# Patient Record
Sex: MALE | Race: WHITE | NOT HISPANIC OR LATINO | URBAN - METROPOLITAN AREA
[De-identification: names, ages, dates, MRNs, and addresses within clinical notes are randomized per-mention and may not be internally consistent; named-entity substitution may affect disease eponyms.]

---

## 2018-02-20 ENCOUNTER — OUTPATIENT (OUTPATIENT)
Dept: OUTPATIENT SERVICES | Facility: HOSPITAL | Age: 83
LOS: 1 days | End: 2018-02-20
Payer: MEDICARE

## 2018-02-20 DIAGNOSIS — I62.00 NONTRAUMATIC SUBDURAL HEMORRHAGE, UNSPECIFIED: ICD-10-CM

## 2018-02-20 PROCEDURE — 70450 CT HEAD/BRAIN W/O DYE: CPT

## 2018-02-20 PROCEDURE — 70450 CT HEAD/BRAIN W/O DYE: CPT | Mod: 26

## 2018-02-22 ENCOUNTER — OTHER (OUTPATIENT)
Age: 83
End: 2018-02-22

## 2019-10-24 ENCOUNTER — APPOINTMENT (OUTPATIENT)
Dept: NEUROLOGY | Facility: CLINIC | Age: 84
End: 2019-10-24

## 2022-11-15 ENCOUNTER — APPOINTMENT (OUTPATIENT)
Dept: UROLOGY | Facility: CLINIC | Age: 87
End: 2022-11-15

## 2022-11-15 VITALS — DIASTOLIC BLOOD PRESSURE: 77 MMHG | TEMPERATURE: 98.6 F | SYSTOLIC BLOOD PRESSURE: 117 MMHG | HEART RATE: 60 BPM

## 2022-11-15 PROCEDURE — 99203 OFFICE O/P NEW LOW 30 MIN: CPT | Mod: 25

## 2022-11-15 PROCEDURE — 51798 US URINE CAPACITY MEASURE: CPT

## 2022-11-15 RX ORDER — SOLIFENACIN SUCCINATE 5 MG/1
5 TABLET ORAL DAILY
Qty: 30 | Refills: 11 | Status: ACTIVE | COMMUNITY
Start: 2022-11-15 | End: 1900-01-01

## 2022-11-15 RX ORDER — TAMSULOSIN HYDROCHLORIDE 0.4 MG/1
0.4 CAPSULE ORAL
Qty: 30 | Refills: 3 | Status: ACTIVE | COMMUNITY
Start: 2022-11-15 | End: 1900-01-01

## 2022-11-15 NOTE — HISTORY OF PRESENT ILLNESS
[FreeTextEntry1] : 89 male. Here with his daughter ([employer] Employee) and his wife.\par \par cc: frequent urination, elida at night (5-6x at night); bedwetting\par HPI: Since August 2022, the frequency and nocturia became more noticeable. During the day, pt is experiencing frequency. At night, main complaint is nocturia 5-6x/night and enuresis. Reports being on Flomax in the past but was stopped. Denies: hematuria, chills, fever. Hx of Dementia which has been worsening. Family concerned with attempting to render patient more dry with less wetting accidents, difficult to manage at home given worsening dementia. Does poor job with fluid intake, does endorse signifiicant constipation. \par \par Allergies: NKDA\par Anticoagulants: Plavix\par Medical: Dementia, bradycardia, heart failure, kidney stones, constipation\par Surgical: PPM, cholecystectomy, possibly kidney stone surgery\par Social: lives at home with wife, walks with cane\par Family: no known family cancers\par \par PVR: 44 mL\par \par  [Urinary Incontinence] : no urinary incontinence [Urinary Retention] : no urinary retention [Urinary Urgency] : urinary urgency [Urinary Frequency] : urinary frequency

## 2022-11-15 NOTE — REVIEW OF SYSTEMS
[History of kidney stones] : history of kidney stones [Wake up at night to urinate  How many times?  ___] : wakes up to urinate [unfilled] times during the night [Strong urge to urinate] : strong urge to urinate [Negative] : Heme/Lymph [see HPI] : see HPI

## 2022-11-15 NOTE — PHYSICAL EXAM
[General Appearance - Well Developed] : well developed [General Appearance - Well Nourished] : well nourished [Heart Rate And Rhythm] : Heart rate and rhythm were normal [] : no respiratory distress [Respiration, Rhythm And Depth] : normal respiratory rhythm and effort [Bowel Sounds] : normal bowel sounds [Abdomen Soft] : soft [Normal Station and Gait] : the gait and station were normal for the patient's age [Skin Color & Pigmentation] : normal skin color and pigmentation [No Focal Deficits] : no focal deficits [Skin Turgor] : supple [Oriented To Time, Place, And Person] : oriented to person, place, and time [Not Anxious] : not anxious

## 2022-12-19 ENCOUNTER — EMERGENCY (EMERGENCY)
Facility: HOSPITAL | Age: 87
LOS: 1 days | Discharge: HOME | End: 2022-12-19
Attending: EMERGENCY MEDICINE
Payer: MEDICARE

## 2022-12-19 ENCOUNTER — TRANSCRIPTION ENCOUNTER (OUTPATIENT)
Age: 87
End: 2022-12-19

## 2022-12-19 ENCOUNTER — EMERGENCY (EMERGENCY)
Facility: HOSPITAL | Age: 87
LOS: 1 days | Discharge: ANOTHER HOSPITAL | End: 2022-12-19
Attending: EMERGENCY MEDICINE
Payer: MEDICARE

## 2022-12-19 VITALS
HEART RATE: 81 BPM | WEIGHT: 164.91 LBS | SYSTOLIC BLOOD PRESSURE: 143 MMHG | OXYGEN SATURATION: 97 % | HEIGHT: 68 IN | TEMPERATURE: 98 F | DIASTOLIC BLOOD PRESSURE: 90 MMHG | RESPIRATION RATE: 18 BRPM

## 2022-12-19 VITALS
SYSTOLIC BLOOD PRESSURE: 148 MMHG | OXYGEN SATURATION: 97 % | HEART RATE: 60 BPM | RESPIRATION RATE: 18 BRPM | DIASTOLIC BLOOD PRESSURE: 84 MMHG | TEMPERATURE: 98 F

## 2022-12-19 VITALS
TEMPERATURE: 98 F | HEART RATE: 69 BPM | RESPIRATION RATE: 17 BRPM | DIASTOLIC BLOOD PRESSURE: 76 MMHG | SYSTOLIC BLOOD PRESSURE: 138 MMHG | OXYGEN SATURATION: 97 %

## 2022-12-19 LAB
ANION GAP SERPL CALC-SCNC: 8 MMOL/L — SIGNIFICANT CHANGE UP (ref 5–17)
APTT BLD: 35.6 SEC — HIGH (ref 27.5–35.5)
BASOPHILS # BLD AUTO: 0.1 K/UL — SIGNIFICANT CHANGE UP (ref 0–0.2)
BASOPHILS NFR BLD AUTO: 0.7 % — SIGNIFICANT CHANGE UP (ref 0–2)
BUN SERPL-MCNC: 23 MG/DL — HIGH (ref 7–18)
CALCIUM SERPL-MCNC: 9.2 MG/DL — SIGNIFICANT CHANGE UP (ref 8.4–10.5)
CHLORIDE SERPL-SCNC: 107 MMOL/L — SIGNIFICANT CHANGE UP (ref 96–108)
CO2 SERPL-SCNC: 26 MMOL/L — SIGNIFICANT CHANGE UP (ref 22–31)
CREAT SERPL-MCNC: 1.21 MG/DL — SIGNIFICANT CHANGE UP (ref 0.5–1.3)
EGFR: 56 ML/MIN/1.73M2 — LOW
EOSINOPHIL # BLD AUTO: 0 K/UL — SIGNIFICANT CHANGE UP (ref 0–0.5)
EOSINOPHIL NFR BLD AUTO: 0 % — SIGNIFICANT CHANGE UP (ref 0–6)
GLUCOSE SERPL-MCNC: 124 MG/DL — HIGH (ref 70–99)
HCT VFR BLD CALC: 43.8 % — SIGNIFICANT CHANGE UP (ref 39–50)
HGB BLD-MCNC: 14 G/DL — SIGNIFICANT CHANGE UP (ref 13–17)
IMM GRANULOCYTES NFR BLD AUTO: 0.8 % — SIGNIFICANT CHANGE UP (ref 0–0.9)
INR BLD: 1.19 RATIO — HIGH (ref 0.88–1.16)
LYMPHOCYTES # BLD AUTO: 1.17 K/UL — SIGNIFICANT CHANGE UP (ref 1–3.3)
LYMPHOCYTES # BLD AUTO: 8.1 % — LOW (ref 13–44)
MCHC RBC-ENTMCNC: 28.3 PG — SIGNIFICANT CHANGE UP (ref 27–34)
MCHC RBC-ENTMCNC: 32 GM/DL — SIGNIFICANT CHANGE UP (ref 32–36)
MCV RBC AUTO: 88.7 FL — SIGNIFICANT CHANGE UP (ref 80–100)
MONOCYTES # BLD AUTO: 1 K/UL — HIGH (ref 0–0.9)
MONOCYTES NFR BLD AUTO: 7 % — SIGNIFICANT CHANGE UP (ref 2–14)
NEUTROPHILS # BLD AUTO: 11.97 K/UL — HIGH (ref 1.8–7.4)
NEUTROPHILS NFR BLD AUTO: 83.4 % — HIGH (ref 43–77)
NRBC # BLD: 0 /100 WBCS — SIGNIFICANT CHANGE UP (ref 0–0)
PLATELET # BLD AUTO: 196 K/UL — SIGNIFICANT CHANGE UP (ref 150–400)
POTASSIUM SERPL-MCNC: 5.7 MMOL/L — HIGH (ref 3.5–5.3)
POTASSIUM SERPL-SCNC: 5.7 MMOL/L — HIGH (ref 3.5–5.3)
PROTHROM AB SERPL-ACNC: 14.2 SEC — HIGH (ref 10.5–13.4)
RBC # BLD: 4.94 M/UL — SIGNIFICANT CHANGE UP (ref 4.2–5.8)
RBC # FLD: 15.7 % — HIGH (ref 10.3–14.5)
SARS-COV-2 RNA SPEC QL NAA+PROBE: SIGNIFICANT CHANGE UP
SODIUM SERPL-SCNC: 141 MMOL/L — SIGNIFICANT CHANGE UP (ref 135–145)
WBC # BLD: 14.36 K/UL — HIGH (ref 3.8–10.5)
WBC # FLD AUTO: 14.36 K/UL — HIGH (ref 3.8–10.5)

## 2022-12-19 PROCEDURE — 99283 EMERGENCY DEPT VISIT LOW MDM: CPT | Mod: 25

## 2022-12-19 PROCEDURE — 12001 RPR S/N/AX/GEN/TRNK 2.5CM/<: CPT

## 2022-12-19 PROCEDURE — 99285 EMERGENCY DEPT VISIT HI MDM: CPT

## 2022-12-19 PROCEDURE — 12002 RPR S/N/AX/GEN/TRNK2.6-7.5CM: CPT

## 2022-12-19 PROCEDURE — 90715 TDAP VACCINE 7 YRS/> IM: CPT

## 2022-12-19 PROCEDURE — 90471 IMMUNIZATION ADMIN: CPT

## 2022-12-19 PROCEDURE — 73502 X-RAY EXAM HIP UNI 2-3 VIEWS: CPT | Mod: 26,RT

## 2022-12-19 RX ORDER — TETANUS TOXOID, REDUCED DIPHTHERIA TOXOID AND ACELLULAR PERTUSSIS VACCINE, ADSORBED 5; 2.5; 8; 8; 2.5 [IU]/.5ML; [IU]/.5ML; UG/.5ML; UG/.5ML; UG/.5ML
0.5 SUSPENSION INTRAMUSCULAR ONCE
Refills: 0 | Status: COMPLETED | OUTPATIENT
Start: 2022-12-19 | End: 2022-12-19

## 2022-12-19 RX ORDER — MORPHINE SULFATE 50 MG/1
4 CAPSULE, EXTENDED RELEASE ORAL ONCE
Refills: 0 | Status: DISCONTINUED | OUTPATIENT
Start: 2022-12-19 | End: 2022-12-19

## 2022-12-19 RX ADMIN — MORPHINE SULFATE 4 MILLIGRAM(S): 50 CAPSULE, EXTENDED RELEASE ORAL at 23:19

## 2022-12-19 RX ADMIN — TETANUS TOXOID, REDUCED DIPHTHERIA TOXOID AND ACELLULAR PERTUSSIS VACCINE, ADSORBED 0.5 MILLILITER(S): 5; 2.5; 8; 8; 2.5 SUSPENSION INTRAMUSCULAR at 20:14

## 2022-12-19 NOTE — ED PROVIDER NOTE - PATIENT PORTAL LINK FT
You can access the FollowMyHealth Patient Portal offered by [facility] by registering at the following website: [email or web address]. By joining [employer]’s FollowMyHealth portal, you will also be able to view your health information using other applications (apps) compatible with our system.

## 2022-12-19 NOTE — ED PROVIDER NOTE - MUSCULOSKELETAL, MLM
No hip or knee tenderness. Spine appears normal, range of motion is not limited, no muscle or joint tenderness

## 2022-12-19 NOTE — ED PROVIDER NOTE - PHYSICAL EXAMINATION
General: Well appearing, no acute distress, appears stated age  HEENT: normocephalic, atraumatic   Respiratory: normal work of breathing  MSK: tenderness of right pelvis, unable to bend knee, shortened and externally, normal pulses,   Skin: warm, dry  Neuro: A&Ox3, cranial nerves II-XII intact, 5/5 strength in all extremities, no sensory deficits, normal gait   Psych: appropriate affect

## 2022-12-19 NOTE — ED PROVIDER NOTE - LOCATION
PA Initiation    Medication: cycloSPORINE (RESTASIS) 0.05 % ophtha -   Insurance Company: [employer] [place] - Phone [phone] Fax [phone]  Pharmacy Filling the Rx: [id number] - [place] - [address]  Filling Pharmacy Phone: [phone]  Filling Pharmacy Fax: [phone]  Start Date: 3/14/2019               hip
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
## 2022-12-19 NOTE — ED ADULT NURSE NOTE - OBJECTIVE STATEMENT
As per pt, c/o LLE laceration s/p trip and fall down [place] mall. No other obvious traumas noted. Pt denies all other symptoms.

## 2022-12-19 NOTE — ED PROVIDER NOTE - OBJECTIVE STATEMENT
89 year old male with PMHx of alzheimer's is accompanied by his wife is complaining of left leg wound S/P fall today. As per pt's wife, he was on the escalator when he fell (not witnessed by wife). Due to the bleeding, patient called ambulance to be evaluated. No loss of consciousness and no head trauma. Patient is not on any blood thinners. 89 year old male with PMHx of alzheimer's is accompanied by his wife is complaining of left leg wound S/P fall today. As per pt's wife, he was on the escalator when he fell and started bleeding through his jeans . Due to the bleeding, patient called ambulance to be evaluated.   Pt did not lose consciousness and no head trauma. Patient is not on any blood thinners.

## 2022-12-19 NOTE — ED ADULT NURSE NOTE - NSIMPLEMENTINTERV_GEN_ALL_ED
Implemented All Fall Risk Interventions:  [place] to call system. Call bell, personal items and telephone within reach. Instruct patient to call for assistance. Room bathroom lighting operational. Non-slip footwear when patient is off stretcher. Physically safe environment: no spills, clutter or unnecessary equipment. Stretcher in lowest position, wheels locked, appropriate side rails in place. Provide visual cue, wrist band, yellow gown, etc. Monitor gait and stability. Monitor for mental status changes and reorient to person, place, and time. Review medications for side effects contributing to fall risk. Reinforce activity limits and safety measures with patient and family.

## 2022-12-19 NOTE — ED ADULT TRIAGE NOTE - CHIEF COMPLAINT QUOTE
S/P RRT brought in by Wheelchair by [place]. Supervisor [employer].  Found seated on the ground, at corner street of hospital entrance , fell as per wife while walking downhill, with head hitting floor, denies LOC  c/o pain to right hip.

## 2022-12-19 NOTE — ED PROVIDER NOTE - CLINICAL SUMMARY MEDICAL DECISION MAKING FREE TEXT BOX
Will provide Dermabond, give tetanus, and discharge. Will provide Dermabond, give tetanus, and discharge.  Patients wife states he is at his baseline, able to walk by him self, asked again 3 times by me and offered a wheelchair by the nurse, however wife refused, saying he can walk  just fine.

## 2022-12-20 ENCOUNTER — INPATIENT (INPATIENT)
Facility: HOSPITAL | Age: 87
LOS: 9 days | Discharge: NURSING, REHAB OR LONG-TERM CARE | DRG: 522 | End: 2022-12-30
Attending: ORTHOPAEDIC SURGERY | Admitting: ORTHOPAEDIC SURGERY
Payer: MEDICARE

## 2022-12-20 VITALS
TEMPERATURE: 98 F | HEART RATE: 73 BPM | WEIGHT: 179.9 LBS | SYSTOLIC BLOOD PRESSURE: 120 MMHG | OXYGEN SATURATION: 96 % | HEIGHT: 70 IN | DIASTOLIC BLOOD PRESSURE: 81 MMHG | RESPIRATION RATE: 18 BRPM

## 2022-12-20 VITALS
SYSTOLIC BLOOD PRESSURE: 120 MMHG | RESPIRATION RATE: 18 BRPM | DIASTOLIC BLOOD PRESSURE: 81 MMHG | OXYGEN SATURATION: 95 % | TEMPERATURE: 98 F | HEART RATE: 88 BPM

## 2022-12-20 DIAGNOSIS — N40.0 BENIGN PROSTATIC HYPERPLASIA WITHOUT LOWER URINARY TRACT SYMPTOMS: ICD-10-CM

## 2022-12-20 DIAGNOSIS — S72.009A FRACTURE OF UNSPECIFIED PART OF NECK OF UNSPECIFIED FEMUR, INITIAL ENCOUNTER FOR CLOSED FRACTURE: ICD-10-CM

## 2022-12-20 DIAGNOSIS — I48.91 UNSPECIFIED ATRIAL FIBRILLATION: ICD-10-CM

## 2022-12-20 DIAGNOSIS — R52 PAIN, UNSPECIFIED: ICD-10-CM

## 2022-12-20 DIAGNOSIS — S72.001A FRACTURE OF UNSPECIFIED PART OF NECK OF RIGHT FEMUR, INITIAL ENCOUNTER FOR CLOSED FRACTURE: ICD-10-CM

## 2022-12-20 DIAGNOSIS — G30.9 ALZHEIMER'S DISEASE, UNSPECIFIED: ICD-10-CM

## 2022-12-20 LAB
ALBUMIN SERPL ELPH-MCNC: 3.9 G/DL — SIGNIFICANT CHANGE UP (ref 3.3–5)
ALP SERPL-CCNC: 75 U/L — SIGNIFICANT CHANGE UP (ref 40–120)
ALT FLD-CCNC: 8 U/L — LOW (ref 10–45)
ANION GAP SERPL CALC-SCNC: 13 MMOL/L — SIGNIFICANT CHANGE UP (ref 5–17)
ANION GAP SERPL CALC-SCNC: 14 MMOL/L — SIGNIFICANT CHANGE UP (ref 5–17)
APTT BLD: 30.4 SEC — SIGNIFICANT CHANGE UP (ref 27.5–35.5)
AST SERPL-CCNC: 18 U/L — SIGNIFICANT CHANGE UP (ref 10–40)
BASOPHILS # BLD AUTO: 0.05 K/UL — SIGNIFICANT CHANGE UP (ref 0–0.2)
BASOPHILS NFR BLD AUTO: 0.4 % — SIGNIFICANT CHANGE UP (ref 0–2)
BILIRUB SERPL-MCNC: 1.4 MG/DL — HIGH (ref 0.2–1.2)
BLD GP AB SCN SERPL QL: SIGNIFICANT CHANGE UP
BUN SERPL-MCNC: 23 MG/DL — SIGNIFICANT CHANGE UP (ref 7–23)
BUN SERPL-MCNC: 23 MG/DL — SIGNIFICANT CHANGE UP (ref 7–23)
CALCIUM SERPL-MCNC: 8.9 MG/DL — SIGNIFICANT CHANGE UP (ref 8.4–10.5)
CALCIUM SERPL-MCNC: 9.2 MG/DL — SIGNIFICANT CHANGE UP (ref 8.4–10.5)
CHLORIDE SERPL-SCNC: 104 MMOL/L — SIGNIFICANT CHANGE UP (ref 96–108)
CHLORIDE SERPL-SCNC: 104 MMOL/L — SIGNIFICANT CHANGE UP (ref 96–108)
CO2 SERPL-SCNC: 23 MMOL/L — SIGNIFICANT CHANGE UP (ref 22–31)
CO2 SERPL-SCNC: 23 MMOL/L — SIGNIFICANT CHANGE UP (ref 22–31)
CREAT SERPL-MCNC: 1.07 MG/DL — SIGNIFICANT CHANGE UP (ref 0.5–1.3)
CREAT SERPL-MCNC: 1.08 MG/DL — SIGNIFICANT CHANGE UP (ref 0.5–1.3)
EGFR: 64 ML/MIN/1.73M2 — SIGNIFICANT CHANGE UP
EGFR: 65 ML/MIN/1.73M2 — SIGNIFICANT CHANGE UP
EOSINOPHIL # BLD AUTO: 0 K/UL — SIGNIFICANT CHANGE UP (ref 0–0.5)
EOSINOPHIL NFR BLD AUTO: 0 % — SIGNIFICANT CHANGE UP (ref 0–6)
FLUAV AG NPH QL: SIGNIFICANT CHANGE UP
FLUBV AG NPH QL: SIGNIFICANT CHANGE UP
GLUCOSE SERPL-MCNC: 135 MG/DL — HIGH (ref 70–99)
GLUCOSE SERPL-MCNC: 140 MG/DL — HIGH (ref 70–99)
HCT VFR BLD CALC: 42.2 % — SIGNIFICANT CHANGE UP (ref 39–50)
HCT VFR BLD CALC: 44.8 % — SIGNIFICANT CHANGE UP (ref 39–50)
HGB BLD-MCNC: 13.6 G/DL — SIGNIFICANT CHANGE UP (ref 13–17)
HGB BLD-MCNC: 14.5 G/DL — SIGNIFICANT CHANGE UP (ref 13–17)
IMM GRANULOCYTES NFR BLD AUTO: 0.8 % — SIGNIFICANT CHANGE UP (ref 0–0.9)
INR BLD: 1.23 RATIO — HIGH (ref 0.88–1.16)
LYMPHOCYTES # BLD AUTO: 0.66 K/UL — LOW (ref 1–3.3)
LYMPHOCYTES # BLD AUTO: 5.3 % — LOW (ref 13–44)
MCHC RBC-ENTMCNC: 28.8 PG — SIGNIFICANT CHANGE UP (ref 27–34)
MCHC RBC-ENTMCNC: 28.9 PG — SIGNIFICANT CHANGE UP (ref 27–34)
MCHC RBC-ENTMCNC: 32.2 GM/DL — SIGNIFICANT CHANGE UP (ref 32–36)
MCHC RBC-ENTMCNC: 32.4 GM/DL — SIGNIFICANT CHANGE UP (ref 32–36)
MCV RBC AUTO: 89.4 FL — SIGNIFICANT CHANGE UP (ref 80–100)
MCV RBC AUTO: 89.4 FL — SIGNIFICANT CHANGE UP (ref 80–100)
MONOCYTES # BLD AUTO: 0.71 K/UL — SIGNIFICANT CHANGE UP (ref 0–0.9)
MONOCYTES NFR BLD AUTO: 5.6 % — SIGNIFICANT CHANGE UP (ref 2–14)
NEUTROPHILS # BLD AUTO: 11.05 K/UL — HIGH (ref 1.8–7.4)
NEUTROPHILS NFR BLD AUTO: 87.9 % — HIGH (ref 43–77)
NRBC # BLD: 0 /100 WBCS — SIGNIFICANT CHANGE UP (ref 0–0)
NRBC # BLD: 0 /100 WBCS — SIGNIFICANT CHANGE UP (ref 0–0)
PLATELET # BLD AUTO: 196 K/UL — SIGNIFICANT CHANGE UP (ref 150–400)
PLATELET # BLD AUTO: 214 K/UL — SIGNIFICANT CHANGE UP (ref 150–400)
POTASSIUM SERPL-MCNC: 4.5 MMOL/L — SIGNIFICANT CHANGE UP (ref 3.5–5.3)
POTASSIUM SERPL-MCNC: 4.9 MMOL/L — SIGNIFICANT CHANGE UP (ref 3.5–5.3)
POTASSIUM SERPL-SCNC: 4.5 MMOL/L — SIGNIFICANT CHANGE UP (ref 3.5–5.3)
POTASSIUM SERPL-SCNC: 4.9 MMOL/L — SIGNIFICANT CHANGE UP (ref 3.5–5.3)
PROT SERPL-MCNC: 6.5 G/DL — SIGNIFICANT CHANGE UP (ref 6–8.3)
PROTHROM AB SERPL-ACNC: 14.3 SEC — HIGH (ref 10.5–13.4)
RBC # BLD: 4.72 M/UL — SIGNIFICANT CHANGE UP (ref 4.2–5.8)
RBC # BLD: 5.01 M/UL — SIGNIFICANT CHANGE UP (ref 4.2–5.8)
RBC # FLD: 15.8 % — HIGH (ref 10.3–14.5)
RBC # FLD: 15.8 % — HIGH (ref 10.3–14.5)
RSV RNA NPH QL NAA+NON-PROBE: SIGNIFICANT CHANGE UP
SARS-COV-2 RNA SPEC QL NAA+PROBE: SIGNIFICANT CHANGE UP
SODIUM SERPL-SCNC: 140 MMOL/L — SIGNIFICANT CHANGE UP (ref 135–145)
SODIUM SERPL-SCNC: 141 MMOL/L — SIGNIFICANT CHANGE UP (ref 135–145)
WBC # BLD: 12.57 K/UL — HIGH (ref 3.8–10.5)
WBC # BLD: 15.65 K/UL — HIGH (ref 3.8–10.5)
WBC # FLD AUTO: 12.57 K/UL — HIGH (ref 3.8–10.5)
WBC # FLD AUTO: 15.65 K/UL — HIGH (ref 3.8–10.5)

## 2022-12-20 PROCEDURE — 86900 BLOOD TYPING SEROLOGIC ABO: CPT

## 2022-12-20 PROCEDURE — 80048 BASIC METABOLIC PNL TOTAL CA: CPT

## 2022-12-20 PROCEDURE — 73502 X-RAY EXAM HIP UNI 2-3 VIEWS: CPT

## 2022-12-20 PROCEDURE — 72125 CT NECK SPINE W/O DYE: CPT | Mod: MA

## 2022-12-20 PROCEDURE — 72125 CT NECK SPINE W/O DYE: CPT | Mod: 26,MA

## 2022-12-20 PROCEDURE — 99285 EMERGENCY DEPT VISIT HI MDM: CPT

## 2022-12-20 PROCEDURE — 36415 COLL VENOUS BLD VENIPUNCTURE: CPT

## 2022-12-20 PROCEDURE — 85025 COMPLETE CBC W/AUTO DIFF WBC: CPT

## 2022-12-20 PROCEDURE — 72170 X-RAY EXAM OF PELVIS: CPT | Mod: 26

## 2022-12-20 PROCEDURE — 70450 CT HEAD/BRAIN W/O DYE: CPT | Mod: MA

## 2022-12-20 PROCEDURE — 27236 TREAT THIGH FRACTURE: CPT | Mod: RT

## 2022-12-20 PROCEDURE — 86901 BLOOD TYPING SEROLOGIC RH(D): CPT

## 2022-12-20 PROCEDURE — 85730 THROMBOPLASTIN TIME PARTIAL: CPT

## 2022-12-20 PROCEDURE — 99284 EMERGENCY DEPT VISIT MOD MDM: CPT | Mod: 25

## 2022-12-20 PROCEDURE — 87635 SARS-COV-2 COVID-19 AMP PRB: CPT

## 2022-12-20 PROCEDURE — 93010 ELECTROCARDIOGRAM REPORT: CPT

## 2022-12-20 PROCEDURE — 93280 PM DEVICE PROGR EVAL DUAL: CPT | Mod: 26

## 2022-12-20 PROCEDURE — 72192 CT PELVIS W/O DYE: CPT | Mod: 26,MA

## 2022-12-20 PROCEDURE — 71045 X-RAY EXAM CHEST 1 VIEW: CPT | Mod: 26

## 2022-12-20 PROCEDURE — 85610 PROTHROMBIN TIME: CPT

## 2022-12-20 PROCEDURE — 96374 THER/PROPH/DIAG INJ IV PUSH: CPT

## 2022-12-20 PROCEDURE — 76377 3D RENDER W/INTRP POSTPROCES: CPT | Mod: 26

## 2022-12-20 PROCEDURE — 86850 RBC ANTIBODY SCREEN: CPT

## 2022-12-20 PROCEDURE — 70450 CT HEAD/BRAIN W/O DYE: CPT | Mod: 26,MA

## 2022-12-20 DEVICE — STEM HIP NECK ANG V40 SZ 7 37X114MM 127 DEG: Type: IMPLANTABLE DEVICE | Site: RIGHT | Status: FUNCTIONAL

## 2022-12-20 DEVICE — HEAD FEMORAL: Type: IMPLANTABLE DEVICE | Site: RIGHT | Status: FUNCTIONAL

## 2022-12-20 DEVICE — CEMENT SIMPLEX P 40GM: Type: IMPLANTABLE DEVICE | Site: RIGHT | Status: FUNCTIONAL

## 2022-12-20 DEVICE — IMPLANTABLE DEVICE: Type: IMPLANTABLE DEVICE | Site: RIGHT | Status: FUNCTIONAL

## 2022-12-20 RX ORDER — DONEPEZIL HYDROCHLORIDE 10 MG/1
5 TABLET, FILM COATED ORAL AT BEDTIME
Refills: 0 | Status: DISCONTINUED | OUTPATIENT
Start: 2022-12-20 | End: 2022-12-20

## 2022-12-20 RX ORDER — METOPROLOL TARTRATE 50 MG
25 TABLET ORAL DAILY
Refills: 0 | Status: DISCONTINUED | OUTPATIENT
Start: 2022-12-20 | End: 2022-12-20

## 2022-12-20 RX ORDER — FENTANYL CITRATE 50 UG/ML
25 INJECTION INTRAVENOUS
Refills: 0 | Status: DISCONTINUED | OUTPATIENT
Start: 2022-12-20 | End: 2022-12-20

## 2022-12-20 RX ORDER — SODIUM CHLORIDE 9 MG/ML
1000 INJECTION, SOLUTION INTRAVENOUS
Refills: 0 | Status: DISCONTINUED | OUTPATIENT
Start: 2022-12-20 | End: 2022-12-20

## 2022-12-20 RX ORDER — ACETAMINOPHEN 500 MG
1000 TABLET ORAL ONCE
Refills: 0 | Status: DISCONTINUED | OUTPATIENT
Start: 2022-12-20 | End: 2022-12-20

## 2022-12-20 RX ORDER — FENTANYL CITRATE 50 UG/ML
50 INJECTION INTRAVENOUS
Refills: 0 | Status: DISCONTINUED | OUTPATIENT
Start: 2022-12-20 | End: 2022-12-20

## 2022-12-20 RX ORDER — DONEPEZIL HYDROCHLORIDE 10 MG/1
5 TABLET, FILM COATED ORAL AT BEDTIME
Refills: 0 | Status: DISCONTINUED | OUTPATIENT
Start: 2022-12-20 | End: 2022-12-30

## 2022-12-20 RX ORDER — METOPROLOL TARTRATE 50 MG
25 TABLET ORAL DAILY
Refills: 0 | Status: DISCONTINUED | OUTPATIENT
Start: 2022-12-20 | End: 2022-12-30

## 2022-12-20 RX ORDER — ONDANSETRON 8 MG/1
8 TABLET, FILM COATED ORAL ONCE
Refills: 0 | Status: DISCONTINUED | OUTPATIENT
Start: 2022-12-20 | End: 2022-12-20

## 2022-12-20 RX ORDER — ACETAMINOPHEN 500 MG
975 TABLET ORAL EVERY 8 HOURS
Refills: 0 | Status: DISCONTINUED | OUTPATIENT
Start: 2022-12-21 | End: 2022-12-30

## 2022-12-20 RX ORDER — PANTOPRAZOLE SODIUM 20 MG/1
40 TABLET, DELAYED RELEASE ORAL
Refills: 0 | Status: DISCONTINUED | OUTPATIENT
Start: 2022-12-20 | End: 2022-12-30

## 2022-12-20 RX ORDER — PANTOPRAZOLE SODIUM 20 MG/1
40 TABLET, DELAYED RELEASE ORAL
Refills: 0 | Status: DISCONTINUED | OUTPATIENT
Start: 2022-12-20 | End: 2022-12-20

## 2022-12-20 RX ORDER — OXYBUTYNIN CHLORIDE 5 MG
5 TABLET ORAL
Refills: 0 | Status: DISCONTINUED | OUTPATIENT
Start: 2022-12-20 | End: 2022-12-20

## 2022-12-20 RX ORDER — OXYCODONE HYDROCHLORIDE 5 MG/1
2.5 TABLET ORAL EVERY 4 HOURS
Refills: 0 | Status: DISCONTINUED | OUTPATIENT
Start: 2022-12-20 | End: 2022-12-23

## 2022-12-20 RX ORDER — CEFAZOLIN SODIUM 1 G
2000 VIAL (EA) INJECTION EVERY 8 HOURS
Refills: 0 | Status: COMPLETED | OUTPATIENT
Start: 2022-12-20 | End: 2022-12-22

## 2022-12-20 RX ORDER — SODIUM CHLORIDE 9 MG/ML
500 INJECTION INTRAMUSCULAR; INTRAVENOUS; SUBCUTANEOUS ONCE
Refills: 0 | Status: DISCONTINUED | OUTPATIENT
Start: 2022-12-20 | End: 2022-12-20

## 2022-12-20 RX ORDER — SODIUM CHLORIDE 9 MG/ML
1000 INJECTION INTRAMUSCULAR; INTRAVENOUS; SUBCUTANEOUS
Refills: 0 | Status: DISCONTINUED | OUTPATIENT
Start: 2022-12-20 | End: 2022-12-21

## 2022-12-20 RX ORDER — OXYCODONE HYDROCHLORIDE 5 MG/1
5 TABLET ORAL EVERY 4 HOURS
Refills: 0 | Status: DISCONTINUED | OUTPATIENT
Start: 2022-12-20 | End: 2022-12-23

## 2022-12-20 RX ORDER — SACUBITRIL AND VALSARTAN 24; 26 MG/1; MG/1
1 TABLET, FILM COATED ORAL
Refills: 0 | Status: DISCONTINUED | OUTPATIENT
Start: 2022-12-20 | End: 2022-12-30

## 2022-12-20 RX ORDER — TRAMADOL HYDROCHLORIDE 50 MG/1
50 TABLET ORAL EVERY 6 HOURS
Refills: 0 | Status: DISCONTINUED | OUTPATIENT
Start: 2022-12-20 | End: 2022-12-23

## 2022-12-20 RX ORDER — SACUBITRIL AND VALSARTAN 24; 26 MG/1; MG/1
1 TABLET, FILM COATED ORAL
Refills: 0 | Status: DISCONTINUED | OUTPATIENT
Start: 2022-12-20 | End: 2022-12-20

## 2022-12-20 RX ORDER — ENOXAPARIN SODIUM 100 MG/ML
40 INJECTION SUBCUTANEOUS EVERY 24 HOURS
Refills: 0 | Status: DISCONTINUED | OUTPATIENT
Start: 2022-12-21 | End: 2022-12-21

## 2022-12-20 RX ORDER — OXYBUTYNIN CHLORIDE 5 MG
5 TABLET ORAL
Refills: 0 | Status: DISCONTINUED | OUTPATIENT
Start: 2022-12-20 | End: 2022-12-30

## 2022-12-20 RX ADMIN — SACUBITRIL AND VALSARTAN 1 TABLET(S): 24; 26 TABLET, FILM COATED ORAL at 22:58

## 2022-12-20 RX ADMIN — SODIUM CHLORIDE 75 MILLILITER(S): 9 INJECTION, SOLUTION INTRAVENOUS at 10:50

## 2022-12-20 RX ADMIN — SODIUM CHLORIDE 50 MILLILITER(S): 9 INJECTION INTRAMUSCULAR; INTRAVENOUS; SUBCUTANEOUS at 18:29

## 2022-12-20 RX ADMIN — Medication 100 MILLIGRAM(S): at 22:57

## 2022-12-20 RX ADMIN — DONEPEZIL HYDROCHLORIDE 5 MILLIGRAM(S): 10 TABLET, FILM COATED ORAL at 22:58

## 2022-12-20 RX ADMIN — Medication 5 MILLIGRAM(S): at 22:58

## 2022-12-20 NOTE — H&P ADULT - HISTORY OF PRESENT ILLNESS
89y Male who presents to the ED today with c/o right hip pain and inability to bear weight s/p mechanical trip at home yesterday. Pt is confused at baseline and poor historian, hx of Alzheimers dementia. Per the patients family, the patient ambulates w/o assistance at baseline. Pt denies any other injuries. Pt denies head trauma or LOC. Pt denies any numbness, tingling or paresthesias.       Past Medical & Surgical History:  Fracture of unspecified part of neck of unspecified femur, initial encounter for closed fracture    MEWS Score    Alzheimer disease    Hip fracture    Hip fracture, right    Alzheimer disease    Afib    Pain    BPH (benign prostatic hyperplasia)    HIP FX    Alzheimer disease    [email or web address]        Allergies:  No Known Allergies      Vital Signs:  T(C): 36.9 (12-20-22 @ 07:02), Max: 36.9 (12-20-22 @ 07:02)  HR: 67 (12-20-22 @ 07:02) (67 - 75)  BP: 131/82 (12-20-22 @ 07:02) (120/81 - 150/72)  RR: 18 (12-20-22 @ 07:02) (18 - 18)  SpO2: 94% (12-20-22 @ 07:02) (94% - 96%)    Labs:  CBC ( 12-20-22 @ 04:42 )                   14.5  12.57 )-----------( 214                44.8      Chem ( 12-20-22 @ 04:42 )  141  |  104  |  23  ----------------------------<  135  4.5   |  23  |  1.08      PT/INR ( 12-20-22 @ 04:42 )   PT: 14.3;   INR: 1.23      PTT ( 12-20-22 @ 04:42 )  PTT:30.4    Imaging:  X-rays of the pelvis, right hip and femur demonstrate an femoral neck fracture.    PE right hip:  RLE is short and ER. +mild swelling, no ecchymosis, no erythema, skin intact, normothermic. +TTP over groin and greater troch. LROM 2' pain. Moving ankle and all toes, +EHL/FHL/TA/GS. SILT throughout. DP and PT pulses 2+.    Secondary Survey:  Right knee, right ankle, LLE, and B/L UEs with no swelling, no ecchymoses, no abrasions, no lacerations or any other signs of injury with full painless baseline ROM and no bony TTP. Able to SLR LLE. No pain with axial loading or log roll LLE. Sensation intact distally, moving all digits. Distal pulses intact.     Spine and ribs with no bony TTP.     Assessment:  89y Male with a right femoral neck fracture     Plan:  Pt and family advised that surgical fixation of right hip fracture with Intramedullary Nail is necessary.  Surgical procedure was discussed in detail with patient and family including all R/B/As; Pts family expressed understanding and consent for surgery was obtained.  Consult Medical service for optimization and medical clearance.  f/u medical clearance.  f/u labs/imaging.  Complete bedrest.  Pain control.  Ice application.  DVT prophylaxis with SCDs for now.  NPO and hold chemical anticoagulation with plan for possible OR today   IVF while NPO.  Case discussed with Dr. Ceron who agrees with plan.

## 2022-12-20 NOTE — CONSULT NOTE ADULT - PROBLEM SELECTOR RECOMMENDATION 3
Patient with a hx of afib  Echo done in march showed a EF of 43%  continue metoprolol for rate control  will adjust meds as needed  hold clopidogrel for surgery and restart post op  continue entresto

## 2022-12-20 NOTE — ED ADULT NURSE NOTE - NSIMPLEMENTINTERV_GEN_ALL_ED
Implemented All Fall with Harm Risk Interventions:  [place] to call system. Call bell, personal items and telephone within reach. Instruct patient to call for assistance. Room bathroom lighting operational. Non-slip footwear when patient is off stretcher. Physically safe environment: no spills, clutter or unnecessary equipment. Stretcher in lowest position, wheels locked, appropriate side rails in place. Provide visual cue, wrist band, yellow gown, etc. Monitor gait and stability. Monitor for mental status changes and reorient to person, place, and time. Review medications for side effects contributing to fall risk. Reinforce activity limits and safety measures with patient and family. Provide visual clues: red socks.

## 2022-12-20 NOTE — PRE-ANESTHESIA EVALUATION ADULT - NSANTHTOBACCOSD_GEN_ALL_CORE
[facility] [place] at [address]  [address], [address]  W: [phone]  F: [phone]    Reason for Visit:   Josie Santiago is a 80 y.o. male who is seen for follow up of Anemia    History of Present Illness:   Patient is a 80 y.o. male with multiple medical problems including coronary artery disease, aortic stenosis, hypertension, chronic kidney disease who was seen for progressive anemia. He was noted to have a hemoglobin of 7.8 g/dL with an MCV of 88 and no other CBC abnormalities on 1/9/19. Hemoglobin has been declining gradually over the last year. Prior evaluation had revealed a normal B12 level at around 400, no clear evidence of hemolysis. I have a ferritin level from last year that was normal at 124. His last measured creatinine was elevated at 1.3. He was last seen in February 2019 when additional work-up was ordered and was asked to follow-up in 3 weeks. He comes with his son today and states that he was unable to follow-up due to scheduling issues. Kevin Koch He is to deny any changes in his health. He has no bleeding. No dark stools. No fevers, chills, sweats. No blood in urine. He has no fevers, chills, sweats or new pain. He has chronic LE edema and stasis dermatitis. He thinks skin often bleeds and peels at that site. Uses a walker but lives alone and independently. Does not smoke or drink ETOH     Past Medical History:   Diagnosis Date    Cataracts, bilateral     Chronic pain     Diabetes ([address] 2/2/2011    Hypertension       Past Surgical History:   Procedure Laterality Date    HX HERNIA REPAIR      HX ORTHOPAEDIC      bilat hip replacement    HX PROSTATECTOMY        Social History     Tobacco Use    Smoking status: Never Smoker    Smokeless tobacco: Never Used   Substance Use Topics    Alcohol use: No      History reviewed. No pertinent family history.   Current Outpatient Medications   Medication Sig    multivitamin (ONE A DAY) tablet Take 1 Tab by mouth daily.  simvastatin (ZOCOR) 20 mg tablet Take 1 Tab by mouth nightly.  aspirin 81 mg chewable tablet Take 1 Tab by mouth daily.  phosphorus (K PHOS NEUTRAL) 250 mg tablet Take 1 Tab by mouth three (3) times daily (with meals).  timolol (TIMOPTIC) 0.5 % ophthalmic solution Administer 1 Drop to left eye two (2) times a day.  lisinopril (PRINIVIL, ZESTRIL) 20 mg tablet Take 20 mg by mouth daily.  amLODIPine (NORVASC) 10 mg tablet Take 10 mg by mouth daily.  furosemide (LASIX) 40 mg tablet Take 40 mg by mouth daily.  potassium chloride SA (MICRO-K) 10 mEq capsule Take 10 mEq by mouth daily. No current facility-administered medications for this visit. No Known Allergies     Review of Systems: A complete review of systems was obtained, negative except as described above. Physical Exam:     Visit Vitals  /69 (BP 1 Location: Left arm, BP Patient Position: Sitting)   Pulse 69   Temp 97.5 °F (36.4 °C) (Oral)   Resp 16   Ht 5' 7\" (1.702 m)   Wt 174 lb 8 oz (79.2 kg)   SpO2 96%   BMI 27.33 kg/m²     ECOG PS: 2  General: No distress, uses a walker  Eyes: PERRLA, anicteric sclerae  HENT: Atraumatic, OP clear  Neck: Supple  MS: Uses a walker, he has bilateral ;LE edema with scaly skin that is broken down on the L ankle  Skin: No rashes, except on LE  Psych: Alert, oriented, appropriate affect, normal judgment/insight    Results:     Lab Results   Component Value Date/Time    WBC 7.8 02/07/2019 12:35 PM    HGB 7.2 (L) 02/07/2019 12:35 PM    HCT 23.9 (L) 02/07/2019 12:35 PM    PLATELET 372 (H) 21/15/7056 12:35 PM    MCV 92 02/07/2019 12:35 PM    ABS.  NEUTROPHILS 5.3 02/07/2019 12:35 PM     Lab Results   Component Value Date/Time    Sodium 148 (H) 02/07/2019 12:35 PM    Potassium 4.2 02/07/2019 12:35 PM    Chloride 109 (H) 02/07/2019 12:35 PM    CO2 23 02/07/2019 12:35 PM    Glucose 84 02/07/2019 12:35 PM    BUN 24 02/07/2019 12:35 PM    Creatinine 1.60 (H) 02/07/2019 12:35 PM    GFR est AA 44 (L) 02/07/2019 12:35 PM    GFR est non-AA 38 (L) 02/07/2019 12:35 PM    Calcium 9.2 02/07/2019 12:35 PM    Glucose (POC) 94 12/21/2015 11:19 AM     Lab Results   Component Value Date/Time    Bilirubin, total 0.4 02/07/2019 12:35 PM    ALT (SGPT) 7 02/07/2019 12:35 PM    AST (SGOT) 11 02/07/2019 12:35 PM    Alk. phosphatase 66 02/07/2019 12:35 PM    Protein, total 7.5 02/07/2019 12:35 PM    Albumin 3.9 02/07/2019 12:35 PM    Globulin 3.1 12/20/2015 04:36 AM     Lab Results   Component Value Date/Time    Iron % saturation 7 (LL) 02/07/2019 12:35 PM    TIBC 285 02/07/2019 12:35 PM    Ferritin 58 02/07/2019 12:35 PM    Haptoglobin 218 (H) 02/07/2019 12:35 PM     07/20/2009 09:33 AM    M-Jb Not Observed 02/07/2019 12:35 PM    DAVID, Direct None Detected 07/20/2009 09:33 AM    Hep C Virus Ab 0.1 02/07/2019 12:35 PM    HIV SCREEN 4TH GENERATION [facility] Non Reactive 02/07/2019 12:35 PM     Lab Results   Component Value Date/Time    INR 1.2 (H) 04/22/2009 10:36 AM    aPTT 23.0 (L) 04/22/2009 10:36 AM     Component      Latest Ref Rng & Units 2/7/2019          12:35 PM   METHYLMALONIC ACID, SERUM      0 - 378 nmol/L 389 (H)     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 Outside records Records reviewed and summarized above. Pathology report(s) reviewed above. Radiology report(s) reviewed above. Assessment:   1) Normocytic anemia- progressive    All work-up from February 2019 reviewed. He was lost to follow-up and hence I do not know what his current hemoglobin is. Work-up is consistent with profound iron deficiency and B12 deficiency. His last iron saturation was low at 7%, his methylmalonic acid was elevated. Very concerned that he may be having occult GI bleeding. I will go ahead and replace iron as well as B12 and refer him to GI  There is also an element of anemia of chronic kidney disease.   However erythropoietin may not be able to help increase his hemoglobin until his ferritin is close to 200.    2) CKD    3) CAD  On aspirin    4) psychosocial  He has limited social support. Lives alone. Comes with a friend today. Social work following      Plan:     · Injectafer x2  · Start B12 1000 mcg IM weekly x4 and monthly thereafter  · Referred to GI for consideration of colonoscopy and perhaps an endoscopy. Return to clinic in 4 weeks with repeat labs    I appreciate the opportunity to participate in Mr. Merlyn schilling.     Signed By: Esa Calle MD No

## 2022-12-20 NOTE — CONSULT NOTE ADULT - PROBLEM SELECTOR RECOMMENDATION 9
Patient scheduled for an Open reduction and internal fixation of the right hip  No contraindication to scheduled procedure  Patient is NPO  DVT and GI prophylaxis

## 2022-12-20 NOTE — PROCEDURE NOTE - ADDITIONAL PROCEDURE DETAILS
Indication for interrogation: OR today  Presenting rhythm: AF 80s  Measured data WNL, normal pacemaker function, Pt is NOT pacemaker dependent  Stored data revealed episodes of AF persisting since at least 1/2019

## 2022-12-20 NOTE — ED PROVIDER NOTE - CLINICAL SUMMARY MEDICAL DECISION MAKING FREE TEXT BOX
89-year-old male past medical history of Alzheimer's presenting to the ED from a transfer from [facility] after falling and being diagnosed with a right hip fracture.    Initial imaging from [facility] concerns for femoral neck fx  Will consult ortho. Sending pre-op labs

## 2022-12-20 NOTE — ED ADULT NURSE REASSESSMENT NOTE - NS ED NURSE REASSESS COMMENT FT1
Pt pulling at IV line and undressing self in hallway. Pt placed in diaper and IV wrapped with gauze. Pt now sleeping in stretcher in position of comfort.

## 2022-12-20 NOTE — ED PROVIDER NOTE - PHYSICAL EXAMINATION
Physical Exam:    Gen: NAD, AOx3, non-toxic appearing, able to ambulate without assistance  Head: NCAT  HEENT: EOMI, PEERLA, normal conjunctiva, tongue midline, oral mucosa moist  Lung: CTAB, no respiratory distress, no wheezes/rhonchi/rales B/L, speaking in full sentences  CV: RRR, no murmurs, rubs or gallops  Abd: soft, NT, ND, no guarding, no rigidity, no rebound tenderness, no CVA tenderness   MSK: right hip ttp, sensation intact, RLE shortened and externally rotated   Neuro: No focal sensory or motor deficits  Skin: Warm, well perfused, no rash, no leg swelling  Psych: normal affect, calm
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
## 2022-12-20 NOTE — ED PROVIDER NOTE - ATTENDING CONTRIBUTION TO CARE
Afebrile. Awake and Alert. Ox0. Lungs CTA. Heart RRR. Abdomen soft NTND. CN II-XII grossly intact. +TTP right hip NV intact distally.    Ortho c/s
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
## 2022-12-20 NOTE — ED ADULT NURSE NOTE - OBJECTIVE STATEMENT
89 y.o. M A&Ox1 (oriented to self), PMHx of HLD, presenting to ED via EMS as transfer from [place] s/p fall. EMS states that pt was DCed from , declined wheelchair to vehicle, and while leaving tripped outside. States that pt was scanned at  and was found to have a R hip FX. Pt unable to answer any questions, but appears to be in pain to R leg. Upon assessment, pt alert, grossly neurologically intact, and well appearing. Pupils PERRLA and no drainage noted. Airway patent, chest rise symmetrical with no advantageous L/S, and pt is eupneic. Skin is warm, dry, and normal for race. No cyanosis noted to lips or fingernails. Mucous membranes moist. Negative clubbed fingernails. Radial pulses equal and +2 bilaterally. Abd soft, nontender, nondistended. ROM intact and strength +5 in LLE, LUE, RUE. Due to pain, ROM limited in RLE. No edema noted to bilateral legs or arms. Pt resting in stretcher in position of comfort. Stretcher locked and lowered and pt within eyesight of nurses station.

## 2022-12-20 NOTE — PROCEDURE NOTE - INTERROGATION NOTE: UNDERLYING RHYTHM
OFFICE VISIT NOTE            Assessment/Plan for  Yuliya Caro is a 48 y.o. female.  No Patient Care Coordination Note on file.       1.  Acute sinusitis.  Annual issue in a healthy woman.  Really without signs of underlying environmental allergies  2.  Vertigo       Plan:  Z-Carlos        Diagnoses and all orders for this visit:    Acute sinusitis, recurrence not specified, unspecified location    Other orders  -     azithromycin (ZITHROMAX Z-CARLOS) 250 MG tablet  Dispense: 6 tablet; Refill: 0        Yohannes Lares MD  Internal medicine  [facility]  [phone]  [email or web address]    This is an electronically verified report by Yohannes Lares M.D.  (Note created with Dragon voice recognition and unintended spelling errors and word substitutions may occur)             Subjective:   Chief Complaint:  Sinusitis (History of. Eye pressure. Day 5); Ear Fullness (Bilateral); and Cough (Mild)    Patient with 5 days of facial pain tooth pain sore throat.  She gets this annually typically in the spring.  Response to Z-Carlos.  Conservative measures have not worked.  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  She has no history of obstructive sinus disease or recurrent chronic sinus type infection or allergies    Medications:  Current Outpatient Medications on File Prior to Visit   Medication Sig     OMEGA-3/DHA/EPA/FISH OIL (FISH OIL-OMEGA-3 FATTY ACIDS) 300-1,000 mg capsule Take 2 g by mouth daily.     timolol maleate (TIMOPTIC) 0.5 % ophthalmic solution INSTILL 1 DROP BY OPTHALMIC ROUTE EVERY MORNING IN BOTH EYES     No current facility-administered medications on file prior to visit.      Allergies:  Allergies   Allergen Reactions     Sulfa (Sulfonamide Antibiotics) Rash and Hives       Review of Systems:     Extensive 10-point review of systems was performed. Please see the HPI for  "problem specific pertinent review of systems.     Patient does note she is well otherwise.  Healthy lifestyle    Otherwise, the following systems are noncontributory including constitutional, eyes, ears, nose and throat, cardiovascular, respiratory, gastrointestinal, genitourinary, musculoskeletal,neurological, skin and/or breast, endocrine, hematologic/lymph, allergic/immunologic and psychiatric.      Objective:    /70 (Patient Site: Left Arm, Patient Position: Sitting, Cuff Size: Adult Regular)   Pulse 76   Temp 98  F (36.7  C)   Ht 5' 5.5\" (1.664 m)   Wt 118 lb 0.6 oz (53.5 kg)   SpO2 98%   Breastfeeding? No   BMI 19.34 kg/m    Weight:   Wt Readings from Last 3 Encounters:   02/06/19 118 lb 0.6 oz (53.5 kg)   05/23/18 112 lb (50.8 kg)   02/19/18 107 lb (48.5 kg)     [unfilled]  118 lb 0.6 oz (53.5 kg)    In general, no acute distress.  Febrile  Color is good  ENT slightly congested nasal passages.  No polyps  Throat normal excellent dentition  TMs normal  Neck no adenopathy  Lungs clear  No edema          Review of clinical lab tests  Lab Results   Component Value Date    WBC 6.8 05/23/2018    HGB 13.9 05/23/2018    HCT 41.5 05/23/2018     05/23/2018    ALT 39 11/16/2009    AST 21 11/16/2009     05/23/2018    K 3.9 05/23/2018     05/23/2018    CREATININE 0.71 05/23/2018    BUN 12 05/23/2018    CO2 24 05/23/2018    TSH 1.20 10/18/2010       Glucose   Date/Time Value Ref Range Status   05/23/2018 02:23 PM 79 70 - 125 mg/dL Final   10/18/2010 12:15 PM 69 (L) 70 - 110 mg/dL Final     No results found for this or any previous visit (from the past 24 hour(s)).    No results found.     " AF 80s

## 2022-12-20 NOTE — CONSULT NOTE ADULT - PROBLEM SELECTOR RECOMMENDATION 2
continue aricept  reorient Patient as needed  haldol or seroquel for agitation  Psych eval as needed

## 2022-12-20 NOTE — ED PROVIDER NOTE - NS ED ROS FT
CONSTITUTIONAL: No fevers, no chills  EYES: no visual changes, no eye pain  EARS: no ear drainage, no ear pain, no change in hearing  NOSE: no nasal congestion  MOUTH/THROAT: no sore throat  CV: No chest pain, no palpitations  RESP: No SOB, no cough  GI: No n/v/d, no abd pain  : no dysuria, no hematuria, no flank pain  MSK: hip pain   SKIN: no rashes  NEURO: no headache, no focal weakness, no decreased sensation/parasthesias

## 2022-12-20 NOTE — CONSULT NOTE ADULT - ASSESSMENT
89 yo male presents after a fall at home with a right hip fracture. He is scheduled for an Open reduction and internal fixation of the right hip

## 2022-12-20 NOTE — ED ADULT TRIAGE NOTE - CCCP TRG CHIEF CMPLNT
fall Star Wedge Flap Text: The defect edges were debeveled with a #15 scalpel blade.  Given the location of the defect, shape of the defect and the proximity to free margins a star wedge flap was deemed most appropriate.  Using a sterile surgical marker, an appropriate rotation flap was drawn incorporating the defect and placing the expected incisions within the relaxed skin tension lines where possible. The area thus outlined was incised deep to adipose tissue with a #15 scalpel blade.  The skin margins were undermined to an appropriate distance in all directions utilizing iris scissors.

## 2022-12-20 NOTE — ED PROVIDER NOTE - PROGRESS NOTE DETAILS
Hugh Lima DO (PGY1)  Spoke with ortho resident regarding case. Requested reimaging of xray of hip, pelvis and femur RIGHT side. Will order. Hugh Lima DO (PGY1)  Screening pre op EKG showing afib and LBBB. Called wife for collateral who states patient has a hx of pacemaker 12 years ago however no hx of afib.

## 2022-12-21 LAB
24R-OH-CALCIDIOL SERPL-MCNC: 33.2 NG/ML — SIGNIFICANT CHANGE UP (ref 30–80)
ANION GAP SERPL CALC-SCNC: 13 MMOL/L — SIGNIFICANT CHANGE UP (ref 5–17)
BUN SERPL-MCNC: 28 MG/DL — HIGH (ref 7–23)
CALCIUM SERPL-MCNC: 8.7 MG/DL — SIGNIFICANT CHANGE UP (ref 8.4–10.5)
CHLORIDE SERPL-SCNC: 103 MMOL/L — SIGNIFICANT CHANGE UP (ref 96–108)
CO2 SERPL-SCNC: 24 MMOL/L — SIGNIFICANT CHANGE UP (ref 22–31)
CREAT SERPL-MCNC: 1.2 MG/DL — SIGNIFICANT CHANGE UP (ref 0.5–1.3)
EGFR: 57 ML/MIN/1.73M2 — LOW
GLUCOSE SERPL-MCNC: 197 MG/DL — HIGH (ref 70–99)
HCT VFR BLD CALC: 38.7 % — LOW (ref 39–50)
HGB BLD-MCNC: 12.4 G/DL — LOW (ref 13–17)
MCHC RBC-ENTMCNC: 28.6 PG — SIGNIFICANT CHANGE UP (ref 27–34)
MCHC RBC-ENTMCNC: 32 GM/DL — SIGNIFICANT CHANGE UP (ref 32–36)
MCV RBC AUTO: 89.4 FL — SIGNIFICANT CHANGE UP (ref 80–100)
NRBC # BLD: 0 /100 WBCS — SIGNIFICANT CHANGE UP (ref 0–0)
PLATELET # BLD AUTO: 219 K/UL — SIGNIFICANT CHANGE UP (ref 150–400)
POTASSIUM SERPL-MCNC: 4.7 MMOL/L — SIGNIFICANT CHANGE UP (ref 3.5–5.3)
POTASSIUM SERPL-SCNC: 4.7 MMOL/L — SIGNIFICANT CHANGE UP (ref 3.5–5.3)
RBC # BLD: 4.33 M/UL — SIGNIFICANT CHANGE UP (ref 4.2–5.8)
RBC # FLD: 15.8 % — HIGH (ref 10.3–14.5)
SODIUM SERPL-SCNC: 140 MMOL/L — SIGNIFICANT CHANGE UP (ref 135–145)
WBC # BLD: 15.55 K/UL — HIGH (ref 3.8–10.5)
WBC # FLD AUTO: 15.55 K/UL — HIGH (ref 3.8–10.5)

## 2022-12-21 RX ORDER — SODIUM CHLORIDE 9 MG/ML
250 INJECTION INTRAMUSCULAR; INTRAVENOUS; SUBCUTANEOUS ONCE
Refills: 0 | Status: COMPLETED | OUTPATIENT
Start: 2022-12-21 | End: 2022-12-21

## 2022-12-21 RX ORDER — SODIUM CHLORIDE 9 MG/ML
1000 INJECTION INTRAMUSCULAR; INTRAVENOUS; SUBCUTANEOUS
Refills: 0 | Status: DISCONTINUED | OUTPATIENT
Start: 2022-12-21 | End: 2022-12-23

## 2022-12-21 RX ORDER — HALOPERIDOL DECANOATE 100 MG/ML
2 INJECTION INTRAMUSCULAR ONCE
Refills: 0 | Status: COMPLETED | OUTPATIENT
Start: 2022-12-21 | End: 2022-12-21

## 2022-12-21 RX ORDER — ASPIRIN/CALCIUM CARB/MAGNESIUM 324 MG
325 TABLET ORAL EVERY 12 HOURS
Refills: 0 | Status: DISCONTINUED | OUTPATIENT
Start: 2022-12-21 | End: 2022-12-21

## 2022-12-21 RX ORDER — SODIUM CHLORIDE 9 MG/ML
500 INJECTION INTRAMUSCULAR; INTRAVENOUS; SUBCUTANEOUS ONCE
Refills: 0 | Status: COMPLETED | OUTPATIENT
Start: 2022-12-21 | End: 2022-12-21

## 2022-12-21 RX ORDER — QUETIAPINE FUMARATE 200 MG/1
25 TABLET, FILM COATED ORAL AT BEDTIME
Refills: 0 | Status: DISCONTINUED | OUTPATIENT
Start: 2022-12-21 | End: 2022-12-24

## 2022-12-21 RX ORDER — SODIUM CHLORIDE 9 MG/ML
1000 INJECTION INTRAMUSCULAR; INTRAVENOUS; SUBCUTANEOUS
Refills: 0 | Status: DISCONTINUED | OUTPATIENT
Start: 2022-12-21 | End: 2022-12-21

## 2022-12-21 RX ORDER — CLOPIDOGREL BISULFATE 75 MG/1
75 TABLET, FILM COATED ORAL DAILY
Refills: 0 | Status: DISCONTINUED | OUTPATIENT
Start: 2022-12-22 | End: 2022-12-30

## 2022-12-21 RX ORDER — QUETIAPINE FUMARATE 200 MG/1
12.5 TABLET, FILM COATED ORAL ONCE
Refills: 0 | Status: COMPLETED | OUTPATIENT
Start: 2022-12-21 | End: 2022-12-21

## 2022-12-21 RX ORDER — TAMSULOSIN HYDROCHLORIDE 0.4 MG/1
0.4 CAPSULE ORAL AT BEDTIME
Refills: 0 | Status: DISCONTINUED | OUTPATIENT
Start: 2022-12-21 | End: 2022-12-21

## 2022-12-21 RX ORDER — ASPIRIN/CALCIUM CARB/MAGNESIUM 324 MG
325 TABLET ORAL DAILY
Refills: 0 | Status: DISCONTINUED | OUTPATIENT
Start: 2022-12-21 | End: 2022-12-30

## 2022-12-21 RX ADMIN — Medication 100 MILLIGRAM(S): at 21:57

## 2022-12-21 RX ADMIN — QUETIAPINE FUMARATE 25 MILLIGRAM(S): 200 TABLET, FILM COATED ORAL at 21:58

## 2022-12-21 RX ADMIN — Medication 975 MILLIGRAM(S): at 00:46

## 2022-12-21 RX ADMIN — Medication 975 MILLIGRAM(S): at 21:58

## 2022-12-21 RX ADMIN — ENOXAPARIN SODIUM 40 MILLIGRAM(S): 100 INJECTION SUBCUTANEOUS at 06:36

## 2022-12-21 RX ADMIN — Medication 100 MILLIGRAM(S): at 06:36

## 2022-12-21 RX ADMIN — PANTOPRAZOLE SODIUM 40 MILLIGRAM(S): 20 TABLET, DELAYED RELEASE ORAL at 06:37

## 2022-12-21 RX ADMIN — DONEPEZIL HYDROCHLORIDE 5 MILLIGRAM(S): 10 TABLET, FILM COATED ORAL at 21:58

## 2022-12-21 RX ADMIN — Medication 975 MILLIGRAM(S): at 22:40

## 2022-12-21 RX ADMIN — Medication 100 MILLIGRAM(S): at 14:14

## 2022-12-21 RX ADMIN — Medication 5 MILLIGRAM(S): at 06:36

## 2022-12-21 RX ADMIN — SODIUM CHLORIDE 100 MILLILITER(S): 9 INJECTION INTRAMUSCULAR; INTRAVENOUS; SUBCUTANEOUS at 11:43

## 2022-12-21 RX ADMIN — Medication 5 MILLIGRAM(S): at 18:05

## 2022-12-21 RX ADMIN — HALOPERIDOL DECANOATE 2 MILLIGRAM(S): 100 INJECTION INTRAMUSCULAR at 10:24

## 2022-12-21 RX ADMIN — Medication 975 MILLIGRAM(S): at 15:10

## 2022-12-21 RX ADMIN — Medication 975 MILLIGRAM(S): at 14:14

## 2022-12-21 RX ADMIN — Medication 975 MILLIGRAM(S): at 06:36

## 2022-12-21 RX ADMIN — SODIUM CHLORIDE 500 MILLILITER(S): 9 INJECTION INTRAMUSCULAR; INTRAVENOUS; SUBCUTANEOUS at 10:40

## 2022-12-21 RX ADMIN — QUETIAPINE FUMARATE 12.5 MILLIGRAM(S): 200 TABLET, FILM COATED ORAL at 11:37

## 2022-12-21 RX ADMIN — SODIUM CHLORIDE 500 MILLILITER(S): 9 INJECTION INTRAMUSCULAR; INTRAVENOUS; SUBCUTANEOUS at 01:13

## 2022-12-21 RX ADMIN — SODIUM CHLORIDE 125 MILLILITER(S): 9 INJECTION INTRAMUSCULAR; INTRAVENOUS; SUBCUTANEOUS at 08:13

## 2022-12-21 RX ADMIN — Medication 975 MILLIGRAM(S): at 07:15

## 2022-12-21 NOTE — PHYSICAL THERAPY INITIAL EVALUATION ADULT - PERTINENT HX OF CURRENT PROBLEM, REHAB EVAL
89y/o M who presents to the ED today with c/o right hip pain and inability to bear weight s/p mechanical trip at home yesterday. Pt is confused at baseline and poor historian, hx of Alzheimers dementia. Per the patients family, the patient ambulates w/o assistance at baseline. Pt denies any other injuries. Pt denies head trauma or LOC. Pt denies any numbness, tingling or paresthesias. Xray R femur 12/20-Acute mildly displaced fracture of the right femoral neck.

## 2022-12-21 NOTE — OCCUPATIONAL THERAPY INITIAL EVALUATION ADULT - PERTINENT HX OF CURRENT PROBLEM, REHAB EVAL
89y Male who presents to the ED today with c/o right hip pain and inability to bear weight s/p mechanical trip at home yesterday. Pt is confused at baseline and poor historian, hx of Alzheimers dementia. Per the patients family, the patient ambulates w/o assistance at baseline. Pt denies any other injuries. Pt denies head trauma or LOC. Pt denies any numbness, tingling or paresthesias.   xray R femur 12/20-Acute mildly displaced fracture of the right femoral neck.

## 2022-12-21 NOTE — OCCUPATIONAL THERAPY INITIAL EVALUATION ADULT - ADDITIONAL COMMENTS
As per chart: Pateint lives in private house with spouse. 1 step to enter and has first floor set up. Prior to admission, patient independent with ambulation and ADLs. Discharge pending hospital course and P/T evaluation. Anticipate  sub acute rehab. Patient's wife states that patient has aides service 2 times a week, 4 hours per day--wife was unsure of [facility] and agency.

## 2022-12-21 NOTE — PHYSICAL THERAPY INITIAL EVALUATION ADULT - ACTIVE RANGE OF MOTION EXAMINATION, REHAB EVAL
RLE limited by pain/bilateral lower extremity Active ROM was WNL (within normal limits)/Left LE Active ROM was WFL (within functional limits)

## 2022-12-22 ENCOUNTER — TRANSCRIPTION ENCOUNTER (OUTPATIENT)
Age: 87
End: 2022-12-22

## 2022-12-22 ENCOUNTER — APPOINTMENT (OUTPATIENT)
Dept: UROLOGY | Facility: CLINIC | Age: 87
End: 2022-12-22

## 2022-12-22 LAB
ANION GAP SERPL CALC-SCNC: 6 MMOL/L — SIGNIFICANT CHANGE UP (ref 5–17)
BUN SERPL-MCNC: 31 MG/DL — HIGH (ref 7–23)
CALCIUM SERPL-MCNC: 8.5 MG/DL — SIGNIFICANT CHANGE UP (ref 8.4–10.5)
CHLORIDE SERPL-SCNC: 108 MMOL/L — SIGNIFICANT CHANGE UP (ref 96–108)
CO2 SERPL-SCNC: 26 MMOL/L — SIGNIFICANT CHANGE UP (ref 22–31)
CREAT SERPL-MCNC: 1.35 MG/DL — HIGH (ref 0.5–1.3)
EGFR: 49 ML/MIN/1.73M2 — LOW
GLUCOSE SERPL-MCNC: 115 MG/DL — HIGH (ref 70–99)
HCT VFR BLD CALC: 31.3 % — LOW (ref 39–50)
HGB BLD-MCNC: 10 G/DL — LOW (ref 13–17)
MCHC RBC-ENTMCNC: 29 PG — SIGNIFICANT CHANGE UP (ref 27–34)
MCHC RBC-ENTMCNC: 31.9 GM/DL — LOW (ref 32–36)
MCV RBC AUTO: 90.7 FL — SIGNIFICANT CHANGE UP (ref 80–100)
NRBC # BLD: 0 /100 WBCS — SIGNIFICANT CHANGE UP (ref 0–0)
PLATELET # BLD AUTO: 175 K/UL — SIGNIFICANT CHANGE UP (ref 150–400)
POTASSIUM SERPL-MCNC: 4.2 MMOL/L — SIGNIFICANT CHANGE UP (ref 3.5–5.3)
POTASSIUM SERPL-SCNC: 4.2 MMOL/L — SIGNIFICANT CHANGE UP (ref 3.5–5.3)
RBC # BLD: 3.45 M/UL — LOW (ref 4.2–5.8)
RBC # FLD: 16 % — HIGH (ref 10.3–14.5)
SODIUM SERPL-SCNC: 140 MMOL/L — SIGNIFICANT CHANGE UP (ref 135–145)
WBC # BLD: 9.39 K/UL — SIGNIFICANT CHANGE UP (ref 3.8–10.5)
WBC # FLD AUTO: 9.39 K/UL — SIGNIFICANT CHANGE UP (ref 3.8–10.5)

## 2022-12-22 RX ADMIN — DONEPEZIL HYDROCHLORIDE 5 MILLIGRAM(S): 10 TABLET, FILM COATED ORAL at 21:12

## 2022-12-22 RX ADMIN — PANTOPRAZOLE SODIUM 40 MILLIGRAM(S): 20 TABLET, DELAYED RELEASE ORAL at 06:04

## 2022-12-22 RX ADMIN — Medication 325 MILLIGRAM(S): at 21:12

## 2022-12-22 RX ADMIN — QUETIAPINE FUMARATE 25 MILLIGRAM(S): 200 TABLET, FILM COATED ORAL at 21:12

## 2022-12-22 RX ADMIN — Medication 975 MILLIGRAM(S): at 15:12

## 2022-12-22 RX ADMIN — Medication 100 MILLIGRAM(S): at 06:03

## 2022-12-22 RX ADMIN — CLOPIDOGREL BISULFATE 75 MILLIGRAM(S): 75 TABLET, FILM COATED ORAL at 12:04

## 2022-12-22 RX ADMIN — Medication 975 MILLIGRAM(S): at 06:04

## 2022-12-22 RX ADMIN — Medication 100 MILLIGRAM(S): at 15:11

## 2022-12-22 RX ADMIN — SACUBITRIL AND VALSARTAN 1 TABLET(S): 24; 26 TABLET, FILM COATED ORAL at 21:12

## 2022-12-22 RX ADMIN — Medication 975 MILLIGRAM(S): at 21:42

## 2022-12-22 RX ADMIN — Medication 975 MILLIGRAM(S): at 06:40

## 2022-12-22 RX ADMIN — Medication 5 MILLIGRAM(S): at 06:04

## 2022-12-22 RX ADMIN — Medication 975 MILLIGRAM(S): at 21:12

## 2022-12-22 RX ADMIN — Medication 5 MILLIGRAM(S): at 18:38

## 2022-12-22 RX ADMIN — Medication 975 MILLIGRAM(S): at 15:52

## 2022-12-22 NOTE — DISCHARGE NOTE PROVIDER - NSDCMRMEDTOKEN_GEN_ALL_CORE_FT
clopidogrel 75 mg oral tablet: 1 tab(s) orally once a day  donepezil 5 mg oral tablet: 1 tab(s) orally once a day (at bedtime)  Entresto 49 mg-51 mg oral tablet: 1 tab(s) orally 2 times a day  metoprolol succinate 25 mg oral tablet, extended release: 1 tab(s) orally once a day  omeprazole 20 mg oral delayed release capsule: 1 cap(s) orally once a day  solifenacin 5 mg oral tablet: 1 tab(s) orally once a day   acetaminophen 325 mg oral tablet: 3 tab(s) orally every 8 hours, As Needed for Mild Pain  aspirin 325 mg oral tablet: 1 tab(s) orally once a day  clopidogrel 75 mg oral tablet: 1 tab(s) orally once a day  donepezil 5 mg oral tablet: 1 tab(s) orally once a day (at bedtime)  Entresto 49 mg-51 mg oral tablet: 1 tab(s) orally 2 times a day  melatonin 3 mg oral tablet: 1 tab(s) orally once a day (at bedtime)  metoprolol succinate 25 mg oral tablet, extended release: 1 tab(s) orally once a day  pantoprazole 40 mg oral delayed release tablet: 1 tab orally once a day (before breakfast)  polyethylene glycol 3350 oral powder for reconstitution: 17 gram(s) orally 2 times a day  QUEtiapine 25 mg oral tablet: 1 tab(s) orally every 6 hours, As needed, agitation  QUEtiapine 50 mg oral tablet: 1 tab(s) orally 3 times a day  senna leaf extract oral tablet: 2 tab(s) orally once a day (at bedtime)  solifenacin 5 mg oral tablet: 1 tab(s) orally once a day

## 2022-12-22 NOTE — DISCHARGE NOTE PROVIDER - HOSPITAL COURSE
History of Present Illness:  History of Present Illness:   89y Male who presents to the ED today with c/o right hip pain and inability to bear weight s/p mechanical trip at home yesterday. Pt is confused at baseline and poor historian, hx of Alzheimers dementia. Per the patients family, the patient ambulates w/o assistance at baseline. Pt denies any other injuries. Pt denies head trauma or LOC. Pt denies any numbness, tingling or paresthesias.       Past Medical & Surgical History:  Fracture of unspecified part of neck of unspecified femur, initial encounter for closed fracture    PAST MEDICAL/SURGICAL HISTORY:    PPM  Afib  BPH (benign prostatic hyperplasia)  HIP FX  Alzheimer disease    HOSPITAL COURSE:  89 y/o M underwent right hip hemiarthroplasty on 12/20/2022 with Dr. Ceron.  Patient tolerated procedure well.  Patient was evaluated postoperatively by physical and occupational therapists for weight bearing as tolerated ambulation and advised that patient would benefit from admission to rehab facility.  Patient advised to keep surgical incision/dressing clean and dry, and have dressing/sutures/surgical staples removed and steri strips applied post op day #14 (1/3/2023)if applicable.  Patient further advised to follow up with Dr. Ceron in his office 3-4 weeks post op.    History of Present Illness:    89y Male who presents to the ED today with c/o right hip pain and inability to bear weight s/p mechanical trip at home yesterday. Pt is confused at baseline and poor historian, hx of Alzheimers dementia. Per the patients family, the patient ambulates w/o assistance at baseline. Pt denies any other injuries. Pt denies head trauma or LOC. Pt denies any numbness, tingling or paresthesias.       Past Medical & Surgical History:  Fracture of unspecified part of neck of unspecified femur, initial encounter for closed fracture    PAST MEDICAL/SURGICAL HISTORY:  PPM  Afib  BPH (benign prostatic hyperplasia)  HIP FX  Alzheimer disease    HOSPITAL COURSE:  89 y/o M underwent right hip hemiarthroplasty on 12/20/2022 with Dr. Ceron.  Patient tolerated procedure well.  Patient was evaluated postoperatively by physical and occupational therapists for weight bearing as tolerated ambulation and advised that patient would benefit from admission to rehab facility.    Psychiatry consulted due to severe agitation with history of dementia. Seroquel increased to 50mg TID during this admission. Psych recommendations: ******    Patient advised to keep surgical incision/dressing clean and dry, and have dressing/sutures/surgical staples removed and steri strips applied post op day #14 (1/3/2023)if applicable.  Patient further advised to follow up with Dr. Ceron in his office 3-4 weeks post op.    History of Present Illness:    89y Male who presents to the ED today with c/o right hip pain and inability to bear weight s/p mechanical trip at home yesterday. Pt is confused at baseline and poor historian, hx of Alzheimers dementia. Per the patients family, the patient ambulates w/o assistance at baseline. Pt denies any other injuries. Pt denies head trauma or LOC. Pt denies any numbness, tingling or paresthesias.       Past Medical & Surgical History:  Fracture of unspecified part of neck of unspecified femur, initial encounter for closed fracture    PAST MEDICAL/SURGICAL HISTORY:  PPM  Afib  BPH (benign prostatic hyperplasia)  HIP FX  Alzheimer disease    HOSPITAL COURSE:  89 y/o M underwent right hip hemiarthroplasty on 12/20/2022 with Dr. Ceron.  Patient tolerated procedure well.  Patient was evaluated postoperatively by physical and occupational therapists for weight bearing as tolerated ambulation and advised that patient would benefit from admission to rehab facility.    Psychiatry consulted due to severe agitation with history of dementia. Seroquel increased to 50mg TID during this admission. Psych recommendations:  1. Seroquel 50mg PO TID, hold for sedation.  Monitor EKG for QTc<500  2. PRN: Seroquel 25mg PO q6h PRN agitation; Monitor for QTc<500.  Melatonin 3mg PO qHS PRN insomnia.  3. Minimize use of benzos, opioids, anticholinergics, or other deliriogenic agents when possible.  Maintain sleep wake cycle.  Provide frequent reorientation and redirection.  Family member at bedside if possible. Assess for need for glasses and hearing aid (if applicable).  4. Outpatient psychiatry followup at [facility]-Call [phone] to make appointment upon discharge from rehab.   ([facility] Crisis clinic- [phone])    Patient advised to keep surgical incision/dressing clean and dry, and have dressing/sutures/surgical staples removed and steri strips applied post op day #14 (1/3/2023)if applicable.  Patient further advised to follow up with Dr. Ceron in his office 3-4 weeks post op.    History of Present Illness:    89y Male who presents to the ED today with c/o right hip pain and inability to bear weight s/p mechanical trip at home yesterday. Pt is confused at baseline and poor historian, hx of Alzheimers dementia. Per the patients family, the patient ambulates w/o assistance at baseline. Pt denies any other injuries. Pt denies head trauma or LOC. Pt denies any numbness, tingling or paresthesias.       Past Medical & Surgical History:  Fracture of unspecified part of neck of unspecified femur, initial encounter for closed fracture    PAST MEDICAL/SURGICAL HISTORY:  PPM  Afib  BPH (benign prostatic hyperplasia)  HIP FX  Alzheimer disease    HOSPITAL COURSE:  89 y/o M underwent right hip hemiarthroplasty on 12/20/2022 with Dr. Ceron.  Patient tolerated procedure well.  Patient was evaluated postoperatively by physical and occupational therapists for weight bearing as tolerated ambulation and advised that patient would benefit from admission to rehab facility.    Psychiatry consulted due to severe agitation with history of dementia. Seroquel increased to 50mg TID during this admission.   Psych recommendations as follows:  1. Seroquel 50mg PO TID, hold for sedation.  Monitor EKG for QTc<500  2. PRN: Seroquel 25mg PO q6h PRN agitation; Monitor for QTc<500.  Melatonin 3mg PO qHS PRN insomnia.  3. Minimize use of benzos, opioids, anticholinergics, or other deliriogenic agents when possible.  Maintain sleep wake cycle.  Provide frequent reorientation and redirection.  Family member at bedside if possible. Assess for need for glasses and hearing aid (if applicable).  4. Outpatient psychiatry followup at [facility]-Call [phone] to make appointment upon discharge from rehab.   ([facility] Crisis clinic- [phone])    Patient advised to keep surgical incision/dressing clean and dry, and have dressing/sutures/surgical staples removed and steri strips applied post op day #14 (1/3/2023)if applicable.  Patient further advised to follow up with Dr. Ceron in his office 3-4 weeks post op.

## 2022-12-22 NOTE — DISCHARGE NOTE PROVIDER - CARE PROVIDER_API CALL
Monty Ceron (MD)  Orthopaedic Surgery  [address]  [place] [address]  Phone: [phone]  Fax: [phone]  Follow Up Time:

## 2022-12-22 NOTE — DISCHARGE NOTE PROVIDER - NSDCQMCOGNITION_NEU_ALL_CORE
Difficulty making decisions/Difficulty remembering Difficulty concentrating/Difficulty making decisions/Difficulty remembering

## 2022-12-22 NOTE — DISCHARGE NOTE PROVIDER - NSDCFUADDINST_GEN_ALL_CORE_FT
Continue weight bearing as tolerated ambulation with rolling walker and maintaining anterior total hip precautions. Keep surgical incision/dressing clean and dry, have dressing/sutures/staples removed and steri-strips applied post operative day#14 (1/3/2023)if applicable. follow up with Dr. Ceron in his office 3-4 weeks op.

## 2022-12-22 NOTE — DISCHARGE NOTE PROVIDER - NSDCFUADDAPPT_GEN_ALL_CORE_FT
Please call to make appointment to see psychiatrist for continued medication management  upon discharge from rehab:    [facility]  [address] [place] [address] [phone]

## 2022-12-23 LAB
ANION GAP SERPL CALC-SCNC: 9 MMOL/L — SIGNIFICANT CHANGE UP (ref 5–17)
BUN SERPL-MCNC: 28 MG/DL — HIGH (ref 7–23)
CALCIUM SERPL-MCNC: 8.7 MG/DL — SIGNIFICANT CHANGE UP (ref 8.4–10.5)
CHLORIDE SERPL-SCNC: 106 MMOL/L — SIGNIFICANT CHANGE UP (ref 96–108)
CO2 SERPL-SCNC: 25 MMOL/L — SIGNIFICANT CHANGE UP (ref 22–31)
CREAT SERPL-MCNC: 1.09 MG/DL — SIGNIFICANT CHANGE UP (ref 0.5–1.3)
EGFR: 64 ML/MIN/1.73M2 — SIGNIFICANT CHANGE UP
GLUCOSE SERPL-MCNC: 112 MG/DL — HIGH (ref 70–99)
HCT VFR BLD CALC: 29.6 % — LOW (ref 39–50)
HGB BLD-MCNC: 9.6 G/DL — LOW (ref 13–17)
MCHC RBC-ENTMCNC: 28.8 PG — SIGNIFICANT CHANGE UP (ref 27–34)
MCHC RBC-ENTMCNC: 32.4 GM/DL — SIGNIFICANT CHANGE UP (ref 32–36)
MCV RBC AUTO: 88.9 FL — SIGNIFICANT CHANGE UP (ref 80–100)
NRBC # BLD: 0 /100 WBCS — SIGNIFICANT CHANGE UP (ref 0–0)
PLATELET # BLD AUTO: 192 K/UL — SIGNIFICANT CHANGE UP (ref 150–400)
POTASSIUM SERPL-MCNC: 4 MMOL/L — SIGNIFICANT CHANGE UP (ref 3.5–5.3)
POTASSIUM SERPL-SCNC: 4 MMOL/L — SIGNIFICANT CHANGE UP (ref 3.5–5.3)
RBC # BLD: 3.33 M/UL — LOW (ref 4.2–5.8)
RBC # FLD: 16.1 % — HIGH (ref 10.3–14.5)
SARS-COV-2 RNA SPEC QL NAA+PROBE: SIGNIFICANT CHANGE UP
SODIUM SERPL-SCNC: 140 MMOL/L — SIGNIFICANT CHANGE UP (ref 135–145)
WBC # BLD: 7.35 K/UL — SIGNIFICANT CHANGE UP (ref 3.8–10.5)
WBC # FLD AUTO: 7.35 K/UL — SIGNIFICANT CHANGE UP (ref 3.8–10.5)

## 2022-12-23 RX ORDER — ACETAMINOPHEN 500 MG
1000 TABLET ORAL ONCE
Refills: 0 | Status: DISCONTINUED | OUTPATIENT
Start: 2022-12-23 | End: 2022-12-30

## 2022-12-23 RX ORDER — CELECOXIB 200 MG/1
100 CAPSULE ORAL DAILY
Refills: 0 | Status: COMPLETED | OUTPATIENT
Start: 2022-12-24 | End: 2022-12-26

## 2022-12-23 RX ORDER — QUETIAPINE FUMARATE 200 MG/1
12.5 TABLET, FILM COATED ORAL EVERY 24 HOURS
Refills: 0 | Status: DISCONTINUED | OUTPATIENT
Start: 2022-12-24 | End: 2022-12-24

## 2022-12-23 RX ORDER — ONDANSETRON 8 MG/1
4 TABLET, FILM COATED ORAL EVERY 6 HOURS
Refills: 0 | Status: DISCONTINUED | OUTPATIENT
Start: 2022-12-23 | End: 2022-12-30

## 2022-12-23 RX ORDER — OXYCODONE HYDROCHLORIDE 5 MG/1
2.5 TABLET ORAL EVERY 4 HOURS
Refills: 0 | Status: DISCONTINUED | OUTPATIENT
Start: 2022-12-23 | End: 2022-12-24

## 2022-12-23 RX ORDER — TRAMADOL HYDROCHLORIDE 50 MG/1
25 TABLET ORAL EVERY 6 HOURS
Refills: 0 | Status: DISCONTINUED | OUTPATIENT
Start: 2022-12-23 | End: 2022-12-30

## 2022-12-23 RX ADMIN — Medication 5 MILLIGRAM(S): at 05:21

## 2022-12-23 RX ADMIN — Medication 325 MILLIGRAM(S): at 21:43

## 2022-12-23 RX ADMIN — CLOPIDOGREL BISULFATE 75 MILLIGRAM(S): 75 TABLET, FILM COATED ORAL at 11:06

## 2022-12-23 RX ADMIN — Medication 5 MILLIGRAM(S): at 18:02

## 2022-12-23 RX ADMIN — Medication 975 MILLIGRAM(S): at 14:46

## 2022-12-23 RX ADMIN — DONEPEZIL HYDROCHLORIDE 5 MILLIGRAM(S): 10 TABLET, FILM COATED ORAL at 21:43

## 2022-12-23 RX ADMIN — Medication 975 MILLIGRAM(S): at 05:50

## 2022-12-23 RX ADMIN — SACUBITRIL AND VALSARTAN 1 TABLET(S): 24; 26 TABLET, FILM COATED ORAL at 11:06

## 2022-12-23 RX ADMIN — SACUBITRIL AND VALSARTAN 1 TABLET(S): 24; 26 TABLET, FILM COATED ORAL at 21:43

## 2022-12-23 RX ADMIN — QUETIAPINE FUMARATE 25 MILLIGRAM(S): 200 TABLET, FILM COATED ORAL at 21:43

## 2022-12-23 RX ADMIN — Medication 975 MILLIGRAM(S): at 21:43

## 2022-12-23 RX ADMIN — Medication 975 MILLIGRAM(S): at 05:20

## 2022-12-23 RX ADMIN — Medication 975 MILLIGRAM(S): at 22:13

## 2022-12-23 RX ADMIN — PANTOPRAZOLE SODIUM 40 MILLIGRAM(S): 20 TABLET, DELAYED RELEASE ORAL at 05:21

## 2022-12-23 RX ADMIN — Medication 975 MILLIGRAM(S): at 14:16

## 2022-12-24 RX ORDER — LANOLIN ALCOHOL/MO/W.PET/CERES
3 CREAM (GRAM) TOPICAL AT BEDTIME
Refills: 0 | Status: DISCONTINUED | OUTPATIENT
Start: 2022-12-24 | End: 2022-12-30

## 2022-12-24 RX ORDER — QUETIAPINE FUMARATE 200 MG/1
25 TABLET, FILM COATED ORAL
Refills: 0 | Status: DISCONTINUED | OUTPATIENT
Start: 2022-12-24 | End: 2022-12-26

## 2022-12-24 RX ORDER — HALOPERIDOL DECANOATE 100 MG/ML
2 INJECTION INTRAMUSCULAR EVERY 6 HOURS
Refills: 0 | Status: DISCONTINUED | OUTPATIENT
Start: 2022-12-24 | End: 2022-12-27

## 2022-12-24 RX ORDER — SODIUM CHLORIDE 9 MG/ML
1000 INJECTION INTRAMUSCULAR; INTRAVENOUS; SUBCUTANEOUS
Refills: 0 | Status: DISCONTINUED | OUTPATIENT
Start: 2022-12-24 | End: 2022-12-27

## 2022-12-24 RX ORDER — HALOPERIDOL DECANOATE 100 MG/ML
1 INJECTION INTRAMUSCULAR EVERY 6 HOURS
Refills: 0 | Status: DISCONTINUED | OUTPATIENT
Start: 2022-12-24 | End: 2022-12-27

## 2022-12-24 RX ADMIN — SODIUM CHLORIDE 100 MILLILITER(S): 9 INJECTION INTRAMUSCULAR; INTRAVENOUS; SUBCUTANEOUS at 21:38

## 2022-12-24 RX ADMIN — Medication 975 MILLIGRAM(S): at 13:01

## 2022-12-24 RX ADMIN — QUETIAPINE FUMARATE 25 MILLIGRAM(S): 200 TABLET, FILM COATED ORAL at 15:17

## 2022-12-24 RX ADMIN — PANTOPRAZOLE SODIUM 40 MILLIGRAM(S): 20 TABLET, DELAYED RELEASE ORAL at 05:45

## 2022-12-24 RX ADMIN — SACUBITRIL AND VALSARTAN 1 TABLET(S): 24; 26 TABLET, FILM COATED ORAL at 11:05

## 2022-12-24 RX ADMIN — Medication 325 MILLIGRAM(S): at 21:38

## 2022-12-24 RX ADMIN — Medication 975 MILLIGRAM(S): at 05:45

## 2022-12-24 RX ADMIN — CELECOXIB 100 MILLIGRAM(S): 200 CAPSULE ORAL at 11:35

## 2022-12-24 RX ADMIN — HALOPERIDOL DECANOATE 2 MILLIGRAM(S): 100 INJECTION INTRAMUSCULAR at 23:13

## 2022-12-24 RX ADMIN — Medication 975 MILLIGRAM(S): at 22:30

## 2022-12-24 RX ADMIN — SACUBITRIL AND VALSARTAN 1 TABLET(S): 24; 26 TABLET, FILM COATED ORAL at 21:37

## 2022-12-24 RX ADMIN — CELECOXIB 100 MILLIGRAM(S): 200 CAPSULE ORAL at 11:05

## 2022-12-24 RX ADMIN — QUETIAPINE FUMARATE 12.5 MILLIGRAM(S): 200 TABLET, FILM COATED ORAL at 08:05

## 2022-12-24 RX ADMIN — Medication 975 MILLIGRAM(S): at 13:31

## 2022-12-24 RX ADMIN — Medication 975 MILLIGRAM(S): at 06:15

## 2022-12-24 RX ADMIN — CLOPIDOGREL BISULFATE 75 MILLIGRAM(S): 75 TABLET, FILM COATED ORAL at 11:05

## 2022-12-24 RX ADMIN — Medication 975 MILLIGRAM(S): at 21:44

## 2022-12-24 RX ADMIN — SODIUM CHLORIDE 100 MILLILITER(S): 9 INJECTION INTRAMUSCULAR; INTRAVENOUS; SUBCUTANEOUS at 15:26

## 2022-12-24 RX ADMIN — Medication 25 MILLIGRAM(S): at 05:46

## 2022-12-24 RX ADMIN — Medication 3 MILLIGRAM(S): at 21:44

## 2022-12-24 RX ADMIN — Medication 5 MILLIGRAM(S): at 05:45

## 2022-12-24 RX ADMIN — DONEPEZIL HYDROCHLORIDE 5 MILLIGRAM(S): 10 TABLET, FILM COATED ORAL at 21:38

## 2022-12-24 RX ADMIN — TRAMADOL HYDROCHLORIDE 25 MILLIGRAM(S): 50 TABLET ORAL at 11:04

## 2022-12-24 RX ADMIN — Medication 5 MILLIGRAM(S): at 17:27

## 2022-12-24 RX ADMIN — SODIUM CHLORIDE 100 MILLILITER(S): 9 INJECTION INTRAMUSCULAR; INTRAVENOUS; SUBCUTANEOUS at 17:28

## 2022-12-24 RX ADMIN — QUETIAPINE FUMARATE 25 MILLIGRAM(S): 200 TABLET, FILM COATED ORAL at 21:37

## 2022-12-25 RX ORDER — SODIUM CHLORIDE 9 MG/ML
500 INJECTION INTRAMUSCULAR; INTRAVENOUS; SUBCUTANEOUS ONCE
Refills: 0 | Status: COMPLETED | OUTPATIENT
Start: 2022-12-25 | End: 2022-12-25

## 2022-12-25 RX ADMIN — Medication 325 MILLIGRAM(S): at 21:03

## 2022-12-25 RX ADMIN — QUETIAPINE FUMARATE 25 MILLIGRAM(S): 200 TABLET, FILM COATED ORAL at 21:03

## 2022-12-25 RX ADMIN — SACUBITRIL AND VALSARTAN 1 TABLET(S): 24; 26 TABLET, FILM COATED ORAL at 11:00

## 2022-12-25 RX ADMIN — DONEPEZIL HYDROCHLORIDE 5 MILLIGRAM(S): 10 TABLET, FILM COATED ORAL at 21:03

## 2022-12-25 RX ADMIN — Medication 5 MILLIGRAM(S): at 17:12

## 2022-12-25 RX ADMIN — QUETIAPINE FUMARATE 25 MILLIGRAM(S): 200 TABLET, FILM COATED ORAL at 13:04

## 2022-12-25 RX ADMIN — Medication 3 MILLIGRAM(S): at 21:03

## 2022-12-25 RX ADMIN — SODIUM CHLORIDE 500 MILLILITER(S): 9 INJECTION INTRAMUSCULAR; INTRAVENOUS; SUBCUTANEOUS at 13:59

## 2022-12-25 RX ADMIN — CLOPIDOGREL BISULFATE 75 MILLIGRAM(S): 75 TABLET, FILM COATED ORAL at 13:05

## 2022-12-25 RX ADMIN — Medication 975 MILLIGRAM(S): at 13:04

## 2022-12-25 RX ADMIN — Medication 975 MILLIGRAM(S): at 21:02

## 2022-12-25 RX ADMIN — Medication 975 MILLIGRAM(S): at 13:40

## 2022-12-25 RX ADMIN — SACUBITRIL AND VALSARTAN 1 TABLET(S): 24; 26 TABLET, FILM COATED ORAL at 21:03

## 2022-12-25 RX ADMIN — TRAMADOL HYDROCHLORIDE 25 MILLIGRAM(S): 50 TABLET ORAL at 11:59

## 2022-12-25 RX ADMIN — Medication 975 MILLIGRAM(S): at 22:00

## 2022-12-25 RX ADMIN — CELECOXIB 100 MILLIGRAM(S): 200 CAPSULE ORAL at 13:40

## 2022-12-25 RX ADMIN — CELECOXIB 100 MILLIGRAM(S): 200 CAPSULE ORAL at 13:05

## 2022-12-25 RX ADMIN — TRAMADOL HYDROCHLORIDE 25 MILLIGRAM(S): 50 TABLET ORAL at 11:00

## 2022-12-25 NOTE — PROVIDER CONTACT NOTE (OTHER) - BACKGROUND
s/p fall and right hip arthroscopy.
see H&P
see H & P
s/p fall and right hip arthroscopy.
s/p fall and right hip fx. R hip total arthroscopy 12/20

## 2022-12-25 NOTE — PROVIDER CONTACT NOTE (OTHER) - ASSESSMENT
AxOx1. Confused. Does not appear to be in distress.
Pt A&Ox1. BP low, 88/47 HR 77.
AxOx1. Does not appear to be in distress. BP trending down, HR 98 bpm
AxOx1. Confused. HR 80 bpm
pt was able to remove the aquacel dsg to the rt hip - staples intact. redirected pt and wrists in place.

## 2022-12-25 NOTE — PROVIDER CONTACT NOTE (OTHER) - ACTION/TREATMENT ORDERED:
Provider notified. No further interventions recommended. Will cont to monitor.
Provider notified. No interventions, recommends RN to monitor.
Provider notified. Ordered a bolus 250 ml/30 min. Will recheck BP.
notified ANISH Green. Admin 250cc NS bolus.
PA stated he will replace drsg with new aquacel

## 2022-12-25 NOTE — PROVIDER CONTACT NOTE (OTHER) - SITUATION
Hypotension, BP 91/57, s/p 250 mL bolus
pt removed aquacel dsg
Hypotension, BP 94/66
BP 88/47. HR 77.
Hypotension, BP 81/52

## 2022-12-25 NOTE — PROVIDER CONTACT NOTE (OTHER) - REASON
Hypotension, BP 94/66
Hypotension, BP 91/57, s/p 250 mL bolus
pt removed aquacel dsg
BP low
Hypotension, BP 81/52

## 2022-12-26 LAB — SARS-COV-2 RNA SPEC QL NAA+PROBE: SIGNIFICANT CHANGE UP

## 2022-12-26 RX ORDER — QUETIAPINE FUMARATE 200 MG/1
50 TABLET, FILM COATED ORAL THREE TIMES A DAY
Refills: 0 | Status: DISCONTINUED | OUTPATIENT
Start: 2022-12-26 | End: 2022-12-30

## 2022-12-26 RX ORDER — QUETIAPINE FUMARATE 200 MG/1
50 TABLET, FILM COATED ORAL THREE TIMES A DAY
Refills: 0 | Status: DISCONTINUED | OUTPATIENT
Start: 2022-12-26 | End: 2022-12-26

## 2022-12-26 RX ADMIN — Medication 975 MILLIGRAM(S): at 21:39

## 2022-12-26 RX ADMIN — Medication 975 MILLIGRAM(S): at 21:09

## 2022-12-26 RX ADMIN — SACUBITRIL AND VALSARTAN 1 TABLET(S): 24; 26 TABLET, FILM COATED ORAL at 10:14

## 2022-12-26 RX ADMIN — Medication 5 MILLIGRAM(S): at 06:07

## 2022-12-26 RX ADMIN — Medication 975 MILLIGRAM(S): at 13:54

## 2022-12-26 RX ADMIN — QUETIAPINE FUMARATE 50 MILLIGRAM(S): 200 TABLET, FILM COATED ORAL at 21:08

## 2022-12-26 RX ADMIN — QUETIAPINE FUMARATE 25 MILLIGRAM(S): 200 TABLET, FILM COATED ORAL at 11:46

## 2022-12-26 RX ADMIN — Medication 975 MILLIGRAM(S): at 07:06

## 2022-12-26 RX ADMIN — CLOPIDOGREL BISULFATE 75 MILLIGRAM(S): 75 TABLET, FILM COATED ORAL at 11:46

## 2022-12-26 RX ADMIN — PANTOPRAZOLE SODIUM 40 MILLIGRAM(S): 20 TABLET, DELAYED RELEASE ORAL at 06:07

## 2022-12-26 RX ADMIN — Medication 975 MILLIGRAM(S): at 06:06

## 2022-12-26 RX ADMIN — Medication 325 MILLIGRAM(S): at 21:09

## 2022-12-26 RX ADMIN — Medication 975 MILLIGRAM(S): at 14:10

## 2022-12-26 RX ADMIN — Medication 3 MILLIGRAM(S): at 21:10

## 2022-12-26 RX ADMIN — Medication 25 MILLIGRAM(S): at 06:06

## 2022-12-26 RX ADMIN — CELECOXIB 100 MILLIGRAM(S): 200 CAPSULE ORAL at 13:54

## 2022-12-26 RX ADMIN — CELECOXIB 100 MILLIGRAM(S): 200 CAPSULE ORAL at 14:15

## 2022-12-26 RX ADMIN — SACUBITRIL AND VALSARTAN 1 TABLET(S): 24; 26 TABLET, FILM COATED ORAL at 21:08

## 2022-12-26 RX ADMIN — QUETIAPINE FUMARATE 25 MILLIGRAM(S): 200 TABLET, FILM COATED ORAL at 09:14

## 2022-12-26 RX ADMIN — Medication 5 MILLIGRAM(S): at 17:29

## 2022-12-26 RX ADMIN — DONEPEZIL HYDROCHLORIDE 5 MILLIGRAM(S): 10 TABLET, FILM COATED ORAL at 21:08

## 2022-12-26 NOTE — DIETITIAN INITIAL EVALUATION ADULT - OTHER INFO
-- Weight Hx: per wife pt's UBW 166lbs without any known changes PTA. Dosing wt this admission 180lbs, stated -?accuracy of stated vs. UBW. No growth chart history available, will continue to monitor.   -- Pt with restraints 2/2 agitation, now ordered for Seroquel  -- IVF: NS @ 100mL/hr

## 2022-12-26 NOTE — DIETITIAN INITIAL EVALUATION ADULT - PERTINENT MEDS FT
MEDICATIONS  (STANDING):  acetaminophen     Tablet .. 975 milliGRAM(s) Oral every 8 hours  aspirin 325 milliGRAM(s) Oral daily  celecoxib 100 milliGRAM(s) Oral daily  clopidogrel Tablet 75 milliGRAM(s) Oral daily  donepezil 5 milliGRAM(s) Oral at bedtime  melatonin 3 milliGRAM(s) Oral at bedtime  metoprolol succinate ER 25 milliGRAM(s) Oral daily  oxybutynin 5 milliGRAM(s) Oral two times a day  pantoprazole    Tablet 40 milliGRAM(s) Oral before breakfast  QUEtiapine 50 milliGRAM(s) Oral three times a day  sacubitril 49 mG/valsartan 51 mG 1 Tablet(s) Oral two times a day  sodium chloride 0.9%. 1000 milliLiter(s) (100 mL/Hr) IV Continuous <Continuous>    MEDICATIONS  (PRN):  acetaminophen   IVPB .. 1000 milliGRAM(s) IV Intermittent once PRN Severe Pain (7 - 10)  haloperidol     Tablet 2 milliGRAM(s) Oral every 6 hours PRN agitation  haloperidol    Injectable 1 milliGRAM(s) IV Push every 6 hours PRN SEVERE  agitation only  ondansetron Injectable 4 milliGRAM(s) IV Push every 6 hours PRN Nausea and/or Vomiting  traMADol 25 milliGRAM(s) Oral every 6 hours PRN mild-moderate pain

## 2022-12-26 NOTE — CHART NOTE - NSCHARTNOTESELECT_GEN_ALL_CORE
Psych recommendations/Event Note
Agitation/Event Note
Aquacel Pulling/Event Note
Orthopedic POC/Event Note

## 2022-12-26 NOTE — DIETITIAN INITIAL EVALUATION ADULT - ENTER FROM (CAL/KG)
..  [address] [place] [address], [address] [place] [address]   Nutrition Assessment - Medical Nutrition Therapy   Outpatient Initial Evaluation         Patient Name: Bishnu Gayle : 1957   Treatment Diagnosis: Type 2 DM, obesity   Referral Source: Rohini Tapia NP Start of Care [facility]): 2021     Gender: female Age: 59 y.o. Ht: 65 in Wt: 216 lb  kg   BMI: 35.94 BMR   Male  BMR Female 1530     Past Medical History:  Schizophrenia, HTN, GERD, CVA 2 years ago, smoker, hyperlipidemia, depression, sleep apnea (on cpap)     Pertinent Medications:     Lantus 55 units, Glipizide 10 mg BID, Norvasc, Zocor, ASA   Biochemical Data:   Lab Results   Component Value Date/Time    Hemoglobin A1c 8.0 (H) 2021 03:07 PM    Hemoglobin A1c (POC) 7.8 2020 01:54 PM     Lab Results   Component Value Date/Time    Sodium 142 2021 03:07 PM    Potassium 4.5 2021 03:07 PM    Chloride 108 2021 03:07 PM    CO2 27 2021 03:07 PM    Anion gap 7 2021 03:07 PM    Glucose 75 2021 03:07 PM    BUN 15 2021 03:07 PM    Creatinine 0.98 2021 03:07 PM    BUN/Creatinine ratio 15 2021 03:07 PM    GFR est AA >60 2021 03:07 PM    GFR est non-AA 57 (L) 2021 03:07 PM    Calcium 9.4 2021 03:07 PM    Bilirubin, total 0.2 2021 03:07 PM    Alk.  phosphatase 93 2021 03:07 PM    Protein, total 7.3 2021 03:07 PM    Albumin 3.8 2021 03:07 PM    Globulin 3.5 2021 03:07 PM    A-G Ratio 1.1 2021 03:07 PM    ALT (SGPT) 27 2021 03:07 PM    AST (SGOT) 15 2021 03:07 PM     Lab Results   Component Value Date/Time    Cholesterol, total 135 2021 03:07 PM    HDL Cholesterol 50 2021 03:07 PM    LDL, calculated 64.8 2021 03:07 PM    VLDL, calculated 20.2 2021 03:07 PM    Triglyceride 101 2021 03:07 PM    CHOL/HDL Ratio 2.7 2021 03:07 PM     Lab Results   Component Value Date/Time    ALT (SGPT) 27 03/18/2021 03:07 PM    AST (SGOT) 15 03/18/2021 03:07 PM    Alk. phosphatase 93 03/18/2021 03:07 PM    Bilirubin, direct 0.1 09/16/2020 01:38 PM    Bilirubin, total 0.2 03/18/2021 03:07 PM     Lab Results   Component Value Date/Time    Creatinine 0.98 03/18/2021 03:07 PM     Lab Results   Component Value Date/Time    BUN 15 03/18/2021 03:07 PM     Lab Results   Component Value Date/Time    Microalbumin/Creat ratio (mg/g creat) 22 09/16/2020 01:38 PM    Microalbumin,urine random 5.63 (H) 09/16/2020 01:38 PM    Microalbumin urine (POC) 150 06/14/2017 12:20 PM        Assessment:   The patient is here for diabetes education. She says she has had diabetes for about 10 years but she has never received any education in the past. She has a strong family history for diabetes; one sister passed from diabetes complications a few years back. The patient lives alone; she has 4 grown children who live nearby. . She says she has tried to stop drinking sugary drinks to improve her blood sugars but she still eats a lot of fast food. She does not cook. She also does not exercise. She says she does not want to quit smoking. The patient says she checks her blood sugar \"sometimes\" at fasting. She says her blood sugars range from  depending upon what she eats the night before. Food & Nutrition: Dietary recall:   Breakfast: SKIPS (used to drink coffee but she stopped doing that)  Lunch (around noon): caesar salad from [employer] with grilled chicken on it and caesar dressing   Dinner: Fast food with friends \"because they are paying for it\" [employer], [facility], chinese food  If she eats at home she will make a hamburger or sloppy joes or fix a frozen pizza. She buys pre-made items at [employer] as well (broccoli cheddar soup) She says she eats very little vegetables. She says eating vegetables is like eating [place] food. \"  Snacks at night: chips, cookies  Sweets:  \"That'm my problem. \" She says she loves gingersnap cookies. Beverages: Gatorade Zero, some water (no sodas, no juice)       Estimate Needs   Calories: 1500  Protein: 94 Carbs: 169 Fat: 50   Kcal/day  g/day  g/day  g/day        percent: 25  45  30               Nutrition Diagnosis   Obesity related to excessive energy intake as evidenced by BMI of 35.94    Altered nutrition related lab values related to type 2 DM as evidenced by A1C 8.0    Nutrition knowledge deficit related to type 2 DM as evidenced by overconsumption of CHO containing foods, fast food, lack of exercise, inappropriate diet to prevent weight gain over time. Nutrition Intervention &  Education: Educated patient on diabetes, weight loss diet with plate method guidelines. Encouraged the patient to eat at least 3 times per day, spacing meals no more than 4-5 hours apart (2-3 hours in-between snacks). Discussed that 1/2 the plate should include non-starchy vegetables, 1/4 plate should be lean protein, and 1/4 of plate should be carbohydrate. Discussed foods high in carbs that will affect blood sugars. Encouraged the patient to try using measuring cups to familiarize with appropriate serving sizes. Measure out 1 cup of carbs at meals (potatoes, rice, pasta, corn, baked beans, mac n cheese, sweet potatoes etc) and don't exceed that much at a meal. Suggested the patient include protein source with all meals and snacks. Include more non-starchy vegetables and only 2 fruit servings per day (eat a variety). Don't drink calories: avoid fruit juice, regular sodas, sweet tea, Gatorade. Eliminate/decrease fast food consumption. Make better choices at fast food restaurants if going to eat it. We discussed the importance of timing of meals. Don't skip breakfast; pick something from snack list. Discussed importance of 150 minutes per week physical activity.      Handouts Provided: [x]  Carbohydrates  [x]  Protein  [x]  Non-starchy Vegetables  []  Food Label  [x]  Meal and Snack Ideas  []  Food Journals [x]  Diabetes  []  Cholesterol  [x]  Sodium  [x]  Gen Nutr Guidelines  []  SBGM Guidelines  [x]  Others: My Healthy Plate   Information Reviewed with: Patient   Readiness to Change Stage: [x]  Pre-contemplative    []  Contemplative  []  Preparation               []  Action                  []  Maintenance   Potential Barriers to Learning: []  Decline in memory    []  Language barrier   []  Other:  []  Emotional                  []  Limited mobility  [x]  Lack of motivation     [] Vision, hearing or cognitive impairment   Expected Compliance: Fair to poor     Nutritional Goal - To promote lifestyle changes to result in:    [x]  Weight loss  [x]  Improved diabetic control  []  Decreased cholesterol levels  [x]  Decreased blood pressure  []  Weight maintenance []  Preventing any interactions associated with food allergies  []  Adequate weight gain toward goal weight  []  Other:        Patient Goals:   1. Eat something for breakfast within an hour of waking everyday (pick something from the snack list; balance of protein and carbs)  2. Eat less fast food. If going to eat fast food, don't super size. Choose grilled chicken (instead of fried) and side salad (instead of fries). 3. Try to follow plate method for meals. Include lean protein and controlled amount of carbs (1 cup max). Add some non-starchy vegetables (fresh, frozen or KEITH) or include salad with meals. 4. Walking after meals (30 minutes per day at least 5 days per week). 5. 64 oz water daily  (only sugar free beverages)      Dietitian Signature: Joanna Marino RD,[facility] Date: 4/19/2021   Follow-up:  Will call for an appointment if needed Time: 11:40 AM 25

## 2022-12-26 NOTE — DIETITIAN INITIAL EVALUATION ADULT - ORAL INTAKE PTA/DIET HISTORY
Per pt's wife at bedside (pt A&Ox0), pt with good appetite and PO intake PTA. Follows regular diet but prefers soft foods (has dentures). Taking vitamin D, E, C, and fish oil PTA. Wife providing pt with Ensure supplement 1 x daily at home. NKFA or intolerances reported.

## 2022-12-26 NOTE — DIETITIAN INITIAL EVALUATION ADULT - ENERGY INTAKE
Pt with decreased intake, poor per wife. Appears pt requires 1:1 feeding assistance and encouragement. Wife feeding patient at visit, pt intermittently refusing. Pt ordered for Ensure Surgery, per wife has been receiving.

## 2022-12-26 NOTE — DIETITIAN INITIAL EVALUATION ADULT - REASON
Nutrition focused physical exam deferred at this time as pt being fed. Pt appears to have age-related sarcopenia.

## 2022-12-26 NOTE — CHART NOTE - NSCHARTNOTEFT_GEN_A_CORE
Note:    Pt has been intermittently agitated throughout his post-operative period.  Does respond well when family members are present  Placed on Seroquel and discussions w/ Medicine  Spoke w/ Psych on call  Agree with above. Suggested:  --increase dose and frequency of Seroquel  -- continue haldol PRN  for excessive agitation  --  Add melatonin QHS  --  IVF for dehydration    Call for inpatient consultation should Rx prove ineffective or patient becomes more agitated  Family updated      ***See Above  Noble OCONNELL  Orthopedics  B: [phone]  S: [phone]
Nurse contacted provider that pt pulled off his Aquacel dressing and threw it on the floor, despite him having bilateral wrist restraints. Bilateral wrist restrains were tightened and a new Aquacel dressing was placed. As per nurse Sweetie, pt has done this before during his hospital stay. Pt is due for Seroquel dose at bedtime. Will continue to monitor behavior.    Rober Ramirez PA-C  Orthopedic Surgery  Pager #[phone]
Post-Operative Check    Patient was seen and evaluated in RR resting without complaints. Pain is well controlled.  Denies CP/SOB, dyspnea, paresthesias, N/V.  Patient is confused at baseline with Hx of Alzheimer's dementia.     Vitals:  T(C): 36.1 (20 Dec 2022 18:15), Max: 36.9 (20 Dec 2022 07:02)  T(F): 97 (20 Dec 2022 18:15), Max: 98.4 (20 Dec 2022 07:02)  HR: 79 (20 Dec 2022 19:00) (66 - 117)  BP: 100/58 (20 Dec 2022 19:00) (95/54 - 152/69)  BP(mean): 70 (20 Dec 2022 18:30) (68 - 98)  RR: 16 (20 Dec 2022 19:00) (16 - 18)  SpO2: 97% (20 Dec 2022 19:00) (92% - 99%)    Parameters below as of 20 Dec 2022 18:15  Patient On (Oxygen Delivery Method): nasal cannula  O2 Flow (L/min): 2    Exam:  General: A&Ox1, NAD, resting comfortably in bed.  Laterality: RLE            Aquacel is clean, dry, and intact            Sensation intact to light touch          Calves soft, nontender.           1+ DP/PT pulses.          (+) DF/PF/EHL/FHL 5/5          capillary refill <2 secs. Warm, well-perfused.     Labs:                      13.6   15.65 )-----------( 196      ( 20 Dec 2022 17:29 )             42.2     12-20    140  |  104  |  23  ----------------------------<  140<H>  4.9   |  23  |  1.07    Ca    8.9      20 Dec 2022 17:29      A/P: 89y Male s/p Right hip bipolar hemiarthroplasty.   -  Pain management  -  IS encouraged  -  DVT ppx: Lovenox 40 mg QD, SCDs  -  GI ppx: Protonix 40 mg  -  PT/OT - WBAT, OOB  -  Continue with Post-op Antibiotics x 24hrs  -  Dispo planning: PACU to floor pending PT/OT eval      Sven Guerra, PA-C  Orthopedic Surgery  Pager #[phone]
Spoke with psych physician on call regarding continued agitation.   Recommendations for increase in seroquel to 50mg TID, continue with prn haldol as ordered.   They will see patient first thing in the morning.     Feli Rdz PA-C  Orthopedic Surgery  Team Pager: [phone]

## 2022-12-26 NOTE — DIETITIAN INITIAL EVALUATION ADULT - NS FNS DIET ORDER
Diet, Regular:   Supplement Feeding Modality:  Oral  Ensure Surgery Cans or Servings Per Day:  1       Frequency:  Two Times a day (12-24-22 @ 13:48)

## 2022-12-27 DIAGNOSIS — F03.90 UNSPECIFIED DEMENTIA WITHOUT BEHAVIORAL DISTURBANCE: ICD-10-CM

## 2022-12-27 LAB
ANION GAP SERPL CALC-SCNC: 8 MMOL/L — SIGNIFICANT CHANGE UP (ref 5–17)
BUN SERPL-MCNC: 23 MG/DL — SIGNIFICANT CHANGE UP (ref 7–23)
CALCIUM SERPL-MCNC: 8.4 MG/DL — SIGNIFICANT CHANGE UP (ref 8.4–10.5)
CHLORIDE SERPL-SCNC: 109 MMOL/L — HIGH (ref 96–108)
CO2 SERPL-SCNC: 26 MMOL/L — SIGNIFICANT CHANGE UP (ref 22–31)
CREAT SERPL-MCNC: 1.08 MG/DL — SIGNIFICANT CHANGE UP (ref 0.5–1.3)
EGFR: 64 ML/MIN/1.73M2 — SIGNIFICANT CHANGE UP
GLUCOSE SERPL-MCNC: 156 MG/DL — HIGH (ref 70–99)
HCT VFR BLD CALC: 31.3 % — LOW (ref 39–50)
HGB BLD-MCNC: 9.9 G/DL — LOW (ref 13–17)
MCHC RBC-ENTMCNC: 28.6 PG — SIGNIFICANT CHANGE UP (ref 27–34)
MCHC RBC-ENTMCNC: 31.6 GM/DL — LOW (ref 32–36)
MCV RBC AUTO: 90.5 FL — SIGNIFICANT CHANGE UP (ref 80–100)
NRBC # BLD: 0 /100 WBCS — SIGNIFICANT CHANGE UP (ref 0–0)
PLATELET # BLD AUTO: 311 K/UL — SIGNIFICANT CHANGE UP (ref 150–400)
POTASSIUM SERPL-MCNC: 3.9 MMOL/L — SIGNIFICANT CHANGE UP (ref 3.5–5.3)
POTASSIUM SERPL-SCNC: 3.9 MMOL/L — SIGNIFICANT CHANGE UP (ref 3.5–5.3)
RBC # BLD: 3.46 M/UL — LOW (ref 4.2–5.8)
RBC # FLD: 16.2 % — HIGH (ref 10.3–14.5)
SODIUM SERPL-SCNC: 143 MMOL/L — SIGNIFICANT CHANGE UP (ref 135–145)
WBC # BLD: 10.32 K/UL — SIGNIFICANT CHANGE UP (ref 3.8–10.5)
WBC # FLD AUTO: 10.32 K/UL — SIGNIFICANT CHANGE UP (ref 3.8–10.5)

## 2022-12-27 PROCEDURE — 99222 1ST HOSP IP/OBS MODERATE 55: CPT

## 2022-12-27 RX ORDER — SENNA PLUS 8.6 MG/1
2 TABLET ORAL AT BEDTIME
Refills: 0 | Status: DISCONTINUED | OUTPATIENT
Start: 2022-12-27 | End: 2022-12-30

## 2022-12-27 RX ORDER — SODIUM CHLORIDE 9 MG/ML
500 INJECTION INTRAMUSCULAR; INTRAVENOUS; SUBCUTANEOUS ONCE
Refills: 0 | Status: COMPLETED | OUTPATIENT
Start: 2022-12-27 | End: 2022-12-29

## 2022-12-27 RX ORDER — HALOPERIDOL DECANOATE 100 MG/ML
0.5 INJECTION INTRAMUSCULAR EVERY 6 HOURS
Refills: 0 | Status: DISCONTINUED | OUTPATIENT
Start: 2022-12-27 | End: 2022-12-30

## 2022-12-27 RX ORDER — QUETIAPINE FUMARATE 200 MG/1
25 TABLET, FILM COATED ORAL EVERY 6 HOURS
Refills: 0 | Status: DISCONTINUED | OUTPATIENT
Start: 2022-12-27 | End: 2022-12-30

## 2022-12-27 RX ORDER — POLYETHYLENE GLYCOL 3350 17 G/17G
17 POWDER, FOR SOLUTION ORAL
Refills: 0 | Status: DISCONTINUED | OUTPATIENT
Start: 2022-12-27 | End: 2022-12-30

## 2022-12-27 RX ADMIN — Medication 5 MILLIGRAM(S): at 05:44

## 2022-12-27 RX ADMIN — QUETIAPINE FUMARATE 25 MILLIGRAM(S): 200 TABLET, FILM COATED ORAL at 23:45

## 2022-12-27 RX ADMIN — POLYETHYLENE GLYCOL 3350 17 GRAM(S): 17 POWDER, FOR SOLUTION ORAL at 17:58

## 2022-12-27 RX ADMIN — Medication 325 MILLIGRAM(S): at 21:30

## 2022-12-27 RX ADMIN — Medication 25 MILLIGRAM(S): at 05:44

## 2022-12-27 RX ADMIN — PANTOPRAZOLE SODIUM 40 MILLIGRAM(S): 20 TABLET, DELAYED RELEASE ORAL at 05:44

## 2022-12-27 RX ADMIN — Medication 975 MILLIGRAM(S): at 05:43

## 2022-12-27 RX ADMIN — CLOPIDOGREL BISULFATE 75 MILLIGRAM(S): 75 TABLET, FILM COATED ORAL at 11:44

## 2022-12-27 RX ADMIN — SACUBITRIL AND VALSARTAN 1 TABLET(S): 24; 26 TABLET, FILM COATED ORAL at 21:30

## 2022-12-27 RX ADMIN — Medication 3 MILLIGRAM(S): at 21:30

## 2022-12-27 RX ADMIN — Medication 975 MILLIGRAM(S): at 15:30

## 2022-12-27 RX ADMIN — SENNA PLUS 2 TABLET(S): 8.6 TABLET ORAL at 21:30

## 2022-12-27 RX ADMIN — QUETIAPINE FUMARATE 50 MILLIGRAM(S): 200 TABLET, FILM COATED ORAL at 15:30

## 2022-12-27 RX ADMIN — SACUBITRIL AND VALSARTAN 1 TABLET(S): 24; 26 TABLET, FILM COATED ORAL at 11:44

## 2022-12-27 RX ADMIN — Medication 5 MILLIGRAM(S): at 17:55

## 2022-12-27 RX ADMIN — Medication 975 MILLIGRAM(S): at 21:30

## 2022-12-27 RX ADMIN — Medication 975 MILLIGRAM(S): at 06:13

## 2022-12-27 RX ADMIN — QUETIAPINE FUMARATE 50 MILLIGRAM(S): 200 TABLET, FILM COATED ORAL at 21:31

## 2022-12-27 RX ADMIN — DONEPEZIL HYDROCHLORIDE 5 MILLIGRAM(S): 10 TABLET, FILM COATED ORAL at 21:30

## 2022-12-27 RX ADMIN — QUETIAPINE FUMARATE 50 MILLIGRAM(S): 200 TABLET, FILM COATED ORAL at 05:44

## 2022-12-27 NOTE — BH CONSULTATION LIAISON ASSESSMENT NOTE - CURRENT MEDICATION
MEDICATIONS  (STANDING):  acetaminophen     Tablet .. 975 milliGRAM(s) Oral every 8 hours  aspirin 325 milliGRAM(s) Oral daily  clopidogrel Tablet 75 milliGRAM(s) Oral daily  donepezil 5 milliGRAM(s) Oral at bedtime  melatonin 3 milliGRAM(s) Oral at bedtime  metoprolol succinate ER 25 milliGRAM(s) Oral daily  oxybutynin 5 milliGRAM(s) Oral two times a day  pantoprazole    Tablet 40 milliGRAM(s) Oral before breakfast  polyethylene glycol 3350 17 Gram(s) Oral two times a day  QUEtiapine 50 milliGRAM(s) Oral three times a day  sacubitril 49 mG/valsartan 51 mG 1 Tablet(s) Oral two times a day  senna 2 Tablet(s) Oral at bedtime  sodium chloride 0.9% Bolus 500 milliLiter(s) IV Bolus once  sodium chloride 0.9%. 1000 milliLiter(s) (100 mL/Hr) IV Continuous <Continuous>    MEDICATIONS  (PRN):  acetaminophen   IVPB .. 1000 milliGRAM(s) IV Intermittent once PRN Severe Pain (7 - 10)  haloperidol     Tablet 2 milliGRAM(s) Oral every 6 hours PRN agitation  haloperidol    Injectable 1 milliGRAM(s) IV Push every 6 hours PRN SEVERE  agitation only  ondansetron Injectable 4 milliGRAM(s) IV Push every 6 hours PRN Nausea and/or Vomiting  traMADol 25 milliGRAM(s) Oral every 6 hours PRN mild-moderate pain

## 2022-12-27 NOTE — BH CONSULTATION LIAISON ASSESSMENT NOTE - SUMMARY
89M,  for 42yrs, domiciled with wife and part-time HHA 2x weekly from 12pm-4pm, retired manager of a parking garage, with PPHx dementia dx in 2019 on donepezil, no previous SA or psych admissions, no previous inpt psychiatric admissions, no active substance abuse, with PMH significant for BPH, Afib w/PPM, a/w RT femoral head fx, POD#7 s/p R Femoral Neck Fx Amaury arthroplasty, psychiatry consulted for management of agitation.  Pt assessed at bedside, oriented to self only, in restraints.  Pt mumbling softly, unable to provide more than his name, does not know where he is or why he is in the hospital, unable to provide his own age.  Per family, pt with acute worsening MS as compared to baseline c/w delirium superimposed on dementia.

## 2022-12-27 NOTE — BH CONSULTATION LIAISON ASSESSMENT NOTE - NSBHCHARTREVIEWVS_PSY_A_CORE FT
Vital Signs Last 24 Hrs  T(C): 36.3 (27 Dec 2022 09:26), Max: 37.1 (27 Dec 2022 04:50)  T(F): 97.4 (27 Dec 2022 09:26), Max: 98.8 (27 Dec 2022 04:50)  HR: 87 (27 Dec 2022 09:26) (74 - 92)  BP: 84/63 (27 Dec 2022 09:26) (84/63 - 134/83)  BP(mean): --  RR: 18 (27 Dec 2022 09:26) (18 - 18)  SpO2: 96% (27 Dec 2022 09:26) (93% - 96%)    Parameters below as of 27 Dec 2022 09:26  Patient On (Oxygen Delivery Method): room air

## 2022-12-27 NOTE — BH CONSULTATION LIAISON ASSESSMENT NOTE - HPI (INCLUDE ILLNESS QUALITY, SEVERITY, DURATION, TIMING, CONTEXT, MODIFYING FACTORS, ASSOCIATED SIGNS AND SYMPTOMS)
89M,  for 42yrs, domiciled with wife and part-time HHA 2x weekly from 12pm-4pm, retired manager of a parking garage, with PPHx dementia dx in 2019 on donepezil, no previous SA or psych admissions, no previous inpt psychiatric admissions, no active substance abuse, with PMH significant for BPH, Afib w/PPM, a/w RT femoral head fx, POD#7 s/p R Femoral Neck Fx Amaury arthroplasty, psychiatry consulted for management of agitation.    Pt assessed at bedside, oriented to self only, in restraints.  Pt mumbling softly, unable to provide more than his name, does not know where he is or why he is in the hospital, unable to provide his own age.      Collateral obtained from spouse, Lelia Hawley ([phone]): Per wife, pt is normally pleasant at baseline, stating "he has never been a violent person." Wife says he has only been agitated in the hospital. States pt oriented to self at baseline, does not typically become agitated, wife is his main caregiver.

## 2022-12-27 NOTE — BH CONSULTATION LIAISON ASSESSMENT NOTE - DESCRIPTION
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

## 2022-12-27 NOTE — BH CONSULTATION LIAISON ASSESSMENT NOTE - NSBHCHARTREVIEWINVESTIGATE_PSY_A_CORE FT
< from: 12 Lead ECG (12.20.22 @ 05:07) >      Ventricular Rate 78 BPM    QRS Duration 144 ms    Q-T Interval 408 ms    QTC Calculation(Bazett) 465 ms    R Axis 39 degrees    T Axis 260 degrees    Diagnosis Line ATRIAL FIBRILLATION  LEFT BUNDLE BRANCH BLOCK  ABNORMAL ECG  NO PREVIOUS ECGS AVAILABLE  Confirmed by NIC ESPINAL BANI ([id number]) on 12/24/2022 12:11:32 AM    < end of copied text >

## 2022-12-27 NOTE — BH CONSULTATION LIAISON ASSESSMENT NOTE - NSBHCONSULTFOLLOWAFTERCARE_PSY_A_CORE FT
OP psych f/u at [facility]- [phone]  [facility] clinic- [phone]

## 2022-12-27 NOTE — BH CONSULTATION LIAISON ASSESSMENT NOTE - NSBHCONSULTRECOMMENDOTHER_PSY_A_CORE FT
1. May use Seroquel 50mg PO TID if family agreeable for antipsychotic use, hold for sedation.  Monitor EKG for QTc<500    2. PRN: Seroquel 25mg PO q6h PRN agitation; Haldol 0.5mg IV q6h PRN severe agitation.  Monitor for QTc<500.  Melatonin 3mg PO qHS PRN insomnia.    3. Check: TSH, B12, folate, RPR; consider CT head    4. Minimize use of benzos, opioids, anticholinergics, or other deliriogenic agents when possible.  Maintain sleep wake cycle.  Provide frequent reorientation and redirection.  Family member at bedside if possible. Assess for need for glasses and hearing aid (if applicable).    5. Pt does not meet criteria for psychiatric hospitalization.  Recommend outpt psych f/u at [facility] after d/c- [phone].   [facility] will follow.

## 2022-12-27 NOTE — BH CONSULTATION LIAISON ASSESSMENT NOTE - NSBHCHARTREVIEWLAB_PSY_A_CORE FT
9.9    10.32 )-----------( 311      ( 27 Dec 2022 10:45 )             31.3     12-27    143  |  109<H>  |  23  ----------------------------<  156<H>  3.9   |  26  |  1.08    Ca    8.4      27 Dec 2022 10:45

## 2022-12-28 LAB
FOLATE SERPL-MCNC: 9.7 NG/ML — SIGNIFICANT CHANGE UP
T PALLIDUM AB TITR SER: NEGATIVE — SIGNIFICANT CHANGE UP
TSH SERPL-MCNC: 1.2 UIU/ML — SIGNIFICANT CHANGE UP (ref 0.27–4.2)
VIT B12 SERPL-MCNC: >2000 PG/ML — HIGH (ref 232–1245)

## 2022-12-28 PROCEDURE — 99232 SBSQ HOSP IP/OBS MODERATE 35: CPT

## 2022-12-28 RX ADMIN — QUETIAPINE FUMARATE 25 MILLIGRAM(S): 200 TABLET, FILM COATED ORAL at 12:27

## 2022-12-28 RX ADMIN — QUETIAPINE FUMARATE 50 MILLIGRAM(S): 200 TABLET, FILM COATED ORAL at 06:22

## 2022-12-28 RX ADMIN — POLYETHYLENE GLYCOL 3350 17 GRAM(S): 17 POWDER, FOR SOLUTION ORAL at 17:53

## 2022-12-28 RX ADMIN — Medication 975 MILLIGRAM(S): at 06:50

## 2022-12-28 RX ADMIN — SENNA PLUS 2 TABLET(S): 8.6 TABLET ORAL at 21:06

## 2022-12-28 RX ADMIN — PANTOPRAZOLE SODIUM 40 MILLIGRAM(S): 20 TABLET, DELAYED RELEASE ORAL at 06:20

## 2022-12-28 RX ADMIN — Medication 975 MILLIGRAM(S): at 21:36

## 2022-12-28 RX ADMIN — QUETIAPINE FUMARATE 50 MILLIGRAM(S): 200 TABLET, FILM COATED ORAL at 14:07

## 2022-12-28 RX ADMIN — DONEPEZIL HYDROCHLORIDE 5 MILLIGRAM(S): 10 TABLET, FILM COATED ORAL at 21:06

## 2022-12-28 RX ADMIN — QUETIAPINE FUMARATE 50 MILLIGRAM(S): 200 TABLET, FILM COATED ORAL at 21:05

## 2022-12-28 RX ADMIN — QUETIAPINE FUMARATE 25 MILLIGRAM(S): 200 TABLET, FILM COATED ORAL at 23:17

## 2022-12-28 RX ADMIN — Medication 5 MILLIGRAM(S): at 06:22

## 2022-12-28 RX ADMIN — Medication 3 MILLIGRAM(S): at 21:06

## 2022-12-28 RX ADMIN — POLYETHYLENE GLYCOL 3350 17 GRAM(S): 17 POWDER, FOR SOLUTION ORAL at 06:22

## 2022-12-28 RX ADMIN — Medication 975 MILLIGRAM(S): at 14:06

## 2022-12-28 RX ADMIN — Medication 975 MILLIGRAM(S): at 06:20

## 2022-12-28 RX ADMIN — Medication 5 MILLIGRAM(S): at 17:52

## 2022-12-28 RX ADMIN — Medication 325 MILLIGRAM(S): at 21:06

## 2022-12-28 RX ADMIN — SACUBITRIL AND VALSARTAN 1 TABLET(S): 24; 26 TABLET, FILM COATED ORAL at 21:05

## 2022-12-28 RX ADMIN — CLOPIDOGREL BISULFATE 75 MILLIGRAM(S): 75 TABLET, FILM COATED ORAL at 12:28

## 2022-12-28 RX ADMIN — Medication 975 MILLIGRAM(S): at 21:06

## 2022-12-28 RX ADMIN — SACUBITRIL AND VALSARTAN 1 TABLET(S): 24; 26 TABLET, FILM COATED ORAL at 10:27

## 2022-12-28 NOTE — BH CONSULTATION LIAISON PROGRESS NOTE - NSBHCONSULTRECOMMENDOTHER_PSY_A_CORE FT
1. Can c/w Seroquel 50mg PO TID if family agreeable for antipsychotic use, hold for sedation.  Monitor EKG for QTc<500    2. PRN: Seroquel 25mg PO q6h PRN agitation; Haldol 0.5mg IV q6h PRN severe agitation.  Monitor for QTc<500.  Melatonin 3mg PO qHS PRN insomnia.    3. Can consider CT head if MS not improving    4. Minimize use of benzos, opioids, anticholinergics, or other deliriogenic agents when possible.  Maintain sleep wake cycle.  Provide frequent reorientation and redirection.  Family member at bedside if possible. Assess for need for glasses and hearing aid (if applicable).    5. Pt does not meet criteria for psychiatric hospitalization.  Recommend outpt psych f/u at [facility] after d/c- [phone].   [facility] will follow.

## 2022-12-28 NOTE — BH CONSULTATION LIAISON PROGRESS NOTE - NSBHCONSULTFOLLOWAFTERCARE_PSY_A_CORE FT
OP psych f/u at [facility]- [phone]  [facility] clinic- [phone]

## 2022-12-28 NOTE — BH CONSULTATION LIAISON PROGRESS NOTE - NSBHCHARTREVIEWVS_PSY_A_CORE FT
Vital Signs Last 24 Hrs  T(C): 36.7 (28 Dec 2022 13:55), Max: 36.8 (28 Dec 2022 09:26)  T(F): 98 (28 Dec 2022 13:55), Max: 98.2 (28 Dec 2022 09:26)  HR: 96 (28 Dec 2022 13:55) (69 - 100)  BP: 92/56 (28 Dec 2022 13:55) (92/56 - 126/78)  BP(mean): --  RR: 18 (28 Dec 2022 13:55) (18 - 18)  SpO2: 93% (28 Dec 2022 13:55) (93% - 95%)    Parameters below as of 28 Dec 2022 13:55  Patient On (Oxygen Delivery Method): room air

## 2022-12-28 NOTE — BH CONSULTATION LIAISON PROGRESS NOTE - NSBHCHARTREVIEWINVESTIGATE_PSY_A_CORE FT
< from: 12 Lead ECG (12.20.22 @ 05:07) >      Ventricular Rate 78 BPM    QRS Duration 144 ms    Q-T Interval 408 ms    QTC Calculation(Bazett) 465 ms    R Axis 39 degrees    T Axis 260 degrees    Diagnosis Line ATRIAL FIBRILLATION  LEFT BUNDLE BRANCH BLOCK  ABNORMAL ECG  NO PREVIOUS ECGS AVAILABLE  Confirmed by NIC ESPINAL BANI ([id number]) on 12/24/2022 12:11:32 AM    < end of copied text >

## 2022-12-28 NOTE — BH CONSULTATION LIAISON PROGRESS NOTE - CURRENT MEDICATION
MEDICATIONS  (STANDING):  acetaminophen     Tablet .. 975 milliGRAM(s) Oral every 8 hours  aspirin 325 milliGRAM(s) Oral daily  clopidogrel Tablet 75 milliGRAM(s) Oral daily  donepezil 5 milliGRAM(s) Oral at bedtime  melatonin 3 milliGRAM(s) Oral at bedtime  metoprolol succinate ER 25 milliGRAM(s) Oral daily  oxybutynin 5 milliGRAM(s) Oral two times a day  pantoprazole    Tablet 40 milliGRAM(s) Oral before breakfast  polyethylene glycol 3350 17 Gram(s) Oral two times a day  QUEtiapine 50 milliGRAM(s) Oral three times a day  sacubitril 49 mG/valsartan 51 mG 1 Tablet(s) Oral two times a day  senna 2 Tablet(s) Oral at bedtime  sodium chloride 0.9% Bolus 500 milliLiter(s) IV Bolus once    MEDICATIONS  (PRN):  acetaminophen   IVPB .. 1000 milliGRAM(s) IV Intermittent once PRN Severe Pain (7 - 10)  haloperidol    Injectable 0.5 milliGRAM(s) IV Push every 6 hours PRN severe agitation  ondansetron Injectable 4 milliGRAM(s) IV Push every 6 hours PRN Nausea and/or Vomiting  QUEtiapine 25 milliGRAM(s) Oral every 6 hours PRN agitation  traMADol 25 milliGRAM(s) Oral every 6 hours PRN mild-moderate pain

## 2022-12-28 NOTE — BH CONSULTATION LIAISON PROGRESS NOTE - NSBHFUPINTERVALHXFT_PSY_A_CORE
Pt was assessed at bedside with nurse present. He is sitting in a chair with soft restraints present, getting his afternoon medications. Pt unable to verbalize name but recognized it as his own when asked, disoriented to place, time, and situation. Pt denies current SI/HI/AVH. Vague about his age.    Per nursing staff, pt is restless but not overtly agitated, mildly somnolent s/p Seroquel.

## 2022-12-29 RX ADMIN — Medication 975 MILLIGRAM(S): at 13:08

## 2022-12-29 RX ADMIN — Medication 975 MILLIGRAM(S): at 06:36

## 2022-12-29 RX ADMIN — Medication 3 MILLIGRAM(S): at 20:51

## 2022-12-29 RX ADMIN — SENNA PLUS 2 TABLET(S): 8.6 TABLET ORAL at 20:52

## 2022-12-29 RX ADMIN — Medication 975 MILLIGRAM(S): at 20:49

## 2022-12-29 RX ADMIN — QUETIAPINE FUMARATE 25 MILLIGRAM(S): 200 TABLET, FILM COATED ORAL at 18:06

## 2022-12-29 RX ADMIN — DONEPEZIL HYDROCHLORIDE 5 MILLIGRAM(S): 10 TABLET, FILM COATED ORAL at 20:51

## 2022-12-29 RX ADMIN — QUETIAPINE FUMARATE 25 MILLIGRAM(S): 200 TABLET, FILM COATED ORAL at 10:00

## 2022-12-29 RX ADMIN — POLYETHYLENE GLYCOL 3350 17 GRAM(S): 17 POWDER, FOR SOLUTION ORAL at 17:30

## 2022-12-29 RX ADMIN — Medication 5 MILLIGRAM(S): at 17:30

## 2022-12-29 RX ADMIN — Medication 5 MILLIGRAM(S): at 06:36

## 2022-12-29 RX ADMIN — Medication 325 MILLIGRAM(S): at 20:51

## 2022-12-29 RX ADMIN — QUETIAPINE FUMARATE 50 MILLIGRAM(S): 200 TABLET, FILM COATED ORAL at 13:08

## 2022-12-29 RX ADMIN — PANTOPRAZOLE SODIUM 40 MILLIGRAM(S): 20 TABLET, DELAYED RELEASE ORAL at 06:36

## 2022-12-29 RX ADMIN — QUETIAPINE FUMARATE 50 MILLIGRAM(S): 200 TABLET, FILM COATED ORAL at 06:37

## 2022-12-29 RX ADMIN — QUETIAPINE FUMARATE 50 MILLIGRAM(S): 200 TABLET, FILM COATED ORAL at 20:51

## 2022-12-29 RX ADMIN — Medication 975 MILLIGRAM(S): at 07:06

## 2022-12-29 RX ADMIN — SACUBITRIL AND VALSARTAN 1 TABLET(S): 24; 26 TABLET, FILM COATED ORAL at 09:01

## 2022-12-29 RX ADMIN — SODIUM CHLORIDE 500 MILLILITER(S): 9 INJECTION INTRAMUSCULAR; INTRAVENOUS; SUBCUTANEOUS at 20:51

## 2022-12-29 RX ADMIN — CLOPIDOGREL BISULFATE 75 MILLIGRAM(S): 75 TABLET, FILM COATED ORAL at 13:08

## 2022-12-29 RX ADMIN — Medication 25 MILLIGRAM(S): at 06:35

## 2022-12-29 RX ADMIN — Medication 975 MILLIGRAM(S): at 21:30

## 2022-12-29 RX ADMIN — POLYETHYLENE GLYCOL 3350 17 GRAM(S): 17 POWDER, FOR SOLUTION ORAL at 06:36

## 2022-12-30 ENCOUNTER — TRANSCRIPTION ENCOUNTER (OUTPATIENT)
Age: 87
End: 2022-12-30

## 2022-12-30 VITALS
RESPIRATION RATE: 18 BRPM | DIASTOLIC BLOOD PRESSURE: 69 MMHG | TEMPERATURE: 98 F | OXYGEN SATURATION: 96 % | SYSTOLIC BLOOD PRESSURE: 104 MMHG | HEART RATE: 79 BPM

## 2022-12-30 LAB — SARS-COV-2 RNA SPEC QL NAA+PROBE: SIGNIFICANT CHANGE UP

## 2022-12-30 PROCEDURE — 85025 COMPLETE CBC W/AUTO DIFF WBC: CPT

## 2022-12-30 PROCEDURE — U0005: CPT

## 2022-12-30 PROCEDURE — 71045 X-RAY EXAM CHEST 1 VIEW: CPT

## 2022-12-30 PROCEDURE — 76377 3D RENDER W/INTRP POSTPROCES: CPT

## 2022-12-30 PROCEDURE — 96374 THER/PROPH/DIAG INJ IV PUSH: CPT

## 2022-12-30 PROCEDURE — 86900 BLOOD TYPING SEROLOGIC ABO: CPT

## 2022-12-30 PROCEDURE — 84443 ASSAY THYROID STIM HORMONE: CPT

## 2022-12-30 PROCEDURE — 85730 THROMBOPLASTIN TIME PARTIAL: CPT

## 2022-12-30 PROCEDURE — 97530 THERAPEUTIC ACTIVITIES: CPT

## 2022-12-30 PROCEDURE — 99285 EMERGENCY DEPT VISIT HI MDM: CPT

## 2022-12-30 PROCEDURE — 97116 GAIT TRAINING THERAPY: CPT

## 2022-12-30 PROCEDURE — 73552 X-RAY EXAM OF FEMUR 2/>: CPT

## 2022-12-30 PROCEDURE — 80048 BASIC METABOLIC PNL TOTAL CA: CPT

## 2022-12-30 PROCEDURE — 82306 VITAMIN D 25 HYDROXY: CPT

## 2022-12-30 PROCEDURE — 80053 COMPREHEN METABOLIC PANEL: CPT

## 2022-12-30 PROCEDURE — 86780 TREPONEMA PALLIDUM: CPT

## 2022-12-30 PROCEDURE — C9399: CPT

## 2022-12-30 PROCEDURE — 72192 CT PELVIS W/O DYE: CPT | Mod: MA

## 2022-12-30 PROCEDURE — 84484 ASSAY OF TROPONIN QUANT: CPT

## 2022-12-30 PROCEDURE — 86850 RBC ANTIBODY SCREEN: CPT

## 2022-12-30 PROCEDURE — 97162 PT EVAL MOD COMPLEX 30 MIN: CPT

## 2022-12-30 PROCEDURE — 97110 THERAPEUTIC EXERCISES: CPT

## 2022-12-30 PROCEDURE — 82607 VITAMIN B-12: CPT

## 2022-12-30 PROCEDURE — 36415 COLL VENOUS BLD VENIPUNCTURE: CPT

## 2022-12-30 PROCEDURE — 97166 OT EVAL MOD COMPLEX 45 MIN: CPT

## 2022-12-30 PROCEDURE — C1776: CPT

## 2022-12-30 PROCEDURE — 85027 COMPLETE CBC AUTOMATED: CPT

## 2022-12-30 PROCEDURE — 86901 BLOOD TYPING SEROLOGIC RH(D): CPT

## 2022-12-30 PROCEDURE — 87637 SARSCOV2&INF A&B&RSV AMP PRB: CPT

## 2022-12-30 PROCEDURE — 85610 PROTHROMBIN TIME: CPT

## 2022-12-30 PROCEDURE — 72170 X-RAY EXAM OF PELVIS: CPT

## 2022-12-30 PROCEDURE — 73502 X-RAY EXAM HIP UNI 2-3 VIEWS: CPT

## 2022-12-30 PROCEDURE — U0003: CPT

## 2022-12-30 PROCEDURE — 82746 ASSAY OF FOLIC ACID SERUM: CPT

## 2022-12-30 RX ORDER — QUETIAPINE FUMARATE 200 MG/1
1 TABLET, FILM COATED ORAL
Qty: 0 | Refills: 0 | DISCHARGE
Start: 2022-12-30

## 2022-12-30 RX ORDER — ACETAMINOPHEN 500 MG
3 TABLET ORAL
Qty: 0 | Refills: 0 | DISCHARGE
Start: 2022-12-30

## 2022-12-30 RX ORDER — SENNA PLUS 8.6 MG/1
2 TABLET ORAL
Qty: 0 | Refills: 0 | DISCHARGE
Start: 2022-12-30

## 2022-12-30 RX ORDER — PANTOPRAZOLE SODIUM 20 MG/1
1 TABLET, DELAYED RELEASE ORAL
Qty: 0 | Refills: 0 | DISCHARGE
Start: 2022-12-30

## 2022-12-30 RX ORDER — ASPIRIN/CALCIUM CARB/MAGNESIUM 324 MG
1 TABLET ORAL
Qty: 0 | Refills: 0 | DISCHARGE
Start: 2022-12-30

## 2022-12-30 RX ORDER — POLYETHYLENE GLYCOL 3350 17 G/17G
17 POWDER, FOR SOLUTION ORAL
Qty: 0 | Refills: 0 | DISCHARGE
Start: 2022-12-30

## 2022-12-30 RX ORDER — LANOLIN ALCOHOL/MO/W.PET/CERES
1 CREAM (GRAM) TOPICAL
Qty: 0 | Refills: 0 | DISCHARGE
Start: 2022-12-30

## 2022-12-30 RX ADMIN — Medication 975 MILLIGRAM(S): at 13:57

## 2022-12-30 RX ADMIN — Medication 975 MILLIGRAM(S): at 07:44

## 2022-12-30 RX ADMIN — Medication 25 MILLIGRAM(S): at 06:50

## 2022-12-30 RX ADMIN — POLYETHYLENE GLYCOL 3350 17 GRAM(S): 17 POWDER, FOR SOLUTION ORAL at 06:50

## 2022-12-30 RX ADMIN — Medication 5 MILLIGRAM(S): at 06:50

## 2022-12-30 RX ADMIN — TRAMADOL HYDROCHLORIDE 25 MILLIGRAM(S): 50 TABLET ORAL at 10:28

## 2022-12-30 RX ADMIN — Medication 5 MILLIGRAM(S): at 17:27

## 2022-12-30 RX ADMIN — POLYETHYLENE GLYCOL 3350 17 GRAM(S): 17 POWDER, FOR SOLUTION ORAL at 17:28

## 2022-12-30 RX ADMIN — Medication 975 MILLIGRAM(S): at 06:49

## 2022-12-30 RX ADMIN — CLOPIDOGREL BISULFATE 75 MILLIGRAM(S): 75 TABLET, FILM COATED ORAL at 11:17

## 2022-12-30 RX ADMIN — QUETIAPINE FUMARATE 50 MILLIGRAM(S): 200 TABLET, FILM COATED ORAL at 13:57

## 2022-12-30 RX ADMIN — QUETIAPINE FUMARATE 50 MILLIGRAM(S): 200 TABLET, FILM COATED ORAL at 06:52

## 2022-12-30 RX ADMIN — PANTOPRAZOLE SODIUM 40 MILLIGRAM(S): 20 TABLET, DELAYED RELEASE ORAL at 06:50

## 2022-12-30 RX ADMIN — QUETIAPINE FUMARATE 25 MILLIGRAM(S): 200 TABLET, FILM COATED ORAL at 11:17

## 2022-12-30 RX ADMIN — SACUBITRIL AND VALSARTAN 1 TABLET(S): 24; 26 TABLET, FILM COATED ORAL at 11:17

## 2022-12-30 NOTE — DISCHARGE NOTE NURSING/CASE MANAGEMENT/SOCIAL WORK - NSDCFUADDAPPT_GEN_ALL_CORE_FT
Please call to make appointment to see psychiatrist for continued medication management  upon discharge from rehab:    [facility]  [address] [place] [address] [phone]

## 2022-12-30 NOTE — PROGRESS NOTE ADULT - PROVIDER SPECIALTY LIST ADULT
Orthopedics
Internal Medicine
Orthopedics
Internal Medicine

## 2022-12-30 NOTE — PROGRESS NOTE ADULT - PROBLEM SELECTOR PLAN 3
Patient with a hx of afib  Echo done in march showed a EF of 43%  continue metoprolol for rate control  will adjust meds as needed  hold clopidogrel for surgery and restart post op  continue Entresto.
Patient with a hx of afib  Echo done in march showed a EF of 43%  continue metoprolol for rate control  will adjust meds as needed  hold clopidogrel for surgery and restart post op  continue Entresto.
Patient with a hx of afib  Echo done in march showed a EF of 43%  continue metoprolol for rate control  will adjust meds as needed  off AC  on plavix  continue Entresto.
Patient with a hx of afib  Echo done in march showed a EF of 43%  continue metoprolol for rate control  will adjust meds as needed  hold clopidogrel for surgery and restart post op  continue Entresto.
Patient with a hx of afib  Echo done in march showed a EF of 43%  continue metoprolol for rate control  will adjust meds as needed  hold clopidogrel for surgery and restart post op  continue Entresto.
Patient with a hx of afib  Echo done in march showed a EF of 43%  continue metoprolol for rate control  will adjust meds as needed  off AC  on plavix  continue Entresto.
Patient with a hx of afib  Echo done in march showed a EF of 43%  continue metoprolol for rate control  will adjust meds as needed  off AC  on plavix  continue Entresto.
Patient with a hx of afib  Echo done in march showed a EF of 43%  continue metoprolol for rate control  will adjust meds as needed  hold clopidogrel for surgery and restart post op  continue Entresto.

## 2022-12-30 NOTE — PROGRESS NOTE ADULT - PROBLEM SELECTOR PLAN 1
physical therapy as tolerated  PO as tolerated  DVT and GI prophylaxis
Patient tolerated the procedure well  physical therapy as tolerated  PO as tolerated  DVT and GI prophylaxis
Patient tolerated the procedure well  physical therapy as tolerated  PO as tolerated  DVT and GI prophylaxis
physical therapy as tolerated  PO as tolerated  DVT and GI prophylaxis
Patient tolerated the procedure well  physical therapy as tolerated  PO as tolerated  DVT and GI prophylaxis
physical therapy as tolerated  PO as tolerated  DVT and GI prophylaxis
Patient tolerated the procedure well  physical therapy as tolerated  PO as tolerated  DVT and GI prophylaxis
physical therapy as tolerated  PO as tolerated  DVT and GI prophylaxis

## 2022-12-30 NOTE — PROGRESS NOTE ADULT - PROBLEM SELECTOR PLAN 5
continue to monitor for urinary retention.

## 2022-12-30 NOTE — PROGRESS NOTE ADULT - PROBLEM SELECTOR PROBLEM 1
Hip fracture, right

## 2022-12-30 NOTE — PROGRESS NOTE ADULT - TIME BILLING
Discussed treatment plan with wife and patient at bedside .  DC planing to rehab
Discussed treatment plan with staff and patient at bedside .
Discussed treatment plan with staff and patient at bedside.   Patient scheduled for DC to rehab
Discussed treatment plan with wife and patient at bedside .  DC planing to rehab
Discussed treatment plan with staff and patient at bedside .  DC planing to rehab
Discussed treatment plan with wife and patient at bedside .  DC planing to rehab

## 2022-12-30 NOTE — PROGRESS NOTE ADULT - ASSESSMENT
89 yo male presents after a fall at home with a right hip fracture. He is s/p an Open reduction and internal fixation of the right hip
89 y/o M s/p right hip hemiarthroplasty POD#5, no narcs, re-orientation by family when available  Lucy Delacruz PA-C  Orthopaedic Surgery  Team pager [phone]  [facility] [phone]  [phone]  
89 yo male presents after a fall at home with a right hip fracture. He is s/p an Open reduction and internal fixation of the right hip
89 yo male presents after a fall at home with a right hip fracture. He is scheduled for an Open reduction and internal fixation of the right hip
89 yo male presents after a fall at home with a right hip fracture. He is s/p an Open reduction and internal fixation of the right hip
A/p: 89y Male POD#6 s/p R Femoral Neck Fx Amaury arthroplasty.  VSS. NAD.    Patient on level 1 restrains to prevent self injury  PT/OT-WBAT RLE  IS  DVT PPx: Asprin, Plavix and SCDs  Pain Control  Continue Current Tx.  Dispo planning to Sub Acute Rehab    Ha Barry PA-C  Orthopedic Surgery Team  Team Pager: [phone]
A/p: 89y Male POD#7 s/p R Femoral Neck Fx Amaury arthroplasty.  VSS. NAD.    Patient on level 1 restrains to prevent self injury  Awaiting psych eval today. Seroquel dosing increased yesterday as per psych verbal recs, will continue to assess  PT/OT-WBAT RLE  IS  DVT PPx: Asprin, Plavix and SCDs  Pain Control  Continue Current Tx.  Bowel regimen adjusted.   Protonix for GI protection  Dispo planning to Sub Acute Rehab    Feli Rdz PA-C  Orthopedic Surgery Team  Team Pager: [phone]
89 y/o M s/p right hip hemiarthroplasty POD#4, physical therapy, no narcs  Lucy Delacruz PA-C  Orthopaedic Surgery  Team pager [phone]  [facility] [phone]  [phone]  
S/P Right hip hemiarthroplasty    Plan    Continue present care  D/C planning in progress       Gisela Claire PA-C   Beeper    [phone]  
89 yo male presents after a fall at home with a right hip fracture. He is s/p an Open reduction and internal fixation of the right hip
89 yo male presents after a fall at home with a right hip fracture. He is s/p an Open reduction and internal fixation of the right hip
89 yo male presents after a fall at home with a right hip fracture. He is s/p an Open reduction and internal fixation of the right hip
89 yo male presents after a fall at home with a right hip fracture. He is s/p an Open reduction and internal fixation of the right hip
89 yo male presents after a fall at home with a right hip fracture. He is s/p an Open reduction and internal fixation of the right hip
89 yo male presents after a fall at home with a right hip fracture. He is s/p an Open reduction and internal fixation of the right hip

## 2022-12-30 NOTE — DISCHARGE NOTE NURSING/CASE MANAGEMENT/SOCIAL WORK - PATIENT PORTAL LINK FT
You can access the FollowMyHealth Patient Portal offered by [facility] by registering at the following website: [email or web address]. By joining [facility]’s FollowMyHealth portal, you will also be able to view your health information using other applications (apps) compatible with our system.

## 2022-12-30 NOTE — PROGRESS NOTE ADULT - PROBLEM SELECTOR PLAN 4
Pain meds as needed  follow for oversedation   GI regime to prevent constipation.

## 2022-12-30 NOTE — PROGRESS NOTE ADULT - PROBLEM SELECTOR PLAN 2
continue aricept  reorient Patient as needed  Patient has been agitated  continue 12.5 Mg QD and 25 mg Qhs  Psych eval as needed.
continue aricept  reorient Patient as needed  Patient has been agitated at time  continue 12.5 Mg QD and 25 mg Qhs  DC restraints if possible
continue aricept  reorient Patient as needed  Patient has been agitated at time  continue 12.5 Mg QD and 25 mg Qhs  DC restraints if possible  psy f/u
continue aricept  reorient Patient as needed  Patient has been agitated at time  continue 12.5 Mg QD and 25 mg Qhs  DC restraints if possible  psy f/u
continue aricept  reorient Patient as needed  Patient has been agitated at time  continue 12.5 Mg QD and 25 mg Qhs  DC restraints if possible  would consider Psych eval with continued agitation
continue aricept  reorient Patient as needed  Patient has been agitated  would start 12.5 Mg QD and 25 mg Qhs  Psych eval as needed.
continue aricept  reorient Patient as needed  Patient has been agitated at time  continue 12.5 Mg QD and 25 mg Qhs  DC restraints if possible  psy f/u
continue aricept  reorient Patient as needed  Patient has been agitated at time  continue 12.5 Mg QD and 25 mg Qhs  DC restraints if possible  psy f/u
continue aricept  reorient Patient as needed  Patient has been agitated at time  continue 12.5 Mg QD and 25 mg Qhs
continue aricept  reorient Patient as needed  Patient has been agitated at time  continue 12.5 Mg QD and 25 mg Qhs  DC restraints if possible  would consider Psych eval with continued agitation

## 2022-12-30 NOTE — PROGRESS NOTE ADULT - PROBLEM SELECTOR PROBLEM 2
Alzheimer disease

## 2022-12-30 NOTE — DISCHARGE NOTE NURSING/CASE MANAGEMENT/SOCIAL WORK - NSDCPEFALRISK_GEN_ALL_CORE
For information on Fall & Injury Prevention, visit: [email or web address] OR  [email or web address] OR  https://www.cdc.gov/steadi/patient.html

## 2022-12-30 NOTE — PROGRESS NOTE ADULT - SUBJECTIVE AND OBJECTIVE BOX
89-year-old male past medical history of Alzheimer's and pacemaker 12 years ago presenting to the ED from a transfer from [facility] after falling and being diagnosed with a right hip fracture.  Patient was initially evaluated at [place] after falling on an escalator and sustaining a laceration which was repaired at the other hospital.  Patient was discharged and walked out of the ER without any difficulty however when he was walking down a hill outside of the hospital as per the patient's wife he fell because of the wind and she fell on top of him.  Patient hit his head but did not lose consciousness no nausea, lightheadedness, change in his mental status.  Patient is not on any blood thinners. Patient seen post op after an Open reduction and internal fixation of the right hip. Patient has been confused but less agitated.        MEDICATIONS  (STANDING):  acetaminophen     Tablet .. 975 milliGRAM(s) Oral every 8 hours  aspirin 325 milliGRAM(s) Oral daily  clopidogrel Tablet 75 milliGRAM(s) Oral daily  donepezil 5 milliGRAM(s) Oral at bedtime  melatonin 3 milliGRAM(s) Oral at bedtime  metoprolol succinate ER 25 milliGRAM(s) Oral daily  oxybutynin 5 milliGRAM(s) Oral two times a day  pantoprazole    Tablet 40 milliGRAM(s) Oral before breakfast  polyethylene glycol 3350 17 Gram(s) Oral two times a day  QUEtiapine 50 milliGRAM(s) Oral three times a day  sacubitril 49 mG/valsartan 51 mG 1 Tablet(s) Oral two times a day  senna 2 Tablet(s) Oral at bedtime    MEDICATIONS  (PRN):  acetaminophen   IVPB .. 1000 milliGRAM(s) IV Intermittent once PRN Severe Pain (7 - 10)  ondansetron Injectable 4 milliGRAM(s) IV Push every 6 hours PRN Nausea and/or Vomiting  QUEtiapine 25 milliGRAM(s) Oral every 6 hours PRN agitation  traMADol 25 milliGRAM(s) Oral every 6 hours PRN mild-moderate pain          VITALS:   T(C): 36.3 (12-30-22 @ 08:10), Max: 36.8 (12-30-22 @ 04:20)  HR: 67 (12-30-22 @ 13:25) (67 - 98)  BP: 103/63 (12-30-22 @ 13:25) (95/56 - 120/73)  RR: 18 (12-30-22 @ 13:25) (18 - 18)  SpO2: 93% (12-30-22 @ 13:25) (93% - 97%)  Wt(kg): --    PHYSICAL EXAM:  GENERAL: NAD, well-groomed, well-developed  HEAD:  Atraumatic, Normocephalic  EYES: EOMI, PERRLA, conjunctiva and sclera clear  ENMT: No tonsillar erythema, exudates, or enlargement; Moist mucous membranes, Good dentition, No lesions  NECK: Supple, No JVD, Normal thyroid  NERVOUS SYSTEM:  Alert & Oriented X1,   CHEST/LUNG: Clear to percussion bilaterally; No rales, rhonchi, wheezing, or rubs  HEART: Regular rate and rhythm; No murmurs, rubs, or gallops  ABDOMEN: Soft, Nontender, Nondistended; Bowel sounds present  EXTREMITIES:  2+ Peripheral Pulses, No clubbing, cyanosis, or edema  LYMPH: No lymphadenopathy noted  SKIN: No rashes or lesions    LABS:                      CAPILLARY BLOOD GLUCOSE          RADIOLOGY & ADDITIONAL TESTS:      
Orthopedic Surgery Progress Note    S: No acute events overnight, patient confused this AM, restraints in place due to pulling at tubes/lines/dressings.    O:  Vital Signs Last 24 Hrs  T(C): 36.7 (22 Dec 2022 05:55), Max: 36.7 (21 Dec 2022 12:17)  T(F): 98 (22 Dec 2022 05:55), Max: 98 (21 Dec 2022 12:17)  HR: 85 (22 Dec 2022 05:55) (77 - 104)  BP: 111/71 (22 Dec 2022 05:55) (88/47 - 111/71)  BP(mean): --  RR: 18 (22 Dec 2022 05:55) (18 - 18)  SpO2: 94% (22 Dec 2022 05:55) (87% - 94%)    Parameters below as of 22 Dec 2022 05:55  Patient On (Oxygen Delivery Method): nasal cannula  O2 Flow (L/min): 2      Gen: NAD, patient resting comfortably in bed  RLE:  Dressing removed and incision covered by ABD and ace, aquacel replaced  EHL/FHL/TA/GS intact  SILT DP/SP/Whyte/Sa  WWP distally    Labs:                        12.4   15.55 )-----------( 219      ( 21 Dec 2022 05:27 )             38.7                         13.6   15.65 )-----------( 196      ( 20 Dec 2022 17:29 )             42.2       12-21    140  |  103  |  28<H>  ----------------------------<  197<H>  4.7   |  24  |  1.20      A/P 89y year old male s/p right hip hemiarthroplasty for femoral neck fracture, now POD #2    Pain Control  DVT PPX with aspirin and plavix  PT/OOB  WBAT   Anterior dislocation precautions  Dispo Planning- pending PT recs, likely rehab
Orthopedic Surgery Progress Note    S: Patient seen and examined this morning. No acute events overnight. Pain well controlled. Tolerating diet. Denies N/V/D/F/C.     O:  Vital Signs Last 24 Hrs  T(C): 36.4 (23 Dec 2022 04:14), Max: 37.1 (22 Dec 2022 20:04)  T(F): 97.6 (23 Dec 2022 04:14), Max: 98.8 (22 Dec 2022 20:04)  HR: 87 (23 Dec 2022 04:14) (76 - 92)  BP: 99/62 (23 Dec 2022 04:14) (92/54 - 111/71)  BP(mean): --  RR: 18 (23 Dec 2022 04:14) (18 - 18)  SpO2: 93% (23 Dec 2022 04:14) (93% - 97%)    Parameters below as of 23 Dec 2022 04:14  Patient On (Oxygen Delivery Method): nasal cannula  O2 Flow (L/min): 2        Gen: NAD, patient resting comfortably in bed  RLE:  Dressing removed and incision covered by ABD and ace, aquacel replaced  EHL/FHL/TA/GS intact  SILT DP/SP/Whyte/Sa  WWP distally    Labs:                                            10.0   9.39  )-----------( 175      ( 22 Dec 2022 07:11 )             31.3     12-22    140  |  108  |  31<H>  ----------------------------<  115<H>  4.2   |  26  |  1.35<H>    Ca    8.5      22 Dec 2022 07:11        A/P 89y year old male s/p right hip hemiarthroplasty for femoral neck fracture, now POD #3    Pain Control  DVT PPX with aspirin and plavix  PT/OOB  WBAT   Anterior dislocation precautions  Dispo Planning- pending PT recs, likely rehab
covering for dr moffett    89-year-old male past medical history of Alzheimer's and pacemaker 12 years ago presenting to the ED from a transfer from [facility] after falling and being diagnosed with a right hip fracture.  Patient was initially evaluated at [place] after falling on an escalator and sustaining a laceration which was repaired at the other hospital.  Patient was discharged and walked out of the ER without any difficulty however when he was walking down a hill outside of the hospital as per the patient's wife he fell because of the wind and she fell on top of him.  Patient hit his head but did not lose consciousness no nausea, lightheadedness, change in his mental status.  Patient is not on any blood thinners. Patient seen post op after an Open reduction and internal fixation of the right hip. Patient has been confused but less agitated.         MEDICATIONS  (STANDING):  acetaminophen     Tablet .. 975 milliGRAM(s) Oral every 8 hours  aspirin 325 milliGRAM(s) Oral daily  clopidogrel Tablet 75 milliGRAM(s) Oral daily  donepezil 5 milliGRAM(s) Oral at bedtime  melatonin 3 milliGRAM(s) Oral at bedtime  metoprolol succinate ER 25 milliGRAM(s) Oral daily  oxybutynin 5 milliGRAM(s) Oral two times a day  pantoprazole    Tablet 40 milliGRAM(s) Oral before breakfast  polyethylene glycol 3350 17 Gram(s) Oral two times a day  QUEtiapine 50 milliGRAM(s) Oral three times a day  sacubitril 49 mG/valsartan 51 mG 1 Tablet(s) Oral two times a day  senna 2 Tablet(s) Oral at bedtime  sodium chloride 0.9% Bolus 500 milliLiter(s) IV Bolus once    MEDICATIONS  (PRN):  acetaminophen   IVPB .. 1000 milliGRAM(s) IV Intermittent once PRN Severe Pain (7 - 10)  haloperidol    Injectable 0.5 milliGRAM(s) IV Push every 6 hours PRN severe agitation  ondansetron Injectable 4 milliGRAM(s) IV Push every 6 hours PRN Nausea and/or Vomiting  QUEtiapine 25 milliGRAM(s) Oral every 6 hours PRN agitation  traMADol 25 milliGRAM(s) Oral every 6 hours PRN mild-moderate pain    Vital Signs Last 24 Hrs  T(C): 36.8 (28 Dec 2022 09:26), Max: 36.8 (28 Dec 2022 09:26)  T(F): 98.2 (28 Dec 2022 09:26), Max: 98.2 (28 Dec 2022 09:26)  HR: 69 (28 Dec 2022 09:26) (69 - 100)  BP: 102/63 (28 Dec 2022 09:26) (102/63 - 126/78)  BP(mean): --  RR: 18 (28 Dec 2022 09:26) (18 - 18)  SpO2: 93% (28 Dec 2022 09:26) (93% - 95%)    Parameters below as of 28 Dec 2022 09:26  Patient On (Oxygen Delivery Method): room air          PHYSICAL EXAM:  GENERAL: NAD, well-groomed, well-developed  HEAD:  Atraumatic, Normocephalic  EYES: EOMI, PERRLA, conjunctiva and sclera clear  ENMT: No tonsillar erythema, exudates, or enlargement; Moist mucous membranes, Good dentition, No lesions  NECK: Supple, No JVD, Normal thyroid  NERVOUS SYSTEM:  Alert & Oriented X1,   CHEST/LUNG: Clear to percussion bilaterally; No rales, rhonchi, wheezing, or rubs  HEART: Regular rate and rhythm; No murmurs, rubs, or gallops  ABDOMEN: Soft, Nontender, Nondistended; Bowel sounds present  EXTREMITIES:  2+ Peripheral Pulses, No clubbing, cyanosis, or edema  LYMPH: No lymphadenopathy noted  SKIN: No rashes or lesions    LABS:                              9.9    10.32 )-----------( 311      ( 27 Dec 2022 10:45 )             31.3   12-27    143  |  109<H>  |  23  ----------------------------<  156<H>  3.9   |  26  |  1.08    Ca    8.4      27 Dec 2022 10:45    
Orthopedic Surgery Progress Note    S: No acute events overnight, pain appears well controlled.  Patient is confused this AM.  Hypotensive to 81/52 overnight, improved with bolus.     O:  Vital Signs Last 24 Hrs  T(C): 36.8 (21 Dec 2022 04:12), Max: 36.9 (20 Dec 2022 21:57)  T(F): 98.2 (21 Dec 2022 04:12), Max: 98.5 (20 Dec 2022 21:57)  HR: 89 (21 Dec 2022 04:12) (66 - 117)  BP: 93/61 (21 Dec 2022 04:12) (81/52 - 152/69)  BP(mean): 70 (20 Dec 2022 21:15) (68 - 98)  RR: 18 (21 Dec 2022 04:12) (15 - 18)  SpO2: 92% (21 Dec 2022 04:12) (92% - 100%)    Parameters below as of 21 Dec 2022 04:12  Patient On (Oxygen Delivery Method): nasal cannula  O2 Flow (L/min): 2      Gen: NAD, patient resting comfortably in bed, A&O x1  RLE:  Dressing C/D/I  EHL/FHL/TA/GS intact  SILT DP/SP/Whyte/Sa  No calf tenderness  WWP distally    Labs:                        12.4   15.55 )-----------( 219      ( 21 Dec 2022 05:27 )             38.7                         13.6   15.65 )-----------( 196      ( 20 Dec 2022 17:29 )             42.2       12-21    140  |  103  |  28<H>  ----------------------------<  197<H>  4.7   |  24  |  1.20        PT/INR - ( 20 Dec 2022 04:42 )   PT: 14.3 sec;   INR: 1.23 ratio         PTT - ( 20 Dec 2022 04:42 )  PTT:30.4 sec    A/P 89y year old male s/p right hip hemiarthroplasty for femoral neck fracture, now POD #1.    Pain Control  DVT PPX with lovenox for now  PT/OOB  WBAT   Anterior dislocation precautions  Dispo Planning- pending PT recs
Patient is a 89y old  Male who presents with a chief complaint of R Femoral Neck Fx (27 Dec 2022 06:10)      POST OPERATIVE DAY #:  [ ]   Patient comfortable  No complaints    VS:  T(C): 36.5 (12-28-22 @ 05:30), Max: 36.7 (12-27-22 @ 16:08)  T(F): 97.7 (12-28-22 @ 05:30), Max: 98.1 (12-27-22 @ 16:08)  HR: 70 (12-28-22 @ 05:30) (70 - 100)  BP: 102/63 (12-28-22 @ 05:30) (84/63 - 126/78)  RR: 18 (12-28-22 @ 05:30) (18 - 18)  SpO2: 93% (12-28-22 @ 05:30) (93% - 96%)  Wt(kg): --      PHYSICAL EXAM:  NAD, Alert  EXT:   Rt Hip: Dressing C/D/I  No Calf Tenderness  (+) DF/PF; (+) Distal Pulses;  Sensation: No gross deficits noted  Pulses 2+   B/L, PAS     LABS:                        9.9<L>  10.32 )-----------( 311      ( 27 Dec 2022 10:45 )             31.3<L>    12-27    143  |  109<H>  |  23  ----------------------------<  156<H>  3.9   |  26  |  1.08                 
Patient is a 89y old  Male who presents with a chief complaint of R Femoral Neck Fx (27 Dec 2022 06:10)    No acute events overnight.  Pain control.  Has been OOB working w/ PT.  Denies any LE numbness/tingling. No psychiatric concerns overnight.     ICU Vital Signs Last 24 Hrs  T(C): 36.3 (29 Dec 2022 04:31), Max: 36.9 (28 Dec 2022 20:09)  T(F): 97.4 (29 Dec 2022 04:31), Max: 98.4 (28 Dec 2022 20:09)  HR: 76 (29 Dec 2022 04:31) (69 - 96)  BP: 112/63 (29 Dec 2022 04:31) (92/56 - 112/63)  BP(mean): --  ABP: --  ABP(mean): --  RR: 18 (29 Dec 2022 04:31) (18 - 18)  SpO2: 95% (29 Dec 2022 04:31) (93% - 95%)    O2 Parameters below as of 29 Dec 2022 04:31  Patient On (Oxygen Delivery Method): room air        PHYSICAL EXAM:  NAD, Alert  EXT:   Rt Hip: Dressing C/D/I  No Calf Tenderness  (+) DF/PF; (+) Distal Pulses;  Sensation: No gross deficits noted  Pulses 2+   B/L, PAS       Assessment: s/p Right hip hemiarthroplasty, progressing well.    Plan:  - Pain control PRN  - DVT PPx  - OOB  - WBAT  - PT/OT  - No restraints at this time      
Patient seen and examined this morning. No acute events overnight. Pain well controlled. Tolerating diet. Denies N/V/D/F/C. Trying to get out of bed this morning.    Vital Signs Last 24 Hrs  T(C): 36.8 (30 Dec 2022 04:20), Max: 36.8 (30 Dec 2022 04:20)  T(F): 98.3 (30 Dec 2022 04:20), Max: 98.3 (30 Dec 2022 04:20)  HR: 84 (30 Dec 2022 04:20) (76 - 98)  BP: 116/69 (30 Dec 2022 04:20) (95/56 - 121/66)  BP(mean): --  RR: 18 (30 Dec 2022 04:20) (18 - 18)  SpO2: 94% (30 Dec 2022 04:20) (94% - 97%)    Parameters below as of 30 Dec 2022 04:20  Patient On (Oxygen Delivery Method): room air            PHYSICAL EXAM:  NAD, Alert  EXT:   Rt Hip: Dressing C/D/I  No Calf Tenderness  (+) DF/PF; (+) Distal Pulses;  Sensation: No gross deficits noted  Pulses 2+   B/L, PAS       Assessment: s/p Right hip hemiarthroplasty, POD10, progressing well.    Plan:  - Pain control PRN  - DVT PPx  - OOB  - WBAT  - PT/OT  - No restraints at this time  - Rehab today      
Patient is a 89y old  Male who presents with a chief complaint of right hip fracture   Patient s/p right hip hemiarthroplasty POD#5  Patient appears comfortable, less agitated overnight    T(C): 36.4 (12-25-22 @ 04:47), Max: 37.1 (12-24-22 @ 16:15)  HR: 74 (12-25-22 @ 04:47) (73 - 96)  BP: 101/63 (12-25-22 @ 04:47) (101/63 - 132/54)  RR: 18 (12-25-22 @ 04:47) (16 - 18)  SpO2: 94% (12-25-22 @ 04:47) (94% - 98%)    PHYSICAL EXAM:  NAD, Alert  [Right ] Hip: Dressing C/D/I; sensation grossly intact to light touch; (+) DF/PF; (+) Distal Pulses; No Calf tenderness B/L, PAS       
Patient is a 89y old  Male who presents with a chief complaint of Right hip fracture  Patient s/p right hip hemiarthroplasty POD#4  Patient appears comfortable, agitated overnight    T(C): 36.4 (12-24-22 @ 04:23), Max: 37.2 (12-23-22 @ 23:55)  HR: 76 (12-24-22 @ 04:23) (76 - 109)  BP: 131/72 (12-24-22 @ 04:23) (93/59 - 131/72)  RR: 18 (12-24-22 @ 04:23) (16 - 18)  SpO2: 98% (12-24-22 @ 04:23) (94% - 98%)    PHYSICAL EXAM:  NAD, Alert  [right ] Hip: Dressing C/D/I; sensation grossly intact to light touch; (+) DF/PF; (+) Distal Pulses; No Calf tenderness B/L, PAS   LABS:                     9.6    7.35  )-----------( 192      ( 23 Dec 2022 06:07 )             29.6   12-23  140  |  106  |  28<H>  ----------------------------<  112<H>  4.0   |  25  |  1.09  Ca    8.7      23 Dec 2022 06:06      
Post op Day [6]    Patient resting without complaints.  No chest pain, SOB, N/V.    T(C): 36.7 (12-26-22 @ 05:01), Max: 36.9 (12-25-22 @ 08:40)  HR: 77 (12-26-22 @ 05:01) (63 - 95)  BP: 124/63 (12-26-22 @ 05:01) (91/54 - 124/63)  RR: 18 (12-26-22 @ 05:01) (18 - 18)  SpO2: 93% (12-26-22 @ 05:01) (92% - 100%)      Exam:  Alert and Orientedx1 to name, patient with dementia at baseline. No Acute Distress    Lower Extremities: R Femur  Incision open to air c/d/i, staples in place.  Calves Soft, Non-tender bilaterally  +PF/DF/EHL/FHL  SILT  +DP Pulse                 
Post op Day [7]    Patient resting without complaints. Denies pain at this time. Responsive to questions. In restraints.  No chest pain, SOB, N/V.    T(C): 36.7 (12-26-22 @ 05:01), Max: 36.9 (12-25-22 @ 08:40)  HR: 77 (12-26-22 @ 05:01) (63 - 95)  BP: 124/63 (12-26-22 @ 05:01) (91/54 - 124/63)  RR: 18 (12-26-22 @ 05:01) (18 - 18)  SpO2: 93% (12-26-22 @ 05:01) (92% - 100%)      Exam:  Alert and Oriented x1 to name, patient with dementia at baseline. No Acute Distress  Lower Extremities: R Femur  Incision open to air c/d/i, staples in place. (patient removes dressing daily - will reapply a new dressing today)  Calves Soft, Non-tender bilaterally  +PF/DF/EHL/FHL  SILT  +DP Pulse
89-year-old male past medical history of Alzheimer's and pacemaker 12 years ago presenting to the ED from a transfer from [facility] after falling and being diagnosed with a right hip fracture.  Patient was initially evaluated at [place] after falling on an escalator and sustaining a laceration which was repaired at the other hospital.  Patient was discharged and walked out of the ER without any difficulty however when he was walking down a hill outside of the hospital as per the patient's wife he fell because of the wind and she fell on top of him.  Patient hit his head but did not lose consciousness no nausea, lightheadedness, change in his mental status.  Patient is not on any blood thinners. Patient seen post op after an Open reduction and internal fixation of the right hip. Patient has been confused.       MEDICATIONS  (STANDING):  acetaminophen     Tablet .. 975 milliGRAM(s) Oral every 8 hours  aspirin 325 milliGRAM(s) Oral every 12 hours  ceFAZolin   IVPB 2000 milliGRAM(s) IV Intermittent every 8 hours  donepezil 5 milliGRAM(s) Oral at bedtime  metoprolol succinate ER 25 milliGRAM(s) Oral daily  oxybutynin 5 milliGRAM(s) Oral two times a day  pantoprazole    Tablet 40 milliGRAM(s) Oral before breakfast  QUEtiapine 25 milliGRAM(s) Oral at bedtime  sacubitril 49 mG/valsartan 51 mG 1 Tablet(s) Oral two times a day  sodium chloride 0.9%. 1000 milliLiter(s) (100 mL/Hr) IV Continuous <Continuous>    MEDICATIONS  (PRN):  oxyCODONE    IR 2.5 milliGRAM(s) Oral every 4 hours PRN Moderate Pain (4 - 6)  oxyCODONE    IR 5 milliGRAM(s) Oral every 4 hours PRN Severe Pain (7 - 10)  traMADol 50 milliGRAM(s) Oral every 6 hours PRN Mild Pain (1 - 3)          VITALS:   T(C): 36.7 (12-21-22 @ 12:17), Max: 36.9 (12-20-22 @ 21:57)  HR: 91 (12-21-22 @ 12:17) (77 - 117)  BP: 89/50 (12-21-22 @ 12:17) (81/52 - 152/69)  RR: 18 (12-21-22 @ 12:17) (15 - 18)  SpO2: 93% (12-21-22 @ 12:17) (92% - 100%)  Wt(kg): --    PHYSICAL EXAM:  GENERAL: NAD, well-groomed, well-developed  HEAD:  Atraumatic, Normocephalic  EYES: EOMI, PERRLA, conjunctiva and sclera clear  ENMT: No tonsillar erythema, exudates, or enlargement; Moist mucous membranes, Good dentition, No lesions  NECK: Supple, No JVD, Normal thyroid  NERVOUS SYSTEM:  Alert & Oriented X1,   CHEST/LUNG: Clear to percussion bilaterally; No rales, rhonchi, wheezing, or rubs  HEART: Regular rate and rhythm; No murmurs, rubs, or gallops  ABDOMEN: Soft, Nontender, Nondistended; Bowel sounds present  EXTREMITIES:  2+ Peripheral Pulses, No clubbing, cyanosis, or edema  LYMPH: No lymphadenopathy noted  SKIN: No rashes or lesions    LABS:        CBC Full  -  ( 21 Dec 2022 05:27 )  WBC Count : 15.55 K/uL  RBC Count : 4.33 M/uL  Hemoglobin : 12.4 g/dL  Hematocrit : 38.7 %  Platelet Count - Automated : 219 K/uL  Mean Cell Volume : 89.4 fl  Mean Cell Hemoglobin : 28.6 pg  Mean Cell Hemoglobin Concentration : 32.0 gm/dL  Auto Neutrophil # : x  Auto Lymphocyte # : x  Auto Monocyte # : x  Auto Eosinophil # : x  Auto Basophil # : x  Auto Neutrophil % : x  Auto Lymphocyte % : x  Auto Monocyte % : x  Auto Eosinophil % : x  Auto Basophil % : x    12-21    140  |  103  |  28<H>  ----------------------------<  197<H>  4.7   |  24  |  1.20    Ca    8.7      21 Dec 2022 05:27    TPro  6.5  /  Alb  3.9  /  TBili  1.4<H>  /  DBili  x   /  AST  18  /  ALT  8<L>  /  AlkPhos  75  12-20    LIVER FUNCTIONS - ( 20 Dec 2022 04:42 )  Alb: 3.9 g/dL / Pro: 6.5 g/dL / ALK PHOS: 75 U/L / ALT: 8 U/L / AST: 18 U/L / GGT: x           PT/INR - ( 20 Dec 2022 04:42 )   PT: 14.3 sec;   INR: 1.23 ratio         PTT - ( 20 Dec 2022 04:42 )  PTT:30.4 sec    CAPILLARY BLOOD GLUCOSE          RADIOLOGY & ADDITIONAL TESTS:      
covering for dr moffett    89-year-old male past medical history of Alzheimer's and pacemaker 12 years ago presenting to the ED from a transfer from [facility] after falling and being diagnosed with a right hip fracture.  Patient was initially evaluated at [place] after falling on an escalator and sustaining a laceration which was repaired at the other hospital.  Patient was discharged and walked out of the ER without any difficulty however when he was walking down a hill outside of the hospital as per the patient's wife he fell because of the wind and she fell on top of him.  Patient hit his head but did not lose consciousness no nausea, lightheadedness, change in his mental status.  Patient is not on any blood thinners. Patient seen post op after an Open reduction and internal fixation of the right hip. Patient has been confused but less agitated.         MEDICATIONS  (STANDING):  acetaminophen     Tablet .. 975 milliGRAM(s) Oral every 8 hours  aspirin 325 milliGRAM(s) Oral daily  clopidogrel Tablet 75 milliGRAM(s) Oral daily  donepezil 5 milliGRAM(s) Oral at bedtime  melatonin 3 milliGRAM(s) Oral at bedtime  metoprolol succinate ER 25 milliGRAM(s) Oral daily  oxybutynin 5 milliGRAM(s) Oral two times a day  pantoprazole    Tablet 40 milliGRAM(s) Oral before breakfast  polyethylene glycol 3350 17 Gram(s) Oral two times a day  QUEtiapine 50 milliGRAM(s) Oral three times a day  sacubitril 49 mG/valsartan 51 mG 1 Tablet(s) Oral two times a day  senna 2 Tablet(s) Oral at bedtime  sodium chloride 0.9% Bolus 500 milliLiter(s) IV Bolus once  sodium chloride 0.9%. 1000 milliLiter(s) (100 mL/Hr) IV Continuous <Continuous>    MEDICATIONS  (PRN):  acetaminophen   IVPB .. 1000 milliGRAM(s) IV Intermittent once PRN Severe Pain (7 - 10)  haloperidol     Tablet 2 milliGRAM(s) Oral every 6 hours PRN agitation  haloperidol    Injectable 1 milliGRAM(s) IV Push every 6 hours PRN SEVERE  agitation only  ondansetron Injectable 4 milliGRAM(s) IV Push every 6 hours PRN Nausea and/or Vomiting  traMADol 25 milliGRAM(s) Oral every 6 hours PRN mild-moderate pain      Vital Signs Last 24 Hrs  T(C): 36.3 (27 Dec 2022 09:26), Max: 37.1 (27 Dec 2022 04:50)  T(F): 97.4 (27 Dec 2022 09:26), Max: 98.8 (27 Dec 2022 04:50)  HR: 87 (27 Dec 2022 09:26) (74 - 88)  BP: 96/62 (27 Dec 2022 11:00) (84/63 - 134/83)  BP(mean): --  RR: 18 (27 Dec 2022 09:26) (18 - 18)  SpO2: 96% (27 Dec 2022 09:26) (93% - 96%)    Parameters below as of 27 Dec 2022 09:26  Patient On (Oxygen Delivery Method): room air          PHYSICAL EXAM:  GENERAL: NAD, well-groomed, well-developed  HEAD:  Atraumatic, Normocephalic  EYES: EOMI, PERRLA, conjunctiva and sclera clear  ENMT: No tonsillar erythema, exudates, or enlargement; Moist mucous membranes, Good dentition, No lesions  NECK: Supple, No JVD, Normal thyroid  NERVOUS SYSTEM:  Alert & Oriented X1,   CHEST/LUNG: Clear to percussion bilaterally; No rales, rhonchi, wheezing, or rubs  HEART: Regular rate and rhythm; No murmurs, rubs, or gallops  ABDOMEN: Soft, Nontender, Nondistended; Bowel sounds present  EXTREMITIES:  2+ Peripheral Pulses, No clubbing, cyanosis, or edema  LYMPH: No lymphadenopathy noted  SKIN: No rashes or lesions    LABS:                             9.9    10.32 )-----------( 311      ( 27 Dec 2022 10:45 )             31.3   12-27    143  |  109<H>  |  23  ----------------------------<  156<H>  3.9   |  26  |  1.08    Ca    8.4      27 Dec 2022 10:45    
89-year-old male past medical history of Alzheimer's and pacemaker 12 years ago presenting to the ED from a transfer from [facility] after falling and being diagnosed with a right hip fracture.  Patient was initially evaluated at [place] after falling on an escalator and sustaining a laceration which was repaired at the other hospital.  Patient was discharged and walked out of the ER without any difficulty however when he was walking down a hill outside of the hospital as per the patient's wife he fell because of the wind and she fell on top of him.  Patient hit his head but did not lose consciousness no nausea, lightheadedness, change in his mental status.  Patient is not on any blood thinners. Patient seen post op after an Open reduction and internal fixation of the right hip. Patient has been confused but less agitated. seen with his grand daughter at bedside eating breakfast.      MEDICATIONS  (STANDING):  acetaminophen     Tablet .. 975 milliGRAM(s) Oral every 8 hours  aspirin 325 milliGRAM(s) Oral daily  celecoxib 100 milliGRAM(s) Oral daily  clopidogrel Tablet 75 milliGRAM(s) Oral daily  donepezil 5 milliGRAM(s) Oral at bedtime  melatonin 3 milliGRAM(s) Oral at bedtime  metoprolol succinate ER 25 milliGRAM(s) Oral daily  oxybutynin 5 milliGRAM(s) Oral two times a day  pantoprazole    Tablet 40 milliGRAM(s) Oral before breakfast  QUEtiapine 25 milliGRAM(s) Oral <User Schedule>  sacubitril 49 mG/valsartan 51 mG 1 Tablet(s) Oral two times a day  sodium chloride 0.9% Bolus 500 milliLiter(s) IV Bolus once  sodium chloride 0.9%. 1000 milliLiter(s) (100 mL/Hr) IV Continuous <Continuous>    MEDICATIONS  (PRN):  acetaminophen   IVPB .. 1000 milliGRAM(s) IV Intermittent once PRN Severe Pain (7 - 10)  haloperidol     Tablet 2 milliGRAM(s) Oral every 6 hours PRN agitation  haloperidol    Injectable 1 milliGRAM(s) IV Push every 6 hours PRN SEVERE  agitation only  ondansetron Injectable 4 milliGRAM(s) IV Push every 6 hours PRN Nausea and/or Vomiting  traMADol 25 milliGRAM(s) Oral every 6 hours PRN mild-moderate pain          VITALS:   T(C): 36.7 (12-25-22 @ 13:20), Max: 37.1 (12-24-22 @ 16:15)  HR: 95 (12-25-22 @ 13:20) (74 - 96)  BP: 92/55 (12-25-22 @ 13:20) (92/55 - 132/54)  RR: 18 (12-25-22 @ 13:20) (16 - 18)  SpO2: 100% (12-25-22 @ 13:20) (94% - 100%)  Wt(kg): --      PHYSICAL EXAM:  GENERAL: NAD, well-groomed, well-developed  HEAD:  Atraumatic, Normocephalic  EYES: EOMI, PERRLA, conjunctiva and sclera clear  ENMT: No tonsillar erythema, exudates, or enlargement; Moist mucous membranes, Good dentition, No lesions  NECK: Supple, No JVD, Normal thyroid  NERVOUS SYSTEM:  Alert & Oriented X1,   CHEST/LUNG: Clear to percussion bilaterally; No rales, rhonchi, wheezing, or rubs  HEART: Regular rate and rhythm; No murmurs, rubs, or gallops  ABDOMEN: Soft, Nontender, Nondistended; Bowel sounds present  EXTREMITIES:  2+ Peripheral Pulses, No clubbing, cyanosis, or edema  LYMPH: No lymphadenopathy noted  SKIN: No rashes or lesions    LABS:                      CAPILLARY BLOOD GLUCOSE          RADIOLOGY & ADDITIONAL TESTS:      
covering for dr moffett    89-year-old male past medical history of Alzheimer's and pacemaker 12 years ago presenting to the ED from a transfer from [facility] after falling and being diagnosed with a right hip fracture.  Patient was initially evaluated at [place] after falling on an escalator and sustaining a laceration which was repaired at the other hospital.  Patient was discharged and walked out of the ER without any difficulty however when he was walking down a hill outside of the hospital as per the patient's wife he fell because of the wind and she fell on top of him.  Patient hit his head but did not lose consciousness no nausea, lightheadedness, change in his mental status.  Patient is not on any blood thinners. Patient seen post op after an Open reduction and internal fixation of the right hip. Patient has been confused but less agitated.         MEDICATIONS  (STANDING):  acetaminophen     Tablet .. 975 milliGRAM(s) Oral every 8 hours  aspirin 325 milliGRAM(s) Oral daily  celecoxib 100 milliGRAM(s) Oral daily  clopidogrel Tablet 75 milliGRAM(s) Oral daily  donepezil 5 milliGRAM(s) Oral at bedtime  melatonin 3 milliGRAM(s) Oral at bedtime  metoprolol succinate ER 25 milliGRAM(s) Oral daily  oxybutynin 5 milliGRAM(s) Oral two times a day  pantoprazole    Tablet 40 milliGRAM(s) Oral before breakfast  QUEtiapine 50 milliGRAM(s) Oral three times a day  sacubitril 49 mG/valsartan 51 mG 1 Tablet(s) Oral two times a day  sodium chloride 0.9%. 1000 milliLiter(s) (100 mL/Hr) IV Continuous <Continuous>    MEDICATIONS  (PRN):  acetaminophen   IVPB .. 1000 milliGRAM(s) IV Intermittent once PRN Severe Pain (7 - 10)  haloperidol     Tablet 2 milliGRAM(s) Oral every 6 hours PRN agitation  haloperidol    Injectable 1 milliGRAM(s) IV Push every 6 hours PRN SEVERE  agitation only  ondansetron Injectable 4 milliGRAM(s) IV Push every 6 hours PRN Nausea and/or Vomiting  traMADol 25 milliGRAM(s) Oral every 6 hours PRN mild-moderate pain       Vital Signs Last 24 Hrs  T(C): 36.4 (26 Dec 2022 13:06), Max: 36.8 (25 Dec 2022 20:00)  T(F): 97.6 (26 Dec 2022 13:06), Max: 98.2 (25 Dec 2022 20:00)  HR: 92 (26 Dec 2022 13:06) (63 - 92)  BP: 133/79 (26 Dec 2022 13:06) (91/54 - 133/79)  BP(mean): --  RR: 18 (26 Dec 2022 13:06) (18 - 18)  SpO2: 93% (26 Dec 2022 13:06) (92% - 93%)    Parameters below as of 26 Dec 2022 13:06  Patient On (Oxygen Delivery Method): room air          PHYSICAL EXAM:  GENERAL: NAD, well-groomed, well-developed  HEAD:  Atraumatic, Normocephalic  EYES: EOMI, PERRLA, conjunctiva and sclera clear  ENMT: No tonsillar erythema, exudates, or enlargement; Moist mucous membranes, Good dentition, No lesions  NECK: Supple, No JVD, Normal thyroid  NERVOUS SYSTEM:  Alert & Oriented X1,   CHEST/LUNG: Clear to percussion bilaterally; No rales, rhonchi, wheezing, or rubs  HEART: Regular rate and rhythm; No murmurs, rubs, or gallops  ABDOMEN: Soft, Nontender, Nondistended; Bowel sounds present  EXTREMITIES:  2+ Peripheral Pulses, No clubbing, cyanosis, or edema  LYMPH: No lymphadenopathy noted  SKIN: No rashes or lesions    LABS:       
89-year-old male past medical history of Alzheimer's and pacemaker 12 years ago presenting to the ED from a transfer from [facility] after falling and being diagnosed with a right hip fracture.  Patient was initially evaluated at [place] after falling on an escalator and sustaining a laceration which was repaired at the other hospital.  Patient was discharged and walked out of the ER without any difficulty however when he was walking down a hill outside of the hospital as per the patient's wife he fell because of the wind and she fell on top of him.  Patient hit his head but did not lose consciousness no nausea, lightheadedness, change in his mental status.  Patient is not on any blood thinners. Patient seen post op after an Open reduction and internal fixation of the right hip. Patient has been confused but less agitated. seen with his wife at bedside      MEDICATIONS  (STANDING):  acetaminophen     Tablet .. 975 milliGRAM(s) Oral every 8 hours  aspirin 325 milliGRAM(s) Oral daily  clopidogrel Tablet 75 milliGRAM(s) Oral daily  donepezil 5 milliGRAM(s) Oral at bedtime  metoprolol succinate ER 25 milliGRAM(s) Oral daily  oxybutynin 5 milliGRAM(s) Oral two times a day  pantoprazole    Tablet 40 milliGRAM(s) Oral before breakfast  QUEtiapine 25 milliGRAM(s) Oral at bedtime  sacubitril 49 mG/valsartan 51 mG 1 Tablet(s) Oral two times a day  sodium chloride 0.9%. 1000 milliLiter(s) (100 mL/Hr) IV Continuous <Continuous>    MEDICATIONS  (PRN):  oxyCODONE    IR 2.5 milliGRAM(s) Oral every 4 hours PRN Moderate Pain (4 - 6)  oxyCODONE    IR 5 milliGRAM(s) Oral every 4 hours PRN Severe Pain (7 - 10)  traMADol 50 milliGRAM(s) Oral every 6 hours PRN Mild Pain (1 - 3)          VITALS:   T(C): 36.4 (12-23-22 @ 13:35), Max: 37.1 (12-22-22 @ 20:04)  HR: 80 (12-23-22 @ 13:35) (77 - 95)  BP: 93/59 (12-23-22 @ 13:35) (93/59 - 105/64)  RR: 18 (12-23-22 @ 13:35) (18 - 18)  SpO2: 94% (12-23-22 @ 13:35) (92% - 96%)  Wt(kg): --    PHYSICAL EXAM:  GENERAL: NAD, well-groomed, well-developed  HEAD:  Atraumatic, Normocephalic  EYES: EOMI, PERRLA, conjunctiva and sclera clear  ENMT: No tonsillar erythema, exudates, or enlargement; Moist mucous membranes, Good dentition, No lesions  NECK: Supple, No JVD, Normal thyroid  NERVOUS SYSTEM:  Alert & Oriented X1,   CHEST/LUNG: Clear to percussion bilaterally; No rales, rhonchi, wheezing, or rubs  HEART: Regular rate and rhythm; No murmurs, rubs, or gallops  ABDOMEN: Soft, Nontender, Nondistended; Bowel sounds present  EXTREMITIES:  2+ Peripheral Pulses, No clubbing, cyanosis, or edema  LYMPH: No lymphadenopathy noted  SKIN: No rashes or lesions    LABS:        CBC Full  -  ( 23 Dec 2022 06:07 )  WBC Count : 7.35 K/uL  RBC Count : 3.33 M/uL  Hemoglobin : 9.6 g/dL  Hematocrit : 29.6 %  Platelet Count - Automated : 192 K/uL  Mean Cell Volume : 88.9 fl  Mean Cell Hemoglobin : 28.8 pg  Mean Cell Hemoglobin Concentration : 32.4 gm/dL  Auto Neutrophil # : x  Auto Lymphocyte # : x  Auto Monocyte # : x  Auto Eosinophil # : x  Auto Basophil # : x  Auto Neutrophil % : x  Auto Lymphocyte % : x  Auto Monocyte % : x  Auto Eosinophil % : x  Auto Basophil % : x    12-23    140  |  106  |  28<H>  ----------------------------<  112<H>  4.0   |  25  |  1.09    Ca    8.7      23 Dec 2022 06:06            CAPILLARY BLOOD GLUCOSE          RADIOLOGY & ADDITIONAL TESTS:      
89-year-old male past medical history of Alzheimer's and pacemaker 12 years ago presenting to the ED from a transfer from [facility] after falling and being diagnosed with a right hip fracture.  Patient was initially evaluated at [place] after falling on an escalator and sustaining a laceration which was repaired at the other hospital.  Patient was discharged and walked out of the ER without any difficulty however when he was walking down a hill outside of the hospital as per the patient's wife he fell because of the wind and she fell on top of him.  Patient hit his head but did not lose consciousness no nausea, lightheadedness, change in his mental status.  Patient is not on any blood thinners. Patient seen post op after an Open reduction and internal fixation of the right hip. Patient has been confused but less agitated.       MEDICATIONS  (STANDING):  acetaminophen     Tablet .. 975 milliGRAM(s) Oral every 8 hours  aspirin 325 milliGRAM(s) Oral daily  clopidogrel Tablet 75 milliGRAM(s) Oral daily  donepezil 5 milliGRAM(s) Oral at bedtime  metoprolol succinate ER 25 milliGRAM(s) Oral daily  oxybutynin 5 milliGRAM(s) Oral two times a day  pantoprazole    Tablet 40 milliGRAM(s) Oral before breakfast  QUEtiapine 25 milliGRAM(s) Oral at bedtime  sacubitril 49 mG/valsartan 51 mG 1 Tablet(s) Oral two times a day  sodium chloride 0.9%. 1000 milliLiter(s) (100 mL/Hr) IV Continuous <Continuous>    MEDICATIONS  (PRN):  oxyCODONE    IR 2.5 milliGRAM(s) Oral every 4 hours PRN Moderate Pain (4 - 6)  oxyCODONE    IR 5 milliGRAM(s) Oral every 4 hours PRN Severe Pain (7 - 10)  traMADol 50 milliGRAM(s) Oral every 6 hours PRN Mild Pain (1 - 3)          VITALS:   T(C): 36.6 (12-22-22 @ 16:01), Max: 36.8 (12-22-22 @ 09:10)  HR: 77 (12-22-22 @ 16:01) (76 - 104)  BP: 97/65 (12-22-22 @ 16:01) (92/54 - 111/71)  RR: 18 (12-22-22 @ 16:01) (18 - 18)  SpO2: 96% (12-22-22 @ 16:01) (87% - 97%)  Wt(kg): --    PHYSICAL EXAM:  GENERAL: NAD, well-groomed, well-developed  HEAD:  Atraumatic, Normocephalic  EYES: EOMI, PERRLA, conjunctiva and sclera clear  ENMT: No tonsillar erythema, exudates, or enlargement; Moist mucous membranes, Good dentition, No lesions  NECK: Supple, No JVD, Normal thyroid  NERVOUS SYSTEM:  Alert & Oriented X1,   CHEST/LUNG: Clear to percussion bilaterally; No rales, rhonchi, wheezing, or rubs  HEART: Regular rate and rhythm; No murmurs, rubs, or gallops  ABDOMEN: Soft, Nontender, Nondistended; Bowel sounds present  EXTREMITIES:  2+ Peripheral Pulses, No clubbing, cyanosis, or edema  LYMPH: No lymphadenopathy noted  SKIN: No rashes or lesions    LABS:        CBC Full  -  ( 22 Dec 2022 07:11 )  WBC Count : 9.39 K/uL  RBC Count : 3.45 M/uL  Hemoglobin : 10.0 g/dL  Hematocrit : 31.3 %  Platelet Count - Automated : 175 K/uL  Mean Cell Volume : 90.7 fl  Mean Cell Hemoglobin : 29.0 pg  Mean Cell Hemoglobin Concentration : 31.9 gm/dL  Auto Neutrophil # : x  Auto Lymphocyte # : x  Auto Monocyte # : x  Auto Eosinophil # : x  Auto Basophil # : x  Auto Neutrophil % : x  Auto Lymphocyte % : x  Auto Monocyte % : x  Auto Eosinophil % : x  Auto Basophil % : x    12-22    140  |  108  |  31<H>  ----------------------------<  115<H>  4.2   |  26  |  1.35<H>    Ca    8.5      22 Dec 2022 07:11            CAPILLARY BLOOD GLUCOSE          RADIOLOGY & ADDITIONAL TESTS:      
covering for dr moffett    89-year-old male past medical history of Alzheimer's and pacemaker 12 years ago presenting to the ED from a transfer from [facility] after falling and being diagnosed with a right hip fracture.  Patient was initially evaluated at [place] after falling on an escalator and sustaining a laceration which was repaired at the other hospital.  Patient was discharged and walked out of the ER without any difficulty however when he was walking down a hill outside of the hospital as per the patient's wife he fell because of the wind and she fell on top of him.  Patient hit his head but did not lose consciousness no nausea, lightheadedness, change in his mental status.  Patient is not on any blood thinners. Patient seen post op after an Open reduction and internal fixation of the right hip. Patient has been confused but less agitated.         MEDICATIONS  (STANDING):  acetaminophen     Tablet .. 975 milliGRAM(s) Oral every 8 hours  aspirin 325 milliGRAM(s) Oral daily  clopidogrel Tablet 75 milliGRAM(s) Oral daily  donepezil 5 milliGRAM(s) Oral at bedtime  melatonin 3 milliGRAM(s) Oral at bedtime  metoprolol succinate ER 25 milliGRAM(s) Oral daily  oxybutynin 5 milliGRAM(s) Oral two times a day  pantoprazole    Tablet 40 milliGRAM(s) Oral before breakfast  polyethylene glycol 3350 17 Gram(s) Oral two times a day  QUEtiapine 50 milliGRAM(s) Oral three times a day  sacubitril 49 mG/valsartan 51 mG 1 Tablet(s) Oral two times a day  senna 2 Tablet(s) Oral at bedtime  sodium chloride 0.9% Bolus 500 milliLiter(s) IV Bolus once    MEDICATIONS  (PRN):  acetaminophen   IVPB .. 1000 milliGRAM(s) IV Intermittent once PRN Severe Pain (7 - 10)  haloperidol    Injectable 0.5 milliGRAM(s) IV Push every 6 hours PRN severe agitation  ondansetron Injectable 4 milliGRAM(s) IV Push every 6 hours PRN Nausea and/or Vomiting  QUEtiapine 25 milliGRAM(s) Oral every 6 hours PRN agitation  traMADol 25 milliGRAM(s) Oral every 6 hours PRN mild-moderate pain      Vital Signs Last 24 Hrs  T(C): 36.4 (29 Dec 2022 08:54), Max: 36.9 (28 Dec 2022 20:09)  T(F): 97.6 (29 Dec 2022 08:54), Max: 98.4 (28 Dec 2022 20:09)  HR: 81 (29 Dec 2022 08:54) (76 - 96)  BP: 111/73 (29 Dec 2022 08:54) (92/56 - 112/63)  BP(mean): --  RR: 18 (29 Dec 2022 08:54) (18 - 18)  SpO2: 95% (29 Dec 2022 08:54) (93% - 95%)    Parameters below as of 29 Dec 2022 08:54  Patient On (Oxygen Delivery Method): room air           PHYSICAL EXAM:  GENERAL: NAD, well-groomed, well-developed  HEAD:  Atraumatic, Normocephalic  EYES: EOMI, PERRLA, conjunctiva and sclera clear  ENMT: No tonsillar erythema, exudates, or enlargement; Moist mucous membranes, Good dentition, No lesions  NECK: Supple, No JVD, Normal thyroid  NERVOUS SYSTEM:  Alert & Oriented X1,   CHEST/LUNG: Clear to percussion bilaterally; No rales, rhonchi, wheezing, or rubs  HEART: Regular rate and rhythm; No murmurs, rubs, or gallops  ABDOMEN: Soft, Nontender, Nondistended; Bowel sounds present  EXTREMITIES:  2+ Peripheral Pulses, No clubbing, cyanosis, or edema  LYMPH: No lymphadenopathy noted  SKIN: No rashes or lesions    LABS:                      
89-year-old male past medical history of Alzheimer's and pacemaker 12 years ago presenting to the ED from a transfer from [facility] after falling and being diagnosed with a right hip fracture.  Patient was initially evaluated at [place] after falling on an escalator and sustaining a laceration which was repaired at the other hospital.  Patient was discharged and walked out of the ER without any difficulty however when he was walking down a hill outside of the hospital as per the patient's wife he fell because of the wind and she fell on top of him.  Patient hit his head but did not lose consciousness no nausea, lightheadedness, change in his mental status.  Patient is not on any blood thinners. Patient seen post op after an Open reduction and internal fixation of the right hip. Patient has been confused but less agitated. seen with his wife at bedside      MEDICATIONS  (STANDING):  acetaminophen     Tablet .. 975 milliGRAM(s) Oral every 8 hours  aspirin 325 milliGRAM(s) Oral daily  celecoxib 100 milliGRAM(s) Oral daily  clopidogrel Tablet 75 milliGRAM(s) Oral daily  donepezil 5 milliGRAM(s) Oral at bedtime  metoprolol succinate ER 25 milliGRAM(s) Oral daily  oxybutynin 5 milliGRAM(s) Oral two times a day  pantoprazole    Tablet 40 milliGRAM(s) Oral before breakfast  QUEtiapine 25 milliGRAM(s) Oral at bedtime  QUEtiapine 12.5 milliGRAM(s) Oral every 24 hours  sacubitril 49 mG/valsartan 51 mG 1 Tablet(s) Oral two times a day    MEDICATIONS  (PRN):  acetaminophen   IVPB .. 1000 milliGRAM(s) IV Intermittent once PRN Severe Pain (7 - 10)  haloperidol     Tablet 2 milliGRAM(s) Oral every 6 hours PRN agitation  ondansetron Injectable 4 milliGRAM(s) IV Push every 6 hours PRN Nausea and/or Vomiting  oxyCODONE    IR 2.5 milliGRAM(s) Oral every 4 hours PRN Severe Pain (7 - 10)  traMADol 25 milliGRAM(s) Oral every 6 hours PRN mild-moderate pain          VITALS:   T(C): 36.6 (12-24-22 @ 08:26), Max: 37.2 (12-23-22 @ 23:55)  HR: 73 (12-24-22 @ 08:26) (73 - 109)  BP: 103/65 (12-24-22 @ 08:26) (93/59 - 131/72)  RR: 18 (12-24-22 @ 08:26) (16 - 18)  SpO2: 98% (12-24-22 @ 08:26) (94% - 98%)  Wt(kg): --        LABS:        CBC Full  -  ( 23 Dec 2022 06:07 )  WBC Count : 7.35 K/uL  RBC Count : 3.33 M/uL  Hemoglobin : 9.6 g/dL  Hematocrit : 29.6 %  Platelet Count - Automated : 192 K/uL  Mean Cell Volume : 88.9 fl  Mean Cell Hemoglobin : 28.8 pg  Mean Cell Hemoglobin Concentration : 32.4 gm/dL  Auto Neutrophil # : x  Auto Lymphocyte # : x  Auto Monocyte # : x  Auto Eosinophil # : x  Auto Basophil # : x  Auto Neutrophil % : x  Auto Lymphocyte % : x  Auto Monocyte % : x  Auto Eosinophil % : x  Auto Basophil % : x    12-23    140  |  106  |  28<H>  ----------------------------<  112<H>  4.0   |  25  |  1.09    Ca    8.7      23 Dec 2022 06:06            CAPILLARY BLOOD GLUCOSE          RADIOLOGY & ADDITIONAL TESTS:

## 2023-01-04 ENCOUNTER — TRANSCRIPTION ENCOUNTER (OUTPATIENT)
Age: 88
End: 2023-01-04

## 2023-01-04 ENCOUNTER — INPATIENT (INPATIENT)
Facility: HOSPITAL | Age: 88
LOS: 15 days | Discharge: NURSING, REHAB OR LONG-TERM CARE | DRG: 522 | End: 2023-01-20
Attending: ORTHOPAEDIC SURGERY | Admitting: ORTHOPAEDIC SURGERY
Payer: MEDICARE

## 2023-01-04 VITALS
HEART RATE: 97 BPM | TEMPERATURE: 98 F | HEIGHT: 68 IN | WEIGHT: 149.91 LBS | RESPIRATION RATE: 16 BRPM | OXYGEN SATURATION: 100 % | DIASTOLIC BLOOD PRESSURE: 70 MMHG | SYSTOLIC BLOOD PRESSURE: 108 MMHG

## 2023-01-04 DIAGNOSIS — S72.009A FRACTURE OF UNSPECIFIED PART OF NECK OF UNSPECIFIED FEMUR, INITIAL ENCOUNTER FOR CLOSED FRACTURE: ICD-10-CM

## 2023-01-04 PROBLEM — G30.9 ALZHEIMER'S DISEASE, UNSPECIFIED: Chronic | Status: ACTIVE | Noted: 2022-12-19

## 2023-01-04 LAB
ALBUMIN SERPL ELPH-MCNC: 3.4 G/DL — SIGNIFICANT CHANGE UP (ref 3.3–5)
ALP SERPL-CCNC: 92 U/L — SIGNIFICANT CHANGE UP (ref 40–120)
ALT FLD-CCNC: 13 U/L — SIGNIFICANT CHANGE UP (ref 10–45)
ANION GAP SERPL CALC-SCNC: 11 MMOL/L — SIGNIFICANT CHANGE UP (ref 5–17)
APTT BLD: 25.4 SEC — LOW (ref 27.5–35.5)
AST SERPL-CCNC: 22 U/L — SIGNIFICANT CHANGE UP (ref 10–40)
BASOPHILS # BLD AUTO: 0.11 K/UL — SIGNIFICANT CHANGE UP (ref 0–0.2)
BASOPHILS NFR BLD AUTO: 0.9 % — SIGNIFICANT CHANGE UP (ref 0–2)
BILIRUB SERPL-MCNC: 1 MG/DL — SIGNIFICANT CHANGE UP (ref 0.2–1.2)
BLD GP AB SCN SERPL QL: NEGATIVE — SIGNIFICANT CHANGE UP
BUN SERPL-MCNC: 57 MG/DL — HIGH (ref 7–23)
CALCIUM SERPL-MCNC: 9.1 MG/DL — SIGNIFICANT CHANGE UP (ref 8.4–10.5)
CHLORIDE SERPL-SCNC: 106 MMOL/L — SIGNIFICANT CHANGE UP (ref 96–108)
CO2 SERPL-SCNC: 25 MMOL/L — SIGNIFICANT CHANGE UP (ref 22–31)
CREAT SERPL-MCNC: 1.39 MG/DL — HIGH (ref 0.5–1.3)
EGFR: 48 ML/MIN/1.73M2 — LOW
EOSINOPHIL # BLD AUTO: 0 K/UL — SIGNIFICANT CHANGE UP (ref 0–0.5)
EOSINOPHIL NFR BLD AUTO: 0 % — SIGNIFICANT CHANGE UP (ref 0–6)
GLUCOSE SERPL-MCNC: 109 MG/DL — HIGH (ref 70–99)
HCT VFR BLD CALC: 33.3 % — LOW (ref 39–50)
HGB BLD-MCNC: 10.5 G/DL — LOW (ref 13–17)
IMM GRANULOCYTES NFR BLD AUTO: 3.9 % — HIGH (ref 0–0.9)
INR BLD: 1.29 RATIO — HIGH (ref 0.88–1.16)
LYMPHOCYTES # BLD AUTO: 0.79 K/UL — LOW (ref 1–3.3)
LYMPHOCYTES # BLD AUTO: 6.2 % — LOW (ref 13–44)
MCHC RBC-ENTMCNC: 29.8 PG — SIGNIFICANT CHANGE UP (ref 27–34)
MCHC RBC-ENTMCNC: 31.5 GM/DL — LOW (ref 32–36)
MCV RBC AUTO: 94.6 FL — SIGNIFICANT CHANGE UP (ref 80–100)
MONOCYTES # BLD AUTO: 0.97 K/UL — HIGH (ref 0–0.9)
MONOCYTES NFR BLD AUTO: 7.6 % — SIGNIFICANT CHANGE UP (ref 2–14)
NEUTROPHILS # BLD AUTO: 10.31 K/UL — HIGH (ref 1.8–7.4)
NEUTROPHILS NFR BLD AUTO: 81.4 % — HIGH (ref 43–77)
NRBC # BLD: 0 /100 WBCS — SIGNIFICANT CHANGE UP (ref 0–0)
PLATELET # BLD AUTO: 436 K/UL — HIGH (ref 150–400)
POTASSIUM SERPL-MCNC: 4.6 MMOL/L — SIGNIFICANT CHANGE UP (ref 3.5–5.3)
POTASSIUM SERPL-SCNC: 4.6 MMOL/L — SIGNIFICANT CHANGE UP (ref 3.5–5.3)
PROT SERPL-MCNC: 6.4 G/DL — SIGNIFICANT CHANGE UP (ref 6–8.3)
PROTHROM AB SERPL-ACNC: 14.9 SEC — HIGH (ref 10.5–13.4)
RBC # BLD: 3.52 M/UL — LOW (ref 4.2–5.8)
RBC # FLD: 18.6 % — HIGH (ref 10.3–14.5)
RH IG SCN BLD-IMP: POSITIVE — SIGNIFICANT CHANGE UP
SARS-COV-2 RNA SPEC QL NAA+PROBE: SIGNIFICANT CHANGE UP
SODIUM SERPL-SCNC: 142 MMOL/L — SIGNIFICANT CHANGE UP (ref 135–145)
WBC # BLD: 12.68 K/UL — HIGH (ref 3.8–10.5)
WBC # FLD AUTO: 12.68 K/UL — HIGH (ref 3.8–10.5)

## 2023-01-04 PROCEDURE — 99285 EMERGENCY DEPT VISIT HI MDM: CPT

## 2023-01-04 PROCEDURE — 71045 X-RAY EXAM CHEST 1 VIEW: CPT | Mod: 26

## 2023-01-04 PROCEDURE — 73552 X-RAY EXAM OF FEMUR 2/>: CPT | Mod: 26,LT

## 2023-01-04 PROCEDURE — 72125 CT NECK SPINE W/O DYE: CPT | Mod: 26,MA

## 2023-01-04 PROCEDURE — 76376 3D RENDER W/INTRP POSTPROCES: CPT | Mod: 26

## 2023-01-04 PROCEDURE — 72192 CT PELVIS W/O DYE: CPT | Mod: 26,MA

## 2023-01-04 PROCEDURE — 73522 X-RAY EXAM HIPS BI 3-4 VIEWS: CPT | Mod: 26

## 2023-01-04 PROCEDURE — 70450 CT HEAD/BRAIN W/O DYE: CPT | Mod: 26,MA

## 2023-01-04 RX ORDER — SENNA PLUS 8.6 MG/1
2 TABLET ORAL AT BEDTIME
Refills: 0 | Status: DISCONTINUED | OUTPATIENT
Start: 2023-01-04 | End: 2023-01-05

## 2023-01-04 RX ORDER — SODIUM CHLORIDE 9 MG/ML
1000 INJECTION INTRAMUSCULAR; INTRAVENOUS; SUBCUTANEOUS
Refills: 0 | Status: DISCONTINUED | OUTPATIENT
Start: 2023-01-04 | End: 2023-01-05

## 2023-01-04 RX ORDER — OXYCODONE HYDROCHLORIDE 5 MG/1
2.5 TABLET ORAL EVERY 4 HOURS
Refills: 0 | Status: DISCONTINUED | OUTPATIENT
Start: 2023-01-04 | End: 2023-01-05

## 2023-01-04 RX ORDER — ALLOPURINOL 300 MG
300 TABLET ORAL DAILY
Refills: 0 | Status: DISCONTINUED | OUTPATIENT
Start: 2023-01-04 | End: 2023-01-05

## 2023-01-04 RX ORDER — AMIODARONE HYDROCHLORIDE 400 MG/1
200 TABLET ORAL DAILY
Refills: 0 | Status: DISCONTINUED | OUTPATIENT
Start: 2023-01-04 | End: 2023-01-05

## 2023-01-04 RX ORDER — ENOXAPARIN SODIUM 100 MG/ML
40 INJECTION SUBCUTANEOUS ONCE
Refills: 0 | Status: COMPLETED | OUTPATIENT
Start: 2023-01-04 | End: 2023-01-04

## 2023-01-04 RX ORDER — SODIUM CHLORIDE 9 MG/ML
1000 INJECTION INTRAMUSCULAR; INTRAVENOUS; SUBCUTANEOUS ONCE
Refills: 0 | Status: COMPLETED | OUTPATIENT
Start: 2023-01-04 | End: 2023-01-04

## 2023-01-04 RX ORDER — LANOLIN ALCOHOL/MO/W.PET/CERES
3 CREAM (GRAM) TOPICAL AT BEDTIME
Refills: 0 | Status: DISCONTINUED | OUTPATIENT
Start: 2023-01-04 | End: 2023-01-05

## 2023-01-04 RX ORDER — PANTOPRAZOLE SODIUM 20 MG/1
40 TABLET, DELAYED RELEASE ORAL
Refills: 0 | Status: DISCONTINUED | OUTPATIENT
Start: 2023-01-04 | End: 2023-01-05

## 2023-01-04 RX ORDER — OXYBUTYNIN CHLORIDE 5 MG
5 TABLET ORAL
Refills: 0 | Status: DISCONTINUED | OUTPATIENT
Start: 2023-01-04 | End: 2023-01-05

## 2023-01-04 RX ORDER — MEMANTINE HYDROCHLORIDE 10 MG/1
5 TABLET ORAL DAILY
Refills: 0 | Status: DISCONTINUED | OUTPATIENT
Start: 2023-01-04 | End: 2023-01-05

## 2023-01-04 RX ORDER — ACETAMINOPHEN 500 MG
1000 TABLET ORAL ONCE
Refills: 0 | Status: COMPLETED | OUTPATIENT
Start: 2023-01-04 | End: 2023-01-04

## 2023-01-04 RX ORDER — ACETAMINOPHEN 500 MG
1000 TABLET ORAL ONCE
Refills: 0 | Status: DISCONTINUED | OUTPATIENT
Start: 2023-01-04 | End: 2023-01-05

## 2023-01-04 RX ORDER — TAMSULOSIN HYDROCHLORIDE 0.4 MG/1
0.4 CAPSULE ORAL AT BEDTIME
Refills: 0 | Status: DISCONTINUED | OUTPATIENT
Start: 2023-01-04 | End: 2023-01-05

## 2023-01-04 RX ORDER — OXYCODONE HYDROCHLORIDE 5 MG/1
5 TABLET ORAL EVERY 4 HOURS
Refills: 0 | Status: DISCONTINUED | OUTPATIENT
Start: 2023-01-04 | End: 2023-01-05

## 2023-01-04 RX ORDER — TRAZODONE HCL 50 MG
50 TABLET ORAL AT BEDTIME
Refills: 0 | Status: DISCONTINUED | OUTPATIENT
Start: 2023-01-04 | End: 2023-01-05

## 2023-01-04 RX ORDER — ACETAMINOPHEN 500 MG
975 TABLET ORAL EVERY 8 HOURS
Refills: 0 | Status: DISCONTINUED | OUTPATIENT
Start: 2023-01-04 | End: 2023-01-05

## 2023-01-04 RX ORDER — HALOPERIDOL DECANOATE 100 MG/ML
2 INJECTION INTRAMUSCULAR ONCE
Refills: 0 | Status: COMPLETED | OUTPATIENT
Start: 2023-01-04 | End: 2023-01-04

## 2023-01-04 RX ORDER — MAGNESIUM HYDROXIDE 400 MG/1
30 TABLET, CHEWABLE ORAL DAILY
Refills: 0 | Status: DISCONTINUED | OUTPATIENT
Start: 2023-01-04 | End: 2023-01-05

## 2023-01-04 RX ADMIN — Medication 400 MILLIGRAM(S): at 18:07

## 2023-01-04 RX ADMIN — HALOPERIDOL DECANOATE 2 MILLIGRAM(S): 100 INJECTION INTRAMUSCULAR at 19:45

## 2023-01-04 RX ADMIN — ENOXAPARIN SODIUM 40 MILLIGRAM(S): 100 INJECTION SUBCUTANEOUS at 23:13

## 2023-01-04 RX ADMIN — SODIUM CHLORIDE 100 MILLILITER(S): 9 INJECTION INTRAMUSCULAR; INTRAVENOUS; SUBCUTANEOUS at 23:07

## 2023-01-04 RX ADMIN — SODIUM CHLORIDE 1000 MILLILITER(S): 9 INJECTION INTRAMUSCULAR; INTRAVENOUS; SUBCUTANEOUS at 18:07

## 2023-01-04 NOTE — ED PROVIDER NOTE - CLINICAL SUMMARY MEDICAL DECISION MAKING FREE TEXT BOX
Attending note.  Patient with recurrent falls and worsening confusion with new left hip fracture.  Patient had previous right hip fracture was repaired approximately 2 weeks ago.  Labs, analgesia, CT of head, x-rays of pelvis, bilateral hips, bilateral femurs, bilateral knees.  Urinalysis.  Orthopedic consultation.

## 2023-01-04 NOTE — H&P ADULT - HISTORY OF PRESENT ILLNESS
89y Male community ambulatory presents c/o L hip pain and inability to ambulate sp mechanical fall at rehab. Per the family, patient fell 5 days ago and the facility did not get xrays until today. Denies HS/LOC. Denies numbness/tingling. Denies fever/chills. Denies pain/injury elsewhere.     Discussed with wife at bedside.     PMH of dementia, HTN, A fib sp PPM, subdural hematoma sp craniotomy, BPH, gout, sp right hip hemiarthroplasty on 12/20/2022 with Dr. Ceron p/w L hip pain.     Has been c/o L hip pain and NH only pursued xrays today which revealed a L hip femoral neck fx. No imaging of revised R hip was performed to ensure no issues on the side.     Wife very upset at how pt was tx and given his hx was not secured better in bed. Did not give pt any pain meds. Since R hip replacement pt has not been able to  ambulate and is wheelchair/bedbound. Pt at baseline mental status, AOx1.      MEDICATIONS  (STANDING):  acetaminophen     Tablet .. 975 milliGRAM(s) Oral every 8 hours  enoxaparin Injectable 40 milliGRAM(s) SubCutaneous once  senna 2 Tablet(s) Oral at bedtime  sodium chloride 0.9%. 1000 milliLiter(s) IV Continuous <Continuous>    Allergies    No Known Allergies    Intolerances                            10.5   12.68 )-----------( 436      ( 04 Jan 2023 17:46 )             33.3     04 Jan 2023 17:46    142    |  106    |  57     ----------------------------<  109    4.6     |  25     |  1.39     Ca    9.1        04 Jan 2023 17:46    TPro  6.4    /  Alb  3.4    /  TBili  1.0    /  DBili  x      /  AST  22     /  ALT  13     /  AlkPhos  92     04 Jan 2023 17:46    PT/INR - ( 04 Jan 2023 17:46 )   PT: 14.9 sec;   INR: 1.29 ratio         PTT - ( 04 Jan 2023 17:46 )  PTT:25.4 sec  Vital Signs Last 24 Hrs  T(C): 36.9 (01-04-23 @ 17:15), Max: 36.9 (01-04-23 @ 17:15)  T(F): 98.4 (01-04-23 @ 17:15), Max: 98.4 (01-04-23 @ 17:15)  HR: 88 (01-04-23 @ 17:15) (88 - 97)  BP: 119/72 (01-04-23 @ 17:15) (108/70 - 119/72)  BP(mean): --  RR: 20 (01-04-23 @ 17:15) (16 - 20)  SpO2: 99% (01-04-23 @ 17:15) (99% - 100%)    Imaging: XR demonstrates L hip fracture    Physical Exam  General: NAD, Awake  Neurologic: No gross deficits, moving all 4s.  Head: NCAT without abrasions, lacerations, or ecchymosis to head, face, or scalp  Eyes: PERRL  Neck/C-Spine: FAROM. No bony TTP.  T/L Spine: No bony TTP, no palpable step-off.    HIPS and PELVIS: Unable to SLR L Hip.

## 2023-01-04 NOTE — ED PROVIDER NOTE - NS ED ATTENDING STATEMENT MOD
This was a shared visit with the DEJON. I reviewed and verified the documentation and independently performed the documented:

## 2023-01-04 NOTE — ED ADULT NURSE NOTE - OBJECTIVE STATEMENT
89F, AAO1 baseline dementia, BIBEMS from NH. Wife at bedside reports fall from bed at rehab 5 days ago, pt unable to ambulate since fall. Wife sts xray L hip performed this AM showing fx. Pt with recent fall in december + r hip fracture. Staples to R hip incision intact, no redness, no drainage, not warm to touch. +PP b/l LE, moving b/l upper extremities. NRB in place from EMS, pt noted to be sating % RA.

## 2023-01-04 NOTE — H&P ADULT - NSICDXNOFAMILYHX_GEN_ALL_CORE
Patient is 66-year-old male sent here for further evaluation regarding gross hematuria.  Patient had a CT you performed.  This demonstrated mild benign bilateral renal cysts.  He was noted to have some mild right-sided hydronephrosis down to the level of the UVJ.  On the left side he had significant hydronephrosis in the mid section of the ureter with the more distal end of the ureter on the left difficult to visualize.  He also has significant bladder distention.  I do believe there is a little bit of filling defect in the bladder on the right sidewall as well.    Patient did note that he saw little bit of blood a couple of times during of voiding.  He has not had prior urological surgery.  He denies kidney stones or UTIs.      I recommended cystoscopy is dictated below.  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  Has a couple of small polyps on the dome as well.  His left ureter is open effluxing clear urine there does not appear to be true obstruction.  We may shoot a retrograde or repeat that CT scan in the future just to follow that left sided hydro.  He did empty his bladder well today in the office so even though he has a somewhat distended appearing bladder on his CT scan, he does empty well.  I do not think anything needs to be done in terms of his outlet.    He agrees and understands.  I recommended TURBT with mitomycin.  Will get that done in the next week or 2 as an outpatient.  I answered all his questions.  He had a 35 minutes visit today greater than half that time spent discussion      Patient was prepped draped in supine position.  Flexible scope was introduced passed under direct vision to the bladder.  Penile prostatic urethra were normal.  He had moderate hyperplasia of the prostate but a somewhat open bladder neck.  Bladder itself was visualized throughout.  Sidewalls floor dome and trigone were all inspected.  He had significant  papillary disease on the right sidewall and floor of the bladder.  There were few small polyps on the dome.  Left right ureters could be seen in were open effluxing clear urine.  I retroflexed the scope.  The bladder neck is clear.  I removed the scope.  He was given postprocedure prophylaxis.     <-- Click to add NO pertinent Family History

## 2023-01-04 NOTE — ED PROVIDER NOTE - DIFFERENTIAL DIAGNOSIS
Differential Diagnosis Not limited to left hip fracture, pelvis fracture, femur fracture, recurrent fracture of right hip, intracranial hemorrhage, dehydration

## 2023-01-04 NOTE — ED PROVIDER NOTE - NSICDXPASTMEDICALHX_GEN_ALL_CORE_FT
PAST MEDICAL HISTORY:  AF (atrial fibrillation)     Alzheimer disease     Arthritis     Benign prostatic hypertrophy     Gout     Hypertension     Pacemaker     Subdural hematoma

## 2023-01-04 NOTE — ED PROVIDER NOTE - PROGRESS NOTE DETAILS
Ortho paged. Sole Anderson PA-C Pt acutely agitated. Pulled out IV and trying to crawl his way out of the stretcher. Cursing and trying to pull at staff.   Will order haldol for agitation  EKG documented  Dw Dr. Neyda Anderson PA-C

## 2023-01-04 NOTE — H&P ADULT - NSHPPHYSICALEXAM_GEN_ALL_CORE
LLE:   Shortened, externally rotated  no erythema, ecchymosis, edema  TTP over the left hip and proximal thigh  NTTP over the bony prominences of the knee/ankle/foot  painless passive/active ROM of the ankle/foot  Unable to range hip or knee 2/2 hip pain  L2-S1 SILT  motor grossly intact throughout hip flexors/quads/hams/TA/EHL/FHL/GSC  + DP/PT pulses  compartments soft and compressible, calves nontender      SECONDARY EXAM:  SILT throughout, motor grossly intact throughout, no other orthopedic injuries at this time, compartments soft and compressible. Moving b/l UE w/o issue. Brusing on the LUE brachium medially    SPINE: Skin intact, no bony tenderness or step-offs appreciated throughout cervical/thoracic/lumbar/sacral spine

## 2023-01-04 NOTE — ED PROVIDER NOTE - NSICDXPASTSURGICALHX_GEN_ALL_CORE_FT
PAST SURGICAL HISTORY:  No significant past surgical history     S/P craniotomy     S/P laparoscopic cholecystectomy     S/P umbilical hernia repair, follow-up exam

## 2023-01-04 NOTE — ED ADULT NURSE REASSESSMENT NOTE - NS ED NURSE REASSESS COMMENT FT1
handoff report received from CHIVO Salazar. upon receiving report pt appears confused and agitated, is noted to be yelling, removing his gown, removed his peripheral IV line, and attempting to get out of bed. ANISH Black and RN at bedside to assist patient. EKG completed by RN per PA orders, EKG handed to PA. Vital signs stable. Haldol administered IM per orders. Pt changed and cleaned, fresh new gown provided. New peripheral IV placed. handoff report received from CHIVO Salazar. upon receiving report pt appears confused and agitated, is noted to be yelling, removing his gown, removed his peripheral IV line, and attempting to get out of bed. ANISH Black and RN at bedside to assist patient. EKG completed by RN per PA orders, EKG handed to PA. Vital signs stable. Haldol administered IM per orders. Pt changed and cleaned, fresh new gown provided. New peripheral IV placed. NAD noted at this time pt now resting calm in stretcher.

## 2023-01-04 NOTE — ED PROVIDER NOTE - OBJECTIVE STATEMENT
89 yo M with a PMH of dementia,  HTN, A fib sp PPM, subdural hematoma sp craniotomy, BPH, gout, sp right hip hemiarthroplasty on 12/20/2022 with Dr. Ceron p/w L hip pain. Hx obtained from wife who states pt fell out of his bed 5 days ago. Has been c/o L hip pain and NH only pursued xrays today which revealed a L hip fx. No imaging of revised R hip was performed to ensure no issues on the side. Wife very upset at how pt was tx and given his hx was not secured better in bed. Did not give pt any pain meds. Since R hip replacement pt has not been able to ambulate and is wheelchair/bedbound. Pt at baseline mental status, AOx1. In her opinion pt seems weaker since he was admitted. 89 yo M with a PMH of dementia,  HTN, A fib sp PPM, subdural hematoma sp craniotomy, BPH, gout, sp right hip hemiarthroplasty on 12/20/2022 with Dr. Ceron p/w L hip pain. Hx obtained from wife who states pt fell out of his bed 5 days ago. Has been c/o L hip pain and NH only pursued xrays today which revealed a L hip fx. No imaging of revised R hip was performed to ensure no issues on the side. Wife very upset at how pt was tx and given his hx was not secured better in bed. Did not give pt any pain meds. Since R hip replacement pt has not been able to ambulate and is wheelchair/bedbound. Pt at baseline mental status, AOx1. In her opinion pt seems weaker since he was admitted.      Attending note.  Patient was seen in room #34 to the left.  Patient was brought in by EMS from [facility] after multiple falls over the last week.  He was previously mated to [facility] approximately 2 weeks ago for a fall which resulted in a right hip fracture which was repaired by Dr. Monty Ceron.  She is not able to provide a reliable history.  According to the patient his daughter he has a history of dementia which is gotten much worse since his original fracture.  He is not eating and drinking as he should.  She reports 3 falls over the last week.  X-rays were performed yesterday and she received a phone call today that he fractured his left hip.  Patient denies pain or other injury on review of systems. 89 yo M with a PMH of dementia, HTN, A fib sp PPM, subdural hematoma sp craniotomy, BPH, gout, sp right hip hemiarthroplasty on 12/20/2022 with Dr. Ceron p/w L hip pain. Hx obtained from wife who states pt fell out of his bed 5 days ago. Has been c/o L hip pain and NH only pursued xrays today which revealed a L hip fx. No imaging of revised R hip was performed to ensure no issues on the side. Wife very upset at how pt was tx and given his hx was not secured better in bed. Did not give pt any pain meds. Since R hip replacement pt has not been able to ambulate and is wheelchair/bedbound. Pt at baseline mental status, AOx1. In her opinion pt seems weaker since he was admitted.      Attending note.  Patient was seen in room #34 to the left.  Patient was brought in by EMS from [facility] after multiple falls over the last week.  He was previously mated to [facility] approximately 2 weeks ago for a fall which resulted in a right hip fracture which was repaired by Dr. Monty Ceron.  She is not able to provide a reliable history.  According to the patient his daughter he has a history of dementia which is gotten much worse since his original fracture.  He is not eating and drinking as he should.  She reports 3 falls over the last week.  X-rays were performed yesterday and she received a phone call today that he fractured his left hip.  Patient denies pain or other injury on review of systems.

## 2023-01-04 NOTE — H&P ADULT - ASSESSMENT
A/P: 89y Male with L hip fracture    Plan for L hip hemiarthroplasty vs CRPP w/ Dr. Ceron 1/5   Hold VTE ppx after midnight  IVF while NPO  PPM interrogated 12/20, please see chart --> MRN [id number]  Pain control  NWB LLE, bedrest  FU labs/imaging  Per Dr. Ceron leave jesse in   Please document Medical risk stratification v optimization for OR  Will discuss with attending

## 2023-01-04 NOTE — ED ADULT NURSE REASSESSMENT NOTE - NS ED NURSE REASSESS COMMENT FT1
Patient not able to tolerate po medication at this time d/t agitation and confusion. ANISH Anderson informs RN that she contacted Surgery MD who is taking care of patient and made them aware of pt need for IV pain medication to be ordered. Per ANISH Black, admitting Surg MD states he will place orders for IV medication. Handoff report given to ED Holding RN and made aware of orders pending. Assumed presence of pain noted d/t patient injuries and patient's current agitation levels. Pt not able to tolerate po medication at this time d/t agitation and confusion. ANISH Anderson informs RN that she contacted Surgery MD who is taking care of patient and made them aware of pt need for IV pain medication to be ordered. Per ANISH Black, admitting Surg MD states he will place orders for IV medication. Handoff report given to ED Holding RN and made aware of orders pending. Assumed presence of pain noted d/t patient injuries and patient's current agitation levels. Per PA and RN judgment, pt not able to tolerate po medication at this time d/t agitation and confusion. ANISH Anderson informs RN that she contacted Surgery MD who is taking care of patient and made them aware of pt need for IV pain medication to be ordered. Per ANISH Black, admitting Surg MD states he will place orders for IV medication. pending new orders. Handoff report given to ED Holding RN and made aware of orders pending.

## 2023-01-05 DIAGNOSIS — R52 PAIN, UNSPECIFIED: ICD-10-CM

## 2023-01-05 DIAGNOSIS — S72.002A FRACTURE OF UNSPECIFIED PART OF NECK OF LEFT FEMUR, INITIAL ENCOUNTER FOR CLOSED FRACTURE: ICD-10-CM

## 2023-01-05 DIAGNOSIS — I10 ESSENTIAL (PRIMARY) HYPERTENSION: ICD-10-CM

## 2023-01-05 DIAGNOSIS — N40.0 BENIGN PROSTATIC HYPERPLASIA WITHOUT LOWER URINARY TRACT SYMPTOMS: ICD-10-CM

## 2023-01-05 DIAGNOSIS — F03.90 UNSPECIFIED DEMENTIA WITHOUT BEHAVIORAL DISTURBANCE: ICD-10-CM

## 2023-01-05 DIAGNOSIS — I48.20 CHRONIC ATRIAL FIBRILLATION, UNSPECIFIED: ICD-10-CM

## 2023-01-05 LAB
24R-OH-CALCIDIOL SERPL-MCNC: 35.6 NG/ML — SIGNIFICANT CHANGE UP (ref 30–80)
ANION GAP SERPL CALC-SCNC: 11 MMOL/L — SIGNIFICANT CHANGE UP (ref 5–17)
ANION GAP SERPL CALC-SCNC: 14 MMOL/L — SIGNIFICANT CHANGE UP (ref 5–17)
APTT BLD: 29.4 SEC — SIGNIFICANT CHANGE UP (ref 27.5–35.5)
BUN SERPL-MCNC: 55 MG/DL — HIGH (ref 7–23)
BUN SERPL-MCNC: 60 MG/DL — HIGH (ref 7–23)
CALCIUM SERPL-MCNC: 8.3 MG/DL — LOW (ref 8.4–10.5)
CALCIUM SERPL-MCNC: 9.2 MG/DL — SIGNIFICANT CHANGE UP (ref 8.4–10.5)
CHLORIDE SERPL-SCNC: 107 MMOL/L — SIGNIFICANT CHANGE UP (ref 96–108)
CHLORIDE SERPL-SCNC: 110 MMOL/L — HIGH (ref 96–108)
CO2 SERPL-SCNC: 21 MMOL/L — LOW (ref 22–31)
CO2 SERPL-SCNC: 21 MMOL/L — LOW (ref 22–31)
CREAT SERPL-MCNC: 1.2 MG/DL — SIGNIFICANT CHANGE UP (ref 0.5–1.3)
CREAT SERPL-MCNC: 1.25 MG/DL — SIGNIFICANT CHANGE UP (ref 0.5–1.3)
EGFR: 54 ML/MIN/1.73M2 — LOW
EGFR: 57 ML/MIN/1.73M2 — LOW
GLUCOSE SERPL-MCNC: 136 MG/DL — HIGH (ref 70–99)
GLUCOSE SERPL-MCNC: 146 MG/DL — HIGH (ref 70–99)
HCT VFR BLD CALC: 30.8 % — LOW (ref 39–50)
HCT VFR BLD CALC: 31.4 % — LOW (ref 39–50)
HGB BLD-MCNC: 9.5 G/DL — LOW (ref 13–17)
HGB BLD-MCNC: 9.9 G/DL — LOW (ref 13–17)
INR BLD: 1.31 RATIO — HIGH (ref 0.88–1.16)
MCHC RBC-ENTMCNC: 29.4 PG — SIGNIFICANT CHANGE UP (ref 27–34)
MCHC RBC-ENTMCNC: 29.4 PG — SIGNIFICANT CHANGE UP (ref 27–34)
MCHC RBC-ENTMCNC: 30.8 GM/DL — LOW (ref 32–36)
MCHC RBC-ENTMCNC: 31.5 GM/DL — LOW (ref 32–36)
MCV RBC AUTO: 93.2 FL — SIGNIFICANT CHANGE UP (ref 80–100)
MCV RBC AUTO: 95.4 FL — SIGNIFICANT CHANGE UP (ref 80–100)
NRBC # BLD: 0 /100 WBCS — SIGNIFICANT CHANGE UP (ref 0–0)
NRBC # BLD: 0 /100 WBCS — SIGNIFICANT CHANGE UP (ref 0–0)
PLATELET # BLD AUTO: 423 K/UL — HIGH (ref 150–400)
PLATELET # BLD AUTO: 429 K/UL — HIGH (ref 150–400)
POTASSIUM SERPL-MCNC: 4.4 MMOL/L — SIGNIFICANT CHANGE UP (ref 3.5–5.3)
POTASSIUM SERPL-MCNC: 5.2 MMOL/L — SIGNIFICANT CHANGE UP (ref 3.5–5.3)
POTASSIUM SERPL-SCNC: 4.4 MMOL/L — SIGNIFICANT CHANGE UP (ref 3.5–5.3)
POTASSIUM SERPL-SCNC: 5.2 MMOL/L — SIGNIFICANT CHANGE UP (ref 3.5–5.3)
PROTHROM AB SERPL-ACNC: 15.1 SEC — HIGH (ref 10.5–13.4)
RBC # BLD: 3.23 M/UL — LOW (ref 4.2–5.8)
RBC # BLD: 3.37 M/UL — LOW (ref 4.2–5.8)
RBC # FLD: 18.3 % — HIGH (ref 10.3–14.5)
RBC # FLD: 18.5 % — HIGH (ref 10.3–14.5)
SODIUM SERPL-SCNC: 142 MMOL/L — SIGNIFICANT CHANGE UP (ref 135–145)
SODIUM SERPL-SCNC: 142 MMOL/L — SIGNIFICANT CHANGE UP (ref 135–145)
WBC # BLD: 11.64 K/UL — HIGH (ref 3.8–10.5)
WBC # BLD: 19.58 K/UL — HIGH (ref 3.8–10.5)
WBC # FLD AUTO: 11.64 K/UL — HIGH (ref 3.8–10.5)
WBC # FLD AUTO: 19.58 K/UL — HIGH (ref 3.8–10.5)

## 2023-01-05 PROCEDURE — 93970 EXTREMITY STUDY: CPT | Mod: 26

## 2023-01-05 PROCEDURE — 72170 X-RAY EXAM OF PELVIS: CPT | Mod: 26

## 2023-01-05 PROCEDURE — 27236 TREAT THIGH FRACTURE: CPT | Mod: LT

## 2023-01-05 DEVICE — STEM HIP NECK ANG V40 SZ 7 37X114MM 127 DEG: Type: IMPLANTABLE DEVICE | Site: LEFT | Status: FUNCTIONAL

## 2023-01-05 DEVICE — IMPLANTABLE DEVICE: Type: IMPLANTABLE DEVICE | Site: LEFT | Status: FUNCTIONAL

## 2023-01-05 DEVICE — HEAD FEMORAL: Type: IMPLANTABLE DEVICE | Site: LEFT | Status: FUNCTIONAL

## 2023-01-05 RX ORDER — CELECOXIB 200 MG/1
100 CAPSULE ORAL DAILY
Refills: 0 | Status: COMPLETED | OUTPATIENT
Start: 2023-01-06 | End: 2023-01-08

## 2023-01-05 RX ORDER — OXYBUTYNIN CHLORIDE 5 MG
5 TABLET ORAL
Refills: 0 | Status: DISCONTINUED | OUTPATIENT
Start: 2023-01-05 | End: 2023-01-16

## 2023-01-05 RX ORDER — ONDANSETRON 8 MG/1
4 TABLET, FILM COATED ORAL ONCE
Refills: 0 | Status: DISCONTINUED | OUTPATIENT
Start: 2023-01-05 | End: 2023-01-05

## 2023-01-05 RX ORDER — SENNA PLUS 8.6 MG/1
2 TABLET ORAL AT BEDTIME
Refills: 0 | Status: DISCONTINUED | OUTPATIENT
Start: 2023-01-05 | End: 2023-01-20

## 2023-01-05 RX ORDER — ACETAMINOPHEN 500 MG
1000 TABLET ORAL ONCE
Refills: 0 | Status: COMPLETED | OUTPATIENT
Start: 2023-01-05 | End: 2023-01-05

## 2023-01-05 RX ORDER — ENOXAPARIN SODIUM 100 MG/ML
60 INJECTION SUBCUTANEOUS EVERY 12 HOURS
Refills: 0 | Status: ACTIVE | OUTPATIENT
Start: 2023-01-06 | End: 2023-12-05

## 2023-01-05 RX ORDER — PANTOPRAZOLE SODIUM 20 MG/1
40 TABLET, DELAYED RELEASE ORAL
Refills: 0 | Status: DISCONTINUED | OUTPATIENT
Start: 2023-01-05 | End: 2023-01-20

## 2023-01-05 RX ORDER — AMIODARONE HYDROCHLORIDE 400 MG/1
200 TABLET ORAL DAILY
Refills: 0 | Status: DISCONTINUED | OUTPATIENT
Start: 2023-01-05 | End: 2023-01-20

## 2023-01-05 RX ORDER — OXYCODONE HYDROCHLORIDE 5 MG/1
5 TABLET ORAL ONCE
Refills: 0 | Status: DISCONTINUED | OUTPATIENT
Start: 2023-01-05 | End: 2023-01-05

## 2023-01-05 RX ORDER — SODIUM CHLORIDE 9 MG/ML
500 INJECTION INTRAMUSCULAR; INTRAVENOUS; SUBCUTANEOUS ONCE
Refills: 0 | Status: COMPLETED | OUTPATIENT
Start: 2023-01-05 | End: 2023-01-05

## 2023-01-05 RX ORDER — ACETAMINOPHEN 500 MG
975 TABLET ORAL EVERY 8 HOURS
Refills: 0 | Status: DISCONTINUED | OUTPATIENT
Start: 2023-01-06 | End: 2023-01-20

## 2023-01-05 RX ORDER — ACETAMINOPHEN 500 MG
1000 TABLET ORAL ONCE
Refills: 0 | Status: COMPLETED | OUTPATIENT
Start: 2023-01-05 | End: 2023-01-10

## 2023-01-05 RX ORDER — ACETAMINOPHEN 500 MG
1000 TABLET ORAL ONCE
Refills: 0 | Status: COMPLETED | OUTPATIENT
Start: 2023-01-06 | End: 2023-01-06

## 2023-01-05 RX ORDER — POLYETHYLENE GLYCOL 3350 17 G/17G
17 POWDER, FOR SOLUTION ORAL DAILY
Refills: 0 | Status: DISCONTINUED | OUTPATIENT
Start: 2023-01-05 | End: 2023-01-20

## 2023-01-05 RX ORDER — ALLOPURINOL 300 MG
300 TABLET ORAL DAILY
Refills: 0 | Status: DISCONTINUED | OUTPATIENT
Start: 2023-01-05 | End: 2023-01-20

## 2023-01-05 RX ORDER — TRAMADOL HYDROCHLORIDE 50 MG/1
50 TABLET ORAL EVERY 6 HOURS
Refills: 0 | Status: DISCONTINUED | OUTPATIENT
Start: 2023-01-05 | End: 2023-01-07

## 2023-01-05 RX ORDER — CEFAZOLIN SODIUM 1 G
2000 VIAL (EA) INJECTION EVERY 8 HOURS
Refills: 0 | Status: COMPLETED | OUTPATIENT
Start: 2023-01-05 | End: 2023-01-06

## 2023-01-05 RX ORDER — TAMSULOSIN HYDROCHLORIDE 0.4 MG/1
0.4 CAPSULE ORAL AT BEDTIME
Refills: 0 | Status: DISCONTINUED | OUTPATIENT
Start: 2023-01-05 | End: 2023-01-20

## 2023-01-05 RX ORDER — ONDANSETRON 8 MG/1
4 TABLET, FILM COATED ORAL EVERY 6 HOURS
Refills: 0 | Status: DISCONTINUED | OUTPATIENT
Start: 2023-01-05 | End: 2023-01-20

## 2023-01-05 RX ORDER — LANOLIN ALCOHOL/MO/W.PET/CERES
3 CREAM (GRAM) TOPICAL AT BEDTIME
Refills: 0 | Status: DISCONTINUED | OUTPATIENT
Start: 2023-01-05 | End: 2023-01-05

## 2023-01-05 RX ORDER — LANOLIN ALCOHOL/MO/W.PET/CERES
3 CREAM (GRAM) TOPICAL AT BEDTIME
Refills: 0 | Status: DISCONTINUED | OUTPATIENT
Start: 2023-01-05 | End: 2023-01-20

## 2023-01-05 RX ORDER — MAGNESIUM HYDROXIDE 400 MG/1
30 TABLET, CHEWABLE ORAL DAILY
Refills: 0 | Status: DISCONTINUED | OUTPATIENT
Start: 2023-01-05 | End: 2023-01-20

## 2023-01-05 RX ORDER — TRAZODONE HCL 50 MG
50 TABLET ORAL AT BEDTIME
Refills: 0 | Status: DISCONTINUED | OUTPATIENT
Start: 2023-01-05 | End: 2023-01-20

## 2023-01-05 RX ORDER — SODIUM CHLORIDE 9 MG/ML
1000 INJECTION INTRAMUSCULAR; INTRAVENOUS; SUBCUTANEOUS
Refills: 0 | Status: DISCONTINUED | OUTPATIENT
Start: 2023-01-05 | End: 2023-01-06

## 2023-01-05 RX ORDER — TRAMADOL HYDROCHLORIDE 50 MG/1
25 TABLET ORAL EVERY 6 HOURS
Refills: 0 | Status: DISCONTINUED | OUTPATIENT
Start: 2023-01-05 | End: 2023-01-07

## 2023-01-05 RX ORDER — MEMANTINE HYDROCHLORIDE 10 MG/1
5 TABLET ORAL DAILY
Refills: 0 | Status: DISCONTINUED | OUTPATIENT
Start: 2023-01-05 | End: 2023-01-20

## 2023-01-05 RX ORDER — FENTANYL CITRATE 50 UG/ML
25 INJECTION INTRAVENOUS
Refills: 0 | Status: DISCONTINUED | OUTPATIENT
Start: 2023-01-05 | End: 2023-01-05

## 2023-01-05 RX ORDER — OMEPRAZOLE 10 MG/1
1 CAPSULE, DELAYED RELEASE ORAL
Qty: 0 | Refills: 0 | DISCHARGE

## 2023-01-05 RX ORDER — ACETAMINOPHEN 500 MG
975 TABLET ORAL EVERY 6 HOURS
Refills: 0 | Status: DISCONTINUED | OUTPATIENT
Start: 2023-01-05 | End: 2023-01-05

## 2023-01-05 RX ADMIN — SODIUM CHLORIDE 500 MILLILITER(S): 9 INJECTION INTRAMUSCULAR; INTRAVENOUS; SUBCUTANEOUS at 19:29

## 2023-01-05 RX ADMIN — Medication 5 MILLIGRAM(S): at 05:43

## 2023-01-05 RX ADMIN — AMIODARONE HYDROCHLORIDE 200 MILLIGRAM(S): 400 TABLET ORAL at 05:43

## 2023-01-05 RX ADMIN — SODIUM CHLORIDE 1000 MILLILITER(S): 9 INJECTION INTRAMUSCULAR; INTRAVENOUS; SUBCUTANEOUS at 20:29

## 2023-01-05 RX ADMIN — PANTOPRAZOLE SODIUM 40 MILLIGRAM(S): 20 TABLET, DELAYED RELEASE ORAL at 05:43

## 2023-01-05 RX ADMIN — SODIUM CHLORIDE 100 MILLILITER(S): 9 INJECTION INTRAMUSCULAR; INTRAVENOUS; SUBCUTANEOUS at 17:34

## 2023-01-05 RX ADMIN — SODIUM CHLORIDE 100 MILLILITER(S): 9 INJECTION INTRAMUSCULAR; INTRAVENOUS; SUBCUTANEOUS at 23:29

## 2023-01-05 RX ADMIN — SODIUM CHLORIDE 100 MILLILITER(S): 9 INJECTION INTRAMUSCULAR; INTRAVENOUS; SUBCUTANEOUS at 09:32

## 2023-01-05 RX ADMIN — Medication 400 MILLIGRAM(S): at 23:28

## 2023-01-05 RX ADMIN — Medication 975 MILLIGRAM(S): at 05:43

## 2023-01-05 NOTE — CONSULT NOTE ADULT - PROBLEM SELECTOR RECOMMENDATION 9
Patient is scheduled for an Open reduction and internal fixation of the left hip  No contraindication to scheduled procedure  Patient is NPO  DVT and GI prophylaxis as per ortho

## 2023-01-05 NOTE — CONSULT NOTE ADULT - PROBLEM SELECTOR RECOMMENDATION 3
will continue to monitor BP  Not currently on antihypertensive meds  will continue to monitor BP and add meds as needed  Low sodium diet

## 2023-01-05 NOTE — PROGRESS NOTE ADULT - SUBJECTIVE AND OBJECTIVE BOX
Patient seen and evaluated this AM.  Pain controlled.  Has been NPO since midnight.     ICU Vital Signs Last 24 Hrs  T(C): 36.4 (05 Jan 2023 04:53), Max: 37 (04 Jan 2023 21:49)  T(F): 97.5 (05 Jan 2023 04:53), Max: 98.6 (04 Jan 2023 21:49)  HR: 76 (05 Jan 2023 04:53) (69 - 97)  BP: 134/88 (05 Jan 2023 04:53) (108/70 - 134/88)  BP(mean): 91 (04 Jan 2023 19:40) (91 - 91)  ABP: --  ABP(mean): --  RR: 18 (05 Jan 2023 04:53) (16 - 20)  SpO2: 97% (05 Jan 2023 04:53) (96% - 100%)    O2 Parameters below as of 05 Jan 2023 04:53  Patient On (Oxygen Delivery Method): room air        Exam:    Gen: NAD, resting comfortably  LLE:  Shortened, externally rotated  No erythema, ecchymosis, edema  TTP over the left hip and proximal thigh  NTTP over the bony prominences of the knee/ankle/foot  Painless passive/active ROM of the ankle/foot  Unable to range hip or knee 2/2 hip pain    RLE:   Prior alina staples in place  Prominent gluteal bruising   +TTP  Hip ROM limited by pain      A/P: 89y Male with L hip fracture    Plan for OR today   IVF while NPO  PPM interrogated 12/20, please see chart --> MRN [id number]  Pain control  NWB LLE, bedrest  FU labs  Per Dr. Ceron leave RLE jesse in   Please document Medical risk stratification v optimization for OR

## 2023-01-05 NOTE — CONSULT NOTE ADULT - PROBLEM SELECTOR RECOMMENDATION 2
continue to monitor rate  continue amiodarone for rate control  Patient is not currently on AC with frequent falls   will continue to follow rat and add meds as needed

## 2023-01-05 NOTE — PRE-ANESTHESIA EVALUATION ADULT - NSANTHPMHFT_GEN_ALL_CORE
89 yo male with a hx of dementia, Afib (coumadin held), HTN, and BPH, Presents after a fall at a rehab facility. 89 yo male with a hx of dementia, Afib (coumadin held), HTN, and BPH, Presents after a fall at a rehab facility. Not able to ambulate

## 2023-01-05 NOTE — PROGRESS NOTE ADULT - SUBJECTIVE AND OBJECTIVE BOX
ORTHO ATTENDING POST OP    s/p L  hip hemiarthroplasty  WBAT  L LE  Anterior hip precautions  Lovenox starting tomorrow for DVT tx  venodynes  ancef x 24h  OOB to chair in AM  rehab consult  CBC in RR and AM   f/u medicine

## 2023-01-05 NOTE — PRE-ANESTHESIA EVALUATION ADULT - NSRADCARDRESULTSFT_GEN_ALL_CORE
PROCEDURE: Transthoracic echocardiogram with 2-D, M-Mode  and complete spectral and color flow Doppler.  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 Mild mitral regurgitation.  Aortic Valve/Aorta: Calcified trileaflet aortic valve with  normal opening. No aortic valve regurgitation seen.  Normal aortic root.  Left Atrium: Severely dilated left atrium.  LA volume index  = 75 cc/m2.  Left Ventricle: Mild segmental left ventricular systolic  dysfunction. The septum is paradoxical. The distal septum  is hypokinetic. Normal left ventricular internal dimensions  and wall thickness.  Right Heart: Severe right atrial enlargement. Normal right  ventricular size and function. Mild-moderate tricuspid  regurgitation. Normal pulmonic valve.  Pericardium/Pleura: Normal pericardium with no pericardial  effusion.  Hemodynamic: Estimated right atrial pressure is 10 mm Hg.  Estimated right ventricular systolic pressure equals 41 mm  Hg, assuming right atrial pressure equals 10 mm Hg,  consistent with mild pulmonary hypertension.  ------------------------------------------------------------------------  Conclusions:  1. Mitral annular calcification, otherwise normal mitral  valve. Mild mitral regurgitation.  2. Calcified trileaflet aortic valve with normal opening.  No aortic valve regurgitation seen.  3. Severely dilated left atrium.  LA volume index = 75  cc/m2.  4. Normal left ventricularinternal dimensions and wall  thickness.  5. Mild segmental left ventricular systolic dysfunction.  The septum is paradoxical. The distal septum is  hypokinetic.  6. Severe right atrial enlargement.  7. Normal right ventricular size and function.  8.Estimated right ventricular systolic pressure equals 41  mm Hg, assuming right atrial pressure equals 10 mm Hg,  consistent with mild pulmonary hypertension.  9. Mild-moderate tricuspid regurgitation.  ------------------------------------------------------------------------  Confirmed on  11/15/2013 - 14:44:12 by Michael Huddleston M.D.

## 2023-01-05 NOTE — CHART NOTE - NSCHARTNOTEFT_GEN_A_CORE
Post-Operative Check    Patient was seen and evaluated in RR. Patient very lethargic and difficult to arouse post anesthesia.    Vitals:  T(C): 36.2 (05 Jan 2023 17:15), Max: 37 (04 Jan 2023 21:49)  T(F): 97.2 (05 Jan 2023 17:15), Max: 98.6 (04 Jan 2023 21:49)  HR: 86 (05 Jan 2023 17:30) (69 - 100)  BP: 99/54 (05 Jan 2023 17:30) (89/54 - 134/88)  BP(mean): 73 (05 Jan 2023 17:30) (61 - 91)  RR: 14 (05 Jan 2023 17:30) (12 - 20)  SpO2: 93% (05 Jan 2023 17:30) (92% - 100%)    Parameters below as of 05 Jan 2023 17:15  Patient On (Oxygen Delivery Method): nasal cannula  O2 Flow (L/min): 2    Exam:  General: A&Ox3, NAD, resting comfortably in bed.  Laterality:       LLE:         Aquacel dressing is clean, dry, and intact.                   Calves soft, nontender.          2+ DP pulse         Unable to assess motor/sensation         Good capillary refill. Warm, well-perfused.     Labs:                      9.5    19.58 )-----------( 423      ( 05 Jan 2023 17:27 )             30.8     01-05    142  |  107  |  60<H>  ----------------------------<  136<H>  4.4   |  21<L>  |  1.25    Ca    9.2      05 Jan 2023 01:50      A/P: 89y Male s/p Left Femur IM Nailing  -  Pain management  -  IS encouraged  -  DVT ppx: Lovenox 60mg BID, SCDs  -  GI ppx: Protonix 40mg  -  PT/OT - WBAT LLE, OOB  -  Continue with Post-op Antibiotics x 24hrs  -  f/u AM labs  -  f/u motor/sensation  -  Appreciate medicine recs  -  Discharge planning: PACU to floor, pending PT/OT      Sven Guerra PA-C  Orthopedic Surgery  Pager #[phone]

## 2023-01-06 ENCOUNTER — TRANSCRIPTION ENCOUNTER (OUTPATIENT)
Age: 88
End: 2023-01-06

## 2023-01-06 DIAGNOSIS — I82.409 ACUTE EMBOLISM AND THROMBOSIS OF UNSPECIFIED DEEP VEINS OF UNSPECIFIED LOWER EXTREMITY: ICD-10-CM

## 2023-01-06 LAB
ALBUMIN SERPL ELPH-MCNC: 3 G/DL — LOW (ref 3.3–5)
ANION GAP SERPL CALC-SCNC: 11 MMOL/L — SIGNIFICANT CHANGE UP (ref 5–17)
ANION GAP SERPL CALC-SCNC: 8 MMOL/L — SIGNIFICANT CHANGE UP (ref 5–17)
BASOPHILS # BLD AUTO: 0.04 K/UL — SIGNIFICANT CHANGE UP (ref 0–0.2)
BASOPHILS NFR BLD AUTO: 0.3 % — SIGNIFICANT CHANGE UP (ref 0–2)
BUN SERPL-MCNC: 58 MG/DL — HIGH (ref 7–23)
BUN SERPL-MCNC: 60 MG/DL — HIGH (ref 7–23)
CALCIUM SERPL-MCNC: 8.4 MG/DL — SIGNIFICANT CHANGE UP (ref 8.4–10.5)
CALCIUM SERPL-MCNC: 8.6 MG/DL — SIGNIFICANT CHANGE UP (ref 8.4–10.5)
CHLORIDE SERPL-SCNC: 110 MMOL/L — HIGH (ref 96–108)
CHLORIDE SERPL-SCNC: 111 MMOL/L — HIGH (ref 96–108)
CO2 SERPL-SCNC: 22 MMOL/L — SIGNIFICANT CHANGE UP (ref 22–31)
CO2 SERPL-SCNC: 23 MMOL/L — SIGNIFICANT CHANGE UP (ref 22–31)
CREAT SERPL-MCNC: 1.37 MG/DL — HIGH (ref 0.5–1.3)
CREAT SERPL-MCNC: 1.51 MG/DL — HIGH (ref 0.5–1.3)
EGFR: 43 ML/MIN/1.73M2 — LOW
EGFR: 48 ML/MIN/1.73M2 — LOW
EOSINOPHIL # BLD AUTO: 0 K/UL — SIGNIFICANT CHANGE UP (ref 0–0.5)
EOSINOPHIL NFR BLD AUTO: 0 % — SIGNIFICANT CHANGE UP (ref 0–6)
GLUCOSE SERPL-MCNC: 147 MG/DL — HIGH (ref 70–99)
GLUCOSE SERPL-MCNC: 165 MG/DL — HIGH (ref 70–99)
HCT VFR BLD CALC: 27.9 % — LOW (ref 39–50)
HCT VFR BLD CALC: 29.4 % — LOW (ref 39–50)
HGB BLD-MCNC: 8.5 G/DL — LOW (ref 13–17)
HGB BLD-MCNC: 9 G/DL — LOW (ref 13–17)
IMM GRANULOCYTES NFR BLD AUTO: 3.2 % — HIGH (ref 0–0.9)
LYMPHOCYTES # BLD AUTO: 0.52 K/UL — LOW (ref 1–3.3)
LYMPHOCYTES # BLD AUTO: 4.5 % — LOW (ref 13–44)
MCHC RBC-ENTMCNC: 29.3 PG — SIGNIFICANT CHANGE UP (ref 27–34)
MCHC RBC-ENTMCNC: 29.6 PG — SIGNIFICANT CHANGE UP (ref 27–34)
MCHC RBC-ENTMCNC: 30.5 GM/DL — LOW (ref 32–36)
MCHC RBC-ENTMCNC: 30.6 GM/DL — LOW (ref 32–36)
MCV RBC AUTO: 95.8 FL — SIGNIFICANT CHANGE UP (ref 80–100)
MCV RBC AUTO: 97.2 FL — SIGNIFICANT CHANGE UP (ref 80–100)
MONOCYTES # BLD AUTO: 0.85 K/UL — SIGNIFICANT CHANGE UP (ref 0–0.9)
MONOCYTES NFR BLD AUTO: 7.3 % — SIGNIFICANT CHANGE UP (ref 2–14)
NEUTROPHILS # BLD AUTO: 9.88 K/UL — HIGH (ref 1.8–7.4)
NEUTROPHILS NFR BLD AUTO: 84.7 % — HIGH (ref 43–77)
NRBC # BLD: 0 /100 WBCS — SIGNIFICANT CHANGE UP (ref 0–0)
NRBC # BLD: 0 /100 WBCS — SIGNIFICANT CHANGE UP (ref 0–0)
PLATELET # BLD AUTO: 405 K/UL — HIGH (ref 150–400)
PLATELET # BLD AUTO: 437 K/UL — HIGH (ref 150–400)
POTASSIUM SERPL-MCNC: 5.2 MMOL/L — SIGNIFICANT CHANGE UP (ref 3.5–5.3)
POTASSIUM SERPL-MCNC: 5.5 MMOL/L — HIGH (ref 3.5–5.3)
POTASSIUM SERPL-SCNC: 5.2 MMOL/L — SIGNIFICANT CHANGE UP (ref 3.5–5.3)
POTASSIUM SERPL-SCNC: 5.5 MMOL/L — HIGH (ref 3.5–5.3)
RBC # BLD: 2.87 M/UL — LOW (ref 4.2–5.8)
RBC # BLD: 3.07 M/UL — LOW (ref 4.2–5.8)
RBC # FLD: 18.3 % — HIGH (ref 10.3–14.5)
RBC # FLD: 18.5 % — HIGH (ref 10.3–14.5)
SODIUM SERPL-SCNC: 141 MMOL/L — SIGNIFICANT CHANGE UP (ref 135–145)
SODIUM SERPL-SCNC: 144 MMOL/L — SIGNIFICANT CHANGE UP (ref 135–145)
WBC # BLD: 11.66 K/UL — HIGH (ref 3.8–10.5)
WBC # BLD: 14.45 K/UL — HIGH (ref 3.8–10.5)
WBC # FLD AUTO: 11.66 K/UL — HIGH (ref 3.8–10.5)
WBC # FLD AUTO: 14.45 K/UL — HIGH (ref 3.8–10.5)

## 2023-01-06 RX ORDER — SODIUM CHLORIDE 9 MG/ML
500 INJECTION INTRAMUSCULAR; INTRAVENOUS; SUBCUTANEOUS ONCE
Refills: 0 | Status: COMPLETED | OUTPATIENT
Start: 2023-01-06 | End: 2023-01-06

## 2023-01-06 RX ADMIN — TAMSULOSIN HYDROCHLORIDE 0.4 MILLIGRAM(S): 0.4 CAPSULE ORAL at 23:17

## 2023-01-06 RX ADMIN — SODIUM CHLORIDE 500 MILLILITER(S): 9 INJECTION INTRAMUSCULAR; INTRAVENOUS; SUBCUTANEOUS at 11:10

## 2023-01-06 RX ADMIN — TRAMADOL HYDROCHLORIDE 50 MILLIGRAM(S): 50 TABLET ORAL at 15:15

## 2023-01-06 RX ADMIN — Medication 975 MILLIGRAM(S): at 23:16

## 2023-01-06 RX ADMIN — SENNA PLUS 2 TABLET(S): 8.6 TABLET ORAL at 23:17

## 2023-01-06 RX ADMIN — Medication 100 MILLIGRAM(S): at 07:06

## 2023-01-06 RX ADMIN — Medication 5 MILLIGRAM(S): at 18:16

## 2023-01-06 RX ADMIN — CELECOXIB 100 MILLIGRAM(S): 200 CAPSULE ORAL at 12:57

## 2023-01-06 RX ADMIN — TRAMADOL HYDROCHLORIDE 50 MILLIGRAM(S): 50 TABLET ORAL at 16:00

## 2023-01-06 RX ADMIN — Medication 975 MILLIGRAM(S): at 14:21

## 2023-01-06 RX ADMIN — MEMANTINE HYDROCHLORIDE 5 MILLIGRAM(S): 10 TABLET ORAL at 12:09

## 2023-01-06 RX ADMIN — Medication 975 MILLIGRAM(S): at 23:52

## 2023-01-06 RX ADMIN — Medication 3 MILLIGRAM(S): at 23:17

## 2023-01-06 RX ADMIN — Medication 975 MILLIGRAM(S): at 13:51

## 2023-01-06 RX ADMIN — Medication 300 MILLIGRAM(S): at 12:08

## 2023-01-06 RX ADMIN — Medication 1000 MILLIGRAM(S): at 00:00

## 2023-01-06 RX ADMIN — Medication 1000 MILLIGRAM(S): at 06:10

## 2023-01-06 RX ADMIN — ENOXAPARIN SODIUM 60 MILLIGRAM(S): 100 INJECTION SUBCUTANEOUS at 05:41

## 2023-01-06 RX ADMIN — CELECOXIB 100 MILLIGRAM(S): 200 CAPSULE ORAL at 12:09

## 2023-01-06 RX ADMIN — Medication 400 MILLIGRAM(S): at 05:40

## 2023-01-06 RX ADMIN — Medication 50 MILLIGRAM(S): at 01:00

## 2023-01-06 RX ADMIN — ENOXAPARIN SODIUM 60 MILLIGRAM(S): 100 INJECTION SUBCUTANEOUS at 18:16

## 2023-01-06 RX ADMIN — PANTOPRAZOLE SODIUM 40 MILLIGRAM(S): 20 TABLET, DELAYED RELEASE ORAL at 05:40

## 2023-01-06 RX ADMIN — Medication 100 MILLIGRAM(S): at 00:18

## 2023-01-06 RX ADMIN — POLYETHYLENE GLYCOL 3350 17 GRAM(S): 17 POWDER, FOR SOLUTION ORAL at 12:08

## 2023-01-06 RX ADMIN — Medication 5 MILLIGRAM(S): at 05:41

## 2023-01-06 RX ADMIN — Medication 50 MILLIGRAM(S): at 23:17

## 2023-01-06 RX ADMIN — SODIUM CHLORIDE 500 MILLILITER(S): 9 INJECTION INTRAMUSCULAR; INTRAVENOUS; SUBCUTANEOUS at 11:09

## 2023-01-06 NOTE — DISCHARGE NOTE PROVIDER - NSDCFUADDINST_GEN_ALL_CORE_FT
Continue weight bearing as tolerated ambulation with rolling walker. Keep surgical incision/dressing clean and dry, have dressing/sutures/staples removed and steri-strips applied post operative day #14(1/19/2023)if applicable. Follow up with Dr. Ceron in his office 3-4 weeks post op. Continue weight bearing as tolerated ambulation with rolling walker. Keep surgical incision/dressing clean and dry, have dressing/sutures/staples removed and steri-strips applied post operative day #14(1/19/2023)if applicable. Follow up with Dr. Ceron in his office 3-4 weeks post op.    Please follow up with Vascular Surgeron Dr. Juarez within 1 month (call office for appointment).  Please follow up with Hematology at the [facility] on discharge from hospital.  Please call [phone] to schedule an appointment.  Address: [address] [place] [address].    You must continue Eliquis until cleared by Vascular Surgeon or Hematologist.     Diet Consistency recommendations: Puree diet with thin liquids.  Patient should have comprehensive dental evaluation on discharge.  Please make appointment with your primary Dentist. Continue weight bearing as tolerated ambulation with rolling walker. Keep left surgical incision/dressing clean and dry, have dressing/sutures/staples removed and steri-strips applied post operative day #21 (1/26/2023) if applicable. Follow up with Dr. Ceron in his office 3-4 weeks post op.    Please follow up with Vascular Surgeon Dr. Juarez within 1 month (call office for appointment).  Please follow up with Hematology at the [facility] on discharge from hospital.  Please call [phone] to schedule an appointment.  Address: [address] [place] [address].    You must continue Eliquis until cleared by Vascular Surgeon or Hematologist.     Diet Consistency recommendations: Puree diet with thin liquids.  Patient should have comprehensive dental evaluation on discharge.  Please make appointment with your primary Dentist. Continue weight bearing as tolerated ambulation with rolling walker. Keep left surgical incision/dressing clean and dry and have dressing/sutures/staples removed and steri-strips applied post operative day #21 (1/26/2023) at rehab. Follow up with Dr. Ceron in his office 3-4 weeks post op.    Please follow up with Vascular Surgeon Dr. Juarez within 1 month (call office for appointment).  Please follow up with Hematology at the [facility] on discharge from Hospital.  Please call [phone] to schedule an appointment.  Address: [address] [place] [address].    You must continue Eliquis until cleared by Vascular Surgeon or Hematologist.     Diet Consistency recommendations: Puree diet with thin liquids.  Patient should have comprehensive dental evaluation on discharge.  Please make appointment with your primary Dentist.    Rehab to perform TOV once patient more ambulatory (Jamil placed 1/17/23 after failed TOV).

## 2023-01-06 NOTE — PROGRESS NOTE ADULT - SUBJECTIVE AND OBJECTIVE BOX
Orthopedic Surgery Progress Note    S: No acute events overnight, pain is well controlled.  No chest pain, shortness of breath, light-headedness.    O:  Vital Signs Last 24 Hrs  T(C): 36.8 (06 Jan 2023 05:00), Max: 36.8 (05 Jan 2023 18:00)  T(F): 98.3 (06 Jan 2023 05:00), Max: 98.3 (06 Jan 2023 05:00)  HR: 81 (06 Jan 2023 05:00) (78 - 100)  BP: 92/56 (06 Jan 2023 05:00) (84/55 - 125/82)  BP(mean): 73 (05 Jan 2023 17:30) (61 - 91)  RR: 18 (06 Jan 2023 05:00) (12 - 18)  SpO2: 99% (06 Jan 2023 05:00) (92% - 100%)    Parameters below as of 06 Jan 2023 05:00  Patient On (Oxygen Delivery Method): room air        Gen: NAD, patient resting comfortably in bed  LLE:  Dressing C/D/I  EHL/FHL/TA/GS intact  SILT DP/SP/Whyte/Sa  no calf tenderness  WWP distally    Labs:                        9.5    19.58 )-----------( 423      ( 05 Jan 2023 17:27 )             30.8                         9.9    11.64 )-----------( 429      ( 05 Jan 2023 01:50 )             31.4       01-05    142  |  110<H>  |  55<H>  ----------------------------<  146<H>  5.2   |  21<L>  |  1.20        PT/INR - ( 05 Jan 2023 01:50 )   PT: 15.1 sec;   INR: 1.31 ratio         PTT - ( 05 Jan 2023 01:50 )  PTT:29.4 sec      A/P 89y year old male s/p L hip hemiarthroplasty for femoral neck fracture, now POD#1    Pain Control  Therapeutic lovenox for mutliple B/L DVTs  PT/OOB  WBAT   Anterior dislocation precautions LLE  Dispo Planning- will need rehab placement

## 2023-01-06 NOTE — CHART NOTE - NSCHARTNOTEFT_GEN_A_CORE
RN called patient bedside for hypotensive episode.  Manual BP 74/52, patient asymptomatic.  Patient received 500cc NS Bolus x2 and running on 100cc NS fluid x 24hrs.  Repeat CBC/BMP ordered for 1800.  Will continue to monitor patient and BP and follow up on evening labs.  Medicine team made aware and in agreement.    Ha Barry PA-C  Orthopedic Surgery Team  Team Pager #[phone]

## 2023-01-06 NOTE — PHYSICAL THERAPY INITIAL EVALUATION ADULT - TRANSFER TRAINING, PT EVAL
GOAL: Patient will perform sit to stand transfers minAx1 at rolling walker with proper hand placement and appropriate safety awareness in 2 weeks.

## 2023-01-06 NOTE — PHYSICAL THERAPY INITIAL EVALUATION ADULT - ADDITIONAL COMMENTS
As per 12/21 PT eval document: Pateint lives in private house with spouse. 1 step to enter and has first floor set up. Prior to admission, patient independent with ambulation and ADLs. Discharge pending hospital course and P/T evaluation. Anticipate  sub acute rehab. Patient's wife states that patient has aides service 2 times a week, 4 hours per day--wife was unsure of [facility] and agency. Pt recently admitted to [facility] and returned s/p fall and L hip alina performed this admission.

## 2023-01-06 NOTE — DISCHARGE NOTE PROVIDER - NSDCMRMEDTOKEN_GEN_ALL_CORE_FT
acetaminophen 325 mg oral tablet: 3 tab(s) orally every 8 hours, As Needed for Mild Pain  allopurinol 300 mg oral tablet: 1 tab(s) orally once a day  amiodarone 200 mg oral tablet: 1 tab(s) orally once a day  aspirin 325 mg oral tablet: 1 tab(s) orally once a day  clopidogrel 75 mg oral tablet: 1 tab(s) orally once a day  clopidogrel 75 mg oral tablet: 1 tab(s) orally once a day  donepezil 5 mg oral tablet: 1 tab(s) orally once a day (at bedtime)  Entresto 49 mg-51 mg oral tablet: 1 tab(s) orally 2 times a day  melatonin 3 mg oral tablet: 1 tab(s) orally once a day (at bedtime)  metoprolol succinate 25 mg oral tablet, extended release: 1 tab(s) orally once a day  Namenda 5 mg oral tablet: 1 tab(s) orally once a day  pantoprazole 40 mg oral delayed release tablet: 1 tab(s) orally once a day  pantoprazole 40 mg oral delayed release tablet: 1 tab orally once a day (before breakfast)  polyethylene glycol 3350 oral powder for reconstitution: 17 gram(s) orally 2 times a day  QUEtiapine 25 mg oral tablet: 1 tab(s) orally every 6 hours, As needed, agitation  QUEtiapine 50 mg oral tablet: 1 tab(s) orally 3 times a day  senna leaf extract oral tablet: 2 tab(s) orally once a day (at bedtime)  solifenacin 5 mg oral tablet: 1 tab(s) orally once a day  tamsulosin 0.4 mg oral capsule: 2 cap(s) orally once a day  Toviaz 4 mg oral tablet, extended release: 1 tab(s) orally once a day  traZODone 50 mg oral tablet: 1 tab(s) orally once a day (at bedtime)   apixaban 5 mg oral tablet: 1 tab(s) orally every 12 hours  magnesium hydroxide 8% oral suspension: 30 milliliter(s) orally once a day, As needed, Constipation  polyethylene glycol 3350 oral powder for reconstitution: 17 gram(s) orally once a day   acetaminophen 325 mg oral tablet: 3 tabs orally every 8 hours, As Needed for Mild Pain MDD:9  allopurinol 300 mg oral tablet: 1 tab orally once a day MDD:1  amiodarone 200 mg oral tablet: 1 tab(s) orally once a day MDD:1  apixaban 5 mg oral tablet: 1 tab orally every 12 hours X 3 months for bilateral lower extremity blood clots. FU with PCP to continue anticoagulation past 3 month period to treat blood clots in legs. MDD:2  magnesium hydroxide 8% oral suspension: 30 milliliterorally once a day, As needed for Constipation MDD:1  melatonin 3 mg oral tablet: 1 tab(s) orally once a day (at bedtime) MDD:1  Namenda 5 mg oral tablet: 1 tab orally once a day MDD:1  pantoprazole 40 mg oral delayed release tablet: 1 tab orally once a day (before breakfast) while taking Eliquis MDD:1  polyethylene glycol 3350 oral powder for reconstitution: 17 grams orally once a day MDD:1  QUEtiapine 50 mg oral tablet: 1 tab(s) orally once a day (at bedtime) MDD:1  senna leaf extract oral tablet: 2 tabs orally once a day (at bedtime)    MDD:2  SEROquel 25 mg oral tablet: 0.5 tab(s) orally once a day at 8am MDD:1  tamsulosin 0.4 mg oral capsule: 1 cap(s) orally once a day (at bedtime) MDD:1  traZODone 50 mg oral tablet: 1 tab(s) orally once a day (at bedtime) MDD:1   acetaminophen 325 mg oral tablet: 3 tabs orally every 8 hours, As Needed for Mild Pain MDD:9  allopurinol 300 mg oral tablet: 1 tab orally once a day MDD:1  amiodarone 200 mg oral tablet: 1 tab(s) orally once a day MDD:1  apixaban 5 mg oral tablet: 1 tab orally every 12 hours X 3 months for bilateral lower extremity blood clots. FU with Dr. Juarez to continue anticoagulation past 3 month period to treat blood clots in legs. MDD:2  magnesium hydroxide 8% oral suspension: 30 milliliterorally once a day, As needed for Constipation MDD:1  melatonin 3 mg oral tablet: 1 tab(s) orally once a day (at bedtime) MDD:1  Namenda 5 mg oral tablet: 1 tab orally once a day MDD:1  pantoprazole 40 mg oral delayed release tablet: 1 tab orally once a day (before breakfast) while taking Eliquis MDD:1  polyethylene glycol 3350 oral powder for reconstitution: 17 grams orally once a day MDD:1  QUEtiapine 50 mg oral tablet: 1 tab(s) orally once a day (at bedtime) MDD:1  senna leaf extract oral tablet: 2 tabs orally once a day (at bedtime)    MDD:2  SEROquel 25 mg oral tablet: 1 tab orally once a day PRN agitation  MDD:1   tamsulosin 0.4 mg oral capsule: 1 cap(s) orally once a day (at bedtime) MDD:1  traZODone 50 mg oral tablet: 1 tab(s) orally once a day (at bedtime) MDD:1   acetaminophen 325 mg oral tablet: 3 tabs orally every 8 hours As Needed for Mild Pain  allopurinol 300 mg oral tablet: 1 tab(s) orally once a day  amiodarone 200 mg oral tablet: 1 tab(s) orally once a day  apixaban 5 mg oral tablet: 1 tab(s) orally every 12 hours X 3 months for B/L LE DVTS. LEN Juarez to continue this medication past 3 month period which is needed for treatment of blood clots  magnesium hydroxide 8% oral suspension: 30 milliliter(s) orally once a day, As needed, Constipation  melatonin 3 mg oral tablet: 1 tab(s) orally once a day (at bedtime), As Needed for Insomnia  memantine 5 mg oral tablet: 1 tab(s) orally once a day  pantoprazole 40 mg oral delayed release tablet: 1 tab(s) orally once a day before breakfast (Do not stop while taking Eliquis)  polyethylene glycol 3350 oral powder for reconstitution: 17 grams orally once a day MDD:1  QUEtiapine 25 mg oral tablet: 1 tab(s) orally once a day, As needed for Agitation  QUEtiapine 50 mg oral tablet: 1 tab(s) orally once a day (at bedtime)  senna leaf extract oral tablet: 2 tab(s) orally once a day (at bedtime)  tamsulosin 0.4 mg oral capsule: 1 cap(s) orally once a day (at bedtime)  traZODone 50 mg oral tablet: 1 tab(s) orally once a day (at bedtime)   acetaminophen 325 mg oral tablet: 3 tabs orally every 8 hours As Needed for Mild Pain  allopurinol 300 mg oral tablet: 1 tab(s) orally once a day  amiodarone 200 mg oral tablet: 1 tab(s) orally once a day  apixaban 5 mg oral tablet: 1 tab(s) orally every 12 hours X 3 months for B/L LE DVTS. LEN Juarez to continue this medication past 3 month period which is needed for treatment of blood clots  magnesium hydroxide 8% oral suspension: 30 milliliter(s) orally once a day, As needed, Constipation  melatonin 3 mg oral tablet: 1 tab(s) orally once a day (at bedtime), As Needed for Insomnia  memantine 5 mg oral tablet: 1 tab(s) orally once a day  pantoprazole 40 mg oral delayed release tablet: 1 tab(s) orally once a day before breakfast (Do not stop while taking Eliquis)  polyethylene glycol 3350 oral powder for reconstitution: 17 gram(s) orally once a day  QUEtiapine 25 mg oral tablet: 1 tab(s) orally once a day, As needed for Agitation  QUEtiapine 50 mg oral tablet: 1 tab(s) orally once a day (at bedtime)  senna leaf extract oral tablet: 2 tab(s) orally once a day (at bedtime)  tamsulosin 0.4 mg oral capsule: 1 cap(s) orally once a day (at bedtime)  traZODone 50 mg oral tablet: 1 tab(s) orally once a day (at bedtime)

## 2023-01-06 NOTE — PROGRESS NOTE ADULT - ASSESSMENT
89 yo male with a hx of dementia, Afib, HTN, and BPH, Presents after a fall at a rehab facility. Patient was found to have a right hip fracture and is s/p an Open reduction and internal fixation of the left hip

## 2023-01-06 NOTE — OCCUPATIONAL THERAPY INITIAL EVALUATION ADULT - PERTINENT HX OF CURRENT PROBLEM, REHAB EVAL
89y Male community ambulatory presents c/o L hip pain and inability to ambulate sp mechanical fall at rehab. Per the family, patient fell 5 days ago and the facility did not get xrays until today.  PMH of dementia, HTN, A fib sp PPM, subdural hematoma sp craniotomy, BPH, gout, sp right hip hemiarthroplasty on 12/20/2022 with Dr. Ceron p/w L hip pain.   Has been c/o L hip pain and NH only pursued xrays today which revealed a L hip femoral neck fx. No imaging of revised R hip was performed to ensure no issues on the side.   Wife very upset at how pt was tx and given his hx was not secured better in bed. Did not give pt any pain meds. Since R hip replacement pt has not been able to  ambulate and is wheelchair/bedbound. Pt at baseline mental status, AOx1.  PROCEDURES:  Hemiarthroplasty, hip 05-Jan-2023 16:32:11  Tara Huddleston.

## 2023-01-06 NOTE — DISCHARGE NOTE PROVIDER - CARE PROVIDERS DIRECT ADDRESSES
[email or web address]

## 2023-01-06 NOTE — OCCUPATIONAL THERAPY INITIAL EVALUATION ADULT - NSOTDISCHREC_GEN_A_CORE
return to; if home will require assist with ALL adls and iadls, home OT, and supervision at all times to ensure safety. mechanical life device +transport WC/Sub-acute Rehab

## 2023-01-06 NOTE — DISCHARGE NOTE PROVIDER - CARE PROVIDER_API CALL
Monty Ceron (MD)  Orthopaedic Surgery  [address]  [place] [address]  Phone: [phone]  Fax: [phone]  Follow Up Time:    Monty Ceron)  Orthopaedic Surgery  [address], [address]  [place] [address]  Phone: [phone]  Fax: [phone]  Follow Up Time:     Lazaro Juarez)  Vascular Surgery  [address], [address]  [place] [address]  Phone: [phone]  Fax: [phone]  Follow Up Time:    Monty Ceron)  Orthopaedic Surgery  [address], [address]  [place] [address]  Phone: [phone]  Fax: [phone]  Follow Up Time: 2 weeks    Lazaro Juarez)  Vascular Surgery  [address], [address]  [place] [address]  Phone: [phone]  Fax: [phone]  Follow Up Time: 1 month

## 2023-01-06 NOTE — PHYSICAL THERAPY INITIAL EVALUATION ADULT - IMPAIRED TRANSFERS: SIT/STAND, REHAB EVAL
impaired balance/cognition/decreased flexibility/impaired motor control/pain/impaired postural control/decreased strength

## 2023-01-06 NOTE — DISCHARGE NOTE PROVIDER - HOSPITAL COURSE
Reason for Admission: L Hip Fx  History of Present Illness:   89y Male community ambulatory presents c/o L hip pain and inability to ambulate sp mechanical fall at rehab. Per the family, patient fell 5 days ago and the facility did not get x-rays until today. Denies HS/LOC. Denies numbness/tingling. Denies fever/chills. Denies pain/injury elsewhere.   Wife very upset at how pt was tx and given his hx was not secured better in bed. Did not give pt any pain meds. Since R hip replacement pt has not been able to  ambulate and is wheelchair/bedbound. Pt at baseline mental status, AOx1.     Past Medical, Past Surgical History:  PAST MEDICAL HISTORY:  AF (atrial fibrillation)   Alzheimer disease   Arthritis   Benign prostatic hypertrophy   Gout   Hypertension   Pacemaker   Subdural hematoma.     PAST SURGICAL HISTORY:  S/P craniotomy   S/P laparoscopic cholecystectomy   S/P umbilical hernia repair.     HOSPITAL COURSE:  89 y/o M underwent left hip hemiarthroplasty on 1/5/2023 with Dr. Ceron.  Patient tolerated procedure well. Patient underwent B/L L/E dopplers perioperatively and was found to have right proximal femoral, peroneal and Soleal DVTs, and left Common/deep femoral, Soleal DVTs. Patient was started on therapeutic dose Lovenox.  Patient was evaluated postoperatively by physical and occupational therapists for weight bearing as tolerated ambulation and advised that patient would benefit from admission to rehab facility.  Patient advised to keep surgical incision/dressing clean and dry, and have dressing/sutures/surgical staples removed and steri strips applied post op day #14(1/19/2023)if applicable.  Patient further advised to follow up with Dr. Ceron in his office 3-4 weeks post op.     Reason for Admission: L Hip Fx  History of Present Illness:   89y Male community ambulatory presents c/o L hip pain and inability to ambulate sp mechanical fall at rehab. Per the family, patient fell 5 days ago and the facility did not get x-rays until today. Denies HS/LOC. Denies numbness/tingling. Denies fever/chills. Denies pain/injury elsewhere.   Wife very upset at how pt was tx and given his hx was not secured better in bed. Did not give pt any pain meds. Since R hip replacement pt has not been able to  ambulate and is wheelchair/bedbound. Pt at baseline mental status, AOx1.     Past Medical, Past Surgical History:  PAST MEDICAL HISTORY:  AF (atrial fibrillation)   Alzheimer disease   Arthritis   Benign prostatic hypertrophy   Gout   Hypertension   Pacemaker   Subdural hematoma.     PAST SURGICAL HISTORY:  S/P craniotomy   S/P laparoscopic cholecystectomy   S/P umbilical hernia repair.     HOSPITAL COURSE:  89 y/o M underwent left hip hemiarthroplasty on 1/5/2023 with Dr. Ceron.  Patient tolerated procedure well. Patient underwent B/L L/E dopplers perioperatively and was found to have right proximal femoral, peroneal and Soleal DVTs, and left Common/deep femoral, Soleal DVTs. Patient was started on therapeutic dose Lovenox.  Patient was evaluated postoperatively by physical and occupational therapists for weight bearing as tolerated ambulation and advised that patient would benefit from admission to rehab facility.    1/7: Patient started on Seroquel 12.5mg PO Daily in morning and Seroquel 50mg PO at bedtime for agitation.  1/8: Left upper extremity doppler ordered to r/o DVT. Doppler performed and showed XXXXX      Patient advised to keep surgical incision/dressing clean and dry, and have dressing/sutures/surgical staples removed and steri strips applied post op day #14(1/19/2023) if applicable.  Patient further advised to follow up with Dr. Ceron in his office 3-4 weeks post op.     Reason for Admission: L Hip Fx  History of Present Illness:   89y Male community ambulatory presents c/o L hip pain and inability to ambulate sp mechanical fall at rehab. Per the family, patient fell 5 days ago and the facility did not get x-rays until today. Denies HS/LOC. Denies numbness/tingling. Denies fever/chills. Denies pain/injury elsewhere.   Wife very upset at how pt was tx and given his hx was not secured better in bed. Did not give pt any pain meds. Since R hip replacement pt has not been able to  ambulate and is wheelchair/bedbound. Pt at baseline mental status, AOx1.    PAST MEDICAL HISTORY:  AF (atrial fibrillation)   Alzheimer disease   Arthritis   Benign prostatic hypertrophy   Gout   Hypertension   Pacemaker   Subdural hematoma.     PAST SURGICAL HISTORY:  S/P craniotomy   S/P laparoscopic cholecystectomy   S/P umbilical hernia repair.     HOSPITAL COURSE:  89 y/o M underwent left hip hemiarthroplasty on 1/5/2023 with Dr. Ceron.  Patient tolerated procedure well. Patient underwent B/L L/E dopplers perioperatively and was found to have right proximal femoral, peroneal and Soleal DVTs, and left Common/deep femoral, Soleal DVTs. Patient was started on therapeutic dose Lovenox.  Patient was evaluated postoperatively by physical and occupational therapists for weight bearing as tolerated ambulation and advised that patient would benefit from admission to rehab facility.      1/7: Patient started on Seroquel 12.5mg PO Daily in morning and Seroquel 50mg PO at bedtime for agitation.    1/8: Left upper extremity doppler ordered to r/o DVT. 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 Vascular Surgery was consulted and recommended continued therapeutic anticoagulation and to follow up with Hematology/Oncology. Heme/Onc recommended to transition to [facility] when deemed appropriate.    1/10: Morning Seroquel was discontinued.    Dental was consulted due to dentures and possible malnutrition, was not seen but recommended to follow up outpatient.  Patient had choking/coughing episode, speech and swallow was consulted and recommended puree diet with thin liquids.     1/12: Patient was placed on Eliquis 5mg twice daily.    Patient advised to keep surgical incision/dressing clean and dry, and have dressing/sutures/surgical staples removed and steri strips applied post op day #14(1/19/2023) if applicable.  Patient further advised to follow up with Dr. Ceron in his office 3-4 weeks post op.     Reason for Admission: L Hip Fx  History of Present Illness:   89y Male community ambulatory presents c/o L hip pain and inability to ambulate sp mechanical fall at rehab. Per the family, patient fell 5 days ago and the facility did not get x-rays until today. Denies HS/LOC. Denies numbness/tingling. Denies fever/chills. Denies pain/injury elsewhere.   Wife very upset at how pt was tx and given his hx was not secured better in bed. Did not give pt any pain meds. Since R hip replacement pt has not been able to  ambulate and is wheelchair/bedbound. Pt at baseline mental status, AOx1.    PAST MEDICAL HISTORY:  AF (atrial fibrillation)   Alzheimer disease   Arthritis   Benign prostatic hypertrophy   Gout   Hypertension   Pacemaker   Subdural hematoma.     PAST SURGICAL HISTORY:  S/P craniotomy   S/P laparoscopic cholecystectomy   S/P umbilical hernia repair.     HOSPITAL COURSE:  89 y/o M underwent left hip hemiarthroplasty on 1/5/2023 with Dr. Ceron.  Patient tolerated procedure well. Patient underwent B/L L/E dopplers perioperatively and was found to have right proximal femoral, peroneal and Soleal DVTs, and left Common/deep femoral, Soleal DVTs. Patient was started on therapeutic dose Lovenox.  Patient was evaluated postoperatively by physical and occupational therapists for weight bearing as tolerated ambulation and advised that patient would benefit from admission to rehab facility.      1/7: Patient started on Seroquel 12.5mg PO Daily in morning and Seroquel 50mg PO at bedtime for agitation.    1/8: Left upper extremity doppler ordered to r/o DVT. 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 Vascular Surgery was consulted and recommended continued therapeutic anticoagulation and to follow up with Hematology/Oncology. Heme/Onc recommended to transition to [facility] when deemed appropriate.    1/10: Morning Seroquel was discontinued.    Dental was consulted due to dentures and possible malnutrition, was not seen but recommended to follow up outpatient.  Patient had choking/coughing episode, speech and swallow was consulted and recommended puree diet with thin liquids.     1/12: Patient was transitioned from Lovenox to Eliquis 5mg twice daily for DVT prophylaxis.    Patient advised to keep surgical incision/dressing clean and dry, and have dressing/sutures/surgical staples removed and steri strips applied post op day #14(1/19/2023) if applicable.  Patient further advised to follow up with Dr. Ceron in his office 3-4 weeks post op.     Reason for Admission: L Hip Fx  History of Present Illness:   89y Male community ambulatory presents c/o L hip pain and inability to ambulate sp mechanical fall at rehab. Per the family, patient fell 5 days ago and the facility did not get x-rays until today. Denies HS/LOC. Denies numbness/tingling. Denies fever/chills. Denies pain/injury elsewhere.   Wife very upset at how pt was tx and given his hx was not secured better in bed. Did not give pt any pain meds. Since R hip replacement pt has not been able to  ambulate and is wheelchair/bedbound. Pt at baseline mental status, AOx1.    PAST MEDICAL HISTORY:  AF (atrial fibrillation)   Alzheimer disease   Arthritis   Benign prostatic hypertrophy   Gout   Hypertension   Pacemaker   Subdural hematoma.     PAST SURGICAL HISTORY:  S/P craniotomy   S/P laparoscopic cholecystectomy   S/P umbilical hernia repair.     HOSPITAL COURSE:  89 y/o M underwent left hip hemiarthroplasty on 1/5/2023 with Dr. Ceron.  Patient tolerated procedure well. Patient underwent B/L L/E dopplers perioperatively and was found to have right proximal femoral, peroneal and Soleal DVTs, and left Common/deep femoral, Soleal DVTs. Patient was started on therapeutic dose Lovenox.  Patient was evaluated postoperatively by physical and occupational therapists for weight bearing as tolerated ambulation and advised that patient would benefit from admission to rehab facility.      1/7: Patient started on Seroquel 12.5mg PO Daily in morning and Seroquel 50mg PO at bedtime for agitation.    1/8: Left upper extremity doppler ordered to r/o DVT. 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 Vascular Surgery was consulted and recommended continued therapeutic anticoagulation and to follow up with Hematology/Oncology. Heme/Onc recommended to transition to [facility] when deemed appropriate.    1/10: Morning Seroquel was discontinued.    Dental was consulted due to dentures and possible malnutrition, was not seen but recommended to follow up outpatient.  Patient had choking/coughing episode, speech and swallow was consulted and recommended puree diet with thin liquids.     1/12: Patient was transitioned from Lovenox to Eliquis 5mg twice daily for DVT prophylaxis.    1/15:  Patient had indwelling urethral catheter placed due to urinary retention, catheter was removed on 1/17 but due to failed trial of void, catheter was reinserted.     1/16: Behavioral Health was consulted due to patient's continued agitation and recommended Seroquel as needed daily, Seroquel/Melatonin/Trazodone at bedtime.     Patient advised to keep surgical incision/dressing clean and dry, and have dressing/sutures/surgical staples removed and steri strips applied post op day #14(1/19/2023) if applicable.  Patient further advised to follow up with Dr. Ceron in his office 3-4 weeks post op.     Reason for Admission: L Hip Fx  History of Present Illness:   89y Male community ambulatory presents c/o L hip pain and inability to ambulate sp mechanical fall at rehab. Per the family, patient fell 5 days ago and the facility did not get x-rays until today. Denies HS/LOC. Denies numbness/tingling. Denies fever/chills. Denies pain/injury elsewhere.   Wife very upset at how pt was tx and given his hx was not secured better in bed. Did not give pt any pain meds. Since R hip replacement pt has not been able to  ambulate and is wheelchair/bedbound. Pt at baseline mental status, AOx1.    PAST MEDICAL HISTORY:  AF (atrial fibrillation)   Alzheimer disease   Arthritis   Benign prostatic hypertrophy   Gout   Hypertension   Pacemaker   Subdural hematoma.     PAST SURGICAL HISTORY:  S/P craniotomy   S/P laparoscopic cholecystectomy   S/P umbilical hernia repair.     HOSPITAL COURSE:  89 y/o M underwent left hip hemiarthroplasty on 1/5/2023 with Dr. Ceron.  Patient tolerated procedure well. Patient underwent B/L L/E dopplers perioperatively and was found to have right proximal femoral, peroneal and Soleal DVTs, and left Common/deep femoral, Soleal DVTs. Patient was started on therapeutic dose Lovenox.  Patient was evaluated postoperatively by physical and occupational therapists for weight bearing as tolerated ambulation and advised that patient would benefit from admission to rehab facility.      1/7: Patient started on Seroquel 12.5mg PO Daily in morning and Seroquel 50mg PO at bedtime for agitation.    1/8: Left upper extremity doppler ordered to r/o DVT. 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 Vascular Surgery was consulted and recommended continued therapeutic anticoagulation and to follow up with Hematology/Oncology. Heme/Onc recommended to transition to [facility] when deemed appropriate.    1/10: Morning Seroquel was discontinued.    Dental was consulted due to dentures and possible malnutrition, was not seen but recommended to follow up outpatient.  Patient had choking/coughing episode, speech and swallow was consulted and recommended puree diet with thin liquids.     1/12: Patient was transitioned from Lovenox to Eliquis 5mg twice daily for DVT prophylaxis.    1/15:  Patient had indwelling urethral catheter placed due to urinary retention, catheter was removed on 1/17 but due to failed trial of void, catheter was reinserted.     1/16: Behavioral Health was consulted due to patient's continued agitation and recommended Seroquel as needed daily, Seroquel/Melatonin/Trazodone at bedtime.     1/17: Patient was due to void and failed his trial, for which an indwelling urethral catheter was placed. Behavioral Health recommended to continue Seroquel 50mg qhs and Trazodone 50mg po qhs.     1/18: Discharge planning was discussed and was intended to be for [facility]. [facility] was unable to come and evaluate the patient secondary to the nurse from [facility] being unable to arrive for the evaluation. The visit was then rescheduled for the morning of January 19th.    1/19: [facility] came to evaluate the patient and it was determined that they cannot accept the patient as he is "too heavy of a 2 person assist." Patient's daughter Dea was informed of the results of the evaluation. Daughter Dea mentioned that she wanted a referral to be sent to [facility], [facility] and [facility], all of which declined the patient. However, [facility]. A global referral was sent out by case management and a discharge notice was recommended to be given to the family as the patient has been considered to be medically cleared.     Patient advised to keep surgical incision/dressing clean and dry, and have dressing/sutures/surgical staples removed and steri strips applied post op day #14(1/19/2023) if applicable.  Patient further advised to follow up with Dr. Ceron in his office 3-4 weeks post op.     Reason for Admission: L Hip Fx  History of Present Illness:   89y Male community ambulatory presents c/o L hip pain and inability to ambulate sp mechanical fall at rehab. Per the family, patient fell 5 days ago and the facility did not get x-rays until today. Denies HS/LOC. Denies numbness/tingling. Denies fever/chills. Denies pain/injury elsewhere.   Wife very upset at how pt was tx and given his hx was not secured better in bed. Did not give pt any pain meds. Since R hip replacement pt has not been able to  ambulate and is wheelchair/bedbound. Pt at baseline mental status, AOx1.    PAST MEDICAL HISTORY:  AF (atrial fibrillation)   Alzheimer disease   Arthritis   Benign prostatic hypertrophy   Gout   Hypertension   Pacemaker   Subdural hematoma.     PAST SURGICAL HISTORY:  S/P craniotomy   S/P laparoscopic cholecystectomy   S/P umbilical hernia repair.     HOSPITAL COURSE:  89 y/o M underwent left hip hemiarthroplasty on 1/5/2023 with Dr. Ceron.  Patient tolerated procedure well. Patient underwent B/L L/E dopplers perioperatively and was found to have right proximal femoral, peroneal and Soleal DVTs, and left Common/deep femoral, Soleal DVTs. Patient was started on therapeutic dose Lovenox.  Patient was evaluated postoperatively by physical and occupational therapists for weight bearing as tolerated ambulation and advised that patient would benefit from admission to rehab facility.      1/7: Patient started on Seroquel 12.5mg PO Daily in morning and Seroquel 50mg PO at bedtime for agitation.    1/8: Left upper extremity doppler ordered to r/o DVT. 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 Vascular Surgery was consulted and recommended continued therapeutic anticoagulation and to follow up with Hematology/Oncology. Heme/Onc recommended to transition to [facility] when deemed appropriate.    1/10: Morning Seroquel was discontinued.    Dental was consulted due to dentures and possible malnutrition, was not seen but recommended to follow up outpatient.  Patient had choking/coughing episode, speech and swallow was consulted and recommended puree diet with thin liquids.     1/12: Patient was transitioned from Lovenox to Eliquis 5mg twice daily for DVT prophylaxis.    1/15:  Patient had indwelling urethral catheter placed due to urinary retention, catheter was removed on 1/17 but due to failed trial of void, catheter was reinserted.     1/16: Behavioral Health was consulted due to patient's continued agitation and recommended Seroquel as needed daily, Seroquel/Melatonin/Trazodone at bedtime.     1/17: Patient was due to void and failed his trial, for which an indwelling urethral catheter was placed. Behavioral Health recommended to continue Seroquel 50mg qhs and Trazodone 50mg po qhs.     1/18: Discharge planning was discussed and was intended to be for [facility]. [facility] was unable to come and evaluate the patient secondary to the nurse from [facility] being unable to arrive for the evaluation. The visit was then rescheduled for the morning of January 19th.    1/19: [facility] came to evaluate the patient and it was determined that they cannot accept the patient as he is "too heavy of a 2 person assist." Patient's daughter Dea was informed of the results of the evaluation. Daughter Dea mentioned that she wanted a referral to be sent to [facility], [facility] and [facility], all of which declined the patient. However, [facility]. A global referral was sent out by case management and a discharge notice was recommended to be given to the family as the patient has been considered to be medically cleared.     Patient advised to keep surgical incision/dressing clean and dry, and have dressing/sutures/surgical staples removed and steri strips applied post op day #21 (1/19/2023) if applicable.  Patient further advised to follow up with Dr. Ceron in his office 3-4 weeks post op.     Reason for Admission: L Hip Fx  History of Present Illness:   89y Male community ambulatory presents c/o L hip pain and inability to ambulate sp mechanical fall at rehab. Per the family, patient fell 5 days ago and the facility did not get x-rays until today. Denies HS/LOC. Denies numbness/tingling. Denies fever/chills. Denies pain/injury elsewhere.   Wife very upset at how pt was tx and given his hx was not secured better in bed. Did not give pt any pain meds. Since R hip replacement pt has not been able to  ambulate and is wheelchair/bedbound. Pt at baseline mental status, AOx1.    PAST MEDICAL HISTORY:  AF (atrial fibrillation)   Alzheimer disease   Arthritis   Benign prostatic hypertrophy   Gout   Hypertension   Pacemaker   Subdural hematoma.     PAST SURGICAL HISTORY:  S/P craniotomy   S/P laparoscopic cholecystectomy   S/P umbilical hernia repair.     HOSPITAL COURSE:  89 y/o M underwent left hip hemiarthroplasty on 1/5/2023 with Dr. Ceron.  Patient tolerated procedure well. Patient underwent B/L L/E dopplers perioperatively and was found to have right proximal femoral, peroneal and Soleal DVTs, and left Common/deep femoral, Soleal DVTs. Patient was started on therapeutic dose Lovenox.  Patient was evaluated postoperatively by physical and occupational therapists for weight bearing as tolerated ambulation and advised that patient would benefit from admission to rehab facility.      1/7: Patient started on Seroquel 12.5mg PO Daily in morning and Seroquel 50mg PO at bedtime for agitation.    1/8: Left upper extremity doppler ordered to r/o DVT. 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 Vascular Surgery was consulted and recommended continued therapeutic anticoagulation and to follow up with Hematology/Oncology. Heme/Onc recommended to transition to [facility] when deemed appropriate.    1/10:    Dental was consulted due to dentures and possible malnutrition, was not seen but recommended to follow up outpatient.  Patient had choking/coughing episode, speech and swallow was consulted and recommended puree diet with thin liquids.     1/12: Patient was transitioned from Lovenox to Eliquis 5mg twice daily for treatment of DVTs/DVT prophylaxis.    1/15:  Patient had indwelling urethral catheter placed due to urinary retention, catheter was removed on 1/17 but due to failed trial of void, catheter was reinserted.     1/16: Behavioral Health was consulted due to patient's continued agitation and recommended Seroquel as needed daily, Seroquel/Melatonin/Trazodone at bedtime.     1/17: Patient was due to void and failed his trial, for which an indwelling urethral catheter was placed. Behavioral Health recommended to continue Seroquel 50mg qhs (hold for sedation) and Trazodone 50mg po qhs, melatonin 3mg PO QHS prn insomnia and Seroquel 25mg PO daily PRN agitation.     1/18: Discharge planning was discussed and was intended to be for [facility]. [facility] was unable to come and evaluate the patient secondary to the nurse from [facility] being unable to arrive for the evaluation. The visit was then rescheduled for the morning of January 19th.    1/19: [facility] came to [facility] to evaluate patient and declined acceptance and family made aware. Subsequently, case management applied to subacute facilities daughter requested which also declined acceptance.   [facility] and case management sending global referral.     Patient advised to keep surgical incision/dressing clean and dry, and have dressing/sutures/surgical staples removed and steri strips applied post op day #21 (1/19/2023) if applicable.  Patient further advised to follow up with Dr. Ceron in his office 3-4 weeks post op.    Patient must continue Eliquis 5mg PO twice a day for at least 3 months and MUST follow up with Vascular Surgeon for continued management of Eliquis medication for bilateral leg/left subclavian blood clots.     Reason for Admission: L Hip Fx  History of Present Illness:   89y Male community ambulatory presents c/o L hip pain and inability to ambulate sp mechanical fall at rehab. Per the family, patient fell 5 days ago and the facility did not get x-rays until today. Denies HS/LOC. Denies numbness/tingling. Denies fever/chills. Denies pain/injury elsewhere.   Wife very upset at how pt was tx and given his hx was not secured better in bed. Did not give pt any pain meds. Since R hip replacement pt has not been able to  ambulate and is wheelchair/bedbound. Pt at baseline mental status, AOx1.    PAST MEDICAL HISTORY:  AF (atrial fibrillation)   Alzheimer disease   Arthritis   Benign prostatic hypertrophy   Gout   Hypertension   Pacemaker   Subdural hematoma.     PAST SURGICAL HISTORY:  S/P craniotomy   S/P laparoscopic cholecystectomy   S/P umbilical hernia repair.     HOSPITAL COURSE:  89 y/o M underwent left hip hemiarthroplasty on 1/5/2023 with Dr. Ceron.  Patient tolerated procedure well. Patient underwent B/L L/E dopplers perioperatively and was found to have right proximal femoral, peroneal and Soleal DVTs, and left Common/deep femoral, Soleal DVTs. Patient was started on therapeutic dose Lovenox.  Patient was evaluated postoperatively by physical and occupational therapists for weight bearing as tolerated ambulation and advised that patient would benefit from admission to rehab facility.      1/7: Patient started on Seroquel 12.5mg PO Daily in morning and Seroquel 50mg PO at bedtime for agitation.    1/8: Left upper extremity doppler ordered to r/o DVT. 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 Vascular Surgery was consulted and recommended continued therapeutic anticoagulation and to follow up with Hematology/Oncology. Heme/Onc recommended to transition to DOAC when deemed appropriate.    1/10:    Dental was consulted due to dentures and possible malnutrition, was not seen but recommended to follow up outpatient.  Patient had choking/coughing episode, speech and swallow was consulted and recommended puree diet with thin liquids.     1/12: Patient was transitioned from Lovenox to Eliquis 5mg twice daily for treatment of DVTs/DVT prophylaxis.    1/15:  Patient had indwelling urethral catheter placed due to urinary retention, catheter was removed on 1/17 but due to failed trial of void, catheter was reinserted.     1/16: Behavioral Health was consulted due to patient's continued agitation and recommended Seroquel as needed daily, Seroquel/Melatonin/Trazodone at bedtime.     1/17: Patient was due to void and failed his trial, for which an indwelling urethral catheter was placed. Behavioral Health recommended to continue Seroquel 50mg qhs (hold for sedation) and Trazodone 50mg po qhs, melatonin 3mg PO QHS prn insomnia and Seroquel 25mg PO daily PRN agitation.     1/18: Discharge planning was discussed and was intended to be for [facility]. [facility] was unable to come and evaluate the patient secondary to the nurse from [facility] being unable to arrive for the evaluation. The visit was then rescheduled for the morning of January 19th.    1/19: [facility] patient and declined acceptance and family made aware. Subsequently, case management applied to subacute facilities daughter requested which also declined acceptance.   Family notified of acceptance to [facility] and case management sending global referral.     Patient advised to keep surgical incision/dressing clean and dry, and have dressing/sutures/surgical staples removed and steri strips applied post op day #21 (1/19/2023) if applicable.  Patient further advised to follow up with Dr. Ceron in his office 3-4 weeks post op.    Patient must continue Eliquis 5mg PO twice a day for at least 3 months and MUST follow up with Vascular Surgeon for continued management of Eliquis medication for bilateral leg/left subclavian blood clots.    Rehab to perform TOV once patient more ambulatory (Jamil placed 1/17/23)

## 2023-01-06 NOTE — DISCHARGE NOTE PROVIDER - PROVIDER TOKENS
PROVIDER:[TOKEN:[[id number]]] PROVIDER:[TOKEN:[[id number]]],PROVIDER:[TOKEN:[[id number]]] PROVIDER:[TOKEN:[[id number]],FOLLOWUP:[2 weeks]],PROVIDER:[TOKEN:[[id number]],FOLLOWUP:[1 month]]

## 2023-01-06 NOTE — PHYSICAL THERAPY INITIAL EVALUATION ADULT - ACTIVE RANGE OF MOTION EXAMINATION, REHAB EVAL
except LLE 2/2 pain and weakness/bilateral upper extremity Active ROM was WFL (within functional limits)/bilateral  lower extremity Active ROM was WFL (within functional limits)

## 2023-01-06 NOTE — PROGRESS NOTE ADULT - SUBJECTIVE AND OBJECTIVE BOX
Patient is a 89 yo male who was recently DCed from [facility] after surgery for a left hip fracture. Th Patient was brought in by EMS from [facility] after multiple falls over the last week.  He was previously admitted to [facility] approximately 2 weeks ago for a fall which resulted in a right hip fracture which was repaired by Dr. Monty Ceron.  He is not able to provide a reliable history.  According to the patient his daughter he has a history of dementia which is gotten much worse since his original fracture.  He is not eating and drinking as he should.  She reports 3 falls over the last week.  X-rays were performed yesterday and she received a phone call today that he fractured his left hip.  Patient denies pain or other injury on review of systems. Patient is s/p an Open reduction and internal fixation of the left hi. Patient tolerated the procedure well. Post op Patient found to have b/l DVTs on dopplers.     MEDICATIONS  (STANDING):  acetaminophen     Tablet .. 975 milliGRAM(s) Oral every 8 hours  allopurinol 300 milliGRAM(s) Oral daily  aMIOdarone    Tablet 200 milliGRAM(s) Oral daily  celecoxib 100 milliGRAM(s) Oral daily  enoxaparin Injectable 60 milliGRAM(s) SubCutaneous every 12 hours  melatonin 3 milliGRAM(s) Oral at bedtime  memantine 5 milliGRAM(s) Oral daily  oxybutynin 5 milliGRAM(s) Oral two times a day  pantoprazole    Tablet 40 milliGRAM(s) Oral before breakfast  polyethylene glycol 3350 17 Gram(s) Oral daily  senna 2 Tablet(s) Oral at bedtime  sodium chloride 0.9%. 1000 milliLiter(s) (100 mL/Hr) IV Continuous <Continuous>  tamsulosin 0.4 milliGRAM(s) Oral at bedtime  traZODone 50 milliGRAM(s) Oral at bedtime    MEDICATIONS  (PRN):  acetaminophen   IVPB .. 1000 milliGRAM(s) IV Intermittent once PRN Severe Pain (7 - 10)  magnesium hydroxide Suspension 30 milliLiter(s) Oral daily PRN Constipation  ondansetron Injectable 4 milliGRAM(s) IV Push every 6 hours PRN Nausea and/or Vomiting  traMADol 25 milliGRAM(s) Oral every 6 hours PRN Moderate Pain (4 - 6)  traMADol 50 milliGRAM(s) Oral every 6 hours PRN Severe Pain (7 - 10)          VITALS:   T(C): 36.4 (01-06-23 @ 08:26), Max: 36.8 (01-05-23 @ 18:00)  HR: 77 (01-06-23 @ 11:45) (77 - 87)  BP: 88/50 (01-06-23 @ 11:45) (74/52 - 121/81)  RR: 18 (01-06-23 @ 08:26) (12 - 18)  SpO2: 99% (01-06-23 @ 10:33) (92% - 100%)  Wt(kg): --      PHYSICAL EXAM:  GENERAL: NAD, well-groomed, well-developed  HEAD:  Atraumatic, Normocephalic  EYES: EOMI, PERRLA, conjunctiva and sclera clear  ENMT: No tonsillar erythema, exudates, or enlargement; Moist mucous membranes, Good dentition, No lesions  NECK: Supple, No JVD, Normal thyroid  NERVOUS SYSTEM:  Alert & Oriented X3, Good concentration; Motor Strength 5/5 B/L upper and lower extremities; DTRs 2+ intact and symmetric  CHEST/LUNG: Clear to percussion bilaterally; No rales, rhonchi, wheezing, or rubs  HEART: Regular rate and rhythm; No murmurs, rubs, or gallops  ABDOMEN: Soft, Nontender, Nondistended; Bowel sounds present  EXTREMITIES:  2+ Peripheral Pulses, No clubbing, cyanosis, or edema  LYMPH: No lymphadenopathy noted  SKIN: No rashes or lesions    LABS:    CARDIAC MARKERS ( 04 Jan 2023 17:46 )  x     / x     / 28 U/L / x     / x          CBC Full  -  ( 06 Jan 2023 07:05 )  WBC Count : 11.66 K/uL  RBC Count : 3.07 M/uL  Hemoglobin : 9.0 g/dL  Hematocrit : 29.4 %  Platelet Count - Automated : 437 K/uL  Mean Cell Volume : 95.8 fl  Mean Cell Hemoglobin : 29.3 pg  Mean Cell Hemoglobin Concentration : 30.6 gm/dL  Auto Neutrophil # : 9.88 K/uL  Auto Lymphocyte # : 0.52 K/uL  Auto Monocyte # : 0.85 K/uL  Auto Eosinophil # : 0.00 K/uL  Auto Basophil # : 0.04 K/uL  Auto Neutrophil % : 84.7 %  Auto Lymphocyte % : 4.5 %  Auto Monocyte % : 7.3 %  Auto Eosinophil % : 0.0 %  Auto Basophil % : 0.3 %    01-06    144  |  110<H>  |  58<H>  ----------------------------<  165<H>  5.5<H>   |  23  |  1.37<H>    Ca    8.4      06 Jan 2023 07:05    TPro  x   /  Alb  3.0<L>  /  TBili  x   /  DBili  x   /  AST  x   /  ALT  x   /  AlkPhos  x   01-06    LIVER FUNCTIONS - ( 06 Jan 2023 07:05 )  Alb: 3.0 g/dL / Pro: x     / ALK PHOS: x     / ALT: x     / AST: x     / GGT: x           PT/INR - ( 05 Jan 2023 01:50 )   PT: 15.1 sec;   INR: 1.31 ratio         PTT - ( 05 Jan 2023 01:50 )  PTT:29.4 sec    CAPILLARY BLOOD GLUCOSE          RADIOLOGY & ADDITIONAL TESTS:

## 2023-01-06 NOTE — OCCUPATIONAL THERAPY INITIAL EVALUATION ADULT - ADDITIONAL COMMENTS
As per chart: Patient lives in private house with spouse. 1 step to enter and  has first floor set up. Prior to admission, patient independent with ambulation  and ADLs. Patient has [facility] 2 days/week for 4 hours/day  *Pt was d/c from [facility] on 12/30 to Sub-acute rehab, returned 2* fall at rehab

## 2023-01-06 NOTE — PHYSICAL THERAPY INITIAL EVALUATION ADULT - PERTINENT HX OF CURRENT PROBLEM, REHAB EVAL
89y Male community ambulatory presents c/o L hip pain and inability to ambulate sp mechanical fall at rehab. Per the family, patient fell 5 days ago and the facility did not get xrays until today. Denies HS/LOC. Denies numbness/tingling. Denies fever/chills. Denies pain/injury elsewhere. PMH of dementia, HTN, A fib sp PPM, subdural hematoma sp craniotomy, BPH, gout, sp right hip hemiarthroplasty on 12/20/2022 with Dr. Ceron p/w L hip pain. Hosp Course: 1/4 XR L femur/Tony Hips w Pelv: Overriding and mildly displaced left femoral neck fracture; 1/4 CXR neg rib fx or PTX; 1/4 CT Head/C-spine Overriding and mildly displaced left femoral neck fracture; 1/4 CT Pelvis Overriding left femoral neck fracture with 1.3 cm of anterior displacement and angulation of the more distal fracture fragment. Interval right hip arthroplasty; 1/5 Tony VA duplex: +Acute, above the knee DVT is seen in the right femoral and left common   femoral and deep femoral veins. Below the knee DVT is noted in the right peroneal and the bilateral soleal veins; 1/5 XR Pelvis: No fracture or dislocation. Status post bilateral hip arthroplasties. No evidence of periprosthetic fracture or loosening. Subcutaneous edema at the left hip which is postoperative in nature with adjacent postsurgical staples

## 2023-01-06 NOTE — DISCHARGE NOTE PROVIDER - NSDCFUADDAPPT_GEN_ALL_CORE_FT
APPTS ARE READY TO BE MADE: [X] YES    Best Family or Patient Contact (if needed):    Additional Information about above appointments (if needed):    1: 1 month f/u with Dr. Juarez for Eliquis management   2:  2 week f/u with Dr. Ceron for wound checks  3:     Other comments or requests:    APPTS ARE READY TO BE MADE: [X] YES    Best Family or Patient Contact (if needed):    Additional Information about above appointments (if needed):    1: 1 month f/u with Dr. Juarez for Eliquis management   2:  2 week f/u with Dr. Ceron for wound checks  3:     Other comments or requests:  Patient is being discharged to rehab. Caregiver will arrange follow up.

## 2023-01-06 NOTE — CHART NOTE - NSCHARTNOTEFT_GEN_A_CORE
Spoke with patients daughter bedside who is concerned her father is not eating well 2/2 his top teeth dentures not fitting well.  Patients daughter requesting dental consult.  Explained to patient that this consult will most likely happen during the week when Dental team in house.  Will forward to remainder primary team to follow up with dental consult on 1/9/21.  Patients daughter to bring in dentures over the weekend.  Nutrition consult ordered in the meantime and diet changed to small and bite size.      Ha Barry PA-C  Orthopedic Surgery Team  Team Pager #[phone]

## 2023-01-06 NOTE — PHYSICAL THERAPY INITIAL EVALUATION ADULT - LEVEL OF INDEPENDENCE: STAND/SIT, REHAB EVAL
Spoke with Priya Steel yesterday afternoon and [facility] should be able to take pt. Left vm this am to confirm. 1130 spoke with Maty Garcia they can take pt anytime. Ps to Dr Lyndon Carnes. 1300 Pt Discharged to [facility],Set up with [facility] for 1800. Pt's son 100 [facility] updated and R N notified. [address] [facility] with d/c plan. maximum assist (25% patients effort)

## 2023-01-06 NOTE — PATIENT PROFILE ADULT - FALL HARM RISK - HARM RISK INTERVENTIONS
Assistance with ambulation/Assistance OOB with selected safe patient handling equipment/Communicate Risk of Fall with Harm to all staff/Discuss with provider need for PT consult/Monitor gait and stability/Provide patient with walking aids - walker, cane, crutches/Reinforce activity limits and safety measures with patient and family/Sit up slowly, dangle for a short time, stand at bedside before walking/Tailored Fall Risk Interventions/Use of alarms - bed, chair and/or voice tab/Visual Cue: Yellow wristband and red socks/Bed in lowest position, wheels locked, appropriate side rails in place/Call bell, personal items and telephone in reach/Instruct patient to call for assistance before getting out of bed or chair/Non-slip footwear when patient is out of bed/Tracy to call system/Physically safe environment - no spills, clutter or unnecessary equipment/Purposeful Proactive Rounding/Room/bathroom lighting operational, light cord in reach

## 2023-01-06 NOTE — DISCHARGE NOTE PROVIDER - NSDCFUSCHEDAPPT_GEN_ALL_CORE_FT
Monty Ceron  [facility]  ORTHOSURG [address] [facility]  Scheduled Appointment: 01/17/2023     Monty Ceron  [facility]  ORTHOSURG [address] [facility]  Scheduled Appointment: 01/30/2023

## 2023-01-06 NOTE — PHYSICAL THERAPY INITIAL EVALUATION ADULT - MANUAL MUSCLE TESTING RESULTS, REHAB EVAL
Strength is at least 3/5 through extremities except LLE 2/2 pain and weakness/grossly assessed due to

## 2023-01-07 LAB
ANION GAP SERPL CALC-SCNC: 11 MMOL/L — SIGNIFICANT CHANGE UP (ref 5–17)
BUN SERPL-MCNC: 60 MG/DL — HIGH (ref 7–23)
CALCIUM SERPL-MCNC: 8.6 MG/DL — SIGNIFICANT CHANGE UP (ref 8.4–10.5)
CHLORIDE SERPL-SCNC: 109 MMOL/L — HIGH (ref 96–108)
CO2 SERPL-SCNC: 20 MMOL/L — LOW (ref 22–31)
CREAT SERPL-MCNC: 1.45 MG/DL — HIGH (ref 0.5–1.3)
EGFR: 45 ML/MIN/1.73M2 — LOW
GLUCOSE SERPL-MCNC: 139 MG/DL — HIGH (ref 70–99)
HCT VFR BLD CALC: 31.6 % — LOW (ref 39–50)
HGB BLD-MCNC: 9.9 G/DL — LOW (ref 13–17)
MCHC RBC-ENTMCNC: 30.1 PG — SIGNIFICANT CHANGE UP (ref 27–34)
MCHC RBC-ENTMCNC: 31.3 GM/DL — LOW (ref 32–36)
MCV RBC AUTO: 96 FL — SIGNIFICANT CHANGE UP (ref 80–100)
NRBC # BLD: 0 /100 WBCS — SIGNIFICANT CHANGE UP (ref 0–0)
PLATELET # BLD AUTO: 370 K/UL — SIGNIFICANT CHANGE UP (ref 150–400)
POTASSIUM SERPL-MCNC: 4.9 MMOL/L — SIGNIFICANT CHANGE UP (ref 3.5–5.3)
POTASSIUM SERPL-SCNC: 4.9 MMOL/L — SIGNIFICANT CHANGE UP (ref 3.5–5.3)
RBC # BLD: 3.29 M/UL — LOW (ref 4.2–5.8)
RBC # FLD: 17.7 % — HIGH (ref 10.3–14.5)
SODIUM SERPL-SCNC: 140 MMOL/L — SIGNIFICANT CHANGE UP (ref 135–145)
WBC # BLD: 10.47 K/UL — SIGNIFICANT CHANGE UP (ref 3.8–10.5)
WBC # FLD AUTO: 10.47 K/UL — SIGNIFICANT CHANGE UP (ref 3.8–10.5)

## 2023-01-07 RX ORDER — QUETIAPINE FUMARATE 200 MG/1
50 TABLET, FILM COATED ORAL AT BEDTIME
Refills: 0 | Status: DISCONTINUED | OUTPATIENT
Start: 2023-01-07 | End: 2023-01-20

## 2023-01-07 RX ORDER — QUETIAPINE FUMARATE 200 MG/1
12.5 TABLET, FILM COATED ORAL DAILY
Refills: 0 | Status: DISCONTINUED | OUTPATIENT
Start: 2023-01-08 | End: 2023-01-10

## 2023-01-07 RX ORDER — QUETIAPINE FUMARATE 200 MG/1
50 TABLET, FILM COATED ORAL THREE TIMES A DAY
Refills: 0 | Status: DISCONTINUED | OUTPATIENT
Start: 2023-01-07 | End: 2023-01-07

## 2023-01-07 RX ORDER — DIAZEPAM 5 MG
1 TABLET ORAL ONCE
Refills: 0 | Status: DISCONTINUED | OUTPATIENT
Start: 2023-01-07 | End: 2023-01-07

## 2023-01-07 RX ORDER — SODIUM CHLORIDE 9 MG/ML
1000 INJECTION INTRAMUSCULAR; INTRAVENOUS; SUBCUTANEOUS
Refills: 0 | Status: DISCONTINUED | OUTPATIENT
Start: 2023-01-07 | End: 2023-01-08

## 2023-01-07 RX ADMIN — Medication 50 MILLIGRAM(S): at 21:25

## 2023-01-07 RX ADMIN — QUETIAPINE FUMARATE 50 MILLIGRAM(S): 200 TABLET, FILM COATED ORAL at 21:25

## 2023-01-07 RX ADMIN — ENOXAPARIN SODIUM 60 MILLIGRAM(S): 100 INJECTION SUBCUTANEOUS at 17:52

## 2023-01-07 RX ADMIN — Medication 975 MILLIGRAM(S): at 06:57

## 2023-01-07 RX ADMIN — SENNA PLUS 2 TABLET(S): 8.6 TABLET ORAL at 21:24

## 2023-01-07 RX ADMIN — TAMSULOSIN HYDROCHLORIDE 0.4 MILLIGRAM(S): 0.4 CAPSULE ORAL at 21:25

## 2023-01-07 RX ADMIN — CELECOXIB 100 MILLIGRAM(S): 200 CAPSULE ORAL at 13:30

## 2023-01-07 RX ADMIN — Medication 5 MILLIGRAM(S): at 17:52

## 2023-01-07 RX ADMIN — Medication 300 MILLIGRAM(S): at 13:30

## 2023-01-07 RX ADMIN — Medication 975 MILLIGRAM(S): at 05:23

## 2023-01-07 RX ADMIN — TRAMADOL HYDROCHLORIDE 50 MILLIGRAM(S): 50 TABLET ORAL at 03:53

## 2023-01-07 RX ADMIN — ENOXAPARIN SODIUM 60 MILLIGRAM(S): 100 INJECTION SUBCUTANEOUS at 05:24

## 2023-01-07 RX ADMIN — Medication 975 MILLIGRAM(S): at 21:25

## 2023-01-07 RX ADMIN — Medication 5 MILLIGRAM(S): at 05:24

## 2023-01-07 RX ADMIN — Medication 3 MILLIGRAM(S): at 21:25

## 2023-01-07 RX ADMIN — Medication 1 MILLIGRAM(S): at 04:27

## 2023-01-07 RX ADMIN — TRAMADOL HYDROCHLORIDE 50 MILLIGRAM(S): 50 TABLET ORAL at 03:23

## 2023-01-07 RX ADMIN — QUETIAPINE FUMARATE 50 MILLIGRAM(S): 200 TABLET, FILM COATED ORAL at 06:49

## 2023-01-07 RX ADMIN — MEMANTINE HYDROCHLORIDE 5 MILLIGRAM(S): 10 TABLET ORAL at 13:30

## 2023-01-07 RX ADMIN — Medication 975 MILLIGRAM(S): at 13:30

## 2023-01-07 RX ADMIN — POLYETHYLENE GLYCOL 3350 17 GRAM(S): 17 POWDER, FOR SOLUTION ORAL at 13:29

## 2023-01-07 RX ADMIN — Medication 975 MILLIGRAM(S): at 21:55

## 2023-01-07 RX ADMIN — AMIODARONE HYDROCHLORIDE 200 MILLIGRAM(S): 400 TABLET ORAL at 05:24

## 2023-01-07 RX ADMIN — PANTOPRAZOLE SODIUM 40 MILLIGRAM(S): 20 TABLET, DELAYED RELEASE ORAL at 05:26

## 2023-01-07 NOTE — CHART NOTE - NSCHARTNOTEFT_GEN_A_CORE
Ortho PA Note    Called spouse to inform patient agitated, screaming on unit currently. Patient "biting off" IV line overnight as per RN and is currently in restraints as he  is attempting to get OOB.   Patient recommended Seroquel 50mg PO TID last admission by Psych Team. Spouse made aware patient requiring Seroquel 50mg PO TID  to be started now.  Spouse informed me she will be at hospital at noon today.      ANISH Baird  [id number]
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
## 2023-01-07 NOTE — PROGRESS NOTE ADULT - SUBJECTIVE AND OBJECTIVE BOX
Patient is a 89 yo male who was recently DCed from [facility] after surgery for a left hip fracture. Th Patient was brought in by EMS from [facility] after multiple falls over the last week.  He was previously admitted to [facility] approximately 2 weeks ago for a fall which resulted in a right hip fracture which was repaired by Dr. Monty Ceron.  He is not able to provide a reliable history.  According to the patient his daughter he has a history of dementia which is gotten much worse since his original fracture.  He is not eating and drinking as he should.  She reports 3 falls over the last week.  X-rays were performed yesterday and she received a phone call today that he fractured his left hip.  Patient denies pain or other injury on review of systems. Patient is s/p an Open reduction and internal fixation of the left hi. Patient tolerated the procedure well. Post op Patient found to have b/l DVTs on dopplers. Overnight the Pt became more agitated. He was started of Seroquel 50 TID by psych. Patient very lethargic this am.       MEDICATIONS  (STANDING):  acetaminophen     Tablet .. 975 milliGRAM(s) Oral every 8 hours  allopurinol 300 milliGRAM(s) Oral daily  aMIOdarone    Tablet 200 milliGRAM(s) Oral daily  celecoxib 100 milliGRAM(s) Oral daily  enoxaparin Injectable 60 milliGRAM(s) SubCutaneous every 12 hours  melatonin 3 milliGRAM(s) Oral at bedtime  memantine 5 milliGRAM(s) Oral daily  oxybutynin 5 milliGRAM(s) Oral two times a day  pantoprazole    Tablet 40 milliGRAM(s) Oral before breakfast  polyethylene glycol 3350 17 Gram(s) Oral daily  QUEtiapine 50 milliGRAM(s) Oral at bedtime  senna 2 Tablet(s) Oral at bedtime  sodium chloride 0.9%. 1000 milliLiter(s) (50 mL/Hr) IV Continuous <Continuous>  tamsulosin 0.4 milliGRAM(s) Oral at bedtime  traZODone 50 milliGRAM(s) Oral at bedtime    MEDICATIONS  (PRN):  acetaminophen   IVPB .. 1000 milliGRAM(s) IV Intermittent once PRN Severe Pain (7 - 10)  magnesium hydroxide Suspension 30 milliLiter(s) Oral daily PRN Constipation  ondansetron Injectable 4 milliGRAM(s) IV Push every 6 hours PRN Nausea and/or Vomiting          VITALS:   T(C): 36.7 (01-07-23 @ 14:00), Max: 36.8 (01-06-23 @ 23:40)  HR: 88 (01-07-23 @ 14:00) (71 - 88)  BP: 87/55 (01-07-23 @ 14:00) (80/50 - 101/63)  RR: 18 (01-07-23 @ 14:00) (18 - 18)  SpO2: 98% (01-07-23 @ 14:00) (95% - 100%)  Wt(kg): --    PHYSICAL EXAM:  GENERAL: NAD, well-groomed, well-developed  HEAD:  Atraumatic, Normocephalic  EYES: EOMI, PERRLA, conjunctiva and sclera clear  ENMT: No tonsillar erythema, exudates, or enlargement; Moist mucous membranes, Good dentition, No lesions  NECK: Supple, No JVD, Normal thyroid  NERVOUS SYSTEM:  Alert & Oriented X3, Good concentration; Motor Strength 5/5 B/L upper and lower extremities; DTRs 2+ intact and symmetric  CHEST/LUNG: Clear to percussion bilaterally; No rales, rhonchi, wheezing, or rubs  HEART: Regular rate and rhythm; No murmurs, rubs, or gallops  ABDOMEN: Soft, Nontender, Nondistended; Bowel sounds present  EXTREMITIES:  2+ Peripheral Pulses, No clubbing, cyanosis, or edema  LYMPH: No lymphadenopathy noted  SKIN: No rashes or lesions    LABS:        CBC Full  -  ( 07 Jan 2023 10:40 )  WBC Count : 10.47 K/uL  RBC Count : 3.29 M/uL  Hemoglobin : 9.9 g/dL  Hematocrit : 31.6 %  Platelet Count - Automated : 370 K/uL  Mean Cell Volume : 96.0 fl  Mean Cell Hemoglobin : 30.1 pg  Mean Cell Hemoglobin Concentration : 31.3 gm/dL  Auto Neutrophil # : x  Auto Lymphocyte # : x  Auto Monocyte # : x  Auto Eosinophil # : x  Auto Basophil # : x  Auto Neutrophil % : x  Auto Lymphocyte % : x  Auto Monocyte % : x  Auto Eosinophil % : x  Auto Basophil % : x    01-07    140  |  109<H>  |  60<H>  ----------------------------<  139<H>  4.9   |  20<L>  |  1.45<H>    Ca    8.6      07 Jan 2023 10:40    TPro  x   /  Alb  3.0<L>  /  TBili  x   /  DBili  x   /  AST  x   /  ALT  x   /  AlkPhos  x   01-06    LIVER FUNCTIONS - ( 06 Jan 2023 07:05 )  Alb: 3.0 g/dL / Pro: x     / ALK PHOS: x     / ALT: x     / AST: x     / GGT: x               CAPILLARY BLOOD GLUCOSE          RADIOLOGY & ADDITIONAL TESTS:

## 2023-01-07 NOTE — PROGRESS NOTE ADULT - SUBJECTIVE AND OBJECTIVE BOX
Patient agitated earlier this morning.   As per Dylan-overnight, patient "bit off" IV.   Required restraints as trying to climb OOB.      T(C): 36.4 (01-07-23 @ 07:10), Max: 36.8 (01-06-23 @ 23:40)  HR: 79 (01-07-23 @ 07:10) (71 - 82)  BP: 90/57 (01-07-23 @ 07:10) (74/52 - 101/63)  RR: 18 (01-07-23 @ 07:10) (18 - 18)  SpO2: 95% (01-07-23 @ 07:10) (95% - 100%)  Wt(kg): --    PHYSICAL EXAM:  Gen: Arousable, Confused, follows most commands  Left Hip: AquacelDressing w moderate old ss drainage; dull sensation grossly intact to light touch; (+) EHL/FHL/TA/GS,  (+) DF/PF; (+) Distal Pulses; No Calf tenderness B/L, PAS     LABS:                        8.5    14.45 )-----------( 405      ( 06 Jan 2023 17:57 )             27.9     01-06    141  |  111<H>  |  60<H>  ----------------------------<  147<H>  5.2   |  22  |  1.51<H>    Ca    8.6      06 Jan 2023 17:57    TPro  x   /  Alb  3.0<L>  /  TBili  x   /  DBili  x   /  AST  x   /  ALT  x   /  AlkPhos  x   01-06

## 2023-01-07 NOTE — PROGRESS NOTE ADULT - ASSESSMENT
A/P: 89 y/o M POD# 2 s/p L Hemiarthroplasty    DVT ppx- Lovenox 60mg SQ BID  LLE WBAT  Pain management w Tylenol  PT  OT  Seroquel 50mg PO TID (Spouse aware)  Discharge planning to [facility]?  D/W attending      ANISH Baird  Orthopedic Surgery  [phone]

## 2023-01-08 LAB
ANION GAP SERPL CALC-SCNC: 14 MMOL/L — SIGNIFICANT CHANGE UP (ref 5–17)
BUN SERPL-MCNC: 60 MG/DL — HIGH (ref 7–23)
CALCIUM SERPL-MCNC: 9.1 MG/DL — SIGNIFICANT CHANGE UP (ref 8.4–10.5)
CHLORIDE SERPL-SCNC: 120 MMOL/L — HIGH (ref 96–108)
CO2 SERPL-SCNC: 17 MMOL/L — LOW (ref 22–31)
CREAT SERPL-MCNC: 1.35 MG/DL — HIGH (ref 0.5–1.3)
EGFR: 49 ML/MIN/1.73M2 — LOW
GLUCOSE SERPL-MCNC: 138 MG/DL — HIGH (ref 70–99)
POTASSIUM SERPL-MCNC: 4.9 MMOL/L — SIGNIFICANT CHANGE UP (ref 3.5–5.3)
POTASSIUM SERPL-SCNC: 4.9 MMOL/L — SIGNIFICANT CHANGE UP (ref 3.5–5.3)
SODIUM SERPL-SCNC: 142 MMOL/L — SIGNIFICANT CHANGE UP (ref 135–145)
SODIUM SERPL-SCNC: 151 MMOL/L — HIGH (ref 135–145)

## 2023-01-08 PROCEDURE — 93971 EXTREMITY STUDY: CPT | Mod: 26,LT

## 2023-01-08 RX ADMIN — ENOXAPARIN SODIUM 60 MILLIGRAM(S): 100 INJECTION SUBCUTANEOUS at 17:40

## 2023-01-08 RX ADMIN — QUETIAPINE FUMARATE 12.5 MILLIGRAM(S): 200 TABLET, FILM COATED ORAL at 12:19

## 2023-01-08 RX ADMIN — SENNA PLUS 2 TABLET(S): 8.6 TABLET ORAL at 21:19

## 2023-01-08 RX ADMIN — Medication 300 MILLIGRAM(S): at 12:16

## 2023-01-08 RX ADMIN — Medication 3 MILLIGRAM(S): at 21:19

## 2023-01-08 RX ADMIN — Medication 975 MILLIGRAM(S): at 21:18

## 2023-01-08 RX ADMIN — Medication 975 MILLIGRAM(S): at 06:35

## 2023-01-08 RX ADMIN — Medication 975 MILLIGRAM(S): at 21:58

## 2023-01-08 RX ADMIN — MEMANTINE HYDROCHLORIDE 5 MILLIGRAM(S): 10 TABLET ORAL at 12:15

## 2023-01-08 RX ADMIN — CELECOXIB 100 MILLIGRAM(S): 200 CAPSULE ORAL at 12:14

## 2023-01-08 RX ADMIN — PANTOPRAZOLE SODIUM 40 MILLIGRAM(S): 20 TABLET, DELAYED RELEASE ORAL at 06:17

## 2023-01-08 RX ADMIN — POLYETHYLENE GLYCOL 3350 17 GRAM(S): 17 POWDER, FOR SOLUTION ORAL at 12:16

## 2023-01-08 RX ADMIN — ENOXAPARIN SODIUM 60 MILLIGRAM(S): 100 INJECTION SUBCUTANEOUS at 06:18

## 2023-01-08 RX ADMIN — Medication 50 MILLIGRAM(S): at 21:19

## 2023-01-08 RX ADMIN — QUETIAPINE FUMARATE 50 MILLIGRAM(S): 200 TABLET, FILM COATED ORAL at 21:19

## 2023-01-08 RX ADMIN — AMIODARONE HYDROCHLORIDE 200 MILLIGRAM(S): 400 TABLET ORAL at 06:17

## 2023-01-08 RX ADMIN — Medication 5 MILLIGRAM(S): at 06:17

## 2023-01-08 RX ADMIN — Medication 975 MILLIGRAM(S): at 06:17

## 2023-01-08 RX ADMIN — Medication 5 MILLIGRAM(S): at 17:40

## 2023-01-08 RX ADMIN — TAMSULOSIN HYDROCHLORIDE 0.4 MILLIGRAM(S): 0.4 CAPSULE ORAL at 21:19

## 2023-01-08 NOTE — CONSULT NOTE ADULT - SUBJECTIVE AND OBJECTIVE BOX
Vascular Surgery Consult  Consulting attending: Dr. Juarez    HPI:  Patient is a 89 year old male presenting for L hip fx s/p mechanical fall. He is POD #3 s/p L hop hemiarthroplasty. Was found to have edematous LUE today and underwent a LUE venous duplex. 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 Vascular surgery consulted for evaluation and recommendations.    Previously receiving DVT ppx. Patient currently on AC - T lovenox - which was initiated 2 days ago for b/l lower extremity DVTs.        PAST MEDICAL HISTORY:  AF (atrial fibrillation)    Hypertension    Arthritis    Benign prostatic hypertrophy    Gout    Subdural hematoma    Pacemaker    Alzheimer disease    Alzheimer disease        PAST SURGICAL HISTORY:  S/P craniotomy    S/P laparoscopic cholecystectomy    S/P umbilical hernia repair, follow-up exam    No significant past surgical history        MEDICATIONS:  acetaminophen     Tablet .. 975 milliGRAM(s) Oral every 8 hours  acetaminophen   IVPB .. 1000 milliGRAM(s) IV Intermittent once PRN  allopurinol 300 milliGRAM(s) Oral daily  aMIOdarone    Tablet 200 milliGRAM(s) Oral daily  enoxaparin Injectable 60 milliGRAM(s) SubCutaneous every 12 hours  magnesium hydroxide Suspension 30 milliLiter(s) Oral daily PRN  melatonin 3 milliGRAM(s) Oral at bedtime  memantine 5 milliGRAM(s) Oral daily  ondansetron Injectable 4 milliGRAM(s) IV Push every 6 hours PRN  oxybutynin 5 milliGRAM(s) Oral two times a day  pantoprazole    Tablet 40 milliGRAM(s) Oral before breakfast  polyethylene glycol 3350 17 Gram(s) Oral daily  QUEtiapine 50 milliGRAM(s) Oral at bedtime  QUEtiapine 12.5 milliGRAM(s) Oral daily  senna 2 Tablet(s) Oral at bedtime  tamsulosin 0.4 milliGRAM(s) Oral at bedtime  traZODone 50 milliGRAM(s) Oral at bedtime      ALLERGIES:  No Known Allergies      VITALS & I/Os:  Vital Signs Last 24 Hrs  T(C): 37.2 (08 Jan 2023 21:22), Max: 37.2 (08 Jan 2023 21:22)  T(F): 99 (08 Jan 2023 21:22), Max: 99 (08 Jan 2023 21:22)  HR: 75 (08 Jan 2023 21:22) (75 - 94)  BP: 100/54 (08 Jan 2023 21:22) (100/54 - 116/70)  BP(mean): --  RR: 18 (08 Jan 2023 21:22) (18 - 18)  SpO2: 95% (08 Jan 2023 21:22) (93% - 97%)    Parameters below as of 08 Jan 2023 21:22  Patient On (Oxygen Delivery Method): nasal cannula  O2 Flow (L/min): 2      I&O's Summary    07 Jan 2023 07:01  -  08 Jan 2023 07:00  --------------------------------------------------------  IN: 500 mL / OUT: 0 mL / NET: 500 mL    08 Jan 2023 07:01  -  08 Jan 2023 21:42  --------------------------------------------------------  IN: 460 mL / OUT: 0 mL / NET: 460 mL        PHYSICAL EXAM:  Gen: NAD  CV: Regular rate  Resp: Nasal cannula in place  Ext: LUE is edematous, motor and sensory intact, palpable radial pulse        LABS:                        9.9    10.47 )-----------( 370      ( 07 Jan 2023 10:40 )             31.6     01-08    142  |  x   |  x   ----------------------------<  x   x    |  x   |  x     Ca    9.1      08 Jan 2023 11:06      Lactate:                  IMAGING:  < from: [facility] Duplex Upper Ext Vein Scan, Left (01.08.23 @ 17:20) >    ACC: [id number] EXAM:  DUPLEX EXT VEINS UPPER LT                          PROCEDURE DATE:  01/08/2023          INTERPRETATION:  CLINICAL INFORMATION: Left arm swelling    COMPARISON: None available.    TECHNIQUE: Duplex sonography of the LEFT UPPER extremity veins with color   and spectral Doppler, with and without compression.    FINDINGS:    DVT is noted in the left subclavian vein. The left internal jugular,   axillary and brachial veins are patent and compressible where applicable.    The basilic and cephalic veins (superficial veins) are patent and   without thrombus.      IMPRESSION:  DVT is noted in the left subclavian vein.    The findings were discussed with Dr. Huddleston on 1/8/2023 5:23 PM.  Hospital policies for call back including read back policies were   followed. The verbal communication call back supplements this written   report.    --- End of Report ---            YURI HERNANDEZ MD; Attending Radiologist  This document has been electronically signed. Jan 8 2023  5:23PM    < end of copied text >  
 Patient is a 89 yo male who was recently DCed from [facility] after surgery for a left hip fracture. Th Patient was brought in by EMS from [facility] after multiple falls over the last week.  He was previously admitted to [facility] approximately 2 weeks ago for a fall which resulted in a right hip fracture which was repaired by Dr. Monty Ceron.  He is not able to provide a reliable history.  According to the patient his daughter he has a history of dementia which is gotten much worse since his original fracture.  He is not eating and drinking as he should.  She reports 3 falls over the last week.  X-rays were performed yesterday and she received a phone call today that he fractured his left hip.  Patient denies pain or other injury on review of systems. Patient is scheduled for an Open reduction and internal fixation of the left hip      PAST MEDICAL & SURGICAL HISTORY:  AF (atrial fibrillation)      Hypertension      Arthritis      Benign prostatic hypertrophy      Gout      Subdural hematoma      Pacemaker      Alzheimer disease      S/P craniotomy      S/P laparoscopic cholecystectomy      S/P umbilical hernia repair, follow-up exam      Open reduction and internal fixation right hip            MEDICATIONS  (STANDING):  acetaminophen     Tablet .. 975 milliGRAM(s) Oral every 8 hours  allopurinol 300 milliGRAM(s) Oral daily  aMIOdarone    Tablet 200 milliGRAM(s) Oral daily  memantine 5 milliGRAM(s) Oral daily  oxybutynin 5 milliGRAM(s) Oral two times a day  pantoprazole    Tablet 40 milliGRAM(s) Oral before breakfast  senna 2 Tablet(s) Oral at bedtime  sodium chloride 0.9%. 1000 milliLiter(s) (100 mL/Hr) IV Continuous <Continuous>  tamsulosin 0.4 milliGRAM(s) Oral at bedtime  traZODone 50 milliGRAM(s) Oral at bedtime    MEDICATIONS  (PRN):  acetaminophen   IVPB .. 1000 milliGRAM(s) IV Intermittent once PRN Severe Pain (7 - 10)  magnesium hydroxide Suspension 30 milliLiter(s) Oral daily PRN Constipation  melatonin 3 milliGRAM(s) Oral at bedtime PRN Insomnia  oxyCODONE    IR 2.5 milliGRAM(s) Oral every 4 hours PRN Moderate Pain (4 - 6)  oxyCODONE    IR 5 milliGRAM(s) Oral every 4 hours PRN Severe Pain (7 - 10)    Social Hx:  Tobacco: Neg  ETOH: Neg  Drugs:  Neg    Family Hx:  As per my conversation with the patients wife, Non contributory     ROS  CONSTITUTIONAL: No weakness, fevers or chills  EYES/ENT: No visual changes;  No vertigo or throat pain   NECK: No pain or stiffness  RESPIRATORY: No cough, wheezing, hemoptysis; No shortness of breath  CARDIOVASCULAR: No chest pain or palpitations  GASTROINTESTINAL: No abdominal or epigastric pain. No nausea, vomiting, or hematemesis; No diarrhea or constipation. No melena or hematochezia.  GENITOURINARY: No dysuria, frequency or hematuria  NEUROLOGICAL: No numbness or weakness  SKIN: No itching, burning, rashes, or lesions   MUSCULOSKELETAL: Left leg pain    INTERVAL HPI/OVERNIGHT EVENTS:  T(C): 36.4 (01-05-23 @ 08:16), Max: 37 (01-04-23 @ 21:49)  HR: 100 (01-05-23 @ 08:16) (69 - 100)  BP: 125/82 (01-05-23 @ 08:16) (108/70 - 134/88)  RR: 18 (01-05-23 @ 08:16) (16 - 20)  SpO2: 94% (01-05-23 @ 08:16) (94% - 100%)  Wt(kg): --  I&O's Summary    04 Jan 2023 07:01  -  05 Jan 2023 07:00  --------------------------------------------------------  IN: 700 mL / OUT: 200 mL / NET: 500 mL    05 Jan 2023 07:01  -  05 Jan 2023 09:55  --------------------------------------------------------  IN: 300 mL / OUT: 0 mL / NET: 300 mL        PHYSICAL EXAM:  GENERAL: NAD, well-groomed, well-developed  HEAD:  Atraumatic, Normocephalic  EYES: EOMI, PERRLA, conjunctiva and sclera clear  ENMT: No tonsillar erythema, exudates, or enlargement; Moist mucous membranes, Good dentition, No lesions  NECK: Supple, No JVD, Normal thyroid  NERVOUS SYSTEM:  Alert & Oriented X3, Good concentration; Motor Strength 5/5 B/L upper and lower extremities; DTRs 2+ intact and symmetric  CHEST/LUNG: Clear to percussion bilaterally; No rales, rhonchi, wheezing, or rubs  HEART: Regular rate and rhythm; No murmurs, rubs, or gallops  ABDOMEN: Soft, Nontender, Nondistended; Bowel sounds present  EXTREMITIES:  2+ Peripheral Pulses, No clubbing, cyanosis, or edema  LYMPH: No lymphadenopathy noted  SKIN: No rashes or lesions        LABS:                        9.9    11.64 )-----------( 429      ( 05 Jan 2023 01:50 )             31.4     01-05    142  |  107  |  60<H>  ----------------------------<  136<H>  4.4   |  21<L>  |  1.25    Ca    9.2      05 Jan 2023 01:50    TPro  6.4  /  Alb  3.4  /  TBili  1.0  /  DBili  x   /  AST  22  /  ALT  13  /  AlkPhos  92  01-04    PT/INR - ( 05 Jan 2023 01:50 )   PT: 15.1 sec;   INR: 1.31 ratio         PTT - ( 05 Jan 2023 01:50 )  PTT:29.4 sec    EKG AFib @ 78 BPM LBBB

## 2023-01-08 NOTE — DIETITIAN INITIAL EVALUATION ADULT - WEIGHT USED FOR CALCULATIONS
Agency/Facility Name: [facility]  Outcome: L/M for admissions requesting referral status    dosing weight

## 2023-01-08 NOTE — DIETITIAN INITIAL EVALUATION ADULT - NS FNS DIET ORDER
Diet, Minced and Moist:   Supplement Feeding Modality:  Oral  Ensure Enlive Cans or Servings Per Day:  1       Frequency:  Three Times a day (01-07-23 @ 14:11)

## 2023-01-08 NOTE — PROGRESS NOTE ADULT - ASSESSMENT
A/P: 89 y/o M POD# 3 s/p L Hemiarthroplasty    DVT ppx- Lovenox 60mg SQ BID  LLE WBAT  Check LUE Doppler to R/O DVT   Pain management w Tylenol  PT  OT  Seroquel 12.5mg Po Daily and Seroquel 50mg PO bedtime as per Medicine  Discharge planning to [facility]  D/W attending      ANISH Baird  Orthopedic Surgery  [id number]

## 2023-01-08 NOTE — DIETITIAN INITIAL EVALUATION ADULT - REASON INDICATOR FOR ASSESSMENT
Nutrition Consult for Assessment received and appreciated.   Information obtained from: family at bedside, medical record, communication with team.   Pt A&O x 1, Alzheimer's dementia Nutrition Consult for Assessment received and appreciated.   Information obtained from: wife at bedside, medical record, communication with team.   Pt A&O x 1, Alzheimer's dementia

## 2023-01-08 NOTE — DIETITIAN INITIAL EVALUATION ADULT - PERTINENT MEDS FT
MEDICATIONS  (STANDING):  acetaminophen     Tablet .. 975 milliGRAM(s) Oral every 8 hours  allopurinol 300 milliGRAM(s) Oral daily  aMIOdarone    Tablet 200 milliGRAM(s) Oral daily  enoxaparin Injectable 60 milliGRAM(s) SubCutaneous every 12 hours  melatonin 3 milliGRAM(s) Oral at bedtime  memantine 5 milliGRAM(s) Oral daily  oxybutynin 5 milliGRAM(s) Oral two times a day  pantoprazole    Tablet 40 milliGRAM(s) Oral before breakfast  polyethylene glycol 3350 17 Gram(s) Oral daily  QUEtiapine 50 milliGRAM(s) Oral at bedtime  QUEtiapine 12.5 milliGRAM(s) Oral daily  senna 2 Tablet(s) Oral at bedtime  sodium chloride 0.9%. 1000 milliLiter(s) (50 mL/Hr) IV Continuous <Continuous>  tamsulosin 0.4 milliGRAM(s) Oral at bedtime  traZODone 50 milliGRAM(s) Oral at bedtime    MEDICATIONS  (PRN):  acetaminophen   IVPB .. 1000 milliGRAM(s) IV Intermittent once PRN Severe Pain (7 - 10)  magnesium hydroxide Suspension 30 milliLiter(s) Oral daily PRN Constipation  ondansetron Injectable 4 milliGRAM(s) IV Push every 6 hours PRN Nausea and/or Vomiting

## 2023-01-08 NOTE — DIETITIAN INITIAL EVALUATION ADULT - NS FNS WEIGHT CHANGE REASON
Weight per HIE:  74.8kg (12/20/22) Weight per HIE:  74.8kg (12/20/22)  Wife endorses  pounds / 74.8kg  ? accuracy of current dosing wt 68kg.

## 2023-01-08 NOTE — DIETITIAN INITIAL EVALUATION ADULT - PERTINENT LABORATORY DATA
01-08    151<H>  |  120<H>  |  60<H>  ----------------------------<  138<H>  4.9   |  17<L>  |  1.35<H>    Ca    9.1      08 Jan 2023 11:06

## 2023-01-08 NOTE — PROGRESS NOTE ADULT - SUBJECTIVE AND OBJECTIVE BOX
Patient is a 89 yo male who was recently DCed from [facility] after surgery for a left hip fracture. Th Patient was brought in by EMS from [facility] after multiple falls over the last week.  He was previously admitted to [facility] approximately 2 weeks ago for a fall which resulted in a right hip fracture which was repaired by Dr. Monty Ceron.  He is not able to provide a reliable history.  According to the patient his daughter he has a history of dementia which is gotten much worse since his original fracture.  He is not eating and drinking as he should.  She reports 3 falls over the last week.  X-rays were performed yesterday and she received a phone call today that he fractured his left hip.  Patient denies pain or other injury on review of systems. Patient is s/p an Open reduction and internal fixation of the left hi. Patient tolerated the procedure well. Post op Patient found to have b/l DVTs on dopplers. Overnight the Pt became more agitated. He was started of Seroquel 50 TID by psych. Patient  more awake today continues to be confused.        MEDICATIONS  (STANDING):  acetaminophen     Tablet .. 975 milliGRAM(s) Oral every 8 hours  allopurinol 300 milliGRAM(s) Oral daily  aMIOdarone    Tablet 200 milliGRAM(s) Oral daily  enoxaparin Injectable 60 milliGRAM(s) SubCutaneous every 12 hours  melatonin 3 milliGRAM(s) Oral at bedtime  memantine 5 milliGRAM(s) Oral daily  oxybutynin 5 milliGRAM(s) Oral two times a day  pantoprazole    Tablet 40 milliGRAM(s) Oral before breakfast  polyethylene glycol 3350 17 Gram(s) Oral daily  QUEtiapine 50 milliGRAM(s) Oral at bedtime  QUEtiapine 12.5 milliGRAM(s) Oral daily  senna 2 Tablet(s) Oral at bedtime  sodium chloride 0.9%. 1000 milliLiter(s) (50 mL/Hr) IV Continuous <Continuous>  tamsulosin 0.4 milliGRAM(s) Oral at bedtime  traZODone 50 milliGRAM(s) Oral at bedtime    MEDICATIONS  (PRN):  acetaminophen   IVPB .. 1000 milliGRAM(s) IV Intermittent once PRN Severe Pain (7 - 10)  magnesium hydroxide Suspension 30 milliLiter(s) Oral daily PRN Constipation  ondansetron Injectable 4 milliGRAM(s) IV Push every 6 hours PRN Nausea and/or Vomiting          VITALS:   T(C): 36.4 (01-08-23 @ 12:00), Max: 37 (01-08-23 @ 04:26)  HR: 89 (01-08-23 @ 12:00) (74 - 120)  BP: 110/56 (01-08-23 @ 12:00) (87/55 - 112/59)  RR: 18 (01-08-23 @ 12:00) (18 - 18)  SpO2: 97% (01-08-23 @ 12:00) (93% - 98%)  Wt(kg): --    PHYSICAL EXAM:  GENERAL: NAD, well-groomed, well-developed  HEAD:  Atraumatic, Normocephalic  EYES: EOMI, PERRLA, conjunctiva and sclera clear  ENMT: No tonsillar erythema, exudates, or enlargement; Moist mucous membranes, Good dentition, No lesions  NECK: Supple, No JVD, Normal thyroid  NERVOUS SYSTEM:  Alert & Oriented X3, Good concentration; Motor Strength 5/5 B/L upper and lower extremities; DTRs 2+ intact and symmetric  CHEST/LUNG: Clear to percussion bilaterally; No rales, rhonchi, wheezing, or rubs  HEART: Regular rate and rhythm; No murmurs, rubs, or gallops  ABDOMEN: Soft, Nontender, Nondistended; Bowel sounds present  EXTREMITIES:  2+ Peripheral Pulses, No clubbing, cyanosis, or edema  LYMPH: No lymphadenopathy noted  SKIN: No rashes or lesions    LABS:        CBC Full  -  ( 07 Jan 2023 10:40 )  WBC Count : 10.47 K/uL  RBC Count : 3.29 M/uL  Hemoglobin : 9.9 g/dL  Hematocrit : 31.6 %  Platelet Count - Automated : 370 K/uL  Mean Cell Volume : 96.0 fl  Mean Cell Hemoglobin : 30.1 pg  Mean Cell Hemoglobin Concentration : 31.3 gm/dL  Auto Neutrophil # : x  Auto Lymphocyte # : x  Auto Monocyte # : x  Auto Eosinophil # : x  Auto Basophil # : x  Auto Neutrophil % : x  Auto Lymphocyte % : x  Auto Monocyte % : x  Auto Eosinophil % : x  Auto Basophil % : x    01-08    151<H>  |  120<H>  |  60<H>  ----------------------------<  138<H>  4.9   |  17<L>  |  1.35<H>    Ca    9.1      08 Jan 2023 11:06            CAPILLARY BLOOD GLUCOSE          RADIOLOGY & ADDITIONAL TESTS:

## 2023-01-08 NOTE — PROGRESS NOTE ADULT - SUBJECTIVE AND OBJECTIVE BOX
As per RN overnight-Juan-patient did not sleep and was restless and screaming majority   of the night. Patient received Seroquel 50mg PO at 21:25.      Restraints were renewed this morning.   T(C): 36.4 (01-08-23 @ 08:01), Max: 37 (01-08-23 @ 04:26)  HR: 83 (01-08-23 @ 08:01) (74 - 120)  BP: 112/59 (01-08-23 @ 08:01) (87/55 - 112/59)  RR: 18 (01-08-23 @ 08:01) (18 - 18)  SpO2: 94% (01-08-23 @ 08:01) (93% - 98%)      Exam:  Gen: Awake, restless, confused, follows most commands  Ext:   Lower Extremities:  Calves Soft, Non-tender bilaterally  +PF/DF/EHL/FHL  SILT  +DP Pulse    Xray:                           9.9    10.47 )-----------( 370      ( 07 Jan 2023 10:40 )             31.6    01-07    140  |  109<H>  |  60<H>  ----------------------------<  139<H>  4.9   |  20<L>  |  1.45<H>    Ca    8.6      07 Jan 2023 10:40     As per RN overnight-Juan-patient did not sleep and was restless and screaming majority   of the night. Patient received Seroquel 50mg PO at 21:25.      Restraints were renewed this morning.   T(C): 36.4 (01-08-23 @ 08:01), Max: 37 (01-08-23 @ 04:26)  HR: 83 (01-08-23 @ 08:01) (74 - 120)  BP: 112/59 (01-08-23 @ 08:01) (87/55 - 112/59)  RR: 18 (01-08-23 @ 08:01) (18 - 18)  SpO2: 94% (01-08-23 @ 08:01) (93% - 98%)      Exam:  Gen: Awake, restless, confused and disoriented, follows some commands  Ext:   LUE: Edematous, Nontender to palpation, radial pulse2+, grossly motor and neuro   intact  Lower Extremities:  Left Hip: Aquacel Dressing w moderate old ss drainage; dull sensation grossly intact to light touch; (+) EHL/FHL/TA/GS,  (+) DF/PF; (+) Distal Pulses; No Calf tenderness B/L, PAS       Labs:                      9.9    10.47 )-----------( 370      ( 07 Jan 2023 10:40 )             31.6    01-07    140  |  109<H>  |  60<H>  ----------------------------<  139<H>  4.9   |  20<L>  |  1.45<H>    Ca    8.6      07 Jan 2023 10:40

## 2023-01-08 NOTE — PROGRESS NOTE ADULT - ASSESSMENT
89 yo male with a hx of dementia, Afib, HTN, and BPH, Presents after a fall at a rehab facility. Patient was found to have a right hip fracture and is s/p an Open reduction and internal fixation of the left hip

## 2023-01-08 NOTE — DIETITIAN INITIAL EVALUATION ADULT - REASON FOR ADMISSION
Fracture of unspecified part of neck of unspecified femur, initial encounter for closed fracture    Per chart: "89 yo male with a hx of dementia, Afib, HTN, and BPH, Presents after a fall at a rehab facility. Patient was found to have a right hip fracture and is s/p an Open reduction and internal fixation of the left hip."

## 2023-01-08 NOTE — DIETITIAN INITIAL EVALUATION ADULT - ENERGY INTAKE
Adequate (%) Per nursing flowsheet, pt consumed 25% of breakfast & lunch and 75% dinner on 1/07.  [meal rounds pending] Per nursing flowsheet, pt consumed 25% of breakfast & lunch and 75% dinner on 1/07.  Wife reports pt ate 100% of chicken, carrots and pudding, and drank 2 Ensure shakes.

## 2023-01-08 NOTE — CONSULT NOTE ADULT - ASSESSMENT
Patient called today with regards to the following medication refill request:    Provider: Ania Wagner MD  Medication: PARoxetine (PAXIL) 20 MG tablet, trazodone (DESYREL) 150 MG tablet, venlafaxine XR (EFFEXOR XR) 75 MG 24 hr capsule    Pharmacy:   [facility] - [place], [place] - [address] AT [address] [place] [address]  [facility] [address]  Phone: [phone] Fax: [phone]        For additional information, or if you need to contact the patient, please call patient at the following number:    Mobile [phone]       Patient states that it is okay to leave a detailed message.    Patient was instructed to call pharmacy directly for future refills.      Advised Patient that refill processing may take 48-72 hours and that the pharmacy will contact them when their prescription is ready for pickup.   
[facility] called [facility] to verify the script sent electronically on 2/22/2021 for Paroxetine 20mg tablets, one tablet by mouth daily, quantity 90.  Trazodone 150mg tablets, one nightly, quantity 90 and Venlafaxine 75mg capsules, one daily, quantity 90 [facility] technician confirmed they did not receive medications that were escribed.     RN called in script for Paroxetine 20mg tablets, one tablet by mouth daily, quantity 90 and because of an issue with their computer system electronically sent in the others.    RN called patient to inquire if the patient was indeed out of medication and if patient wanted a 30 day script sent to local pharmacy while mail order was processing.  Patients  reports mail order is how they want them.  RN asked for return call from patient.     Routing to Dr. Quesada as MONSE   
89 yo male with a hx of dementia, Afib, HTN, and BPH, Presents after a fall at a rehab facility. Patient was found to have a right hip fracture and is scheduled for an Open reduction and internal fixation of the left hip
89 year old male with LUE subclavian vein DVT. Currently receiving T. Lovenox for AC for bilateral lower extremity DVTs.    Plan:  - Continue therapeutic AC  - F/u heme/onc eval and recs  - No acute vascular surgery interventions at this time    Discussed with vascular surgery fellow, Dr. Coker, on behalf of vascular surgery attending on call, Dr Juarez.      Vascular Surgery  [id number]

## 2023-01-08 NOTE — DIETITIAN INITIAL EVALUATION ADULT - OTHER INFO
Nutrition Status:  - POD 3 s/p left hemiarthroplasty  - Hypernatremia (Na 151): NS ordered @ 50ml/hr

## 2023-01-08 NOTE — DIETITIAN INITIAL EVALUATION ADULT - ORAL INTAKE PTA/DIET HISTORY
[family interview pending]  Allergies: NKFA Per wife, pt has a great appetite at home, typically eating soft/minced foods and drinking 2 Ensure daily with full feeding assistance.  Allergies: NKFA

## 2023-01-08 NOTE — DIETITIAN INITIAL EVALUATION ADULT - OTHER CALCULATIONS
fluid needs: defer to team in setting of hypernatremia Calculations based on UBW 74.8kg  Fluid needs: defer to team in setting of hypernatremia

## 2023-01-08 NOTE — CHART NOTE - NSCHARTNOTEFT_GEN_A_CORE
Heme onc consulted for bilateral DVTs and possibly treatment failure w/ left. subclavian vein DVT. 89 yo male with a hx of dementia, Afib, HTN, and BPH, Presents after a fall at a rehab facility s/p  L Hemiarthroplasty 1/5/22. Given pt recent right hip hemiarthroplasty 12/20/22 with likely immobility at subacute rehab 2/2 to dementia, and subsequent further hip procedure on 1/5/22, as well as lack of time between initiation of therapeutic AC on the 5th to discovery of LUE DVT on the 8th, patient does not likely warrant a hypercoagulable workup. Official consult in the AM.

## 2023-01-08 NOTE — DIETITIAN INITIAL EVALUATION ADULT - NSICDXPASTMEDICALHX_GEN_ALL_CORE_FT
PAST MEDICAL HISTORY:  AF (atrial fibrillation)     Alzheimer disease     Alzheimer disease     Arthritis     Benign prostatic hypertrophy     Gout     Hypertension     Pacemaker     Subdural hematoma

## 2023-01-09 LAB — SARS-COV-2 RNA SPEC QL NAA+PROBE: SIGNIFICANT CHANGE UP

## 2023-01-09 RX ADMIN — Medication 3 MILLIGRAM(S): at 21:20

## 2023-01-09 RX ADMIN — ENOXAPARIN SODIUM 60 MILLIGRAM(S): 100 INJECTION SUBCUTANEOUS at 05:48

## 2023-01-09 RX ADMIN — MEMANTINE HYDROCHLORIDE 5 MILLIGRAM(S): 10 TABLET ORAL at 11:37

## 2023-01-09 RX ADMIN — QUETIAPINE FUMARATE 50 MILLIGRAM(S): 200 TABLET, FILM COATED ORAL at 21:19

## 2023-01-09 RX ADMIN — PANTOPRAZOLE SODIUM 40 MILLIGRAM(S): 20 TABLET, DELAYED RELEASE ORAL at 05:47

## 2023-01-09 RX ADMIN — AMIODARONE HYDROCHLORIDE 200 MILLIGRAM(S): 400 TABLET ORAL at 05:47

## 2023-01-09 RX ADMIN — Medication 975 MILLIGRAM(S): at 05:48

## 2023-01-09 RX ADMIN — Medication 975 MILLIGRAM(S): at 06:00

## 2023-01-09 RX ADMIN — Medication 5 MILLIGRAM(S): at 17:15

## 2023-01-09 RX ADMIN — TAMSULOSIN HYDROCHLORIDE 0.4 MILLIGRAM(S): 0.4 CAPSULE ORAL at 21:19

## 2023-01-09 RX ADMIN — QUETIAPINE FUMARATE 12.5 MILLIGRAM(S): 200 TABLET, FILM COATED ORAL at 11:37

## 2023-01-09 RX ADMIN — Medication 50 MILLIGRAM(S): at 21:19

## 2023-01-09 RX ADMIN — Medication 975 MILLIGRAM(S): at 15:20

## 2023-01-09 RX ADMIN — Medication 975 MILLIGRAM(S): at 14:34

## 2023-01-09 RX ADMIN — SENNA PLUS 2 TABLET(S): 8.6 TABLET ORAL at 21:19

## 2023-01-09 RX ADMIN — Medication 975 MILLIGRAM(S): at 21:19

## 2023-01-09 RX ADMIN — ENOXAPARIN SODIUM 60 MILLIGRAM(S): 100 INJECTION SUBCUTANEOUS at 17:15

## 2023-01-09 RX ADMIN — Medication 300 MILLIGRAM(S): at 11:36

## 2023-01-09 RX ADMIN — Medication 5 MILLIGRAM(S): at 05:48

## 2023-01-09 NOTE — PROGRESS NOTE ADULT - SUBJECTIVE AND OBJECTIVE BOX
Vascular Surgery Progress Note    SUBJECTIVE  No acute events overnight. Pt seen and examined on mornings rounds.    OBJECTIVE  ___________________________________________________  VITAL SIGNS / I&O's   Vital Signs Last 24 Hrs  T(C): 36.4 (09 Jan 2023 13:52), Max: 37.2 (08 Jan 2023 21:22)  T(F): 97.5 (09 Jan 2023 13:52), Max: 99 (08 Jan 2023 21:22)  HR: 81 (09 Jan 2023 13:52) (75 - 103)  BP: 119/73 (09 Jan 2023 13:52) (100/54 - 119/73)  BP(mean): --  RR: 18 (09 Jan 2023 13:52) (18 - 18)  SpO2: 96% (09 Jan 2023 13:52) (95% - 96%)    Parameters below as of 09 Jan 2023 13:52  Patient On (Oxygen Delivery Method): nasal cannula  O2 Flow (L/min): 2        08 Jan 2023 07:01  -  09 Jan 2023 07:00  --------------------------------------------------------  IN:    Oral Fluid: 460 mL  Total IN: 460 mL    OUT:    Voided (mL): 200 mL  Total OUT: 200 mL    Total NET: 260 mL        ___________________________________________________  PHYSICAL EXAM    Gen: NAD  CV: Regular rate  Resp: Nasal cannula in place  Ext: LUE is edematous, motor and sensory intact, palpable radial pulse    ___________________________________________________  LABS    08 Jan 2023 17:08    142    |  x      |  x      ----------------------------<  x      x       |  x      |  x        Ca    9.1        08 Jan 2023 11:06        CAPILLARY BLOOD GLUCOSE    ___________________________________________________  MICRO  Recent Cultures:    ___________________________________________________  MEDICATIONS  (STANDING):  acetaminophen     Tablet .. 975 milliGRAM(s) Oral every 8 hours  allopurinol 300 milliGRAM(s) Oral daily  aMIOdarone    Tablet 200 milliGRAM(s) Oral daily  enoxaparin Injectable 60 milliGRAM(s) SubCutaneous every 12 hours  melatonin 3 milliGRAM(s) Oral at bedtime  memantine 5 milliGRAM(s) Oral daily  oxybutynin 5 milliGRAM(s) Oral two times a day  pantoprazole    Tablet 40 milliGRAM(s) Oral before breakfast  polyethylene glycol 3350 17 Gram(s) Oral daily  QUEtiapine 50 milliGRAM(s) Oral at bedtime  QUEtiapine 12.5 milliGRAM(s) Oral daily  senna 2 Tablet(s) Oral at bedtime  tamsulosin 0.4 milliGRAM(s) Oral at bedtime  traZODone 50 milliGRAM(s) Oral at bedtime    MEDICATIONS  (PRN):  acetaminophen   IVPB .. 1000 milliGRAM(s) IV Intermittent once PRN Severe Pain (7 - 10)  magnesium hydroxide Suspension 30 milliLiter(s) Oral daily PRN Constipation  ondansetron Injectable 4 milliGRAM(s) IV Push every 6 hours PRN Nausea and/or Vomiting

## 2023-01-09 NOTE — CHART NOTE - NSCHARTNOTEFT_GEN_A_CORE
HEMATOLOGY FELLOW NOTE     89M hx dementia, Afib, HTN, and BPH, presented after a fall at a rehab facility. Patient was found to have a right hip fracture and is s/p open reduction and internal fixation of the left hip. 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 He was previously not on any AC, so no concerns for treatment/DOAC failure.    Currently on therapeutic Lovenox, can be transitioned to DOAC if deemed appropriate as per primary team based on risk/benefit discussion.    Chiki Matias MD  Hematology/Oncology Fellow PGY-4  Pager: [facility] [phone] / CANDACE [phone]   After 5pm and on weekends please page on-call fellow

## 2023-01-09 NOTE — PROGRESS NOTE ADULT - SUBJECTIVE AND OBJECTIVE BOX
Patient is a 89 yo male who was recently DCed from [facility] after surgery for a left hip fracture. Th Patient was brought in by EMS from [facility] after multiple falls over the last week.  He was previously admitted to [facility] approximately 2 weeks ago for a fall which resulted in a right hip fracture which was repaired by Dr. Monty Ceron.  He is not able to provide a reliable history.  According to the patient his daughter he has a history of dementia which is gotten much worse since his original fracture.  He is not eating and drinking as he should.  She reports 3 falls over the last week.  X-rays were performed yesterday and she received a phone call today that he fractured his left hip.  Patient denies pain or other injury on review of systems. Patient is s/p an Open reduction and internal fixation of the left hi. Patient tolerated the procedure well. Post op Patient found to have b/l DVTs on dopplers. Overnight the Pt became more agitated. He was started of Seroquel 50 TID by psych. Patient  more awake today continues to be confused.        MEDICATIONS  (STANDING):  acetaminophen     Tablet .. 975 milliGRAM(s) Oral every 8 hours  allopurinol 300 milliGRAM(s) Oral daily  aMIOdarone    Tablet 200 milliGRAM(s) Oral daily  enoxaparin Injectable 60 milliGRAM(s) SubCutaneous every 12 hours  melatonin 3 milliGRAM(s) Oral at bedtime  memantine 5 milliGRAM(s) Oral daily  oxybutynin 5 milliGRAM(s) Oral two times a day  pantoprazole    Tablet 40 milliGRAM(s) Oral before breakfast  polyethylene glycol 3350 17 Gram(s) Oral daily  QUEtiapine 50 milliGRAM(s) Oral at bedtime  QUEtiapine 12.5 milliGRAM(s) Oral daily  senna 2 Tablet(s) Oral at bedtime  tamsulosin 0.4 milliGRAM(s) Oral at bedtime  traZODone 50 milliGRAM(s) Oral at bedtime    MEDICATIONS  (PRN):  acetaminophen   IVPB .. 1000 milliGRAM(s) IV Intermittent once PRN Severe Pain (7 - 10)  magnesium hydroxide Suspension 30 milliLiter(s) Oral daily PRN Constipation  ondansetron Injectable 4 milliGRAM(s) IV Push every 6 hours PRN Nausea and/or Vomiting          VITALS:   T(C): 36.8 (01-09-23 @ 08:30), Max: 37.2 (01-08-23 @ 21:22)  HR: 87 (01-09-23 @ 08:30) (75 - 103)  BP: 104/64 (01-09-23 @ 08:30) (100/54 - 116/70)  RR: 18 (01-09-23 @ 08:30) (18 - 18)  SpO2: 95% (01-09-23 @ 08:30) (95% - 95%)  Wt(kg): --      PHYSICAL EXAM:  GENERAL: NAD, well-groomed, well-developed  HEAD:  Atraumatic, Normocephalic  EYES: EOMI, PERRLA, conjunctiva and sclera clear  ENMT: No tonsillar erythema, exudates, or enlargement; Moist mucous membranes, Good dentition, No lesions  NECK: Supple, No JVD, Normal thyroid  NERVOUS SYSTEM:  Alert & Oriented X3, Good concentration; Motor Strength 5/5 B/L upper and lower extremities; DTRs 2+ intact and symmetric  CHEST/LUNG: Clear to percussion bilaterally; No rales, rhonchi, wheezing, or rubs  HEART: Regular rate and rhythm; No murmurs, rubs, or gallops  ABDOMEN: Soft, Nontender, Nondistended; Bowel sounds present  EXTREMITIES:  2+ Peripheral Pulses, No clubbing, cyanosis, or edema  LYMPH: No lymphadenopathy noted  SKIN: No rashes or lesions  LABS:          01-08    142  |  x   |  x   ----------------------------<  x   x    |  x   |  x     Ca    9.1      08 Jan 2023 11:06            CAPILLARY BLOOD GLUCOSE          RADIOLOGY & ADDITIONAL TESTS:

## 2023-01-09 NOTE — PROGRESS NOTE ADULT - ASSESSMENT
89 year old male with LUE subclavian vein DVT. Currently receiving T. Lovenox for AC for bilateral lower extremity DVTs.    Plan:  - Continue therapeutic AC  - F/u heme/onc eval and recs  - No acute vascular surgery interventions at this time  - Will follow    Vascular Surgery  [id number]

## 2023-01-09 NOTE — PROGRESS NOTE ADULT - SUBJECTIVE AND OBJECTIVE BOX
Patient seen and examined at bedside, resting comfortably.  No acute complaints at this time. Pain well controlled. Denies chest pain, shortness of breath, nausea or vomiting.             PE:  Vital Signs Last 24 Hrs  T(C): 37.2 (01-09-23 @ 04:10), Max: 37.2 (01-08-23 @ 21:22)  T(F): 99 (01-09-23 @ 04:10), Max: 99 (01-08-23 @ 21:22)  HR: 103 (01-09-23 @ 04:10) (75 - 103)  BP: 114/70 (01-09-23 @ 04:10) (100/54 - 116/70)  BP(mean): --  RR: 18 (01-09-23 @ 04:10) (18 - 18)  SpO2: 95% (01-09-23 @ 04:10) (94% - 97%)                              9.9    10.47 )-----------( 370      ( 07 Jan 2023 10:40 )             31.6     01-08    142  |  x   |  x   ----------------------------<  x   x    |  x   |  x     Ca    9.1      08 Jan 2023 11:06          Exam:  Gen: Awake, restless, confused and disoriented, follows some commands  Ext:   LUE: Edematous, Nontender to palpation, radial pulse2+, grossly motor and neuro   intact  Lower Extremities:  Left Hip: Aquacel Dressing w moderate old ss drainage; dull sensation grossly intact to light touch; (+) EHL/FHL/TA/GS,  (+) DF/PF; (+) Distal Pulses; No Calf tenderness B/L, PAS

## 2023-01-09 NOTE — PROGRESS NOTE ADULT - ASSESSMENT
89 yo male with a hx of dementia, Afib, HTN, and BPH, Presents after a fall at a rehab facility. Patient was found to have a right hip fracture and is s/p an Open reduction and internal fixation of the left hip

## 2023-01-09 NOTE — PROGRESS NOTE ADULT - ASSESSMENT
A/P: 89 y/o M POD#4 s/p L Hemiarthroplasty    DVT ppx- Lovenox 60mg SQ BID  WBAT b/l LEs   LUE DVT, VSx c/s rec appreciated  FU H/o C/s  Pain management w Tylenol  PT/OT  Seroquel 12.5mg Po Daily and Seroquel 50mg PO bedtime as per Medicine  Discharge planning to [facility]

## 2023-01-10 RX ADMIN — QUETIAPINE FUMARATE 12.5 MILLIGRAM(S): 200 TABLET, FILM COATED ORAL at 11:39

## 2023-01-10 RX ADMIN — ENOXAPARIN SODIUM 60 MILLIGRAM(S): 100 INJECTION SUBCUTANEOUS at 17:43

## 2023-01-10 RX ADMIN — Medication 975 MILLIGRAM(S): at 22:10

## 2023-01-10 RX ADMIN — Medication 300 MILLIGRAM(S): at 11:39

## 2023-01-10 RX ADMIN — Medication 1000 MILLIGRAM(S): at 12:45

## 2023-01-10 RX ADMIN — Medication 50 MILLIGRAM(S): at 22:11

## 2023-01-10 RX ADMIN — TAMSULOSIN HYDROCHLORIDE 0.4 MILLIGRAM(S): 0.4 CAPSULE ORAL at 22:10

## 2023-01-10 RX ADMIN — Medication 400 MILLIGRAM(S): at 12:23

## 2023-01-10 RX ADMIN — ENOXAPARIN SODIUM 60 MILLIGRAM(S): 100 INJECTION SUBCUTANEOUS at 06:35

## 2023-01-10 RX ADMIN — Medication 5 MILLIGRAM(S): at 17:43

## 2023-01-10 RX ADMIN — AMIODARONE HYDROCHLORIDE 200 MILLIGRAM(S): 400 TABLET ORAL at 06:35

## 2023-01-10 RX ADMIN — Medication 975 MILLIGRAM(S): at 23:10

## 2023-01-10 RX ADMIN — SENNA PLUS 2 TABLET(S): 8.6 TABLET ORAL at 22:10

## 2023-01-10 RX ADMIN — POLYETHYLENE GLYCOL 3350 17 GRAM(S): 17 POWDER, FOR SOLUTION ORAL at 11:39

## 2023-01-10 RX ADMIN — Medication 975 MILLIGRAM(S): at 06:35

## 2023-01-10 RX ADMIN — MEMANTINE HYDROCHLORIDE 5 MILLIGRAM(S): 10 TABLET ORAL at 11:39

## 2023-01-10 RX ADMIN — QUETIAPINE FUMARATE 50 MILLIGRAM(S): 200 TABLET, FILM COATED ORAL at 22:11

## 2023-01-10 RX ADMIN — Medication 3 MILLIGRAM(S): at 22:11

## 2023-01-10 RX ADMIN — PANTOPRAZOLE SODIUM 40 MILLIGRAM(S): 20 TABLET, DELAYED RELEASE ORAL at 06:35

## 2023-01-10 RX ADMIN — Medication 5 MILLIGRAM(S): at 06:35

## 2023-01-10 NOTE — DIETITIAN INITIAL EVALUATION ADULT - ADD RECOMMEND
Problem: Discharge Planning  Goal: Discharge to home or other facility with appropriate resources  1/9/2023 2035 by Kelsea Carballo RN  Outcome: Progressing  1/9/2023 1903 by Reggie Cope RN  Outcome: Progressing     Problem: Pain  Goal: Verbalizes/displays adequate comfort level or baseline comfort level  1/9/2023 2035 by Kelsea Carballo RN  Outcome: Progressing  1/9/2023 1903 by Reggie Cope RN  Outcome: Progressing     Problem: Skin/Tissue Integrity  Goal: Absence of new skin breakdown  Description: 1. Monitor for areas of redness and/or skin breakdown  2. Assess vascular access sites hourly  3. Every 4-6 hours minimum:  Change oxygen saturation probe site  4. Every 4-6 hours:  If on nasal continuous positive airway pressure, respiratory therapy assess nares and determine need for appliance change or resting period. 1/9/2023 2035 by Kelsea Carballo RN  Outcome: Progressing  1/9/2023 1903 by Reggie Cope RN  Outcome: Progressing     Problem: Nutrition Deficit:  Goal: Optimize nutritional status  1/9/2023 2035 by Kelsea Carballo RN  Outcome: Progressing  1/9/2023 1903 by Reggie Cope RN  Outcome: Progressing     Problem: Respiratory - Adult  Goal: Achieves optimal ventilation and oxygenation  1/9/2023 2035 by Kelsea Carballo RN  Outcome: Progressing  1/9/2023 1958 by Katja Ward RCP  Outcome: Progressing  1/9/2023 1903 by Reggie Cope RN  Outcome: Progressing  1/9/2023 1203 by Lakshmi Brito RCP  Outcome: Progressing     Problem: Confusion  Goal: Confusion, delirium, dementia, or psychosis is improved or at baseline  Description: INTERVENTIONS:  1. Assess for possible contributors to thought disturbance, including medications, impaired vision or hearing, underlying metabolic abnormalities, dehydration, psychiatric diagnoses, and notify attending LIP  2. Taylor high risk fall precautions, as indicated  3.  Provide frequent short contacts to provide reality reorientation, refocusing and direction  4. Decrease environmental stimuli, including noise as appropriate  5. Monitor and intervene to maintain adequate nutrition, hydration, elimination, sleep and activity  6. If unable to ensure safety without constant attention obtain sitter and review sitter guidelines with assigned personnel  7.  Initiate Psychosocial CNS and Spiritual Care consult, as indicated  1/9/2023 2035 by Cortney Huynh RN  Outcome: Progressing  1/9/2023 1903 by Vielka Irene RN  Outcome: Progressing     Problem: Safety - Adult  Goal: Free from fall injury  1/9/2023 2035 by Cortney Huynh RN  Outcome: Progressing  1/9/2023 1903 by Vielka Irene RN  Outcome: Progressing     Problem: ABCDS Injury Assessment  Goal: Absence of physical injury  1/9/2023 2035 by Cortney Huynh RN  Outcome: Progressing  1/9/2023 1903 by Vielka Irene RN  Outcome: Progressing -- Consider diet change to easy to chew as pt prefers soft foods. Consider SLP evaluation if concern for difficulty chewing/swallowing.  -- Add multivitamin if not otherwise contraindicated.  -- Provide 1:1 feeding assistance and encouragement at meals.  -- Consider bowel regimen as pt with no documented BM since admission.  -- Continue to monitor nutritional intake, labs, weights, BM, skin, clinical course.

## 2023-01-10 NOTE — CHART NOTE - NSCHARTNOTEFT_GEN_A_CORE
Patient visited bedside for removal of R Hip Incision staple removal on POD#21.  Removed all staples, Incision healing well, no drainage.  Patient pleasantly demented, wife at bedside.  Patient left in no acute distress.     Ha Barry PA-C  Orthopedic Surgery Team  Team Pager #[phone]

## 2023-01-10 NOTE — PROGRESS NOTE ADULT - SUBJECTIVE AND OBJECTIVE BOX
As per RN overnight, patient awake and agitated at times overnight.  Currently in restraints.       T(C): 36.4 (01-10-23 @ 06:17), Max: 36.8 (01-09-23 @ 08:30)  HR: 78 (01-10-23 @ 06:17) (78 - 87)  BP: 119/79 (01-10-23 @ 06:17) (104/64 - 119/79)  RR: 18 (01-10-23 @ 06:17) (18 - 18)  SpO2: 95% (01-10-23 @ 06:17) (95% - 97%)      Exam:  Gen: Easily arousable, confused and disoriented, follows some commands  Lower Extremities:  RLE: Hip incision healing well w staples in place, nontender to palpation, Toes warm w brisk cap refill, spontaneous DF/PF appreciated  LLE: Aquacel C/D/I, mild edema, mild incisional tenderness, Toes warm w brisk cap refill, spontaneous DF/PF appreciated    Labs:     01-08    142  |  x   |  x   ----------------------------<  x   x    |  x   |  x     Ca    9.1      08 Jan 2023 11:06

## 2023-01-10 NOTE — PROGRESS NOTE ADULT - ASSESSMENT
A/P: 89 y/o M POD# 5 s/p L Amaury, POD#21 s/p R Amaury    DVT ppx- Lovenox 60mg SQ Q12H  RLE WBAT  Possible D/C R hip [place] today--->To discuss with attending  LLE WBAT  Seroquel 12.5mg PO AM and Seroquel 50mg PO QHS as per Medicine  Restraints  PT/OT as tolerated  Pain management w Tylenol  GI ppx  Minced and moist diet w Ensure TID  Discharge planning to [facility] as per daughter Dea  D/W attending      ANISH Baird  Orthopedic Surgery  [phone]

## 2023-01-10 NOTE — CHART NOTE - NSCHARTNOTEFT_GEN_A_CORE
Spoke with Maggie Luke (Dental resident) regarding family concern for fitting of dentures and possible malnutrition status secondary to improper denture fitting. As per resident, she claims that the pt would have to follow up outpatient for denture fitting and she would not see the patient for this reason.     Rober Ramirez PA-C  Orthopedic Surgery  Pager #[phone]

## 2023-01-10 NOTE — CHART NOTE - NSCHARTNOTEFT_GEN_A_CORE
RN contacted provider regarding choking/coughing episode while eating. RN used suction to remove food. Bedside speech and swallow was ordered for this episode.     Rober Ramirez PA-C  Orthopedic Surgery  Pager #[phone]

## 2023-01-10 NOTE — PROGRESS NOTE ADULT - SUBJECTIVE AND OBJECTIVE BOX
Patient is a 89 yo male who was recently DCed from [facility] after surgery for a left hip fracture. Th Patient was brought in by EMS from [facility] after multiple falls over the last week.  He was previously admitted to [facility] approximately 2 weeks ago for a fall which resulted in a right hip fracture which was repaired by Dr. Monty Ceron.  He is not able to provide a reliable history.  According to the patient his daughter he has a history of dementia which is gotten much worse since his original fracture.  He is not eating and drinking as he should.  She reports 3 falls over the last week.  X-rays were performed yesterday and she received a phone call today that he fractured his left hip.  Patient denies pain or other injury on review of systems. Patient is s/p an Open reduction and internal fixation of the left hi. Patient tolerated the procedure well. Post op Patient found to have b/l DVTs on dopplers.  Patient  more awake today continues to be confused.        MEDICATIONS  (STANDING):  acetaminophen     Tablet .. 975 milliGRAM(s) Oral every 8 hours  allopurinol 300 milliGRAM(s) Oral daily  aMIOdarone    Tablet 200 milliGRAM(s) Oral daily  enoxaparin Injectable 60 milliGRAM(s) SubCutaneous every 12 hours  melatonin 3 milliGRAM(s) Oral at bedtime  memantine 5 milliGRAM(s) Oral daily  oxybutynin 5 milliGRAM(s) Oral two times a day  pantoprazole    Tablet 40 milliGRAM(s) Oral before breakfast  polyethylene glycol 3350 17 Gram(s) Oral daily  QUEtiapine 50 milliGRAM(s) Oral at bedtime  QUEtiapine 12.5 milliGRAM(s) Oral daily  senna 2 Tablet(s) Oral at bedtime  tamsulosin 0.4 milliGRAM(s) Oral at bedtime  traZODone 50 milliGRAM(s) Oral at bedtime    MEDICATIONS  (PRN):  magnesium hydroxide Suspension 30 milliLiter(s) Oral daily PRN Constipation  ondansetron Injectable 4 milliGRAM(s) IV Push every 6 hours PRN Nausea and/or Vomiting          VITALS:   T(C): 36.7 (01-10-23 @ 12:05), Max: 36.8 (01-09-23 @ 20:08)  HR: 77 (01-10-23 @ 12:05) (77 - 90)  BP: 121/72 (01-10-23 @ 12:05) (110/66 - 127/79)  RR: 18 (01-10-23 @ 12:05) (18 - 18)  SpO2: 94% (01-10-23 @ 12:05) (94% - 97%)  Wt(kg): --    PHYSICAL EXAM:  GENERAL: NAD, well-groomed, well-developed  HEAD:  Atraumatic, Normocephalic  EYES: EOMI, PERRLA, conjunctiva and sclera clear  ENMT: No tonsillar erythema, exudates, or enlargement; Moist mucous membranes, Good dentition, No lesions  NECK: Supple, No JVD, Normal thyroid  NERVOUS SYSTEM:  Alert & Oriented X3, Good concentration; Motor Strength 5/5 B/L upper and lower extremities; DTRs 2+ intact and symmetric  CHEST/LUNG: Clear to percussion bilaterally; No rales, rhonchi, wheezing, or rubs  HEART: Regular rate and rhythm; No murmurs, rubs, or gallops  ABDOMEN: Soft, Nontender, Nondistended; Bowel sounds present  EXTREMITIES:  2+ Peripheral Pulses, No clubbing, cyanosis, or edema  LYMPH: No lymphadenopathy noted  SKIN: No rashes or lesions    LABS:          01-08    142  |  x   |  x   ----------------------------<  x   x    |  x   |  x               CAPILLARY BLOOD GLUCOSE          RADIOLOGY & ADDITIONAL TESTS:

## 2023-01-11 RX ORDER — ACETAMINOPHEN 500 MG
1000 TABLET ORAL ONCE
Refills: 0 | Status: COMPLETED | OUTPATIENT
Start: 2023-01-11 | End: 2023-01-11

## 2023-01-11 RX ADMIN — ENOXAPARIN SODIUM 60 MILLIGRAM(S): 100 INJECTION SUBCUTANEOUS at 05:17

## 2023-01-11 RX ADMIN — Medication 975 MILLIGRAM(S): at 21:24

## 2023-01-11 RX ADMIN — ENOXAPARIN SODIUM 60 MILLIGRAM(S): 100 INJECTION SUBCUTANEOUS at 17:35

## 2023-01-11 RX ADMIN — Medication 975 MILLIGRAM(S): at 05:16

## 2023-01-11 RX ADMIN — PANTOPRAZOLE SODIUM 40 MILLIGRAM(S): 20 TABLET, DELAYED RELEASE ORAL at 05:16

## 2023-01-11 RX ADMIN — SENNA PLUS 2 TABLET(S): 8.6 TABLET ORAL at 21:25

## 2023-01-11 RX ADMIN — MEMANTINE HYDROCHLORIDE 5 MILLIGRAM(S): 10 TABLET ORAL at 11:28

## 2023-01-11 RX ADMIN — TAMSULOSIN HYDROCHLORIDE 0.4 MILLIGRAM(S): 0.4 CAPSULE ORAL at 21:24

## 2023-01-11 RX ADMIN — Medication 975 MILLIGRAM(S): at 06:55

## 2023-01-11 RX ADMIN — Medication 5 MILLIGRAM(S): at 17:35

## 2023-01-11 RX ADMIN — Medication 3 MILLIGRAM(S): at 21:24

## 2023-01-11 RX ADMIN — Medication 300 MILLIGRAM(S): at 11:29

## 2023-01-11 RX ADMIN — Medication 975 MILLIGRAM(S): at 21:30

## 2023-01-11 RX ADMIN — QUETIAPINE FUMARATE 50 MILLIGRAM(S): 200 TABLET, FILM COATED ORAL at 21:25

## 2023-01-11 RX ADMIN — Medication 5 MILLIGRAM(S): at 05:16

## 2023-01-11 RX ADMIN — Medication 50 MILLIGRAM(S): at 21:24

## 2023-01-11 RX ADMIN — Medication 400 MILLIGRAM(S): at 13:33

## 2023-01-11 RX ADMIN — AMIODARONE HYDROCHLORIDE 200 MILLIGRAM(S): 400 TABLET ORAL at 05:16

## 2023-01-11 NOTE — SWALLOW BEDSIDE ASSESSMENT ADULT - ASPIRATION PRECAUTIONS
Monitor for s/s aspiration/laryngeal penetration. If noted:  D/C p.o. intake, provide non-oral nutrition/hydration/meds, and contact this service @ [phone]

## 2023-01-11 NOTE — SWALLOW BEDSIDE ASSESSMENT ADULT - SLP PERTINENT HISTORY OF CURRENT PROBLEM
Patient is a 89 yo male who was recently DCed from [facility] after surgery for a left hip fracture. Th Patient was brought in by EMS from [facility] after multiple falls over the last week.  He was previously admitted to [facility] approximately 2 weeks ago for a fall which resulted in a right hip fracture which was repaired by Dr. Monty Ceron.  He is not able to provide a reliable history.  According to the patient his daughter he has a history of dementia which is gotten much worse since his original fracture.  He is not eating and drinking as he should.  She reports 3 falls over the last week.  X-rays were performed yesterday and she received a phone call today that he fractured his left hip.  Patient denies pain or other injury on review of systems. Patient is s/p an Open reduction and internal fixation of the left hi. Patient tolerated the procedure well. Post op Patient found to have b/l DVTs on dopplers

## 2023-01-11 NOTE — PROGRESS NOTE ADULT - ASSESSMENT
A/P: 89 y/o M POD# 6 s/p L Amaury, POD#21 s/p R Amaury    DVT ppx- Lovenox 60mg SQ Q12H  RLE WBAT  LLE WBAT  Seroquel 50mg PO QHS (Daytime Seroquel d/peggy yesterday)  Restraints  PT/OT as tolerated  Pain management w Tylenol  GI ppx  Minced and moist diet w Ensure TID  Speech and swallow pending  Discharge planning to [facility] as per daughter Dea  D/W attending      ANISH Baird  Orthopedic Surgery  [phone]

## 2023-01-11 NOTE — SWALLOW BEDSIDE ASSESSMENT ADULT - SWALLOW EVAL: DIAGNOSIS
89 yo male with a hx of dementia, Afib, HTN, and BPH, presented s/p fall, s/p R-ORIF. Pt presents with oral phase deficits in presence of incomplete dentition. There is slow and inefficient mastication of minced/moist textures, with eventual expectoration of un-chewed portion of bolus. Swallow profile otherwise unremarkable. No overt signs of laryngeal penetration/aspiration across textures.

## 2023-01-11 NOTE — SWALLOW BEDSIDE ASSESSMENT ADULT - ORAL PREPARATORY PHASE
Prolonged and ineffective mastication with minced/moist chicken, resulting in expectoration of bolus Within functional limits

## 2023-01-11 NOTE — PROGRESS NOTE ADULT - SUBJECTIVE AND OBJECTIVE BOX
Patient is a 89 yo male who was recently DCed from [facility] after surgery for a left hip fracture. Th Patient was brought in by EMS from [facility] after multiple falls over the last week.  He was previously admitted to [facility] approximately 2 weeks ago for a fall which resulted in a right hip fracture which was repaired by Dr. Monty Ceron.  He is not able to provide a reliable history.  According to the patient his daughter he has a history of dementia which is gotten much worse since his original fracture.  He is not eating and drinking as he should.  She reports 3 falls over the last week.  X-rays were performed yesterday and she received a phone call today that he fractured his left hip.  Patient denies pain or other injury on review of systems. Patient is s/p an Open reduction and internal fixation of the left hip. Patient tolerated the procedure well. Post op Patient found to have b/l DVTs on dopplers.  Patient  more awake today continues to be confused.      MEDICATIONS  (STANDING):  acetaminophen     Tablet .. 975 milliGRAM(s) Oral every 8 hours  allopurinol 300 milliGRAM(s) Oral daily  aMIOdarone    Tablet 200 milliGRAM(s) Oral daily  enoxaparin Injectable 60 milliGRAM(s) SubCutaneous every 12 hours  melatonin 3 milliGRAM(s) Oral at bedtime  memantine 5 milliGRAM(s) Oral daily  oxybutynin 5 milliGRAM(s) Oral two times a day  pantoprazole    Tablet 40 milliGRAM(s) Oral before breakfast  polyethylene glycol 3350 17 Gram(s) Oral daily  QUEtiapine 50 milliGRAM(s) Oral at bedtime  senna 2 Tablet(s) Oral at bedtime  tamsulosin 0.4 milliGRAM(s) Oral at bedtime  traZODone 50 milliGRAM(s) Oral at bedtime    MEDICATIONS  (PRN):  magnesium hydroxide Suspension 30 milliLiter(s) Oral daily PRN Constipation  ondansetron Injectable 4 milliGRAM(s) IV Push every 6 hours PRN Nausea and/or Vomiting          VITALS:   T(C): 36.4 (01-11-23 @ 12:10), Max: 37.1 (01-10-23 @ 22:10)  HR: 76 (01-11-23 @ 12:10) (76 - 87)  BP: 110/61 (01-11-23 @ 12:10) (110/61 - 138/80)  RR: 18 (01-11-23 @ 12:10) (18 - 18)  SpO2: 100% (01-11-23 @ 12:10) (92% - 100%)  Wt(kg): --    PHYSICAL EXAM:  GENERAL: NAD, well-groomed, well-developed  HEAD:  Atraumatic, Normocephalic  EYES: EOMI, PERRLA, conjunctiva and sclera clear  ENMT: No tonsillar erythema, exudates, or enlargement; Moist mucous membranes, Good dentition, No lesions  NECK: Supple, No JVD, Normal thyroid  NERVOUS SYSTEM:  Alert & Oriented X3, Good concentration; Motor Strength 5/5 B/L upper and lower extremities; DTRs 2+ intact and symmetric  CHEST/LUNG: Clear to percussion bilaterally; No rales, rhonchi, wheezing, or rubs  HEART: Regular rate and rhythm; No murmurs, rubs, or gallops  ABDOMEN: Soft, Nontender, Nondistended; Bowel sounds present  EXTREMITIES:  2+ Peripheral Pulses, No clubbing, cyanosis, or edema  LYMPH: No lymphadenopathy noted  SKIN: No rashes or lesions    LABS:                      CAPILLARY BLOOD GLUCOSE          RADIOLOGY & ADDITIONAL TESTS:

## 2023-01-11 NOTE — SWALLOW BEDSIDE ASSESSMENT ADULT - SLP GENERAL OBSERVATIONS
Pt encountered in bed, awake/alert, PCA feeding pt lunch. Pt English/ Montenegrin speaking,  utilized. Pt oriented to self. Able to follow simple directives. Vocal quality WFL.

## 2023-01-11 NOTE — SWALLOW BEDSIDE ASSESSMENT ADULT - COMMENTS
1/6: Per provider note -> Spoke with patients daughter bedside who is concerned her father is not eating well 2/2 his top teeth dentures not fitting well.  Patients daughter requesting dental consult.   1/8: RD consulted -> Per wife, pt has a great appetite at home, typically eating soft/minced foods and drinking 2 Ensure daily with full feeding assistance.  1/10: Per provider note -> RN contacted provider regarding choking/coughing episode while eating. RN used suction to remove food. Bedside speech and swallow was ordered for this episode.     Swallow history: Pt is new to this service

## 2023-01-11 NOTE — PROGRESS NOTE ADULT - SUBJECTIVE AND OBJECTIVE BOX
As per nursing staff overnight, patient slept until 3am.  Restless and shouts at times.  In restraints.        T(C): 36.8 (01-11-23 @ 05:14), Max: 37.1 (01-10-23 @ 22:10)  HR: 82 (01-11-23 @ 05:14) (77 - 87)  BP: 133/72 (01-11-23 @ 05:14) (121/72 - 133/84)  RR: 18 (01-11-23 @ 05:14) (18 - 18)  SpO2: 94% (01-11-23 @ 05:14) (92% - 98%)      Exam:  Gen: restless, follows some commands  Ext:   LUE: Edematous, motor grossly intact  RLE: Hip incision well healed, nontender to palpation, Toes warm w brisk cap refill, spontaneous DF/PF appreciated  LLE: Aquacel C/D/I, mild edema, mild incisional tenderness, Toes warm w brisk cap refill, spontaneous DF/PF appreciated

## 2023-01-12 RX ORDER — TUBERCULIN PURIFIED PROTEIN DERIVATIVE 5 [IU]/.1ML
5 INJECTION, SOLUTION INTRADERMAL ONCE
Refills: 0 | Status: COMPLETED | OUTPATIENT
Start: 2023-01-12 | End: 2023-01-12

## 2023-01-12 RX ORDER — APIXABAN 2.5 MG/1
5 TABLET, FILM COATED ORAL EVERY 12 HOURS
Refills: 0 | Status: DISCONTINUED | OUTPATIENT
Start: 2023-01-12 | End: 2023-01-20

## 2023-01-12 RX ADMIN — Medication 975 MILLIGRAM(S): at 13:05

## 2023-01-12 RX ADMIN — Medication 975 MILLIGRAM(S): at 14:05

## 2023-01-12 RX ADMIN — Medication 975 MILLIGRAM(S): at 23:01

## 2023-01-12 RX ADMIN — APIXABAN 5 MILLIGRAM(S): 2.5 TABLET, FILM COATED ORAL at 17:33

## 2023-01-12 RX ADMIN — Medication 975 MILLIGRAM(S): at 05:54

## 2023-01-12 RX ADMIN — AMIODARONE HYDROCHLORIDE 200 MILLIGRAM(S): 400 TABLET ORAL at 05:12

## 2023-01-12 RX ADMIN — Medication 3 MILLIGRAM(S): at 22:17

## 2023-01-12 RX ADMIN — Medication 5 MILLIGRAM(S): at 17:33

## 2023-01-12 RX ADMIN — Medication 975 MILLIGRAM(S): at 05:11

## 2023-01-12 RX ADMIN — TAMSULOSIN HYDROCHLORIDE 0.4 MILLIGRAM(S): 0.4 CAPSULE ORAL at 22:18

## 2023-01-12 RX ADMIN — Medication 5 MILLIGRAM(S): at 05:12

## 2023-01-12 RX ADMIN — QUETIAPINE FUMARATE 50 MILLIGRAM(S): 200 TABLET, FILM COATED ORAL at 22:17

## 2023-01-12 RX ADMIN — Medication 975 MILLIGRAM(S): at 22:18

## 2023-01-12 RX ADMIN — SENNA PLUS 2 TABLET(S): 8.6 TABLET ORAL at 22:17

## 2023-01-12 RX ADMIN — TUBERCULIN PURIFIED PROTEIN DERIVATIVE 5 UNIT(S): 5 INJECTION, SOLUTION INTRADERMAL at 16:25

## 2023-01-12 RX ADMIN — PANTOPRAZOLE SODIUM 40 MILLIGRAM(S): 20 TABLET, DELAYED RELEASE ORAL at 05:12

## 2023-01-12 RX ADMIN — ENOXAPARIN SODIUM 60 MILLIGRAM(S): 100 INJECTION SUBCUTANEOUS at 05:11

## 2023-01-12 RX ADMIN — Medication 50 MILLIGRAM(S): at 22:18

## 2023-01-12 NOTE — PROGRESS NOTE ADULT - ASSESSMENT
- WBAT/NWB  - PT/OT  - OOB  - Diet:  - Monitor I&Os  - Dispo:      Impression:   89 y/o M POD# 7 s/p L Amaury, POD#22 s/p R Amaury recovering well.    Plan:  -Off restraints  -RLE WBAT  -LLE WBAT  -PT/OT as tolerated  -C/w pain management:  Tylenol 975mg PO Q8hrs  -C/w DVT prophylaxis:  Lovenox 60mg SQ Q12hrs  -Seroquel 50mg PO QHS  -Diet- minced and moist with Ensure TID  -Dispo: SAUL [facility]  -DNR/DNI    LEROY CardS     Impression:   89 y/o M POD# 7 s/p L Amaury, POD#22 s/p R Amaury recovering well.    Plan:  -Off restraints  -RLE WBAT  -LLE WBAT  -PT/OT as tolerated  -C/w pain management:  Tylenol 975mg PO Q8hrs  -C/w DVT prophylaxis:  -Lovenox 60mg SQ Q12hrs--->Discuss DVT ppx for Discharge with attending  -Seroquel 50mg PO QHS  -Diet: Puree with thin liquids as per speech and swallow findings   -Dispo: [facility]  -DNR/DNI    ANISH Card-S    Addendum:  Patient seen and examined with student and agree with above.  ANISH Baird  Orthopedic Surgery  [phone]

## 2023-01-12 NOTE — PROGRESS NOTE ADULT - SUBJECTIVE AND OBJECTIVE BOX
Orthopedic Surgery Progress Note     S: Patient seen and examined today at bedside. No acute events overnight. Patient able to follow simple commands this AM.    Vital Signs   T(C): 36.5   T(F): 97.7   HR: 80  BP: 121/78   RR: 18  SpO2: 96% on RA      01-10-23 @ 07:01  -  01-11-23 @ 07:00  --------------------------------------------------------  IN: 900 mL / OUT: 600 mL / NET: 300 mL    01-11-23 @ 07:01  -  01-12-23 @ 06:25  --------------------------------------------------------  IN: 570 mL / OUT: 0 mL / NET: 570 mL        Physical Exam:  General: NAD, follows simple commands  Extremity:  RLE- Hip incision healing well, non-tender to palpation, toes warm with brisk cap refill. Spontaneous DF/PF appreciated  LUE- Edematous, motor grossly intact, +1 radial pulse  LLE- Aquacel dressing C/D/I. Mild edema noted, mild incisional tenderness, toes warm with brisk cap refill. Spontaneous and active 4/5 DF/PF appreciated       Orthopedic Surgery Progress Note     S: Patient seen and examined today at bedside. No acute events overnight.  Had speech and swallow yesterday.  Patient able to follow simple commands this AM.    Vital Signs   T(C): 36.5   T(F): 97.7   HR: 80  BP: 121/78   RR: 18  SpO2: 96% on RA      01-10-23 @ 07:01  -  01-11-23 @ 07:00  --------------------------------------------------------  IN: 900 mL / OUT: 600 mL / NET: 300 mL    01-11-23 @ 07:01  -  01-12-23 @ 06:25  --------------------------------------------------------  IN: 570 mL / OUT: 0 mL / NET: 570 mL        Physical Exam:  General: NAD, follows simple commands  Extremity:  RLE- Hip incision healing well, non-tender to palpation, toes warm with brisk cap refill. + DF/PF   LUE- Edematous, motor grossly intact, +1 radial pulse  LLE- Aquacel dressing C/D/I. Mild edema noted, mild incisional tenderness, toes warm with brisk cap refill. Spontaneous and active 5/5 DF/PF appreciated

## 2023-01-12 NOTE — PROGRESS NOTE ADULT - SUBJECTIVE AND OBJECTIVE BOX
Patient is a 89 yo male who was recently DCed from [facility] after surgery for a left hip fracture. Th Patient was brought in by EMS from [facility] after multiple falls over the last week.  He was previously admitted to [facility] approximately 2 weeks ago for a fall which resulted in a right hip fracture which was repaired by Dr. Monty Ceron.  He is not able to provide a reliable history.  According to the patient his daughter he has a history of dementia which is gotten much worse since his original fracture.  He is not eating and drinking as he should.  She reports 3 falls over the last week.  X-rays were performed yesterday and she received a phone call today that he fractured his left hip.  Patient denies pain or other injury on review of systems. Patient is s/p an Open reduction and internal fixation of the left hip. Patient tolerated the procedure well. Post op Patient found to have b/l DVTs on dopplers.  Patient  more awake today continues to be confused but has been less agitated        MEDICATIONS  (STANDING):  acetaminophen     Tablet .. 975 milliGRAM(s) Oral every 8 hours  allopurinol 300 milliGRAM(s) Oral daily  aMIOdarone    Tablet 200 milliGRAM(s) Oral daily  apixaban 5 milliGRAM(s) Oral every 12 hours  melatonin 3 milliGRAM(s) Oral at bedtime  memantine 5 milliGRAM(s) Oral daily  oxybutynin 5 milliGRAM(s) Oral two times a day  pantoprazole    Tablet 40 milliGRAM(s) Oral before breakfast  polyethylene glycol 3350 17 Gram(s) Oral daily  QUEtiapine 50 milliGRAM(s) Oral at bedtime  senna 2 Tablet(s) Oral at bedtime  tamsulosin 0.4 milliGRAM(s) Oral at bedtime  traZODone 50 milliGRAM(s) Oral at bedtime    MEDICATIONS  (PRN):  magnesium hydroxide Suspension 30 milliLiter(s) Oral daily PRN Constipation  ondansetron Injectable 4 milliGRAM(s) IV Push every 6 hours PRN Nausea and/or Vomiting          VITALS:   T(C): 36.7 (01-12-23 @ 12:00), Max: 36.7 (01-12-23 @ 12:00)  HR: 82 (01-12-23 @ 12:00) (72 - 82)  BP: 123/60 (01-12-23 @ 12:00) (116/71 - 127/81)  RR: 18 (01-12-23 @ 12:00) (18 - 18)  SpO2: 97% (01-12-23 @ 12:00) (95% - 99%)  Wt(kg): --    PHYSICAL EXAM:  GENERAL: NAD, well-groomed, well-developed  HEAD:  Atraumatic, Normocephalic  EYES: EOMI, PERRLA, conjunctiva and sclera clear  ENMT: No tonsillar erythema, exudates, or enlargement; Moist mucous membranes, Good dentition, No lesions  NECK: Supple, No JVD, Normal thyroid  NERVOUS SYSTEM:  Alert & Oriented X3, Good concentration; Motor Strength 5/5 B/L upper and lower extremities; DTRs 2+ intact and symmetric  CHEST/LUNG: Clear to percussion bilaterally; No rales, rhonchi, wheezing, or rubs  HEART: Regular rate and rhythm; No murmurs, rubs, or gallops  ABDOMEN: Soft, Nontender, Nondistended; Bowel sounds present  EXTREMITIES:  2+ Peripheral Pulses, No clubbing, cyanosis, or edema  LYMPH: No lymphadenopathy noted  SKIN: No rashes or lesions    LABS:                      CAPILLARY BLOOD GLUCOSE          RADIOLOGY & ADDITIONAL TESTS:

## 2023-01-13 DIAGNOSIS — Z96.649 PRESENCE OF UNSPECIFIED ARTIFICIAL HIP JOINT: ICD-10-CM

## 2023-01-13 DIAGNOSIS — S72.002D FRACTURE OF UNSPECIFIED PART OF NECK OF LEFT FEMUR, SUBSEQUENT ENCOUNTER FOR CLOSED FRACTURE WITH ROUTINE HEALING: ICD-10-CM

## 2023-01-13 RX ORDER — ACETAMINOPHEN 500 MG
1000 TABLET ORAL ONCE
Refills: 0 | Status: DISCONTINUED | OUTPATIENT
Start: 2023-01-13 | End: 2023-01-20

## 2023-01-13 RX ADMIN — Medication 50 MILLIGRAM(S): at 21:01

## 2023-01-13 RX ADMIN — TAMSULOSIN HYDROCHLORIDE 0.4 MILLIGRAM(S): 0.4 CAPSULE ORAL at 21:00

## 2023-01-13 RX ADMIN — Medication 975 MILLIGRAM(S): at 21:01

## 2023-01-13 RX ADMIN — SENNA PLUS 2 TABLET(S): 8.6 TABLET ORAL at 21:01

## 2023-01-13 RX ADMIN — APIXABAN 5 MILLIGRAM(S): 2.5 TABLET, FILM COATED ORAL at 18:08

## 2023-01-13 RX ADMIN — Medication 975 MILLIGRAM(S): at 05:12

## 2023-01-13 RX ADMIN — QUETIAPINE FUMARATE 50 MILLIGRAM(S): 200 TABLET, FILM COATED ORAL at 21:01

## 2023-01-13 RX ADMIN — APIXABAN 5 MILLIGRAM(S): 2.5 TABLET, FILM COATED ORAL at 05:13

## 2023-01-13 RX ADMIN — Medication 975 MILLIGRAM(S): at 14:02

## 2023-01-13 RX ADMIN — Medication 975 MILLIGRAM(S): at 21:31

## 2023-01-13 RX ADMIN — Medication 3 MILLIGRAM(S): at 21:02

## 2023-01-13 RX ADMIN — Medication 975 MILLIGRAM(S): at 05:42

## 2023-01-13 RX ADMIN — Medication 5 MILLIGRAM(S): at 18:08

## 2023-01-13 RX ADMIN — PANTOPRAZOLE SODIUM 40 MILLIGRAM(S): 20 TABLET, DELAYED RELEASE ORAL at 05:14

## 2023-01-13 RX ADMIN — POLYETHYLENE GLYCOL 3350 17 GRAM(S): 17 POWDER, FOR SOLUTION ORAL at 11:06

## 2023-01-13 RX ADMIN — Medication 5 MILLIGRAM(S): at 05:13

## 2023-01-13 RX ADMIN — MEMANTINE HYDROCHLORIDE 5 MILLIGRAM(S): 10 TABLET ORAL at 11:05

## 2023-01-13 RX ADMIN — Medication 300 MILLIGRAM(S): at 11:06

## 2023-01-13 RX ADMIN — AMIODARONE HYDROCHLORIDE 200 MILLIGRAM(S): 400 TABLET ORAL at 05:14

## 2023-01-13 NOTE — PROGRESS NOTE ADULT - SUBJECTIVE AND OBJECTIVE BOX
Patient is a 89 yo male who was recently DCed from [facility] after surgery for a left hip fracture. Th Patient was brought in by EMS from [facility] after multiple falls over the last week.  He was previously admitted to [facility] approximately 2 weeks ago for a fall which resulted in a right hip fracture which was repaired by Dr. Monty Ceron.  He is not able to provide a reliable history.  According to the patient his daughter he has a history of dementia which is gotten much worse since his original fracture.  He is not eating and drinking as he should.  She reports 3 falls over the last week.  X-rays were performed yesterday and she received a phone call today that he fractured his left hip.  Patient denies pain or other injury on review of systems. Patient is s/p an Open reduction and internal fixation of the left hip. Patient tolerated the procedure well. Post op Patient found to have b/l DVTs on dopplers.  Patient  more awake today continues to be confused but has been less agitated      MEDICATIONS  (STANDING):  acetaminophen     Tablet .. 975 milliGRAM(s) Oral every 8 hours  allopurinol 300 milliGRAM(s) Oral daily  aMIOdarone    Tablet 200 milliGRAM(s) Oral daily  apixaban 5 milliGRAM(s) Oral every 12 hours  melatonin 3 milliGRAM(s) Oral at bedtime  memantine 5 milliGRAM(s) Oral daily  oxybutynin 5 milliGRAM(s) Oral two times a day  pantoprazole    Tablet 40 milliGRAM(s) Oral before breakfast  polyethylene glycol 3350 17 Gram(s) Oral daily  QUEtiapine 50 milliGRAM(s) Oral at bedtime  senna 2 Tablet(s) Oral at bedtime  tamsulosin 0.4 milliGRAM(s) Oral at bedtime  traZODone 50 milliGRAM(s) Oral at bedtime    MEDICATIONS  (PRN):  magnesium hydroxide Suspension 30 milliLiter(s) Oral daily PRN Constipation  ondansetron Injectable 4 milliGRAM(s) IV Push every 6 hours PRN Nausea and/or Vomiting          VITALS:   T(C): 36.4 (01-13-23 @ 12:00), Max: 36.8 (01-12-23 @ 21:05)  HR: 84 (01-13-23 @ 12:00) (69 - 88)  BP: 109/74 (01-13-23 @ 12:00) (102/65 - 132/81)  RR: 18 (01-13-23 @ 12:00) (18 - 18)  SpO2: 95% (01-13-23 @ 12:00) (91% - 100%)  Wt(kg): --    PHYSICAL EXAM:  GENERAL: NAD, well-groomed, well-developed  HEAD:  Atraumatic, Normocephalic  EYES: EOMI, PERRLA, conjunctiva and sclera clear  ENMT: No tonsillar erythema, exudates, or enlargement; Moist mucous membranes, Good dentition, No lesions  NECK: Supple, No JVD, Normal thyroid  NERVOUS SYSTEM:  Alert & Oriented X3, Good concentration; Motor Strength 5/5 B/L upper and lower extremities; DTRs 2+ intact and symmetric  CHEST/LUNG: Clear to percussion bilaterally; No rales, rhonchi, wheezing, or rubs  HEART: Regular rate and rhythm; No murmurs, rubs, or gallops  ABDOMEN: Soft, Nontender, Nondistended; Bowel sounds present  EXTREMITIES:  2+ Peripheral Pulses, No clubbing, cyanosis, or edema  LYMPH: No lymphadenopathy noted  SKIN: No rashes or lesions    LABS:                      CAPILLARY BLOOD GLUCOSE          RADIOLOGY & ADDITIONAL TESTS:

## 2023-01-13 NOTE — PROGRESS NOTE ADULT - SUBJECTIVE AND OBJECTIVE BOX
Post op Day [ 8]    Patient resting without complaints.  No chest pain, SOB, N/V.    T(C): 36.4 (01-13-23 @ 04:50), Max: 36.8 (01-12-23 @ 21:05)  HR: 71 (01-13-23 @ 04:50) (69 - 84)  BP: 132/81 (01-13-23 @ 04:50) (102/65 - 132/81)  RR: 18 (01-13-23 @ 04:50) (18 - 18)  SpO2: 99% (01-13-23 @ 04:50) (84% - 99%)  Wt(kg): --    Exam:  Alert and Oriented, No Acute Distress  L hip aquacel with serosang drainage, changed to traditional dsg, site c/d/i, no active drainage.  Soft/compressible compartments  Calves Soft, Non-tender bilaterally  +PF/DF/EHL/FHL  SILT  +DP Pulse

## 2023-01-14 RX ORDER — TRAMADOL HYDROCHLORIDE 50 MG/1
50 TABLET ORAL ONCE
Refills: 0 | Status: DISCONTINUED | OUTPATIENT
Start: 2023-01-14 | End: 2023-01-14

## 2023-01-14 RX ADMIN — TRAMADOL HYDROCHLORIDE 50 MILLIGRAM(S): 50 TABLET ORAL at 04:02

## 2023-01-14 RX ADMIN — Medication 975 MILLIGRAM(S): at 05:58

## 2023-01-14 RX ADMIN — AMIODARONE HYDROCHLORIDE 200 MILLIGRAM(S): 400 TABLET ORAL at 05:28

## 2023-01-14 RX ADMIN — Medication 975 MILLIGRAM(S): at 05:28

## 2023-01-14 RX ADMIN — Medication 300 MILLIGRAM(S): at 12:55

## 2023-01-14 RX ADMIN — TRAMADOL HYDROCHLORIDE 50 MILLIGRAM(S): 50 TABLET ORAL at 04:32

## 2023-01-14 RX ADMIN — Medication 975 MILLIGRAM(S): at 22:08

## 2023-01-14 RX ADMIN — Medication 975 MILLIGRAM(S): at 21:08

## 2023-01-14 RX ADMIN — MEMANTINE HYDROCHLORIDE 5 MILLIGRAM(S): 10 TABLET ORAL at 12:23

## 2023-01-14 RX ADMIN — APIXABAN 5 MILLIGRAM(S): 2.5 TABLET, FILM COATED ORAL at 18:06

## 2023-01-14 RX ADMIN — Medication 5 MILLIGRAM(S): at 18:05

## 2023-01-14 RX ADMIN — Medication 50 MILLIGRAM(S): at 21:08

## 2023-01-14 RX ADMIN — Medication 3 MILLIGRAM(S): at 21:09

## 2023-01-14 RX ADMIN — TAMSULOSIN HYDROCHLORIDE 0.4 MILLIGRAM(S): 0.4 CAPSULE ORAL at 21:09

## 2023-01-14 RX ADMIN — SENNA PLUS 2 TABLET(S): 8.6 TABLET ORAL at 21:10

## 2023-01-14 RX ADMIN — QUETIAPINE FUMARATE 50 MILLIGRAM(S): 200 TABLET, FILM COATED ORAL at 21:10

## 2023-01-14 RX ADMIN — APIXABAN 5 MILLIGRAM(S): 2.5 TABLET, FILM COATED ORAL at 05:29

## 2023-01-14 RX ADMIN — POLYETHYLENE GLYCOL 3350 17 GRAM(S): 17 POWDER, FOR SOLUTION ORAL at 12:55

## 2023-01-14 RX ADMIN — Medication 975 MILLIGRAM(S): at 13:00

## 2023-01-14 RX ADMIN — Medication 5 MILLIGRAM(S): at 05:28

## 2023-01-14 RX ADMIN — Medication 975 MILLIGRAM(S): at 13:55

## 2023-01-14 RX ADMIN — PANTOPRAZOLE SODIUM 40 MILLIGRAM(S): 20 TABLET, DELAYED RELEASE ORAL at 05:28

## 2023-01-14 NOTE — PROGRESS NOTE ADULT - SUBJECTIVE AND OBJECTIVE BOX
POD 9    Patient resting without complaints.  No chest pain, SOB, N/V.    T(C): 36.3 (01-14-23 @ 05:03), Max: 36.7 (01-13-23 @ 09:00)  HR: 77 (01-14-23 @ 05:03) (77 - 88)  BP: 132/72 (01-14-23 @ 05:03) (103/63 - 132/72)  RR: 18 (01-14-23 @ 05:03) (18 - 18)  SpO2: 96% (01-14-23 @ 05:03) (95% - 100%)  Wt(kg): --    Exam:  Alert and Oriented, No Acute Distress  L Hip dsg c/d/i   Calves Soft, Non-tender bilaterally  +PF/DF/EHL/FHL  SILT

## 2023-01-14 NOTE — CHART NOTE - NSCHARTNOTEFT_GEN_A_CORE
Patient had PPD placed by RN on 1/12/2023 to left forearm, volar surface. PPD was read by me on 1/14/2023, as Negative with "0" induration.  Lucy Delacruz PA-C  Orthopaedic Surgery  Team pager [phone]  [facility] [phone]  attjet-[phone]

## 2023-01-14 NOTE — PROGRESS NOTE ADULT - SUBJECTIVE AND OBJECTIVE BOX
Patient is a 89 yo male who was recently DCed from [facility] after surgery for a left hip fracture. Th Patient was brought in by EMS from [facility] after multiple falls over the last week.  He was previously admitted to [facility] approximately 2 weeks ago for a fall which resulted in a right hip fracture which was repaired by Dr. Monty Ceron.  He is not able to provide a reliable history.  According to the patient his daughter he has a history of dementia which is gotten much worse since his original fracture.  He is not eating and drinking as he should.  She reports 3 falls over the last week.  X-rays were performed yesterday and she received a phone call today that he fractured his left hip.  Patient denies pain or other injury on review of systems. Patient is s/p an Open reduction and internal fixation of the left hip. Patient tolerated the procedure well. Post op Patient found to have b/l DVTs on dopplers.  Patient agitated overnight. seen by Psychiatry and started Trazadone post op    MEDICATIONS  (STANDING):  acetaminophen     Tablet .. 975 milliGRAM(s) Oral every 8 hours  allopurinol 300 milliGRAM(s) Oral daily  aMIOdarone    Tablet 200 milliGRAM(s) Oral daily  apixaban 5 milliGRAM(s) Oral every 12 hours  melatonin 3 milliGRAM(s) Oral at bedtime  memantine 5 milliGRAM(s) Oral daily  oxybutynin 5 milliGRAM(s) Oral two times a day  pantoprazole    Tablet 40 milliGRAM(s) Oral before breakfast  polyethylene glycol 3350 17 Gram(s) Oral daily  QUEtiapine 50 milliGRAM(s) Oral at bedtime  senna 2 Tablet(s) Oral at bedtime  tamsulosin 0.4 milliGRAM(s) Oral at bedtime  traZODone 50 milliGRAM(s) Oral at bedtime    MEDICATIONS  (PRN):  acetaminophen   IVPB .. 1000 milliGRAM(s) IV Intermittent once PRN Severe Pain (7 - 10)  magnesium hydroxide Suspension 30 milliLiter(s) Oral daily PRN Constipation  ondansetron Injectable 4 milliGRAM(s) IV Push every 6 hours PRN Nausea and/or Vomiting          VITALS:   T(C): 36.7 (01-14-23 @ 10:31), Max: 36.7 (01-14-23 @ 10:31)  HR: 66 (01-14-23 @ 10:31) (66 - 88)  BP: 126/79 (01-14-23 @ 10:31) (103/63 - 132/72)  RR: 18 (01-14-23 @ 10:31) (18 - 18)  SpO2: 98% (01-14-23 @ 10:31) (96% - 98%)  Wt(kg): --    PHYSICAL EXAM:  GENERAL: NAD, well-groomed, well-developed  HEAD:  Atraumatic, Normocephalic  EYES: EOMI, PERRLA, conjunctiva and sclera clear  ENMT: No tonsillar erythema, exudates, or enlargement; Moist mucous membranes, Good dentition, No lesions  NECK: Supple, No JVD, Normal thyroid  NERVOUS SYSTEM:  Alert & Oriented X3, Good concentration; Motor Strength 5/5 B/L upper and lower extremities; DTRs 2+ intact and symmetric  CHEST/LUNG: Clear to percussion bilaterally; No rales, rhonchi, wheezing, or rubs  HEART: Regular rate and rhythm; No murmurs, rubs, or gallops  ABDOMEN: Soft, Nontender, Nondistended; Bowel sounds present  EXTREMITIES:  2+ Peripheral Pulses, No clubbing, cyanosis, or edema  LYMPH: No lymphadenopathy noted  SKIN: No rashes or lesions      LABS:                      CAPILLARY BLOOD GLUCOSE          RADIOLOGY & ADDITIONAL TESTS:

## 2023-01-15 LAB
ANION GAP SERPL CALC-SCNC: 7 MMOL/L — SIGNIFICANT CHANGE UP (ref 5–17)
BUN SERPL-MCNC: 29 MG/DL — HIGH (ref 7–23)
CALCIUM SERPL-MCNC: 8.8 MG/DL — SIGNIFICANT CHANGE UP (ref 8.4–10.5)
CHLORIDE SERPL-SCNC: 108 MMOL/L — SIGNIFICANT CHANGE UP (ref 96–108)
CO2 SERPL-SCNC: 25 MMOL/L — SIGNIFICANT CHANGE UP (ref 22–31)
CREAT SERPL-MCNC: 1.03 MG/DL — SIGNIFICANT CHANGE UP (ref 0.5–1.3)
EGFR: 68 ML/MIN/1.73M2 — SIGNIFICANT CHANGE UP
GLUCOSE SERPL-MCNC: 120 MG/DL — HIGH (ref 70–99)
HCT VFR BLD CALC: 31.6 % — LOW (ref 39–50)
HGB BLD-MCNC: 9.9 G/DL — LOW (ref 13–17)
MCHC RBC-ENTMCNC: 29.9 PG — SIGNIFICANT CHANGE UP (ref 27–34)
MCHC RBC-ENTMCNC: 31.3 GM/DL — LOW (ref 32–36)
MCV RBC AUTO: 95.5 FL — SIGNIFICANT CHANGE UP (ref 80–100)
NRBC # BLD: 0 /100 WBCS — SIGNIFICANT CHANGE UP (ref 0–0)
PLATELET # BLD AUTO: 346 K/UL — SIGNIFICANT CHANGE UP (ref 150–400)
POTASSIUM SERPL-MCNC: 4.4 MMOL/L — SIGNIFICANT CHANGE UP (ref 3.5–5.3)
POTASSIUM SERPL-SCNC: 4.4 MMOL/L — SIGNIFICANT CHANGE UP (ref 3.5–5.3)
RBC # BLD: 3.31 M/UL — LOW (ref 4.2–5.8)
RBC # FLD: 17.8 % — HIGH (ref 10.3–14.5)
SODIUM SERPL-SCNC: 140 MMOL/L — SIGNIFICANT CHANGE UP (ref 135–145)
WBC # BLD: 7.47 K/UL — SIGNIFICANT CHANGE UP (ref 3.8–10.5)
WBC # FLD AUTO: 7.47 K/UL — SIGNIFICANT CHANGE UP (ref 3.8–10.5)

## 2023-01-15 RX ORDER — FUROSEMIDE 40 MG
20 TABLET ORAL ONCE
Refills: 0 | Status: DISCONTINUED | OUTPATIENT
Start: 2023-01-15 | End: 2023-01-15

## 2023-01-15 RX ORDER — FUROSEMIDE 40 MG
40 TABLET ORAL ONCE
Refills: 0 | Status: COMPLETED | OUTPATIENT
Start: 2023-01-15 | End: 2023-01-15

## 2023-01-15 RX ADMIN — MEMANTINE HYDROCHLORIDE 5 MILLIGRAM(S): 10 TABLET ORAL at 11:30

## 2023-01-15 RX ADMIN — Medication 975 MILLIGRAM(S): at 05:44

## 2023-01-15 RX ADMIN — QUETIAPINE FUMARATE 50 MILLIGRAM(S): 200 TABLET, FILM COATED ORAL at 20:23

## 2023-01-15 RX ADMIN — Medication 975 MILLIGRAM(S): at 13:50

## 2023-01-15 RX ADMIN — Medication 50 MILLIGRAM(S): at 20:23

## 2023-01-15 RX ADMIN — APIXABAN 5 MILLIGRAM(S): 2.5 TABLET, FILM COATED ORAL at 17:46

## 2023-01-15 RX ADMIN — Medication 3 MILLIGRAM(S): at 20:24

## 2023-01-15 RX ADMIN — Medication 975 MILLIGRAM(S): at 21:20

## 2023-01-15 RX ADMIN — Medication 300 MILLIGRAM(S): at 11:30

## 2023-01-15 RX ADMIN — PANTOPRAZOLE SODIUM 40 MILLIGRAM(S): 20 TABLET, DELAYED RELEASE ORAL at 05:45

## 2023-01-15 RX ADMIN — Medication 975 MILLIGRAM(S): at 22:20

## 2023-01-15 RX ADMIN — TUBERCULIN PURIFIED PROTEIN DERIVATIVE 5 UNIT(S): 5 INJECTION, SOLUTION INTRADERMAL at 16:00

## 2023-01-15 RX ADMIN — POLYETHYLENE GLYCOL 3350 17 GRAM(S): 17 POWDER, FOR SOLUTION ORAL at 11:30

## 2023-01-15 RX ADMIN — TAMSULOSIN HYDROCHLORIDE 0.4 MILLIGRAM(S): 0.4 CAPSULE ORAL at 20:22

## 2023-01-15 RX ADMIN — Medication 5 MILLIGRAM(S): at 17:46

## 2023-01-15 RX ADMIN — APIXABAN 5 MILLIGRAM(S): 2.5 TABLET, FILM COATED ORAL at 05:45

## 2023-01-15 RX ADMIN — Medication 5 MILLIGRAM(S): at 05:45

## 2023-01-15 RX ADMIN — Medication 975 MILLIGRAM(S): at 06:44

## 2023-01-15 RX ADMIN — Medication 40 MILLIGRAM(S): at 20:22

## 2023-01-15 RX ADMIN — Medication 975 MILLIGRAM(S): at 14:50

## 2023-01-15 RX ADMIN — AMIODARONE HYDROCHLORIDE 200 MILLIGRAM(S): 400 TABLET ORAL at 05:44

## 2023-01-15 NOTE — PROVIDER CONTACT NOTE (OTHER) - RECOMMENDATIONS
MD Cordero made aware, b/l wrist/mitten restraints?
Notify MD
Notify MD
Oxygen, PE workup?
notify provider
MD Cordero made aware, switch to B/L wrist restraints placed?

## 2023-01-15 NOTE — PROGRESS NOTE ADULT - SUBJECTIVE AND OBJECTIVE BOX
Post op Day [10 ]    Patient resting without complaints.  No chest pain, SOB, N/V.    T(C): 36.6 (01-15-23 @ 05:06), Max: 36.7 (01-14-23 @ 10:31)  HR: 80 (01-15-23 @ 05:06) (66 - 82)  BP: 119/76 (01-15-23 @ 05:06) (119/76 - 133/79)  RR: 18 (01-15-23 @ 05:06) (18 - 18)  SpO2: 92% (01-15-23 @ 05:06) (92% - 98%)  Wt(kg): --    Exam:  Sleeping but arousable, No Acute Distress  L hip dsg c/d/i with soft/compressible compartments  Calves Soft, Non-tender bilaterally  +PF/DF/EHL/FHL  SILT  +DP Pulse  Noted  2+ pitting edema bilat

## 2023-01-15 NOTE — PROGRESS NOTE ADULT - SUBJECTIVE AND OBJECTIVE BOX
Patient is a 89 yo male who was recently DCed from [facility] after surgery for a left hip fracture. Th Patient was brought in by EMS from [facility] after multiple falls over the last week.  He was previously admitted to [facility] approximately 2 weeks ago for a fall which resulted in a right hip fracture which was repaired by Dr. Monty Ceron.  He is not able to provide a reliable history.  According to the patient his daughter he has a history of dementia which is gotten much worse since his original fracture.  He is not eating and drinking as he should.  She reports 3 falls over the last week.  X-rays were performed yesterday and she received a phone call today that he fractured his left hip.  Patient denies pain or other injury on review of systems. Patient is s/p an Open reduction and internal fixation of the left hip. Patient tolerated the procedure well. Post op Patient found to have b/l DVTs on dopplers.  Patient seem less agitated and more awake and alert today      MEDICATIONS  (STANDING):  acetaminophen     Tablet .. 975 milliGRAM(s) Oral every 8 hours  allopurinol 300 milliGRAM(s) Oral daily  aMIOdarone    Tablet 200 milliGRAM(s) Oral daily  apixaban 5 milliGRAM(s) Oral every 12 hours  melatonin 3 milliGRAM(s) Oral at bedtime  memantine 5 milliGRAM(s) Oral daily  oxybutynin 5 milliGRAM(s) Oral two times a day  pantoprazole    Tablet 40 milliGRAM(s) Oral before breakfast  polyethylene glycol 3350 17 Gram(s) Oral daily  QUEtiapine 50 milliGRAM(s) Oral at bedtime  senna 2 Tablet(s) Oral at bedtime  tamsulosin 0.4 milliGRAM(s) Oral at bedtime  traZODone 50 milliGRAM(s) Oral at bedtime    MEDICATIONS  (PRN):  acetaminophen   IVPB .. 1000 milliGRAM(s) IV Intermittent once PRN Severe Pain (7 - 10)  magnesium hydroxide Suspension 30 milliLiter(s) Oral daily PRN Constipation  ondansetron Injectable 4 milliGRAM(s) IV Push every 6 hours PRN Nausea and/or Vomiting          VITALS:   T(C): 36.3 (01-15-23 @ 08:06), Max: 36.6 (01-14-23 @ 16:20)  HR: 75 (01-15-23 @ 08:06) (75 - 82)  BP: 100/66 (01-15-23 @ 08:06) (100/66 - 133/79)  RR: 18 (01-15-23 @ 08:06) (18 - 18)  SpO2: 98% (01-15-23 @ 08:06) (92% - 98%)  Wt(kg): --    PHYSICAL EXAM:  GENERAL: NAD, well-groomed, well-developed  HEAD:  Atraumatic, Normocephalic  EYES: EOMI, PERRLA, conjunctiva and sclera clear  ENMT: No tonsillar erythema, exudates, or enlargement; Moist mucous membranes, Good dentition, No lesions  NECK: Supple, No JVD, Normal thyroid  NERVOUS SYSTEM:  Alert & Oriented X3, Good concentration; Motor Strength 5/5 B/L upper and lower extremities; DTRs 2+ intact and symmetric  CHEST/LUNG: Clear to percussion bilaterally; No rales, rhonchi, wheezing, or rubs  HEART: Regular rate and rhythm; No murmurs, rubs, or gallops  ABDOMEN: Soft, Nontender, Nondistended; Bowel sounds present  EXTREMITIES:  2+ Peripheral Pulses, No clubbing, cyanosis, or edema  LYMPH: No lymphadenopathy noted  SKIN: No rashes or lesions    LABS:        CBC Full  -  ( 15 Shakir 2023 08:07 )  WBC Count : 7.47 K/uL  RBC Count : 3.31 M/uL  Hemoglobin : 9.9 g/dL  Hematocrit : 31.6 %  Platelet Count - Automated : 346 K/uL  Mean Cell Volume : 95.5 fl  Mean Cell Hemoglobin : 29.9 pg  Mean Cell Hemoglobin Concentration : 31.3 gm/dL  Auto Neutrophil # : x  Auto Lymphocyte # : x  Auto Monocyte # : x  Auto Eosinophil # : x  Auto Basophil # : x  Auto Neutrophil % : x  Auto Lymphocyte % : x  Auto Monocyte % : x  Auto Eosinophil % : x  Auto Basophil % : x    01-15    140  |  108  |  29<H>  ----------------------------<  120<H>  4.4   |  25  |  1.03    Ca    8.8      15 Shakir 2023 08:07            CAPILLARY BLOOD GLUCOSE          RADIOLOGY & ADDITIONAL TESTS:

## 2023-01-16 PROCEDURE — 99221 1ST HOSP IP/OBS SF/LOW 40: CPT

## 2023-01-16 RX ORDER — QUETIAPINE FUMARATE 200 MG/1
25 TABLET, FILM COATED ORAL ONCE
Refills: 0 | Status: COMPLETED | OUTPATIENT
Start: 2023-01-16 | End: 2023-01-16

## 2023-01-16 RX ORDER — QUETIAPINE FUMARATE 200 MG/1
12.5 TABLET, FILM COATED ORAL
Refills: 0 | Status: DISCONTINUED | OUTPATIENT
Start: 2023-01-16 | End: 2023-01-18

## 2023-01-16 RX ADMIN — Medication 3 MILLIGRAM(S): at 21:03

## 2023-01-16 RX ADMIN — Medication 300 MILLIGRAM(S): at 11:04

## 2023-01-16 RX ADMIN — PANTOPRAZOLE SODIUM 40 MILLIGRAM(S): 20 TABLET, DELAYED RELEASE ORAL at 06:04

## 2023-01-16 RX ADMIN — APIXABAN 5 MILLIGRAM(S): 2.5 TABLET, FILM COATED ORAL at 17:29

## 2023-01-16 RX ADMIN — Medication 975 MILLIGRAM(S): at 21:03

## 2023-01-16 RX ADMIN — Medication 975 MILLIGRAM(S): at 07:00

## 2023-01-16 RX ADMIN — AMIODARONE HYDROCHLORIDE 200 MILLIGRAM(S): 400 TABLET ORAL at 06:04

## 2023-01-16 RX ADMIN — Medication 50 MILLIGRAM(S): at 21:03

## 2023-01-16 RX ADMIN — APIXABAN 5 MILLIGRAM(S): 2.5 TABLET, FILM COATED ORAL at 06:04

## 2023-01-16 RX ADMIN — QUETIAPINE FUMARATE 25 MILLIGRAM(S): 200 TABLET, FILM COATED ORAL at 10:42

## 2023-01-16 RX ADMIN — TAMSULOSIN HYDROCHLORIDE 0.4 MILLIGRAM(S): 0.4 CAPSULE ORAL at 21:03

## 2023-01-16 RX ADMIN — Medication 975 MILLIGRAM(S): at 06:04

## 2023-01-16 RX ADMIN — MEMANTINE HYDROCHLORIDE 5 MILLIGRAM(S): 10 TABLET ORAL at 11:04

## 2023-01-16 RX ADMIN — Medication 975 MILLIGRAM(S): at 21:33

## 2023-01-16 RX ADMIN — Medication 5 MILLIGRAM(S): at 06:04

## 2023-01-16 RX ADMIN — QUETIAPINE FUMARATE 50 MILLIGRAM(S): 200 TABLET, FILM COATED ORAL at 21:03

## 2023-01-16 RX ADMIN — SENNA PLUS 2 TABLET(S): 8.6 TABLET ORAL at 21:03

## 2023-01-16 NOTE — BH CONSULTATION LIAISON ASSESSMENT NOTE - NSBHCONSULTRECOMMENDOTHER_PSY_A_CORE FT
1. May use Seroquel 50mg PO TID if family agreeable for antipsychotic use, hold for sedation.  Monitor EKG for QTc<500    2. PRN: Seroquel 25mg PO q6h PRN agitation; Haldol 0.5mg IV q6h PRN severe agitation.  Monitor for QTc<500.  Melatonin 3mg PO qHS PRN insomnia.    3. Minimize use of benzos, opioids, anticholinergics, or other deliriogenic agents when possible.  Maintain sleep wake cycle.  Provide frequent reorientation and redirection.  Family member at bedside if possible. Assess for need for glasses and hearing aid (if applicable).    4. Pt does not meet criteria for psychiatric hospitalization.  Recommend outpt psych f/u at [facility] after d/c- [phone].   [facility] Psych will follow. 1. May use Seroquel 25mg PO TID if family agreeable for antipsychotic use, hold for sedation.  Monitor EKG for QTc<500    2. PRN: Seroquel 25mg PO q6h PRN agitation.  Monitor for QTc<500.  Melatonin 3mg PO qHS PRN insomnia.    3. Minimize use of benzos, opioids, anticholinergics, or other deliriogenic agents when possible.  Maintain sleep wake cycle.  Provide frequent reorientation and redirection.  Family member at bedside if possible. Assess for need for glasses and hearing aid (if applicable).    4. Pt does not meet criteria for psychiatric hospitalization.  Recommend outpt psych f/u at [facility] after d/c- [phone]. [facility] Psych will follow. 1. consider Seroquel 25mg PO qdaily PRN for agitation, and 50 mg qhs standing, hold for sedation.  Monitor EKG for QTc<500    2. Melatonin 3mg PO qHS PRN insomnia.    3. Minimize use of benzos, opioids, anticholinergics, or other deliriogenic agents when possible.  Maintain sleep wake cycle.  Provide frequent reorientation and redirection.  Family member at bedside if possible. Assess for need for glasses and hearing aid (if applicable).    4. Pt does not meet criteria for psychiatric hospitalization.  Recommend outpt psych f/u at [facility] after d/c- [phone]. [facility] Psych will follow.    5. dtr in accord with plan above

## 2023-01-16 NOTE — BH CONSULTATION LIAISON ASSESSMENT NOTE - SUMMARY
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 Patient assessed at bedside, not oriented to self, place, year or situation. Patient speaking softly and unable to provide any information. Per family, ***    Patient with acute worsening of mental status as compared to baseline c/w delirium, superimposed on dementia. 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 Patient assessed at bedside, not oriented to self, place, year or situation. Patient speaking softly and unable to provide any information. Per family, patient has an acute worsening of mental status and agitation as compared to baseline c/w delirium, superimposed on dementia.

## 2023-01-16 NOTE — BH CONSULTATION LIAISON ASSESSMENT NOTE - NSBHCHARTREVIEWVS_PSY_A_CORE FT
Vital Signs Last 24 Hrs  T(C): 36.4 (16 Jan 2023 07:50), Max: 36.8 (15 Shakir 2023 17:53)  T(F): 97.6 (16 Jan 2023 07:50), Max: 98.3 (15 Shakir 2023 17:53)  HR: 73 (16 Jan 2023 07:50) (73 - 87)  BP: 103/69 (16 Jan 2023 07:50) (103/64 - 132/76)  BP(mean): --  RR: 18 (16 Jan 2023 07:50) (18 - 18)  SpO2: 98% (16 Jan 2023 07:50) (94% - 98%)    Parameters below as of 16 Jan 2023 07:50  Patient On (Oxygen Delivery Method): room air

## 2023-01-16 NOTE — PROGRESS NOTE ADULT - SUBJECTIVE AND OBJECTIVE BOX
ORTHO  Patient is a 89y old  Male who presents with a chief complaint of L Hip Fx (15 Shakir 2023 13:03)    Pt. resting without complaint, nursing states severe agitation after Jamil placed 2nd UR last pm restraints applied    VS-  T(C): 36.7 (01-15-23 @ 22:24), Max: 36.8 (01-15-23 @ 17:53)  HR: 87 (01-15-23 @ 22:24) (75 - 87)  BP: 132/76 (01-15-23 @ 22:24) (100/66 - 132/76)  RR: 18 (01-15-23 @ 22:24) (18 - 18)  SpO2: 95% (01-15-23 @ 22:24) (94% - 98%)  Wt(kg): --    M.S. Alert  Extremity- Left hip dressing C/D/I  Neuro-              Motor- (+)Ankle DF/PF              Sensation- grossly intact to light touch              Calves- soft, nontender                               9.9    7.47  )-----------( 346      ( 15 Shakir 2023 08:07 )             31.6     01-15    140  |  108  |  29<H>  ----------------------------<  120<H>  4.4   |  25  |  1.03    Ca    8.8      15 Shakir 2023 08:07

## 2023-01-16 NOTE — PROGRESS NOTE ADULT - ASSESSMENT
Impression: Stable       Plan:   Continue present treatment                 Out of bed, ambulate, weight bearing as tolerated                  Physical therapy follow up                  Continue to monitor    Terrance Farley PA-C  Orthopaedic Surgery  Team pager [phone]  [phone]

## 2023-01-16 NOTE — BH CONSULTATION LIAISON ASSESSMENT NOTE - RISK ASSESSMENT
Patient is at an elevated chronic risk secondary to cognitive impairments, chronic medical conditions, no evidence of suicidality or homicidality at this time, does not meet criteria for psychiatric admission. Protective factors include relationship stability, residential stability, and good support system.

## 2023-01-16 NOTE — PROGRESS NOTE ADULT - SUBJECTIVE AND OBJECTIVE BOX
Patient is a 89 yo male who was recently DCed from [facility] after surgery for a left hip fracture. Th Patient was brought in by EMS from [facility] after multiple falls over the last week.  He was previously admitted to [facility] approximately 2 weeks ago for a fall which resulted in a right hip fracture which was repaired by Dr. Monty Ceron.  He is not able to provide a reliable history.  According to the patient his daughter he has a history of dementia which is gotten much worse since his original fracture.  He is not eating and drinking as he should.  She reports 3 falls over the last week.  X-rays were performed yesterday and she received a phone call today that he fractured his left hip.  Patient denies pain or other injury on review of systems. Patient is s/p an Open reduction and internal fixation of the left hip. Patient tolerated the procedure well. Post op Patient found to have b/l DVTs on dopplers.  Patient with increased agitation this am. Seroquel had been held at the family's request but Patient needed medication for agitation to prevent hip from hurting himself or others       MEDICATIONS  (STANDING):  acetaminophen     Tablet .. 975 milliGRAM(s) Oral every 8 hours  allopurinol 300 milliGRAM(s) Oral daily  aMIOdarone    Tablet 200 milliGRAM(s) Oral daily  apixaban 5 milliGRAM(s) Oral every 12 hours  melatonin 3 milliGRAM(s) Oral at bedtime  memantine 5 milliGRAM(s) Oral daily  pantoprazole    Tablet 40 milliGRAM(s) Oral before breakfast  polyethylene glycol 3350 17 Gram(s) Oral daily  QUEtiapine 50 milliGRAM(s) Oral at bedtime  QUEtiapine 12.5 milliGRAM(s) Oral <User Schedule>  senna 2 Tablet(s) Oral at bedtime  tamsulosin 0.4 milliGRAM(s) Oral at bedtime  traZODone 50 milliGRAM(s) Oral at bedtime    MEDICATIONS  (PRN):  acetaminophen   IVPB .. 1000 milliGRAM(s) IV Intermittent once PRN Severe Pain (7 - 10)  magnesium hydroxide Suspension 30 milliLiter(s) Oral daily PRN Constipation  ondansetron Injectable 4 milliGRAM(s) IV Push every 6 hours PRN Nausea and/or Vomiting          VITALS:   T(C): 36.4 (01-16-23 @ 07:50), Max: 36.8 (01-15-23 @ 17:53)  HR: 73 (01-16-23 @ 07:50) (73 - 87)  BP: 103/69 (01-16-23 @ 07:50) (103/64 - 132/76)  RR: 18 (01-16-23 @ 07:50) (18 - 18)  SpO2: 98% (01-16-23 @ 07:50) (94% - 98%)  Wt(kg): --    PHYSICAL EXAM:  GENERAL: NAD, well-groomed, well-developed  HEAD:  Atraumatic, Normocephalic  EYES: EOMI, PERRLA, conjunctiva and sclera clear  ENMT: No tonsillar erythema, exudates, or enlargement; Moist mucous membranes, Good dentition, No lesions  NECK: Supple, No JVD, Normal thyroid  NERVOUS SYSTEM:  Alert & Oriented X3, Good concentration; Motor Strength 5/5 B/L upper and lower extremities; DTRs 2+ intact and symmetric  CHEST/LUNG: Clear to percussion bilaterally; No rales, rhonchi, wheezing, or rubs  HEART: Regular rate and rhythm; No murmurs, rubs, or gallops  ABDOMEN: Soft, Nontender, Nondistended; Bowel sounds present  EXTREMITIES:  2+ Peripheral Pulses, No clubbing, cyanosis, or edema  LYMPH: No lymphadenopathy noted  SKIN: No rashes or lesions    LABS:        CBC Full  -  ( 15 Shakir 2023 08:07 )  WBC Count : 7.47 K/uL  RBC Count : 3.31 M/uL  Hemoglobin : 9.9 g/dL  Hematocrit : 31.6 %  Platelet Count - Automated : 346 K/uL  Mean Cell Volume : 95.5 fl  Mean Cell Hemoglobin : 29.9 pg  Mean Cell Hemoglobin Concentration : 31.3 gm/dL  Auto Neutrophil # : x  Auto Lymphocyte # : x  Auto Monocyte # : x  Auto Eosinophil # : x  Auto Basophil # : x  Auto Neutrophil % : x  Auto Lymphocyte % : x  Auto Monocyte % : x  Auto Eosinophil % : x  Auto Basophil % : x    01-15    140  |  108  |  29<H>  ----------------------------<  120<H>  4.4   |  25  |  1.03    Ca    8.8      15 Shakir 2023 08:07            CAPILLARY BLOOD GLUCOSE          RADIOLOGY & ADDITIONAL TESTS:

## 2023-01-16 NOTE — BH CONSULTATION LIAISON ASSESSMENT NOTE - CURRENT MEDICATION
MEDICATIONS  (STANDING):  acetaminophen     Tablet .. 975 milliGRAM(s) Oral every 8 hours  allopurinol 300 milliGRAM(s) Oral daily  aMIOdarone    Tablet 200 milliGRAM(s) Oral daily  apixaban 5 milliGRAM(s) Oral every 12 hours  melatonin 3 milliGRAM(s) Oral at bedtime  memantine 5 milliGRAM(s) Oral daily  pantoprazole    Tablet 40 milliGRAM(s) Oral before breakfast  polyethylene glycol 3350 17 Gram(s) Oral daily  QUEtiapine 50 milliGRAM(s) Oral at bedtime  QUEtiapine 12.5 milliGRAM(s) Oral <User Schedule>  senna 2 Tablet(s) Oral at bedtime  tamsulosin 0.4 milliGRAM(s) Oral at bedtime  traZODone 50 milliGRAM(s) Oral at bedtime    MEDICATIONS  (PRN):  acetaminophen   IVPB .. 1000 milliGRAM(s) IV Intermittent once PRN Severe Pain (7 - 10)  magnesium hydroxide Suspension 30 milliLiter(s) Oral daily PRN Constipation  ondansetron Injectable 4 milliGRAM(s) IV Push every 6 hours PRN Nausea and/or Vomiting

## 2023-01-16 NOTE — BH CONSULTATION LIAISON ASSESSMENT NOTE - NSBHATTESTCOMMENTATTENDFT_PSY_A_CORE
Pt is a 89 y/o WM with hx of advanced dementia, presents with left hip fx and multiple falls in the nursing home, found to be agitated. psych called for recommendations for agitation. pt was poor historian, and hx obtained by pt's dtr. Dtr gives consent to use seroquel PRN for acute agitation. agree with psych resident's A/P above.

## 2023-01-16 NOTE — BH CONSULTATION LIAISON ASSESSMENT NOTE - HPI (INCLUDE ILLNESS QUALITY, SEVERITY, DURATION, TIMING, CONTEXT, MODIFYING FACTORS, ASSOCIATED SIGNS AND SYMPTOMS)
English 89M,  for 42yrs, domiciled with wife and part-time HHA 2x weekly from 12pm-4pm, retired manager of a parking garage, with PPHx dementia dx in 2019 on donepezil, no previous SA or psych admissions, no previous inpt psychiatric admissions, no active substance abuse, with PMH significant for BPH, Afib w/PPM, recent admission for RT femoral head fx s/p R Femoral Neck Fx Amaury arthroplasty in 12/2022, presented with L femoral neck fx s/p L his hemiarthroplasty 1/5/2023 psychiatry consulted for management of agitation.    Patient assessed at bedside, not oriented to self, place or year. Patient with difficulty speaking, mumbling softly. Unable to provide why he is here in the hospital.     Attempted to call collateral from spouse, Lelia Hawley ([phone]), but no answer and left a voicemail.  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    Patient assessed at bedside, not oriented to self, place or year. Patient with difficulty speaking, mumbling softly. Unable to provide why he is here in the hospital.     Collateral obtained from daughter, Dea ([phone]). Per daughter, patient has been agitated during the day while in the hospital and not sleeping at night. Daughter is concerned that Seroquel has not been working well and is not fully controlling the agitation or insomnia. She states his agitation has only occurred while he is in the hospital, but they are unable to take him home at this point. She is concerned that he has not been able to participate in physical therapy. She confirms that the treatment team is able to continue treating the patient with Seroquel, but refused low-dose Haldol.     Attempted to call collateral from spouse, Lelia Hawley ([phone]), but no answer and left a voicemail.

## 2023-01-17 ENCOUNTER — APPOINTMENT (OUTPATIENT)
Dept: ORTHOPEDIC SURGERY | Facility: CLINIC | Age: 88
End: 2023-01-17

## 2023-01-17 LAB — SARS-COV-2 RNA SPEC QL NAA+PROBE: SIGNIFICANT CHANGE UP

## 2023-01-17 PROCEDURE — 99232 SBSQ HOSP IP/OBS MODERATE 35: CPT

## 2023-01-17 RX ADMIN — QUETIAPINE FUMARATE 50 MILLIGRAM(S): 200 TABLET, FILM COATED ORAL at 21:07

## 2023-01-17 RX ADMIN — Medication 975 MILLIGRAM(S): at 21:38

## 2023-01-17 RX ADMIN — Medication 975 MILLIGRAM(S): at 21:08

## 2023-01-17 RX ADMIN — APIXABAN 5 MILLIGRAM(S): 2.5 TABLET, FILM COATED ORAL at 17:10

## 2023-01-17 RX ADMIN — Medication 300 MILLIGRAM(S): at 11:41

## 2023-01-17 RX ADMIN — SENNA PLUS 2 TABLET(S): 8.6 TABLET ORAL at 21:08

## 2023-01-17 RX ADMIN — AMIODARONE HYDROCHLORIDE 200 MILLIGRAM(S): 400 TABLET ORAL at 05:56

## 2023-01-17 RX ADMIN — PANTOPRAZOLE SODIUM 40 MILLIGRAM(S): 20 TABLET, DELAYED RELEASE ORAL at 05:55

## 2023-01-17 RX ADMIN — MEMANTINE HYDROCHLORIDE 5 MILLIGRAM(S): 10 TABLET ORAL at 11:41

## 2023-01-17 RX ADMIN — Medication 975 MILLIGRAM(S): at 05:55

## 2023-01-17 RX ADMIN — Medication 3 MILLIGRAM(S): at 21:07

## 2023-01-17 RX ADMIN — QUETIAPINE FUMARATE 12.5 MILLIGRAM(S): 200 TABLET, FILM COATED ORAL at 07:42

## 2023-01-17 RX ADMIN — Medication 975 MILLIGRAM(S): at 06:25

## 2023-01-17 RX ADMIN — Medication 975 MILLIGRAM(S): at 14:15

## 2023-01-17 RX ADMIN — Medication 50 MILLIGRAM(S): at 21:07

## 2023-01-17 RX ADMIN — Medication 975 MILLIGRAM(S): at 13:27

## 2023-01-17 RX ADMIN — APIXABAN 5 MILLIGRAM(S): 2.5 TABLET, FILM COATED ORAL at 05:54

## 2023-01-17 RX ADMIN — TAMSULOSIN HYDROCHLORIDE 0.4 MILLIGRAM(S): 0.4 CAPSULE ORAL at 21:07

## 2023-01-17 NOTE — PROGRESS NOTE ADULT - SUBJECTIVE AND OBJECTIVE BOX
Patient is a 89 yo male who was recently DCed from [facility] after surgery for a left hip fracture. Th Patient was brought in by EMS from [facility] after multiple falls over the last week.  He was previously admitted to [facility] approximately 2 weeks ago for a fall which resulted in a right hip fracture which was repaired by Dr. Monty Ceron.  He is not able to provide a reliable history.  According to the patient his daughter he has a history of dementia which is gotten much worse since his original fracture.  He is not eating and drinking as he should.  She reports 3 falls over the last week.  X-rays were performed yesterday and she received a phone call today that he fractured his left hip.  Patient denies pain or other injury on review of systems. Patient is s/p an Open reduction and internal fixation of the left hip. Patient tolerated the procedure well. Post op Patient found to have b/l DVTs on dopplers.  Patient with increased agitation this am. Seroquel had been held at the family's request but Patient needed medication for agitation to prevent hip from hurting himself or others         MEDICATIONS  (STANDING):  acetaminophen     Tablet .. 975 milliGRAM(s) Oral every 8 hours  allopurinol 300 milliGRAM(s) Oral daily  aMIOdarone    Tablet 200 milliGRAM(s) Oral daily  apixaban 5 milliGRAM(s) Oral every 12 hours  melatonin 3 milliGRAM(s) Oral at bedtime  memantine 5 milliGRAM(s) Oral daily  pantoprazole    Tablet 40 milliGRAM(s) Oral before breakfast  polyethylene glycol 3350 17 Gram(s) Oral daily  QUEtiapine 50 milliGRAM(s) Oral at bedtime  QUEtiapine 12.5 milliGRAM(s) Oral <User Schedule>  senna 2 Tablet(s) Oral at bedtime  tamsulosin 0.4 milliGRAM(s) Oral at bedtime  traZODone 50 milliGRAM(s) Oral at bedtime    MEDICATIONS  (PRN):  acetaminophen   IVPB .. 1000 milliGRAM(s) IV Intermittent once PRN Severe Pain (7 - 10)  magnesium hydroxide Suspension 30 milliLiter(s) Oral daily PRN Constipation  ondansetron Injectable 4 milliGRAM(s) IV Push every 6 hours PRN Nausea and/or Vomiting          VITALS:   T(C): 36.8 (01-17-23 @ 15:57), Max: 36.8 (01-17-23 @ 15:57)  HR: 68 (01-17-23 @ 15:57) (67 - 86)  BP: 100/62 (01-17-23 @ 15:57) (100/62 - 136/75)  RR: 18 (01-17-23 @ 15:57) (18 - 18)  SpO2: 96% (01-17-23 @ 15:57) (92% - 96%)  Wt(kg): --    PHYSICAL EXAM:  GENERAL: NAD, well-groomed, well-developed  HEAD:  Atraumatic, Normocephalic  EYES: EOMI, PERRLA, conjunctiva and sclera clear  ENMT: No tonsillar erythema, exudates, or enlargement; Moist mucous membranes, Good dentition, No lesions  NECK: Supple, No JVD, Normal thyroid  NERVOUS SYSTEM:  Alert & Oriented X1 Motor Strength 5/5 B/L upper and lower extremities; DTRs 2+ intact and symmetric  CHEST/LUNG: Clear to percussion bilaterally; No rales, rhonchi, wheezing, or rubs  HEART: Regular rate and rhythm; No murmurs, rubs, or gallops  ABDOMEN: Soft, Nontender, Nondistended; Bowel sounds present  EXTREMITIES:  2+ Peripheral Pulses, No clubbing, cyanosis, or edema  LYMPH: No lymphadenopathy noted  SKIN: No rashes or lesions    LABS:                      CAPILLARY BLOOD GLUCOSE          RADIOLOGY & ADDITIONAL TESTS:

## 2023-01-17 NOTE — PROVIDER CONTACT NOTE (OTHER) - ASSESSMENT
Bladder scan shows 359 cc urine. Pt states that he doesn't feel he has to void but he will try.
On palpation bladder is firm and distended
Pt agitated A&Ox1, confused, pt yelling/screaming out, Pt biting off/at B/L mitten restraints, pulled at Jamil catheter when placed, yellow urine noted when Jamil first placed, now blood tinged urine noted to Jamil bag/draining, no bloody clots noted to urine. +BM, pericare provided. Pt accepted PM medication crushed in applesauce.
Pt lethargic. Arousable to touch. AxOx1.
VSS except low BP and 3L NC. A&Ox1, screaming out and agitated.
VSS, A&Ox1 w/ agitation.
When pt asleep, oxygen sats ranging from  however when pt is awake pt has labored breathing at times and oxygen sats range from 84-94.
Pt stable, confused. Pt dtv @ 1400, pt had no urine output since last checked @ 1100. Bladder scan performed @ 1445 w/ 444cc residual. Safety intact. Hourly rounding in progress.
Pt had 1 incontinent count this AM, pt bladder scanned 8hrs since previous straight cath. 347cc residual urine noted on bladder scan. Safety intact. Hourly rounding in progress.
Pt agitated A&Ox1, confused, Jamil 14 Yi catheter placed, pt pulling at newly placed Jamil, pt yelling/screaming out, attempting to get OOB

## 2023-01-17 NOTE — PROVIDER CONTACT NOTE (OTHER) - BACKGROUND
Pt b/l hip replacement, left hip alina arthoplasty
Pt b/l hip replacement, left hip alina arthoplasty
Pt has DVT of B/L LE and LUE. s/p fell at rehab after having right hip sx. This time had left hip sx.
See H&P
Sp fall L hip fx
s/p Left hip alina
S/p fall, L hip fx.
s/p fall, s/p L hip alina
See H&P
s/p fall L hip alina

## 2023-01-17 NOTE — PROGRESS NOTE ADULT - ASSESSMENT
89 y/o M s/p left hip hemiarthroplasty POD#12, TOV  Lucy Delacruz PA-C  Orthopaedic Surgery  Team pager [phone]  [facility] [phone]  [phone]

## 2023-01-17 NOTE — PROVIDER CONTACT NOTE (OTHER) - REASON
Pt DTV @ 1400
Pt DTV, bladder scan performed 347cc residual
Renewal of Restraints
Bladder distension
Pt agitated, pulling at Jamil,, blood tinged urine noted to Jamil bag
Increased Agitation
Pt agitated, pulling at newly placed ye
Pt desats at times, labored breathing.
Pt hypotensive
Pt failed TOV

## 2023-01-17 NOTE — PROGRESS NOTE ADULT - SUBJECTIVE AND OBJECTIVE BOX
Patient is a 89y old  Male who presents with a chief complaint of L Hip Fracture  Patient s/p left hip hemiarthroplasty POD#12  Patient appears comfortable, agitated overnight    T(C): 36.6 (01-17-23 @ 05:24), Max: 36.6 (01-17-23 @ 05:24)  HR: 75 (01-17-23 @ 05:24) (62 - 86)  BP: 109/68 (01-17-23 @ 05:24) (103/69 - 136/75)  RR: 18 (01-17-23 @ 05:24) (18 - 18)  SpO2: 92% (01-17-23 @ 05:24) (91% - 98%)    PHYSICAL EXAM:  NAD, Alert  [Left ] Hip: Dressing C/D/I; sensation grossly intact to light touch; (+) DF/PF; (+) Distal Pulses; No Calf tenderness B/L, PAS     LABS:                      9.9    7.47  )-----------( 346      ( 15 Shakir 2023 08:07 )             31.6   01-15  140  |  108  |  29<H>  ----------------------------<  120<H>  4.4   |  25  |  1.03  Ca    8.8      15 Shakir 2023 08:07

## 2023-01-17 NOTE — PROVIDER CONTACT NOTE (OTHER) - NAME OF MD/NP/PA/DO NOTIFIED:
ANISH Barry
ANISH Barry and ANISH Guerra
J Carlos
Wing Anthony
ANISH Delacruz
ANISH Delcid
Lonny Cordero
ANISH Cheema
MD Lonny Cordero
MD Lonny Cordero

## 2023-01-17 NOTE — PROVIDER CONTACT NOTE (OTHER) - DATE AND TIME:
15-Shakir-2023 11:50
05-Jan-2023 19:05
07-Jan-2023 04:00
12-Jan-2023 13:03
15-Shakir-2023 20:44
15-Shakir-2023 19:17
15-Shakir-2023 02:46
17-Jan-2023 18:10
08-Jan-2023 04:25
15-Shakir-2023 14:00

## 2023-01-17 NOTE — PROVIDER CONTACT NOTE (OTHER) - ACTION/TREATMENT ORDERED:
Recheck bladder scan @ 20:00 and notify provider of result.
MD Cordero aware, B/L wrist restraints placed. To monitor ye urine output, sebastian-care provided. Safety maintained.
MD Cordero aware, @bedside, new Jamil secured, B/L mitten restraints placed. sebastian-care provided. Safety maintained.
No treatment ordered. ANISH Delacruz states to leave pt off oxygen and no workup needed.  Will continue to monitor closely.
PA consulted Dr Ceron. Bolus ordered x 2. Pt's b/p increased. PA Will came to assess pt at bedside.
as per MD, medications to be given, continue to monitor
Have pt attempt voiding in urinal
Md Wing Anthony notified. Straight cath ordered. Jamil insertion to be determined in morning
Trial to void till 1400 & reassess.
as per MD, will renew.

## 2023-01-17 NOTE — PROVIDER CONTACT NOTE (OTHER) - SITUATION
Pt agitated, pulling at newly placed Jamil
Pt DTV @ 1400
Pt DTV, bladder scan performed 347cc residual
Pt DTV @1630, bladder scan done, showed 317cc urine. ANISH Barry made aware. Bladder scan repeated @ 18:10 per provider request, shows 359 cc urine.
Pt agitated, pulling at Jamil,, blood tinged urine noted to Jamil bag
Pt's b/p 88/60 Hr 78
Increased Agitation
Pt crying out in pain, bladder distended
Renewal of Restraints 0200, 0300, 0400.

## 2023-01-18 LAB
ANION GAP SERPL CALC-SCNC: 9 MMOL/L — SIGNIFICANT CHANGE UP (ref 5–17)
BUN SERPL-MCNC: 22 MG/DL — SIGNIFICANT CHANGE UP (ref 7–23)
CALCIUM SERPL-MCNC: 8.6 MG/DL — SIGNIFICANT CHANGE UP (ref 8.4–10.5)
CHLORIDE SERPL-SCNC: 107 MMOL/L — SIGNIFICANT CHANGE UP (ref 96–108)
CO2 SERPL-SCNC: 24 MMOL/L — SIGNIFICANT CHANGE UP (ref 22–31)
CREAT SERPL-MCNC: 0.93 MG/DL — SIGNIFICANT CHANGE UP (ref 0.5–1.3)
EGFR: 77 ML/MIN/1.73M2 — SIGNIFICANT CHANGE UP
GLUCOSE SERPL-MCNC: 110 MG/DL — HIGH (ref 70–99)
POTASSIUM SERPL-MCNC: 3.9 MMOL/L — SIGNIFICANT CHANGE UP (ref 3.5–5.3)
POTASSIUM SERPL-SCNC: 3.9 MMOL/L — SIGNIFICANT CHANGE UP (ref 3.5–5.3)
SODIUM SERPL-SCNC: 140 MMOL/L — SIGNIFICANT CHANGE UP (ref 135–145)

## 2023-01-18 RX ORDER — TAMSULOSIN HYDROCHLORIDE 0.4 MG/1
2 CAPSULE ORAL
Qty: 0 | Refills: 0 | DISCHARGE

## 2023-01-18 RX ORDER — LANOLIN ALCOHOL/MO/W.PET/CERES
1 CREAM (GRAM) TOPICAL
Qty: 14 | Refills: 0
Start: 2023-01-18 | End: 2023-01-31

## 2023-01-18 RX ORDER — APIXABAN 2.5 MG/1
1 TABLET, FILM COATED ORAL
Qty: 0 | Refills: 0 | DISCHARGE
Start: 2023-01-18

## 2023-01-18 RX ORDER — TRAZODONE HCL 50 MG
1 TABLET ORAL
Qty: 14 | Refills: 0
Start: 2023-01-18 | End: 2023-01-31

## 2023-01-18 RX ORDER — ACETAMINOPHEN 500 MG
3 TABLET ORAL
Qty: 63 | Refills: 0
Start: 2023-01-18 | End: 2023-01-24

## 2023-01-18 RX ORDER — SACUBITRIL AND VALSARTAN 24; 26 MG/1; MG/1
1 TABLET, FILM COATED ORAL
Qty: 0 | Refills: 0 | DISCHARGE

## 2023-01-18 RX ORDER — MAGNESIUM HYDROXIDE 400 MG/1
30 TABLET, CHEWABLE ORAL
Qty: 0 | Refills: 0 | DISCHARGE
Start: 2023-01-18

## 2023-01-18 RX ORDER — AMIODARONE HYDROCHLORIDE 400 MG/1
1 TABLET ORAL
Qty: 14 | Refills: 0
Start: 2023-01-18 | End: 2023-01-31

## 2023-01-18 RX ORDER — SOLIFENACIN SUCCINATE 10 MG/1
1 TABLET ORAL
Qty: 0 | Refills: 0 | DISCHARGE

## 2023-01-18 RX ORDER — QUETIAPINE FUMARATE 200 MG/1
0.5 TABLET, FILM COATED ORAL
Qty: 7 | Refills: 0
Start: 2023-01-18 | End: 2023-01-31

## 2023-01-18 RX ORDER — QUETIAPINE FUMARATE 200 MG/1
1 TABLET, FILM COATED ORAL
Qty: 0 | Refills: 0 | DISCHARGE
Start: 2023-01-18

## 2023-01-18 RX ORDER — DONEPEZIL HYDROCHLORIDE 10 MG/1
1 TABLET, FILM COATED ORAL
Qty: 0 | Refills: 0 | DISCHARGE

## 2023-01-18 RX ORDER — MEMANTINE HYDROCHLORIDE 10 MG/1
1 TABLET ORAL
Qty: 14 | Refills: 0
Start: 2023-01-18 | End: 2023-01-31

## 2023-01-18 RX ORDER — POLYETHYLENE GLYCOL 3350 17 G/17G
17 POWDER, FOR SOLUTION ORAL
Qty: 238 | Refills: 0
Start: 2023-01-18 | End: 2023-01-31

## 2023-01-18 RX ORDER — SENNA PLUS 8.6 MG/1
2 TABLET ORAL
Qty: 28 | Refills: 0
Start: 2023-01-18 | End: 2023-01-31

## 2023-01-18 RX ORDER — QUETIAPINE FUMARATE 200 MG/1
25 TABLET, FILM COATED ORAL DAILY
Refills: 0 | Status: DISCONTINUED | OUTPATIENT
Start: 2023-01-18 | End: 2023-01-20

## 2023-01-18 RX ORDER — TAMSULOSIN HYDROCHLORIDE 0.4 MG/1
1 CAPSULE ORAL
Qty: 30 | Refills: 0
Start: 2023-01-18 | End: 2023-02-16

## 2023-01-18 RX ORDER — ALLOPURINOL 300 MG
1 TABLET ORAL
Qty: 14 | Refills: 0
Start: 2023-01-18 | End: 2023-01-31

## 2023-01-18 RX ORDER — MAGNESIUM HYDROXIDE 400 MG/1
30 TABLET, CHEWABLE ORAL
Qty: 420 | Refills: 0
Start: 2023-01-18 | End: 2023-01-31

## 2023-01-18 RX ORDER — QUETIAPINE FUMARATE 200 MG/1
1 TABLET, FILM COATED ORAL
Qty: 14 | Refills: 0
Start: 2023-01-18 | End: 2023-01-31

## 2023-01-18 RX ORDER — POLYETHYLENE GLYCOL 3350 17 G/17G
17 POWDER, FOR SOLUTION ORAL
Qty: 0 | Refills: 0 | DISCHARGE
Start: 2023-01-18

## 2023-01-18 RX ORDER — CLOPIDOGREL BISULFATE 75 MG/1
1 TABLET, FILM COATED ORAL
Qty: 0 | Refills: 0 | DISCHARGE

## 2023-01-18 RX ORDER — QUETIAPINE FUMARATE 200 MG/1
12.5 TABLET, FILM COATED ORAL
Qty: 14 | Refills: 0
Start: 2023-01-18 | End: 2023-01-31

## 2023-01-18 RX ORDER — METOPROLOL TARTRATE 50 MG
1 TABLET ORAL
Qty: 0 | Refills: 0 | DISCHARGE

## 2023-01-18 RX ORDER — APIXABAN 2.5 MG/1
1 TABLET, FILM COATED ORAL
Qty: 180 | Refills: 0
Start: 2023-01-18 | End: 2023-04-17

## 2023-01-18 RX ORDER — PANTOPRAZOLE SODIUM 20 MG/1
1 TABLET, DELAYED RELEASE ORAL
Qty: 90 | Refills: 0
Start: 2023-01-18 | End: 2023-04-17

## 2023-01-18 RX ADMIN — APIXABAN 5 MILLIGRAM(S): 2.5 TABLET, FILM COATED ORAL at 05:10

## 2023-01-18 RX ADMIN — Medication 975 MILLIGRAM(S): at 21:32

## 2023-01-18 RX ADMIN — QUETIAPINE FUMARATE 50 MILLIGRAM(S): 200 TABLET, FILM COATED ORAL at 21:33

## 2023-01-18 RX ADMIN — MEMANTINE HYDROCHLORIDE 5 MILLIGRAM(S): 10 TABLET ORAL at 11:44

## 2023-01-18 RX ADMIN — AMIODARONE HYDROCHLORIDE 200 MILLIGRAM(S): 400 TABLET ORAL at 05:10

## 2023-01-18 RX ADMIN — Medication 3 MILLIGRAM(S): at 21:37

## 2023-01-18 RX ADMIN — Medication 975 MILLIGRAM(S): at 05:10

## 2023-01-18 RX ADMIN — Medication 300 MILLIGRAM(S): at 11:44

## 2023-01-18 RX ADMIN — APIXABAN 5 MILLIGRAM(S): 2.5 TABLET, FILM COATED ORAL at 16:34

## 2023-01-18 RX ADMIN — Medication 975 MILLIGRAM(S): at 15:00

## 2023-01-18 RX ADMIN — QUETIAPINE FUMARATE 12.5 MILLIGRAM(S): 200 TABLET, FILM COATED ORAL at 07:30

## 2023-01-18 RX ADMIN — SENNA PLUS 2 TABLET(S): 8.6 TABLET ORAL at 21:37

## 2023-01-18 RX ADMIN — Medication 975 MILLIGRAM(S): at 14:09

## 2023-01-18 RX ADMIN — PANTOPRAZOLE SODIUM 40 MILLIGRAM(S): 20 TABLET, DELAYED RELEASE ORAL at 05:10

## 2023-01-18 RX ADMIN — TAMSULOSIN HYDROCHLORIDE 0.4 MILLIGRAM(S): 0.4 CAPSULE ORAL at 21:34

## 2023-01-18 RX ADMIN — Medication 975 MILLIGRAM(S): at 05:40

## 2023-01-18 RX ADMIN — Medication 50 MILLIGRAM(S): at 21:33

## 2023-01-18 RX ADMIN — Medication 975 MILLIGRAM(S): at 22:32

## 2023-01-18 NOTE — CHART NOTE - NSCHARTNOTEFT_GEN_A_CORE
Due to patients deconditioning and generalized weakness secondary to left hip fracture, patient will require a standard wheelchair. This is necessary to achieve daily tasks and therapies which cannot be achieved with the use of a cane, or rolling walker. Patient and family are in agreement with wheelchair use at home, and assistance will be provided if needed.  Based on patients on going issues with deconditioning and generalized weakness secondary to patients diagnosis of left hip fracture. Patient will require a semi-electric hospital bed, with bed rails. This is necessary to achieve positioning and elevation not able to obtain in an ordinary bed. Bed pillows and wedges  have been tried and ruled out.  Lucy Delacruz PA-C  Orthopaedic Surgery  Team pager [phone]  [facility] [phone]  [phone]

## 2023-01-18 NOTE — PROGRESS NOTE ADULT - ASSESSMENT
A/P: 89 y/o M POD#13 s/p L Amaury s/p R Amaury 12/20/22    DVT ppx- Eliquis 5mg PO BID  WBAT  Anterior hip precautions  Pureed diet  Pain management w Tylenol  Jamil to gravity  Discharge planning to [place] pending [facility] 1/19/23  D/W attending      ANISH Baird  Orthopedic Surgery  [phone]

## 2023-01-18 NOTE — PROGRESS NOTE ADULT - SUBJECTIVE AND OBJECTIVE BOX
Patient is a 89 yo male who was recently DCed from [facility] after surgery for a left hip fracture. Th Patient was brought in by EMS from [facility] after multiple falls over the last week.  He was previously admitted to [facility] approximately 2 weeks ago for a fall which resulted in a right hip fracture which was repaired by Dr. Monty Ceron.  He is not able to provide a reliable history.  According to the patient his daughter he has a history of dementia which is gotten much worse since his original fracture.  He is not eating and drinking as he should.  She reports 3 falls over the last week.  X-rays were performed yesterday and she received a phone call today that he fractured his left hip.  Patient denies pain or other injury on review of systems. Patient is s/p an Open reduction and internal fixation of the left hip. Patient tolerated the procedure well. Post op Patient found to have b/l DVTs on dopplers.  Patient with increased agitation this am. Patient seen less agitated      MEDICATIONS  (STANDING):  acetaminophen     Tablet .. 975 milliGRAM(s) Oral every 8 hours  allopurinol 300 milliGRAM(s) Oral daily  aMIOdarone    Tablet 200 milliGRAM(s) Oral daily  apixaban 5 milliGRAM(s) Oral every 12 hours  melatonin 3 milliGRAM(s) Oral at bedtime  memantine 5 milliGRAM(s) Oral daily  pantoprazole    Tablet 40 milliGRAM(s) Oral before breakfast  polyethylene glycol 3350 17 Gram(s) Oral daily  QUEtiapine 50 milliGRAM(s) Oral at bedtime  QUEtiapine 12.5 milliGRAM(s) Oral <User Schedule>  senna 2 Tablet(s) Oral at bedtime  tamsulosin 0.4 milliGRAM(s) Oral at bedtime  traZODone 50 milliGRAM(s) Oral at bedtime    MEDICATIONS  (PRN):  acetaminophen   IVPB .. 1000 milliGRAM(s) IV Intermittent once PRN Severe Pain (7 - 10)  magnesium hydroxide Suspension 30 milliLiter(s) Oral daily PRN Constipation  ondansetron Injectable 4 milliGRAM(s) IV Push every 6 hours PRN Nausea and/or Vomiting          VITALS:   T(C): 36.9 (01-18-23 @ 12:06), Max: 36.9 (01-18-23 @ 12:06)  HR: 61 (01-18-23 @ 12:06) (61 - 77)  BP: 112/72 (01-18-23 @ 12:06) (100/62 - 145/67)  RR: 18 (01-18-23 @ 12:06) (18 - 18)  SpO2: 96% (01-18-23 @ 12:06) (91% - 96%)  Wt(kg): --    PHYSICAL EXAM:  GENERAL: NAD, well-groomed, well-developed  HEAD:  Atraumatic, Normocephalic  EYES: EOMI, PERRLA, conjunctiva and sclera clear  ENMT: No tonsillar erythema, exudates, or enlargement; Moist mucous membranes, Good dentition, No lesions  NECK: Supple, No JVD, Normal thyroid  NERVOUS SYSTEM:  Alert & Oriented X1 Motor Strength 5/5 B/L upper and lower extremities; DTRs 2+ intact and symmetric  CHEST/LUNG: Clear to percussion bilaterally; No rales, rhonchi, wheezing, or rubs  HEART: Regular rate and rhythm; No murmurs, rubs, or gallops  ABDOMEN: Soft, Nontender, Nondistended; Bowel sounds present  EXTREMITIES:  2+ Peripheral Pulses, No clubbing, cyanosis, or edema  LYMPH: No lymphadenopathy noted  SKIN: No rashes or lesions    LABS:          01-18    140  |  107  |  22  ----------------------------<  110<H>  3.9   |  24  |  0.93    Ca    8.6      18 Jan 2023 07:00            CAPILLARY BLOOD GLUCOSE          RADIOLOGY & ADDITIONAL TESTS:

## 2023-01-18 NOTE — PROGRESS NOTE ADULT - SUBJECTIVE AND OBJECTIVE BOX
As per aide at bedside overnight, patient talkative overnight but slept few hours.    T(C): 36.6 (01-18-23 @ 04:30), Max: 36.8 (01-17-23 @ 15:57)  HR: 63 (01-18-23 @ 04:30) (63 - 75)  BP: 124/60 (01-18-23 @ 04:30) (100/62 - 145/67)  RR: 18 (01-18-23 @ 04:30) (18 - 18)  SpO2: 94% (01-18-23 @ 04:30) (91% - 96%)  Wt(kg): --    Exam:  Awake, appears comfortable, follows commands  Lower Extremities:   RLE: Hip incision well healed, +DP, +DF/PF  LLE: Hip dressing C/D/I, +DP, +DF/PF  : +Jamil in place

## 2023-01-19 LAB — SARS-COV-2 RNA SPEC QL NAA+PROBE: SIGNIFICANT CHANGE UP

## 2023-01-19 PROCEDURE — 99232 SBSQ HOSP IP/OBS MODERATE 35: CPT

## 2023-01-19 RX ADMIN — Medication 975 MILLIGRAM(S): at 21:56

## 2023-01-19 RX ADMIN — Medication 975 MILLIGRAM(S): at 13:15

## 2023-01-19 RX ADMIN — Medication 300 MILLIGRAM(S): at 13:15

## 2023-01-19 RX ADMIN — AMIODARONE HYDROCHLORIDE 200 MILLIGRAM(S): 400 TABLET ORAL at 05:02

## 2023-01-19 RX ADMIN — Medication 975 MILLIGRAM(S): at 06:03

## 2023-01-19 RX ADMIN — Medication 975 MILLIGRAM(S): at 05:03

## 2023-01-19 RX ADMIN — TAMSULOSIN HYDROCHLORIDE 0.4 MILLIGRAM(S): 0.4 CAPSULE ORAL at 21:56

## 2023-01-19 RX ADMIN — Medication 50 MILLIGRAM(S): at 21:56

## 2023-01-19 RX ADMIN — SENNA PLUS 2 TABLET(S): 8.6 TABLET ORAL at 21:56

## 2023-01-19 RX ADMIN — Medication 975 MILLIGRAM(S): at 14:15

## 2023-01-19 RX ADMIN — QUETIAPINE FUMARATE 50 MILLIGRAM(S): 200 TABLET, FILM COATED ORAL at 21:57

## 2023-01-19 RX ADMIN — APIXABAN 5 MILLIGRAM(S): 2.5 TABLET, FILM COATED ORAL at 17:48

## 2023-01-19 RX ADMIN — Medication 3 MILLIGRAM(S): at 21:56

## 2023-01-19 RX ADMIN — MEMANTINE HYDROCHLORIDE 5 MILLIGRAM(S): 10 TABLET ORAL at 13:14

## 2023-01-19 RX ADMIN — APIXABAN 5 MILLIGRAM(S): 2.5 TABLET, FILM COATED ORAL at 05:00

## 2023-01-19 RX ADMIN — POLYETHYLENE GLYCOL 3350 17 GRAM(S): 17 POWDER, FOR SOLUTION ORAL at 13:14

## 2023-01-19 RX ADMIN — PANTOPRAZOLE SODIUM 40 MILLIGRAM(S): 20 TABLET, DELAYED RELEASE ORAL at 07:28

## 2023-01-19 NOTE — BH CONSULTATION LIAISON PROGRESS NOTE - NSBHASSESSMENTFT_PSY_ALL_CORE
Pt is a 89yr old,  man for 42yrs, domiciled with wife and part-time HHA 2x weekly from 12pm-4pm, retired manager of a parking garage, with PPHx dementia dx in 2019 on donepezil, no previous SA or psych admissions, no previous inpt psychiatric admissions, no active substance abuse, with PMH significant for BPH, Afib w/PPM, recent admission for RT femoral head fx s/p R Femoral Neck Fx Amaury arthroplasty in 12/2022, presented with L femoral neck fx s/p L his hemiarthroplasty 1/5/2023 psychiatry consulted for management of agitation. Patient assessed at bedside, not oriented to self, place, year or situation. Patient speaking softly and unable to provide any information. Per family, patient has an acute worsening of mental status and agitation as compared to baseline c/w delirium, superimposed on dementia.  1/17/23 PT was calm and cooperative on assessment today. Continue Seroquel 50mg qhs and Trazodone 50mg po qhs
Pt is a 89yr old,  man for 42yrs, domiciled with wife and part-time HHA 2x weekly from 12pm-4pm, retired manager of a parking garage, with PPHx dementia dx in 2019 on donepezil, no previous SA or psych admissions, no previous inpt psychiatric admissions, no active substance abuse, with PMH significant for BPH, Afib w/PPM, recent admission for RT femoral head fx s/p R Femoral Neck Fx Amaury arthroplasty in 12/2022, presented with L femoral neck fx s/p L his hemiarthroplasty 1/5/2023 psychiatry consulted for management of agitation. Patient assessed at bedside, not oriented to self, place, year or situation. Patient speaking softly and unable to provide any information. Per family, patient has an acute worsening of mental status and agitation as compared to baseline c/w delirium, superimposed on dementia.  PT was calm and cooperative on assessment today. Continue Seroquel 50mg qhs and Trazodone 50mg po qhs

## 2023-01-19 NOTE — PROGRESS NOTE ADULT - ASSESSMENT
A/P: 89 y/o M POD#14 s/p L Amaury s/p R Amaury 12/20/22    DVT ppx- Eliquis 5mg PO BID  WBAT  Anterior hip precautions  Pureed diet  Pain management w Tylenol  Jamil to gravity  Discharge planning to [place] pending [place] evaluation today at 10am  D/W attending      ANISH Baird  Orthopedic Surgery  [id number]   A/P: 89 y/o M POD#14 s/p L Amaury s/p R Amaury 12/20/22    DVT ppx- Eliquis 5mg PO BID  B/L LE WBAT/Anterior Hip Precautions  Possibly DC Left hip staples??-->D/W attending  Pureed diet  Pain management w Tylenol  Jamil to gravity  Discharge planning to [place] pending [place] evaluation today at 10am  D/W attending      ANISH Baird  Orthopedic Surgery  [id number]   A/P: 89 y/o M POD#14 s/p L Amaury s/p R Amaury 12/20/22    DVT ppx- Eliquis 5mg PO BID  B/L LE WBAT/Anterior Hip Precautions  Keep Left Hip Staples in place until POD#21 as per Dr. Ceron  Pureed diet  Pain management w Tylenol  Jamil to gravity  Discharge planning to [place] pending [place] evaluation today at 10am  Above D/W attending      ANISH Baird  Orthopedic Surgery  [id number]

## 2023-01-19 NOTE — BH CONSULTATION LIAISON PROGRESS NOTE - NSBHFUPINTERVALHXFT_PSY_A_CORE
Pt is calm and cooperative. He is stating that he does not recall the current month or year or name of the hospital. He says [place] was [place] [facility] and has family there.  When asked who in his family is there, he replies that his mother and father are there. 
Pt is calm and cooperative.

## 2023-01-19 NOTE — BH CONSULTATION LIAISON PROGRESS NOTE - NSBHCONSULTFOLLOWAFTERCARE_PSY_A_CORE FT
OP psych f/u at [facility]- [phone]  [facility] clinic- [phone]
OP psych f/u at [facility]- [phone]  [facility] clinic- [phone]

## 2023-01-19 NOTE — BH CONSULTATION LIAISON PROGRESS NOTE - NSBHCONSULTRECOMMENDOTHER_PSY_A_CORE FT
1. consider Seroquel 25mg PO qdaily PRN for agitation, and 50 mg qhs standing, hold for sedation.  Monitor EKG for QTc<500    2. Melatonin 3mg PO qHS PRN insomnia.    3. Minimize use of benzos, opioids, anticholinergics, or other deliriogenic agents when possible.  Maintain sleep wake cycle.  Provide frequent reorientation and redirection.  Family member at bedside if possible. Assess for need for glasses and hearing aid (if applicable).    4. Pt does not meet criteria for psychiatric hospitalization.  Recommend outpt psych f/u at [facility] after d/c- [phone]. [facility] will follow.    5. dtr in accord with plan above
1. consider Seroquel 25mg PO qdaily PRN for agitation, and 50 mg qhs standing, hold for sedation.  Monitor EKG for QTc<500    2. Melatonin 3mg PO qHS PRN insomnia.    3. Minimize use of benzos, opioids, anticholinergics, or other deliriogenic agents when possible.  Maintain sleep wake cycle.  Provide frequent reorientation and redirection.  Family member at bedside if possible. Assess for need for glasses and hearing aid (if applicable).    4. Pt does not meet criteria for psychiatric hospitalization.  Recommend outpt psych f/u at [facility] after d/c- [phone]. [facility] will follow.    5. dtr in accord with plan above

## 2023-01-19 NOTE — BH CONSULTATION LIAISON PROGRESS NOTE - NSBHCHARTREVIEWVS_PSY_A_CORE FT
Vital Signs Last 24 Hrs  T(C): 36.4 (19 Jan 2023 16:10), Max: 36.9 (18 Jan 2023 19:30)  T(F): 97.5 (19 Jan 2023 16:10), Max: 98.4 (18 Jan 2023 19:30)  HR: 63 (19 Jan 2023 16:10) (59 - 63)  BP: 116/65 (19 Jan 2023 16:10) (112/59 - 132/86)  BP(mean): --  RR: 18 (19 Jan 2023 16:10) (16 - 18)  SpO2: 95% (19 Jan 2023 16:10) (95% - 96%)    Parameters below as of 19 Jan 2023 16:10  Patient On (Oxygen Delivery Method): room air    
Vital Signs Last 24 Hrs  T(C): 36.8 (17 Jan 2023 15:57), Max: 36.8 (17 Jan 2023 15:57)  T(F): 98.2 (17 Jan 2023 15:57), Max: 98.2 (17 Jan 2023 15:57)  HR: 68 (17 Jan 2023 15:57) (67 - 86)  BP: 100/62 (17 Jan 2023 15:57) (100/62 - 136/75)  BP(mean): --  RR: 18 (17 Jan 2023 15:57) (18 - 18)  SpO2: 96% (17 Jan 2023 15:57) (92% - 96%)    Parameters below as of 17 Jan 2023 15:57  Patient On (Oxygen Delivery Method): room air

## 2023-01-19 NOTE — PROGRESS NOTE ADULT - SUBJECTIVE AND OBJECTIVE BOX
Patient is a 89 yo male who was recently DCed from [facility] after surgery for a left hip fracture. Th Patient was brought in by EMS from [facility] after multiple falls over the last week.  He was previously admitted to [facility] approximately 2 weeks ago for a fall which resulted in a right hip fracture which was repaired by Dr. Monty Ceron.  He is not able to provide a reliable history.  According to the patient his daughter he has a history of dementia which is gotten much worse since his original fracture.  He is not eating and drinking as he should.  She reports 3 falls over the last week.  X-rays were performed yesterday and she received a phone call today that he fractured his left hip.  Patient denies pain or other injury on review of systems. Patient is s/p an Open reduction and internal fixation of the left hip. Patient tolerated the procedure well. Post op Patient found to have b/l DVTs on dopplers.  Patient with increased agitation this am. Patient seen less agitated      MEDICATIONS  (STANDING):  acetaminophen     Tablet .. 975 milliGRAM(s) Oral every 8 hours  allopurinol 300 milliGRAM(s) Oral daily  aMIOdarone    Tablet 200 milliGRAM(s) Oral daily  apixaban 5 milliGRAM(s) Oral every 12 hours  melatonin 3 milliGRAM(s) Oral at bedtime  memantine 5 milliGRAM(s) Oral daily  pantoprazole    Tablet 40 milliGRAM(s) Oral before breakfast  polyethylene glycol 3350 17 Gram(s) Oral daily  QUEtiapine 50 milliGRAM(s) Oral at bedtime  senna 2 Tablet(s) Oral at bedtime  tamsulosin 0.4 milliGRAM(s) Oral at bedtime  traZODone 50 milliGRAM(s) Oral at bedtime    MEDICATIONS  (PRN):  acetaminophen   IVPB .. 1000 milliGRAM(s) IV Intermittent once PRN Severe Pain (7 - 10)  magnesium hydroxide Suspension 30 milliLiter(s) Oral daily PRN Constipation  ondansetron Injectable 4 milliGRAM(s) IV Push every 6 hours PRN Nausea and/or Vomiting  QUEtiapine 25 milliGRAM(s) Oral daily PRN Agitation          VITALS:   T(C): 36.4 (01-19-23 @ 16:10), Max: 36.9 (01-18-23 @ 19:30)  HR: 63 (01-19-23 @ 16:10) (59 - 63)  BP: 116/65 (01-19-23 @ 16:10) (112/59 - 132/86)  RR: 18 (01-19-23 @ 16:10) (16 - 18)  SpO2: 95% (01-19-23 @ 16:10) (95% - 96%)  Wt(kg): --    PHYSICAL EXAM:  GENERAL: NAD, well-groomed, well-developed  HEAD:  Atraumatic, Normocephalic  EYES: EOMI, PERRLA, conjunctiva and sclera clear  ENMT: No tonsillar erythema, exudates, or enlargement; Moist mucous membranes, Good dentition, No lesions  NECK: Supple, No JVD, Normal thyroid  NERVOUS SYSTEM:  Alert & Oriented X1 Motor Strength 5/5 B/L upper and lower extremities; DTRs 2+ intact and symmetric  CHEST/LUNG: Clear to percussion bilaterally; No rales, rhonchi, wheezing, or rubs  HEART: Regular rate and rhythm; No murmurs, rubs, or gallops  ABDOMEN: Soft, Nontender, Nondistended; Bowel sounds present  EXTREMITIES:  2+ Peripheral Pulses, No clubbing, cyanosis, or edema  LYMPH: No lymphadenopathy noted  SKIN: No rashes or lesions    LABS:          01-18    140  |  107  |  22  ----------------------------<  110<H>  3.9   |  24  |  0.93    Ca    8.6      18 Jan 2023 07:00            CAPILLARY BLOOD GLUCOSE          RADIOLOGY & ADDITIONAL TESTS:

## 2023-01-19 NOTE — BH CONSULTATION LIAISON PROGRESS NOTE - CURRENT MEDICATION
MEDICATIONS  (STANDING):  acetaminophen     Tablet .. 975 milliGRAM(s) Oral every 8 hours  allopurinol 300 milliGRAM(s) Oral daily  aMIOdarone    Tablet 200 milliGRAM(s) Oral daily  apixaban 5 milliGRAM(s) Oral every 12 hours  melatonin 3 milliGRAM(s) Oral at bedtime  memantine 5 milliGRAM(s) Oral daily  pantoprazole    Tablet 40 milliGRAM(s) Oral before breakfast  polyethylene glycol 3350 17 Gram(s) Oral daily  QUEtiapine 50 milliGRAM(s) Oral at bedtime  QUEtiapine 12.5 milliGRAM(s) Oral <User Schedule>  senna 2 Tablet(s) Oral at bedtime  tamsulosin 0.4 milliGRAM(s) Oral at bedtime  traZODone 50 milliGRAM(s) Oral at bedtime    MEDICATIONS  (PRN):  acetaminophen   IVPB .. 1000 milliGRAM(s) IV Intermittent once PRN Severe Pain (7 - 10)  magnesium hydroxide Suspension 30 milliLiter(s) Oral daily PRN Constipation  ondansetron Injectable 4 milliGRAM(s) IV Push every 6 hours PRN Nausea and/or Vomiting  
MEDICATIONS  (STANDING):  acetaminophen     Tablet .. 975 milliGRAM(s) Oral every 8 hours  allopurinol 300 milliGRAM(s) Oral daily  aMIOdarone    Tablet 200 milliGRAM(s) Oral daily  apixaban 5 milliGRAM(s) Oral every 12 hours  melatonin 3 milliGRAM(s) Oral at bedtime  memantine 5 milliGRAM(s) Oral daily  pantoprazole    Tablet 40 milliGRAM(s) Oral before breakfast  polyethylene glycol 3350 17 Gram(s) Oral daily  QUEtiapine 50 milliGRAM(s) Oral at bedtime  senna 2 Tablet(s) Oral at bedtime  tamsulosin 0.4 milliGRAM(s) Oral at bedtime  traZODone 50 milliGRAM(s) Oral at bedtime    MEDICATIONS  (PRN):  acetaminophen   IVPB .. 1000 milliGRAM(s) IV Intermittent once PRN Severe Pain (7 - 10)  magnesium hydroxide Suspension 30 milliLiter(s) Oral daily PRN Constipation  ondansetron Injectable 4 milliGRAM(s) IV Push every 6 hours PRN Nausea and/or Vomiting  QUEtiapine 25 milliGRAM(s) Oral daily PRN Agitation

## 2023-01-19 NOTE — BH CONSULTATION LIAISON PROGRESS NOTE - NSBHATTESTBILLING_PSY_A_CORE
71611-Ysnffdenhyt diagnostic evaluation with medical services
69953-Yzgpwwczbys diagnostic evaluation with medical services

## 2023-01-19 NOTE — PROGRESS NOTE ADULT - SUBJECTIVE AND OBJECTIVE BOX
Patient resting without complaints.  No chest pain, SOB, N/V.    T(C): 36.9 (01-18-23 @ 19:30), Max: 36.9 (01-18-23 @ 12:06)  HR: 60 (01-18-23 @ 19:30) (60 - 77)  BP: 132/86 (01-18-23 @ 19:30) (107/62 - 132/86)  RR: 18 (01-18-23 @ 19:30) (18 - 18)  SpO2: 95% (01-18-23 @ 19:30) (92% - 96%)      Exam:  Awake, appears comfortable, follows commands  Lower Extremities:   RLE: Hip incision well healed, +DP, +DF/PF  LLE: Hip dressing C/D/I, +DP, +DF/PF  : +Jamil in place           01-18    140  |  107  |  22  ----------------------------<  110<H>  3.9   |  24  |  0.93    Ca    8.6      18 Jan 2023 07:00       Patient resting without complaints.    Attempted to bite nurse this morning as she performed Covid Swab.    T(C): 36.9 (01-18-23 @ 19:30), Max: 36.9 (01-18-23 @ 12:06)  HR: 60 (01-18-23 @ 19:30) (60 - 77)  BP: 132/86 (01-18-23 @ 19:30) (107/62 - 132/86)  RR: 18 (01-18-23 @ 19:30) (18 - 18)  SpO2: 95% (01-18-23 @ 19:30) (92% - 96%)      Exam:  Awake, NAD, follows some commands  Lower Extremities:   RLE: Hip incision well healed, +DP, +DF/PF  LLE: Left hip incision w staples in place, +DP, +DF/PF  : +Jamil in place           01-18    140  |  107  |  22  ----------------------------<  110<H>  3.9   |  24  |  0.93    Ca    8.6      18 Jan 2023 07:00

## 2023-01-20 ENCOUNTER — TRANSCRIPTION ENCOUNTER (OUTPATIENT)
Age: 88
End: 2023-01-20

## 2023-01-20 VITALS
DIASTOLIC BLOOD PRESSURE: 74 MMHG | OXYGEN SATURATION: 95 % | HEART RATE: 65 BPM | RESPIRATION RATE: 18 BRPM | TEMPERATURE: 98 F | SYSTOLIC BLOOD PRESSURE: 126 MMHG

## 2023-01-20 PROCEDURE — 97110 THERAPEUTIC EXERCISES: CPT

## 2023-01-20 PROCEDURE — 85730 THROMBOPLASTIN TIME PARTIAL: CPT

## 2023-01-20 PROCEDURE — 86901 BLOOD TYPING SEROLOGIC RH(D): CPT

## 2023-01-20 PROCEDURE — 36430 TRANSFUSION BLD/BLD COMPNT: CPT

## 2023-01-20 PROCEDURE — 73552 X-RAY EXAM OF FEMUR 2/>: CPT

## 2023-01-20 PROCEDURE — 97162 PT EVAL MOD COMPLEX 30 MIN: CPT

## 2023-01-20 PROCEDURE — 97116 GAIT TRAINING THERAPY: CPT

## 2023-01-20 PROCEDURE — 80053 COMPREHEN METABOLIC PANEL: CPT

## 2023-01-20 PROCEDURE — 86850 RBC ANTIBODY SCREEN: CPT

## 2023-01-20 PROCEDURE — 76376 3D RENDER W/INTRP POSTPROCES: CPT

## 2023-01-20 PROCEDURE — 36415 COLL VENOUS BLD VENIPUNCTURE: CPT

## 2023-01-20 PROCEDURE — 71045 X-RAY EXAM CHEST 1 VIEW: CPT

## 2023-01-20 PROCEDURE — 96374 THER/PROPH/DIAG INJ IV PUSH: CPT

## 2023-01-20 PROCEDURE — 92610 EVALUATE SWALLOWING FUNCTION: CPT

## 2023-01-20 PROCEDURE — 97165 OT EVAL LOW COMPLEX 30 MIN: CPT

## 2023-01-20 PROCEDURE — 86900 BLOOD TYPING SEROLOGIC ABO: CPT

## 2023-01-20 PROCEDURE — U0005: CPT

## 2023-01-20 PROCEDURE — 85025 COMPLETE CBC W/AUTO DIFF WBC: CPT

## 2023-01-20 PROCEDURE — 96372 THER/PROPH/DIAG INJ SC/IM: CPT | Mod: XU

## 2023-01-20 PROCEDURE — 93970 EXTREMITY STUDY: CPT

## 2023-01-20 PROCEDURE — 85610 PROTHROMBIN TIME: CPT

## 2023-01-20 PROCEDURE — 97530 THERAPEUTIC ACTIVITIES: CPT

## 2023-01-20 PROCEDURE — 80048 BASIC METABOLIC PNL TOTAL CA: CPT

## 2023-01-20 PROCEDURE — 85027 COMPLETE CBC AUTOMATED: CPT

## 2023-01-20 PROCEDURE — 72125 CT NECK SPINE W/O DYE: CPT | Mod: MA

## 2023-01-20 PROCEDURE — C1776: CPT

## 2023-01-20 PROCEDURE — 72192 CT PELVIS W/O DYE: CPT | Mod: MA

## 2023-01-20 PROCEDURE — 82550 ASSAY OF CK (CPK): CPT

## 2023-01-20 PROCEDURE — 99285 EMERGENCY DEPT VISIT HI MDM: CPT | Mod: 25

## 2023-01-20 PROCEDURE — P9016: CPT

## 2023-01-20 PROCEDURE — 84295 ASSAY OF SERUM SODIUM: CPT

## 2023-01-20 PROCEDURE — U0003: CPT

## 2023-01-20 PROCEDURE — 86923 COMPATIBILITY TEST ELECTRIC: CPT

## 2023-01-20 PROCEDURE — C9399: CPT

## 2023-01-20 PROCEDURE — 73522 X-RAY EXAM HIPS BI 3-4 VIEWS: CPT

## 2023-01-20 PROCEDURE — 93971 EXTREMITY STUDY: CPT

## 2023-01-20 PROCEDURE — 72170 X-RAY EXAM OF PELVIS: CPT

## 2023-01-20 PROCEDURE — 70450 CT HEAD/BRAIN W/O DYE: CPT | Mod: MA

## 2023-01-20 PROCEDURE — 82040 ASSAY OF SERUM ALBUMIN: CPT

## 2023-01-20 PROCEDURE — 82306 VITAMIN D 25 HYDROXY: CPT

## 2023-01-20 RX ORDER — APIXABAN 2.5 MG/1
1 TABLET, FILM COATED ORAL
Qty: 180 | Refills: 0
Start: 2023-01-20 | End: 2023-04-19

## 2023-01-20 RX ORDER — ALLOPURINOL 300 MG
1 TABLET ORAL
Qty: 0 | Refills: 0 | DISCHARGE
Start: 2023-01-20

## 2023-01-20 RX ORDER — QUETIAPINE FUMARATE 200 MG/1
1 TABLET, FILM COATED ORAL
Qty: 0 | Refills: 0 | DISCHARGE
Start: 2023-01-20

## 2023-01-20 RX ORDER — TAMSULOSIN HYDROCHLORIDE 0.4 MG/1
1 CAPSULE ORAL
Qty: 0 | Refills: 0 | DISCHARGE
Start: 2023-01-20

## 2023-01-20 RX ORDER — MEMANTINE HYDROCHLORIDE 10 MG/1
1 TABLET ORAL
Qty: 0 | Refills: 0 | DISCHARGE
Start: 2023-01-20

## 2023-01-20 RX ORDER — AMIODARONE HYDROCHLORIDE 400 MG/1
1 TABLET ORAL
Qty: 0 | Refills: 0 | DISCHARGE
Start: 2023-01-20

## 2023-01-20 RX ORDER — POLYETHYLENE GLYCOL 3350 17 G/17G
17 POWDER, FOR SOLUTION ORAL
Qty: 0 | Refills: 0 | DISCHARGE
Start: 2023-01-20

## 2023-01-20 RX ORDER — MAGNESIUM HYDROXIDE 400 MG/1
30 TABLET, CHEWABLE ORAL
Qty: 0 | Refills: 0 | DISCHARGE
Start: 2023-01-20

## 2023-01-20 RX ORDER — QUETIAPINE FUMARATE 200 MG/1
1 TABLET, FILM COATED ORAL
Qty: 14 | Refills: 0
Start: 2023-01-20 | End: 2023-02-02

## 2023-01-20 RX ORDER — LANOLIN ALCOHOL/MO/W.PET/CERES
1 CREAM (GRAM) TOPICAL
Qty: 0 | Refills: 0 | DISCHARGE
Start: 2023-01-20

## 2023-01-20 RX ORDER — TRAZODONE HCL 50 MG
1 TABLET ORAL
Qty: 0 | Refills: 0 | DISCHARGE
Start: 2023-01-20

## 2023-01-20 RX ORDER — PANTOPRAZOLE SODIUM 20 MG/1
1 TABLET, DELAYED RELEASE ORAL
Qty: 0 | Refills: 0 | DISCHARGE
Start: 2023-01-20

## 2023-01-20 RX ORDER — ACETAMINOPHEN 500 MG
3 TABLET ORAL
Qty: 0 | Refills: 0 | DISCHARGE
Start: 2023-01-20

## 2023-01-20 RX ORDER — APIXABAN 2.5 MG/1
1 TABLET, FILM COATED ORAL
Qty: 0 | Refills: 0 | DISCHARGE
Start: 2023-01-20

## 2023-01-20 RX ORDER — SENNA PLUS 8.6 MG/1
2 TABLET ORAL
Qty: 0 | Refills: 0 | DISCHARGE
Start: 2023-01-20

## 2023-01-20 RX ADMIN — Medication 975 MILLIGRAM(S): at 06:53

## 2023-01-20 RX ADMIN — Medication 975 MILLIGRAM(S): at 13:37

## 2023-01-20 RX ADMIN — Medication 975 MILLIGRAM(S): at 06:13

## 2023-01-20 RX ADMIN — MEMANTINE HYDROCHLORIDE 5 MILLIGRAM(S): 10 TABLET ORAL at 11:18

## 2023-01-20 RX ADMIN — APIXABAN 5 MILLIGRAM(S): 2.5 TABLET, FILM COATED ORAL at 06:12

## 2023-01-20 RX ADMIN — AMIODARONE HYDROCHLORIDE 200 MILLIGRAM(S): 400 TABLET ORAL at 06:12

## 2023-01-20 RX ADMIN — PANTOPRAZOLE SODIUM 40 MILLIGRAM(S): 20 TABLET, DELAYED RELEASE ORAL at 06:12

## 2023-01-20 RX ADMIN — Medication 975 MILLIGRAM(S): at 14:37

## 2023-01-20 RX ADMIN — Medication 300 MILLIGRAM(S): at 11:18

## 2023-01-20 NOTE — DISCHARGE NOTE NURSING/CASE MANAGEMENT/SOCIAL WORK - NSDCVIVACCINE_GEN_ALL_CORE_FT
Tdap; 19-Dec-2022 20:14; Kacie Rosado (RN); [employer]; [id number] (Exp. Date: 08-Dec-2024); IntraMuscular; Deltoid Left.; 0.5 milliLiter(s); VIS (VIS Published: 09-May-2013, VIS Presented: 19-Dec-2022);

## 2023-01-20 NOTE — PROGRESS NOTE ADULT - PROBLEM SELECTOR PROBLEM 4
BPH (benign prostatic hyperplasia)

## 2023-01-20 NOTE — PROGRESS NOTE ADULT - PROBLEM SELECTOR PLAN 6
Pain meds as needed  follow for oversedation  GI Regime to prevent constipation.

## 2023-01-20 NOTE — PROGRESS NOTE ADULT - PROVIDER SPECIALTY LIST ADULT
Orthopedics
Internal Medicine
Internal Medicine
Orthopedics
Orthopedics
Vascular Surgery
Internal Medicine
Orthopedics
Internal Medicine

## 2023-01-20 NOTE — PROGRESS NOTE ADULT - ASSESSMENT
A/P: 89 y/o M POD# 15 s/p L Amaury, s/p R Amaury 12/20/22 orthopedically stable     DVT ppx- Eliquis 5mg PO BID  B/L LE WBAT/Anterior Hip Precautions  Keep Left Hip Staples in place until POD#21 as per Dr. Ceron  PT  Pureed diet  Pain management w Tylenol  Jamil to gravity  Discharge planning to [facility] ([facility] did not accept patient yesterday and family aware)  D/W attending      ANISH Baird  Orthopedic Surgery  [phone]

## 2023-01-20 NOTE — CHART NOTE - NSCHARTNOTESELECT_GEN_ALL_CORE
24-hour Notice/Event Note
RX cancellation/Event Note
D/C Appt/Event Note
Dental cnslt/Event Note
Dental consult/Event Note
Event Note
Hematology
Heme onc recs/Event Note
Hypotensive/Event Note
Orthopedic POC/Event Note
Seroquel/Event Note
Speech and Swallow/Event Note
Staples RLE Removed/Event Note

## 2023-01-20 NOTE — DISCHARGE NOTE NURSING/CASE MANAGEMENT/SOCIAL WORK - NSDCPEFALRISK_GEN_ALL_CORE
For information on Fall & Injury Prevention, visit: [email or web address] OR  [email or web address] OR  https://www.cdc.gov/steadi/patient.html

## 2023-01-20 NOTE — PROGRESS NOTE ADULT - PROBLEM SELECTOR PLAN 1
PT/OT-WBAT, ant prec  IS  DVT PPx  Pain Control  Continue Current Tx.  Discharge planning [facility] vs. Assisted living    Wayne Cheema PA-C  Team Pager: #[phone]
PT/OT-WBAT, ant prec  IS  DVT PPx  Pain Control  Continue Current Tx.  discharge planning  ck am labs    Wayne Cheema PA-C  Team Pager: #[phone]
PT/OT-WBAT, ant prec  IS  DVT PPx  Pain Control  Continue Current Tx.  discharge planning-[facility] Cheema PA-C  Team Pager: #[phone]
Patient tolerated the procedure well  PO as tolerated  DVT and GI prophylaxis  physical therapy as tolerated
Patient tolerated the procedure well  PO as tolerated  DVT and GI prophylaxis
PO as tolerated  DVT and GI prophylaxis  physical therapy as tolerated
Patient tolerated the procedure well  PO as tolerated  DVT and GI prophylaxis  physical therapy as tolerated
PO as tolerated  DVT and GI prophylaxis  physical therapy as tolerated
Patient tolerated the procedure well  PO as tolerated  DVT and GI prophylaxis  physical therapy as tolerated
Patient tolerated the procedure well  PO as tolerated  DVT and GI prophylaxis  physical therapy as tolerated
PO as tolerated  DVT and GI prophylaxis  physical therapy as tolerated
Patient tolerated the procedure well  PO as tolerated  DVT and GI prophylaxis  physical therapy as tolerated
PO as tolerated  DVT and GI prophylaxis  physical therapy as tolerated
Patient tolerated the procedure well  PO as tolerated  DVT and GI prophylaxis
Patient tolerated the procedure well  PO as tolerated  DVT and GI prophylaxis  physical therapy as tolerated
Patient tolerated the procedure well  PO as tolerated  DVT and GI prophylaxis

## 2023-01-20 NOTE — PROGRESS NOTE ADULT - PROBLEM SELECTOR PLAN 3
will continue to monitor BP  Not currently on antihypertensive meds  will continue to monitor BP and add meds as needed  Low sodium diet.

## 2023-01-20 NOTE — DISCHARGE NOTE NURSING/CASE MANAGEMENT/SOCIAL WORK - PATIENT PORTAL LINK FT
You can access the FollowMyHealth Patient Portal offered by [facility] by registering at the following website: [email or web address]. By joining [employer]’s FollowMyHealth portal, you will also be able to view your health information using other applications (apps) compatible with our system.

## 2023-01-20 NOTE — PROGRESS NOTE ADULT - PROBLEM SELECTOR PROBLEM 5
Dementia

## 2023-01-20 NOTE — CHART NOTE - NSCHARTNOTEFT_GEN_A_CORE
Ortho PA Note    [facility] [address] [facility] ([phone]) called to cancel Rx e-prescribed as per [facility]   Facility request on 1/18/23. Medications were e-prescribed on 1/18/23 and patient not accepted at [facility] on 1/19/23 (patient being discharged to rehab).   As per [facility] pharmacy, Rx will be cancelled.        ANISH Baird  Orthopedic Surgery  [phone]

## 2023-01-20 NOTE — DISCHARGE NOTE NURSING/CASE MANAGEMENT/SOCIAL WORK - NSTOBACCONEVERSMOKERY/N_GEN_A
CERTIFICATE OF WORK         12/2/2022          Re: Hilda Blackburn   [address] [facility] [id number]        This is to certify that Hilda Blackburn has been under my care from 12/2/2022 and is unable to return to work until further notice. COVID and influenza swabs are pending.                     SIGNATURE:___________________________________________,     Ely Rosario PA-C        Urgent Care Services - [address] [address] [phone]  
No

## 2023-01-20 NOTE — PROGRESS NOTE ADULT - PROBLEM SELECTOR PLAN 7
Patient found to have B/L DVTs  Started on full dose Lovenox  continue to monitor   eliquis 5mg BID started
Patient found to have B/L DVTs  Started on full dose Lovenox  continue to monitor   eventual switch to NOAC
Patient found to have B/L DVTs  Started on full dose Lovenox  continue to monitor   eventual switch to NOAC
Patient found to have B/L DVTs  Started on full dose Lovenox  continue to monitor   eliquis 5mg BID started
Patient found to have B/L DVTs  Started on full dose Lovenox  continue to monitor   eliquis 5mg BID started
Patient found to have B/L DVTs  Started on full dose Lovenox  continue to monitor   eventual switch to NOAC
Patient found to have B/L DVTs  Started on full dose Lovenox  continue to monitor   eventual switch to NOAC
Patient found to have B/L DVTs  Started on full dose Lovenox  continue to monitor   eliquis 5mg BID started
Patient found to have B/L DVTs  Started on full dose Lovenox  continue to monitor   eventual switch to NOAC  would start eliquis 5mg BID
Patient found to have B/L DVTs  Started on full dose Lovenox  continue to monitor   eliquis 5mg BID started
Patient found to have B/L DVTs  Started on full dose Lovenox  continue to monitor   eventual switch to NOAC
Patient found to have B/L DVTs  Started on full dose Lovenox  continue to monitor   eliquis 5mg BID started
Patient found to have B/L DVTs  Started on full dose Lovenox  continue to monitor   eventual switch to NOAC

## 2023-01-20 NOTE — PROGRESS NOTE ADULT - PROBLEM SELECTOR PLAN 4
continue to monitor urine out put  continue tamsulosin  follow for signs of retention.
Jamil placed for urinary retention  DC Ditropan  continue tamsulosin  follow urine output
continue to monitor urine out put  continue tamsulosin  follow for signs of retention.
Jamil placed for urinary retention  DC ditropan  continue tamsulosin  follow urine output
continue to monitor urine out put  continue tamsulosin  follow for signs of retention.
Jamil placed for urinary retention  DC ditropan  continue tamsulosin  follow urine output
continue to monitor urine out put  continue tamsulosin  follow for signs of retention.
Jamil placed for urinary retention  continue tamsulosin  follow urine output
Jamil placed for urinary retention  continue tamsulosin  follow urine output
continue to monitor urine out put  continue tamsulosin  follow for signs of retention.
continue to monitor urine out put  continue tamsulosin  follow for signs of retention.

## 2023-01-20 NOTE — PROGRESS NOTE ADULT - PROBLEM SELECTOR PROBLEM 2
Chronic atrial fibrillation

## 2023-01-20 NOTE — PROGRESS NOTE ADULT - SUBJECTIVE AND OBJECTIVE BOX
Patient resting without complaints.    No private aide at bedside overnight.  As per overnight RN, no events overnight.      T(C): 36.4 (01-20-23 @ 04:15), Max: 36.7 (01-19-23 @ 13:00)  HR: 64 (01-20-23 @ 04:15) (59 - 74)  BP: 136/76 (01-20-23 @ 04:15) (112/59 - 146/76)  RR: 18 (01-20-23 @ 04:15) (18 - 18)  SpO2: 96% (01-20-23 @ 04:15) (93% - 96%)      Exam:  Gen: Sleeping upon entering room, easily arousable, follows commands, when asked if has pain replied "its ok"  Lower Extremities:   RLE: Hip incision well healed, +DP, +DF/PF  LLE: Left hip w Aquacel in place C/D/I, +DP, +DF/PF  : +Jamil in place    Labs:     01-18    140  |  107  |  22  ----------------------------<  110<H>  3.9   |  24  |  0.93    Ca    8.6      18 Jan 2023 07:00

## 2023-01-20 NOTE — DISCHARGE NOTE NURSING/CASE MANAGEMENT/SOCIAL WORK - NSDCFUADDAPPT_GEN_ALL_CORE_FT
APPTS ARE READY TO BE MADE: [X] YES    Best Family or Patient Contact (if needed):    Additional Information about above appointments (if needed):    1: 1 month f/u with Dr. Juarez for Eliquis management   2:  2 week f/u with Dr. Ceron for wound checks  3:     Other comments or requests:

## 2023-01-20 NOTE — PROGRESS NOTE ADULT - SUBJECTIVE AND OBJECTIVE BOX
Patient is a 89 yo male who was recently DCed from [facility] after surgery for a left hip fracture. Th Patient was brought in by EMS from [facility] after multiple falls over the last week.  He was previously admitted to [facility] approximately 2 weeks ago for a fall which resulted in a right hip fracture which was repaired by Dr. Monty Ceron.  He is not able to provide a reliable history.  According to the patient his daughter he has a history of dementia which is gotten much worse since his original fracture.  He is not eating and drinking as he should.  She reports 3 falls over the last week.  X-rays were performed yesterday and she received a phone call today that he fractured his left hip.  Patient denies pain or other injury on review of systems. Patient is s/p an Open reduction and internal fixation of the left hip. Patient tolerated the procedure well. Post op Patient found to have b/l DVTs on dopplers.  Patient with increased agitation this am. Patient been less agitated      MEDICATIONS  (STANDING):  acetaminophen     Tablet .. 975 milliGRAM(s) Oral every 8 hours  allopurinol 300 milliGRAM(s) Oral daily  aMIOdarone    Tablet 200 milliGRAM(s) Oral daily  apixaban 5 milliGRAM(s) Oral every 12 hours  melatonin 3 milliGRAM(s) Oral at bedtime  memantine 5 milliGRAM(s) Oral daily  pantoprazole    Tablet 40 milliGRAM(s) Oral before breakfast  polyethylene glycol 3350 17 Gram(s) Oral daily  QUEtiapine 50 milliGRAM(s) Oral at bedtime  senna 2 Tablet(s) Oral at bedtime  tamsulosin 0.4 milliGRAM(s) Oral at bedtime  traZODone 50 milliGRAM(s) Oral at bedtime    MEDICATIONS  (PRN):  acetaminophen   IVPB .. 1000 milliGRAM(s) IV Intermittent once PRN Severe Pain (7 - 10)  magnesium hydroxide Suspension 30 milliLiter(s) Oral daily PRN Constipation  ondansetron Injectable 4 milliGRAM(s) IV Push every 6 hours PRN Nausea and/or Vomiting  QUEtiapine 25 milliGRAM(s) Oral daily PRN Agitation          VITALS:   T(C): 36.4 (01-20-23 @ 08:06), Max: 36.5 (01-20-23 @ 00:05)  HR: 65 (01-20-23 @ 08:06) (63 - 74)  BP: 126/74 (01-20-23 @ 08:06) (116/65 - 146/76)  RR: 18 (01-20-23 @ 08:06) (18 - 18)  SpO2: 95% (01-20-23 @ 08:06) (93% - 96%)  Wt(kg): --    PHYSICAL EXAM:  GENERAL: NAD, well-groomed, well-developed  HEAD:  Atraumatic, Normocephalic  EYES: EOMI, PERRLA, conjunctiva and sclera clear  ENMT: No tonsillar erythema, exudates, or enlargement; Moist mucous membranes, Good dentition, No lesions  NECK: Supple, No JVD, Normal thyroid  NERVOUS SYSTEM:  Alert & Oriented X1 Motor Strength 5/5 B/L upper and lower extremities; DTRs 2+ intact and symmetric  CHEST/LUNG: Clear to percussion bilaterally; No rales, rhonchi, wheezing, or rubs  HEART: Regular rate and rhythm; No murmurs, rubs, or gallops  ABDOMEN: Soft, Nontender, Nondistended; Bowel sounds present  EXTREMITIES:  2+ Peripheral Pulses, No clubbing, cyanosis, or edema  LYMPH: No lymphadenopathy noted  SKIN: No rashes or lesions    LABS:                      CAPILLARY BLOOD GLUCOSE          RADIOLOGY & ADDITIONAL TESTS:

## 2023-01-20 NOTE — PROGRESS NOTE ADULT - PROBLEM SELECTOR PLAN 5
would ask Psych to reevaluate with continue agitation   Trazadone added by psychiatry   continue to monitor for oversedation  discussed with family at bedside
Psych following  continue seroquel  Trazadone added by psychiatry   continue to monitor for oversedation  Patient is less agitated today
less lethargic today  continue seroquel to 12.5 mg in the and 50mg qPM  continue to monitor for oversedation  discussed with family at bedside  consider DCing day time dose of seroquel with increased somnolence
Patient seems overmedicated today  will decrease seroquel to 12.5 mg in the and 50mg qPM  continue to monitor for oversedation  discussed with family at bedside
less lethargic today  continue seroquel to 12.5 mg in the and 50mg qPM  Trazadone added by psychiatry   continue to monitor for oversedation  discussed with family at bedside
Continue to reorient Patient as needed  consider Seroquel as needed.
less lethargic today  continue seroquel to 12.5 mg in the and 50mg qPM  Trazadone added by psychiatry   continue to monitor for oversedation  discussed with family at bedside
less lethargic today  continue seroquel to 12.5 mg in the and 50mg qPM  continue to monitor for oversedation  discussed with family at bedside
Psych following  continue seroquel  Trazadone added by psychiatry   continue to monitor for oversedation  discussed with family at bedside
less lethargic today  continue seroquel to 12.5 mg in the and 50mg qPM  continue to monitor for oversedation  discussed with family at bedside  consider DCing day time dose of seroquel with increased somnolence
Psych following  continue seroquel  Trazadone added by psychiatry   continue to monitor for oversedation  Patient is less agitated today
Psych following  continue seroquel  Trazadone added by psychiatry   continue to monitor for oversedation  Patient is less agitated today
less lethargic today  continue seroquel to 12.5 mg in the and 50mg qPM  continue to monitor for oversedation  discussed with family at bedside  consider DCing day time dose of seroquel with increased somnolence

## 2023-01-20 NOTE — PROGRESS NOTE ADULT - ATTENDING COMMENTS
L FN fx.  For alina.  R/B/A discussed w ULISES
s/p alina. + DVT.  Start full AC w Lovenox.  f/u h/h.  OOB. will likely need PRBC
PT. DVT ppx. f/u labs. DC planning
PT. DVT ppx. f/u labs. DC planning
PT. DVT ppx. f/u labs. f/u medicine. DC planning
PT. DVT tx. Behavioral restrainits. f/u labs. DC planning
f/u medicine.  continue behavioral interventions to protect pt from harming himself.  OOB. f/u imaging.
f/u medicine.  continue behavioral interventions to protect pt from harming himself.  OOB. f/u imaging.
PT. DVT ppx. f/u labs. DC planning
PT. DVT ppx. DC planning
PT. DVT ppx. f/u labs. dc planning
PT. DVT ppx. f/u labs. DC planning
PT. DVT ppx. f/u labs. f/u medicine. dvt ppx

## 2023-01-20 NOTE — PROGRESS NOTE ADULT - PROBLEM SELECTOR PLAN 2
continue to monitor rate  continue amiodarone for rate control  Patient is not currently on AC with frequent falls   will continue to follow rat and add meds as needed.

## 2023-01-20 NOTE — PROGRESS NOTE ADULT - PROBLEM SELECTOR PROBLEM 7
Deep vein thrombosis

## 2023-01-20 NOTE — PROGRESS NOTE ADULT - PROBLEM SELECTOR PROBLEM 1
Hip fracture, left

## 2023-01-20 NOTE — PROGRESS NOTE ADULT - REASON FOR ADMISSION
L Hip Fx

## 2023-01-20 NOTE — PROGRESS NOTE ADULT - TIME BILLING
Discussed treatment plan with staff and patient at bedside
Discussed treatment plan with staff and patient at bedside  awaiting placement
Discussed treatment plan with patient and family at bedside
Discussed treatment plan with staff and patient at bedside  continue to await placement
Discussed treatment plan with staff and patient at bedside  awaitig placement
Discussed treatment plan with patient and family at bedside  suggested DC how with 24 hours aides
Discussed treatment plan with staff and patient at bedside  awaiting placement
Discussed treatment plan with patient and family at bedside  suggested DC how with 24 hours aides
Discussed treatment plan with patient and family at bedside
Discussed treatment plan with staff and patient at bedside
Discussed treatment plan with staff and patient at bedside  awaiting placement
Discussed treatment plan with staff and patient at bedside  awaiting evaluation for placement

## 2023-01-23 PROBLEM — G30.9 ALZHEIMER'S DISEASE, UNSPECIFIED: Chronic | Status: ACTIVE | Noted: 2022-12-20

## 2023-01-30 ENCOUNTER — APPOINTMENT (OUTPATIENT)
Dept: ORTHOPEDIC SURGERY | Facility: CLINIC | Age: 88
End: 2023-01-30

## 2023-02-07 ENCOUNTER — APPOINTMENT (OUTPATIENT)
Dept: UROLOGY | Facility: CLINIC | Age: 88
End: 2023-02-07
Payer: MEDICARE

## 2023-02-07 DIAGNOSIS — R33.9 RETENTION OF URINE, UNSPECIFIED: ICD-10-CM

## 2023-02-07 DIAGNOSIS — R35.0 FREQUENCY OF MICTURITION: ICD-10-CM

## 2023-02-07 PROCEDURE — 99213 OFFICE O/P EST LOW 20 MIN: CPT

## 2023-02-07 NOTE — REVIEW OF SYSTEMS
[see HPI] : see HPI [History of kidney stones] : history of kidney stones [Wake up at night to urinate  How many times?  ___] : wakes up to urinate [unfilled] times during the night [Strong urge to urinate] : strong urge to urinate [Negative] : Heme/Lymph

## 2023-02-07 NOTE — HISTORY OF PRESENT ILLNESS
[Urinary Urgency] : urinary urgency [Urinary Frequency] : urinary frequency [FreeTextEntry1] : 89 male. Here with his daughter ([employer] Employee) and his wife.\par \par cc: frequent urination, elida at night (5-6x at night); bedwetting\par HPI: Since August 2022, the frequency and nocturia became more noticeable. During the day, pt is experiencing frequency. At night, main complaint is nocturia 5-6x/night and enuresis. Reports being on Flomax in the past but was stopped. Denies: hematuria, chills, fever. Hx of Dementia which has been worsening. Family concerned with attempting to render patient more dry with less wetting accidents, difficult to manage at home given worsening dementia. Does poor job with fluid intake, does endorse signifiicant constipation. \par \par Allergies: NKDA\par Anticoagulants: Plavix\par Medical: Dementia, bradycardia, heart failure, kidney stones, constipation\par Surgical: PPM, cholecystectomy, possibly kidney stone surgery\par Social: lives at home with wife, walks with cane\par Family: no known family cancers\par \par PVR: 44 mL\par \par 2/7:\par Here today with prior frequency of urination and bedwetting. Previously restarted flomax and vesicare and avoiding constipation. Patient is a poor historian. Had multiple failed void trials the last week of January with PVRs in the 500-700cc. Catheter has been inserted and we discussed options for urinary retention which include SPT, ye placement. Daughter agreed to ye placement and monthly changes. Bacterial colonization is common with ye placement and discussed with daughter and only to treat symptomatic urinary tract infections.  [Urinary Incontinence] : no urinary incontinence [Urinary Retention] : no urinary retention

## 2023-02-09 ENCOUNTER — INPATIENT (INPATIENT)
Facility: HOSPITAL | Age: 88
LOS: 26 days | Discharge: NURSING, REHAB OR LONG-TERM CARE | DRG: 871 | End: 2023-03-08
Attending: STUDENT IN AN ORGANIZED HEALTH CARE EDUCATION/TRAINING PROGRAM | Admitting: STUDENT IN AN ORGANIZED HEALTH CARE EDUCATION/TRAINING PROGRAM
Payer: MEDICARE

## 2023-02-09 VITALS — HEIGHT: 68 IN

## 2023-02-09 DIAGNOSIS — N39.0 URINARY TRACT INFECTION, SITE NOT SPECIFIED: ICD-10-CM

## 2023-02-09 LAB
ALBUMIN SERPL ELPH-MCNC: 2.9 G/DL — LOW (ref 3.3–5)
ALP SERPL-CCNC: 134 U/L — HIGH (ref 40–120)
ALT FLD-CCNC: 9 U/L — LOW (ref 10–45)
ANION GAP SERPL CALC-SCNC: 12 MMOL/L — SIGNIFICANT CHANGE UP (ref 5–17)
ANISOCYTOSIS BLD QL: SLIGHT — SIGNIFICANT CHANGE UP
APPEARANCE UR: ABNORMAL
APTT BLD: 33.1 SEC — SIGNIFICANT CHANGE UP (ref 27.5–35.5)
AST SERPL-CCNC: 18 U/L — SIGNIFICANT CHANGE UP (ref 10–40)
BACTERIA # UR AUTO: NEGATIVE — SIGNIFICANT CHANGE UP
BASE EXCESS BLDV CALC-SCNC: 4.4 MMOL/L — HIGH (ref -2–3)
BASOPHILS # BLD AUTO: 0 K/UL — SIGNIFICANT CHANGE UP (ref 0–0.2)
BASOPHILS NFR BLD AUTO: 0 % — SIGNIFICANT CHANGE UP (ref 0–2)
BILIRUB SERPL-MCNC: 0.8 MG/DL — SIGNIFICANT CHANGE UP (ref 0.2–1.2)
BILIRUB UR-MCNC: NEGATIVE — SIGNIFICANT CHANGE UP
BUN SERPL-MCNC: 20 MG/DL — SIGNIFICANT CHANGE UP (ref 7–23)
CA-I SERPL-SCNC: 1.25 MMOL/L — SIGNIFICANT CHANGE UP (ref 1.15–1.33)
CALCIUM SERPL-MCNC: 8.9 MG/DL — SIGNIFICANT CHANGE UP (ref 8.4–10.5)
CHLORIDE BLDV-SCNC: 106 MMOL/L — SIGNIFICANT CHANGE UP (ref 96–108)
CHLORIDE SERPL-SCNC: 106 MMOL/L — SIGNIFICANT CHANGE UP (ref 96–108)
CO2 BLDV-SCNC: 31 MMOL/L — HIGH (ref 22–26)
CO2 SERPL-SCNC: 24 MMOL/L — SIGNIFICANT CHANGE UP (ref 22–31)
COLOR SPEC: ABNORMAL
CREAT SERPL-MCNC: 1.13 MG/DL — SIGNIFICANT CHANGE UP (ref 0.5–1.3)
DACRYOCYTES BLD QL SMEAR: SLIGHT — SIGNIFICANT CHANGE UP
DIFF PNL FLD: ABNORMAL
EGFR: 61 ML/MIN/1.73M2 — SIGNIFICANT CHANGE UP
EOSINOPHIL # BLD AUTO: 0 K/UL — SIGNIFICANT CHANGE UP (ref 0–0.5)
EOSINOPHIL NFR BLD AUTO: 0 % — SIGNIFICANT CHANGE UP (ref 0–6)
EPI CELLS # UR: 0 /HPF — SIGNIFICANT CHANGE UP
GAS PNL BLDV: 138 MMOL/L — SIGNIFICANT CHANGE UP (ref 136–145)
GAS PNL BLDV: SIGNIFICANT CHANGE UP
GAS PNL BLDV: SIGNIFICANT CHANGE UP
GLUCOSE BLDV-MCNC: 116 MG/DL — HIGH (ref 70–99)
GLUCOSE SERPL-MCNC: 121 MG/DL — HIGH (ref 70–99)
GLUCOSE UR QL: NEGATIVE — SIGNIFICANT CHANGE UP
HCO3 BLDV-SCNC: 30 MMOL/L — HIGH (ref 22–29)
HCT VFR BLD CALC: 33.3 % — LOW (ref 39–50)
HCT VFR BLDA CALC: 32 % — LOW (ref 39–51)
HGB BLD CALC-MCNC: 10.8 G/DL — LOW (ref 12.6–17.4)
HGB BLD-MCNC: 10.5 G/DL — LOW (ref 13–17)
HYALINE CASTS # UR AUTO: 0 /LPF — SIGNIFICANT CHANGE UP (ref 0–2)
INR BLD: 2.63 RATIO — HIGH (ref 0.88–1.16)
KETONES UR-MCNC: NEGATIVE — SIGNIFICANT CHANGE UP
LACTATE BLDV-MCNC: 2.1 MMOL/L — HIGH (ref 0.5–2)
LEUKOCYTE ESTERASE UR-ACNC: ABNORMAL
LYMPHOCYTES # BLD AUTO: 0.52 K/UL — LOW (ref 1–3.3)
LYMPHOCYTES # BLD AUTO: 1.6 % — LOW (ref 13–44)
MANUAL SMEAR VERIFICATION: SIGNIFICANT CHANGE UP
MCHC RBC-ENTMCNC: 29.7 PG — SIGNIFICANT CHANGE UP (ref 27–34)
MCHC RBC-ENTMCNC: 31.5 GM/DL — LOW (ref 32–36)
MCV RBC AUTO: 94.1 FL — SIGNIFICANT CHANGE UP (ref 80–100)
MONOCYTES # BLD AUTO: 1.29 K/UL — HIGH (ref 0–0.9)
MONOCYTES NFR BLD AUTO: 4 % — SIGNIFICANT CHANGE UP (ref 2–14)
NEUTROPHILS # BLD AUTO: 30.42 K/UL — HIGH (ref 1.8–7.4)
NEUTROPHILS NFR BLD AUTO: 92 % — HIGH (ref 43–77)
NEUTS BAND # BLD: 2.4 % — SIGNIFICANT CHANGE UP (ref 0–8)
NITRITE UR-MCNC: NEGATIVE — SIGNIFICANT CHANGE UP
NT-PROBNP SERPL-SCNC: 5255 PG/ML — HIGH (ref 0–300)
OVALOCYTES BLD QL SMEAR: SLIGHT — SIGNIFICANT CHANGE UP
PCO2 BLDV: 47 MMHG — SIGNIFICANT CHANGE UP (ref 42–55)
PH BLDV: 7.41 — SIGNIFICANT CHANGE UP (ref 7.32–7.43)
PH UR: 6.5 — SIGNIFICANT CHANGE UP (ref 5–8)
PLAT MORPH BLD: NORMAL — SIGNIFICANT CHANGE UP
PLATELET # BLD AUTO: 302 K/UL — SIGNIFICANT CHANGE UP (ref 150–400)
PO2 BLDV: 44 MMHG — SIGNIFICANT CHANGE UP (ref 25–45)
POIKILOCYTOSIS BLD QL AUTO: SLIGHT — SIGNIFICANT CHANGE UP
POTASSIUM BLDV-SCNC: 4.5 MMOL/L — SIGNIFICANT CHANGE UP (ref 3.5–5.1)
POTASSIUM SERPL-MCNC: 4.6 MMOL/L — SIGNIFICANT CHANGE UP (ref 3.5–5.3)
POTASSIUM SERPL-SCNC: 4.6 MMOL/L — SIGNIFICANT CHANGE UP (ref 3.5–5.3)
PROT SERPL-MCNC: 5.7 G/DL — LOW (ref 6–8.3)
PROT UR-MCNC: ABNORMAL
PROTHROM AB SERPL-ACNC: 30.8 SEC — HIGH (ref 10.5–13.4)
RAPID RVP RESULT: SIGNIFICANT CHANGE UP
RBC # BLD: 3.54 M/UL — LOW (ref 4.2–5.8)
RBC # FLD: 16.2 % — HIGH (ref 10.3–14.5)
RBC BLD AUTO: ABNORMAL
RBC CASTS # UR COMP ASSIST: 382 /HPF — HIGH (ref 0–4)
SAO2 % BLDV: 75.3 % — SIGNIFICANT CHANGE UP (ref 67–88)
SARS-COV-2 RNA SPEC QL NAA+PROBE: SIGNIFICANT CHANGE UP
SODIUM SERPL-SCNC: 142 MMOL/L — SIGNIFICANT CHANGE UP (ref 135–145)
SP GR SPEC: 1.02 — SIGNIFICANT CHANGE UP (ref 1.01–1.02)
TROPONIN T, HIGH SENSITIVITY RESULT: 40 NG/L — SIGNIFICANT CHANGE UP (ref 0–51)
TROPONIN T, HIGH SENSITIVITY RESULT: 48 NG/L — SIGNIFICANT CHANGE UP (ref 0–51)
UROBILINOGEN FLD QL: NEGATIVE — SIGNIFICANT CHANGE UP
WBC # BLD: 32.22 K/UL — HIGH (ref 3.8–10.5)
WBC # FLD AUTO: 32.22 K/UL — HIGH (ref 3.8–10.5)
WBC UR QL: 55 /HPF — HIGH (ref 0–5)

## 2023-02-09 PROCEDURE — 99222 1ST HOSP IP/OBS MODERATE 55: CPT | Mod: GC

## 2023-02-09 PROCEDURE — 70450 CT HEAD/BRAIN W/O DYE: CPT | Mod: 26,MA

## 2023-02-09 PROCEDURE — 74177 CT ABD & PELVIS W/CONTRAST: CPT | Mod: 26,MA

## 2023-02-09 PROCEDURE — 99291 CRITICAL CARE FIRST HOUR: CPT

## 2023-02-09 PROCEDURE — 71275 CT ANGIOGRAPHY CHEST: CPT | Mod: 26,MA

## 2023-02-09 PROCEDURE — 76604 US EXAM CHEST: CPT | Mod: 26

## 2023-02-09 PROCEDURE — 71045 X-RAY EXAM CHEST 1 VIEW: CPT | Mod: 26

## 2023-02-09 PROCEDURE — 93308 TTE F-UP OR LMTD: CPT | Mod: 26

## 2023-02-09 RX ORDER — SODIUM CHLORIDE 9 MG/ML
500 INJECTION, SOLUTION INTRAVENOUS ONCE
Refills: 0 | Status: DISCONTINUED | OUTPATIENT
Start: 2023-02-09 | End: 2023-02-09

## 2023-02-09 RX ORDER — SODIUM CHLORIDE 9 MG/ML
1000 INJECTION INTRAMUSCULAR; INTRAVENOUS; SUBCUTANEOUS ONCE
Refills: 0 | Status: COMPLETED | OUTPATIENT
Start: 2023-02-09 | End: 2023-02-09

## 2023-02-09 RX ORDER — PIPERACILLIN AND TAZOBACTAM 4; .5 G/20ML; G/20ML
3.38 INJECTION, POWDER, LYOPHILIZED, FOR SOLUTION INTRAVENOUS ONCE
Refills: 0 | Status: COMPLETED | OUTPATIENT
Start: 2023-02-09 | End: 2023-02-09

## 2023-02-09 RX ORDER — SODIUM CHLORIDE 9 MG/ML
500 INJECTION INTRAMUSCULAR; INTRAVENOUS; SUBCUTANEOUS ONCE
Refills: 0 | Status: COMPLETED | OUTPATIENT
Start: 2023-02-09 | End: 2023-02-09

## 2023-02-09 RX ORDER — VANCOMYCIN HCL 1 G
1000 VIAL (EA) INTRAVENOUS ONCE
Refills: 0 | Status: COMPLETED | OUTPATIENT
Start: 2023-02-09 | End: 2023-02-09

## 2023-02-09 RX ORDER — NOREPINEPHRINE BITARTRATE/D5W 8 MG/250ML
0.1 PLASTIC BAG, INJECTION (ML) INTRAVENOUS
Qty: 8 | Refills: 0 | Status: DISCONTINUED | OUTPATIENT
Start: 2023-02-09 | End: 2023-02-09

## 2023-02-09 RX ORDER — ALBUMIN HUMAN 25 %
500 VIAL (ML) INTRAVENOUS ONCE
Refills: 0 | Status: DISCONTINUED | OUTPATIENT
Start: 2023-02-09 | End: 2023-02-09

## 2023-02-09 RX ORDER — SODIUM CHLORIDE 9 MG/ML
1000 INJECTION INTRAMUSCULAR; INTRAVENOUS; SUBCUTANEOUS ONCE
Refills: 0 | Status: DISCONTINUED | OUTPATIENT
Start: 2023-02-09 | End: 2023-02-09

## 2023-02-09 RX ORDER — PIPERACILLIN AND TAZOBACTAM 4; .5 G/20ML; G/20ML
3.38 INJECTION, POWDER, LYOPHILIZED, FOR SOLUTION INTRAVENOUS ONCE
Refills: 0 | Status: DISCONTINUED | OUTPATIENT
Start: 2023-02-09 | End: 2023-02-09

## 2023-02-09 RX ORDER — ALBUMIN HUMAN 25 %
250 VIAL (ML) INTRAVENOUS ONCE
Refills: 0 | Status: COMPLETED | OUTPATIENT
Start: 2023-02-09 | End: 2023-02-09

## 2023-02-09 RX ORDER — MIDODRINE HYDROCHLORIDE 2.5 MG/1
10 TABLET ORAL ONCE
Refills: 0 | Status: COMPLETED | OUTPATIENT
Start: 2023-02-09 | End: 2023-02-09

## 2023-02-09 RX ORDER — ACETAMINOPHEN 500 MG
1000 TABLET ORAL ONCE
Refills: 0 | Status: COMPLETED | OUTPATIENT
Start: 2023-02-09 | End: 2023-02-09

## 2023-02-09 RX ORDER — SODIUM CHLORIDE 9 MG/ML
1000 INJECTION, SOLUTION INTRAVENOUS
Refills: 0 | Status: DISCONTINUED | OUTPATIENT
Start: 2023-02-09 | End: 2023-02-12

## 2023-02-09 RX ADMIN — SODIUM CHLORIDE 1000 MILLILITER(S): 9 INJECTION INTRAMUSCULAR; INTRAVENOUS; SUBCUTANEOUS at 10:40

## 2023-02-09 RX ADMIN — PIPERACILLIN AND TAZOBACTAM 25 GRAM(S): 4; .5 INJECTION, POWDER, LYOPHILIZED, FOR SOLUTION INTRAVENOUS at 22:41

## 2023-02-09 RX ADMIN — Medication 1000 MILLIGRAM(S): at 11:45

## 2023-02-09 RX ADMIN — SODIUM CHLORIDE 500 MILLILITER(S): 9 INJECTION INTRAMUSCULAR; INTRAVENOUS; SUBCUTANEOUS at 11:59

## 2023-02-09 RX ADMIN — SODIUM CHLORIDE 100 MILLILITER(S): 9 INJECTION, SOLUTION INTRAVENOUS at 12:49

## 2023-02-09 RX ADMIN — SODIUM CHLORIDE 100 MILLILITER(S): 9 INJECTION, SOLUTION INTRAVENOUS at 20:08

## 2023-02-09 RX ADMIN — SODIUM CHLORIDE 500 MILLILITER(S): 9 INJECTION INTRAMUSCULAR; INTRAVENOUS; SUBCUTANEOUS at 17:40

## 2023-02-09 RX ADMIN — Medication 13.1 MICROGRAM(S)/KG/MIN: at 17:27

## 2023-02-09 RX ADMIN — Medication 0.1 MICROGRAM(S)/KG/MIN: at 17:30

## 2023-02-09 RX ADMIN — Medication 125 MILLILITER(S): at 22:42

## 2023-02-09 RX ADMIN — SODIUM CHLORIDE 500 MILLILITER(S): 9 INJECTION INTRAMUSCULAR; INTRAVENOUS; SUBCUTANEOUS at 11:30

## 2023-02-09 RX ADMIN — Medication 250 MILLIGRAM(S): at 10:45

## 2023-02-09 RX ADMIN — Medication 0.1 MICROGRAM(S)/KG/MIN: at 17:00

## 2023-02-09 RX ADMIN — Medication 400 MILLIGRAM(S): at 10:39

## 2023-02-09 RX ADMIN — Medication 13.1 MICROGRAM(S)/KG/MIN: at 15:49

## 2023-02-09 RX ADMIN — Medication 125 MILLILITER(S): at 20:10

## 2023-02-09 RX ADMIN — PIPERACILLIN AND TAZOBACTAM 200 GRAM(S): 4; .5 INJECTION, POWDER, LYOPHILIZED, FOR SOLUTION INTRAVENOUS at 10:40

## 2023-02-09 RX ADMIN — SODIUM CHLORIDE 500 MILLILITER(S): 9 INJECTION INTRAMUSCULAR; INTRAVENOUS; SUBCUTANEOUS at 17:23

## 2023-02-09 NOTE — ED ADULT NURSE REASSESSMENT NOTE - NS ED NURSE REASSESS COMMENT FT1
Pharmacy contacted regarding Zosyn order, pt received initial loading dose of Zosyn @ 1040am and ordered maintenance dose of Zosyn @ 2200 pm, RN contacted pharmacy to confirm order due to Zosyn protocol due to time lapse between Zosyn doses, as per pharmacy pt ok to receive maintenance dose at this time, pt medicated as per MD Kingsley and MD Dai order, pt A&Ox1, 1:1 at bedside for safety due to risk of harm to self by removing monitoring devices.

## 2023-02-09 NOTE — CONSULT NOTE ADULT - SUBJECTIVE AND OBJECTIVE BOX
CHIEF COMPLAINT:     HPI:  89 yo male with hx of dementia, afib s/p ppm, recent hip arthroplasty s/p fall, c/b LE DVTs, LUE DVT on eliquis, chronic ye, bph, gout, subdural hematoma s/p craniotomy, BIBEMS from NH for evaluation of hypotension in setting of known UTI. Per NH records, patient currently on cipro for UTI. Culture sensitivites in packet. Was noted ot be agitated, hypotensive and hypoxic prior to transfer to ED. Febrile at NH. Pt AOx1, unable ot provide hx. Per documentation patient is DNR/DNI. Wife at bedside. She reports patient's States patient has had dark-colored colored for several days. Pt saw urologist on Friday and     In the ED, patient febrile to 101.6, HR 80's, bp 80/50's, satting high 80's on RA. Labs with leukocytosis to 32.2, hg 10.5, plt's 302. PT/INR 30.8/2.63. Serum chemistries unremarkable. VBG ph 7.41, lactate 2.1, pco2 47. Ua positive for blood and WBC's, lare LE, negative for nitries or bacteria. RVP negative. Pending blood cultures, urine culture. Chest xray without any focal consolidations. CTA without any PE, with small-mod pleural effusions bl, bl atelactasis at the bases, dilated RV and RA. CT head negative for any acute changes, with chronic microvascular and lacunar changes.     Patient given zosyn and vanc x1. 1g ofirmev. Patient given 2L NS bolus and started on 100cc/hr LR mIVF. Patient remained hypotensive following fluids and started on levophed. Started on 4-5L NC O2 support.       PAST MEDICAL & SURGICAL HISTORY:  AF (atrial fibrillation)      Hypertension      Arthritis      Benign prostatic hypertrophy      Gout      Subdural hematoma      Pacemaker      Alzheimer disease      Alzheimer disease      S/P craniotomy      S/P laparoscopic cholecystectomy      S/P umbilical hernia repair, follow-up exam      No significant past surgical history          FAMILY HISTORY:      SOCIAL HISTORY:  Smoking: __ packs x ___ years  EtOH Use:  Marital Status:  Occupation:  Recent Travel:  Country of Birth:  Advance Directives:    Allergies    No Known Allergies    Intolerances        HOME MEDICATIONS:    REVIEW OF SYSTEMS:  Constitutional:   Eyes:  ENT:  CV:  Resp:  GI:  :  MSK:  Integumentary:  Neurological:  Psychiatric:  Endocrine:  Hematologic/Lymphatic:  Allergic/Immunologic:  [ ] All other systems negative  [ ] Unable to assess ROS because ________    OBJECTIVE:  ICU Vital Signs Last 24 Hrs  T(C): 36.6 (2023 16:10), Max: 38.7 (2023 10:30)  T(F): 97.9 (2023 16:10), Max: 101.6 (2023 10:30)  HR: 63 (2023 16:10) (63 - 81)  BP: 90/59 (2023 16:24) (80/45 - 106/66)  BP(mean): 70 (2023 16:24) (56 - 80)  ABP: --  ABP(mean): --  RR: 16 (2023 15:52) (16 - 22)  SpO2: 95% (2023 15:56) (88% - 100%)    O2 Parameters below as of 2023 15:56  Patient On (Oxygen Delivery Method): nasal cannula  O2 Flow (L/min): 4            CAPILLARY BLOOD GLUCOSE          PHYSICAL EXAM:  General:   HEENT:   Lymph Nodes:  Neck:   Respiratory:   Cardiovascular:   Abdomen:   Extremities:   Skin:   Neurological:  Psychiatry:    HOSPITAL MEDICATIONS:  MEDICATIONS  (STANDING):  lactated ringers. 1000 milliLiter(s) (100 mL/Hr) IV Continuous <Continuous>  norepinephrine Infusion 0.1 MICROgram(s)/kG/Min (13.1 mL/Hr) IV Continuous <Continuous>    MEDICATIONS  (PRN):      LABS:                        10.5   32.22 )-----------( 302      ( 2023 10:57 )             33.3         142  |  106  |  20  ----------------------------<  121<H>  4.6   |  24  |  1.13    Ca    8.9      2023 10:57    TPro  5.7<L>  /  Alb  2.9<L>  /  TBili  0.8  /  DBili  x   /  AST  18  /  ALT  9<L>  /  AlkPhos  134<H>      PT/INR - ( 2023 10:57 )   PT: 30.8 sec;   INR: 2.63 ratio         PTT - ( 2023 10:57 )  PTT:33.1 sec  Urinalysis Basic - ( 2023 10:53 )    Color: Light Orange / Appearance: Slightly Turbid / S.018 / pH: x  Gluc: x / Ketone: Negative  / Bili: Negative / Urobili: Negative   Blood: x / Protein: 30 mg/dL / Nitrite: Negative   Leuk Esterase: Large / RBC: 382 /hpf / WBC 55 /HPF   Sq Epi: x / Non Sq Epi: 0 /hpf / Bacteria: Negative        Venous Blood Gas:   @ 13:14  7.36/43/37/24/59.8  VBG Lactate: 1.9  Venous Blood Gas:   @ 10:55  7.41/47/44/30/75.3  VBG Lactate: 2.1      MICROBIOLOGY:     RADIOLOGY:  [ ] Reviewed and interpreted by me    EKG: CHIEF COMPLAINT:     HPI:  89 yo male with hx of dementia, afib s/p ppm, recent hip arthroplasty s/p fall, c/b LE DVTs, LUE DVT on eliquis, chronic ye, bph, gout, subdural hematoma s/p craniotomy, BIBEMS from NH for evaluation of hypotension in setting of known UTI. Per NH records, patient currently on cipro for UTI. Culture sensitivites in packet. Was noted ot be agitated, hypotensive and hypoxic prior to transfer to ED. Febrile at NH. Pt AOx1, unable ot provide hx. Per documentation patient is DNR/DNI. Wife at bedside. She reports patient's States patient has had dark-colored colored for several days. Pt saw urologist on Friday and     In the ED, patient febrile to 101.6, HR 80's, bp 80/50's, satting high 80's on RA. Labs with leukocytosis to 32.2, hg 10.5, plt's 302. PT/INR 30.8/2.63. Serum chemistries unremarkable. VBG ph 7.41, lactate 2.1, pco2 47. Ua positive for blood and WBC's, lare LE, negative for nitries or bacteria. RVP negative. Pending blood cultures, urine culture. Chest xray without any focal consolidations. CTA without any PE, with small-mod pleural effusions bl, bl atelactasis at the bases, dilated RV and RA. CT head negative for any acute changes, with chronic microvascular and lacunar changes.     Patient given zosyn and vanc x1. 1g ofirmev. Patient given 2L NS bolus and started on 100cc/hr LR mIVF. Patient remained hypotensive following fluids and started on levophed. Started on 4-5L NC O2 support.     Pt is DNR/DNI. MOLST in chart.       PAST MEDICAL & SURGICAL HISTORY:  AF (atrial fibrillation)      Hypertension      Arthritis      Benign prostatic hypertrophy      Gout      Subdural hematoma      Pacemaker      Alzheimer disease      Alzheimer disease      S/P craniotomy      S/P laparoscopic cholecystectomy      S/P umbilical hernia repair, follow-up exam      No significant past surgical history          FAMILY HISTORY:      SOCIAL HISTORY:  Smoking: __ packs x ___ years  EtOH Use:  Marital Status:  Occupation:  Recent Travel:  Country of Birth:  Advance Directives:    Allergies    No Known Allergies    Intolerances        HOME MEDICATIONS:    REVIEW OF SYSTEMS:  Constitutional:   Eyes:  ENT:  CV:  Resp:  GI:  :  MSK:  Integumentary:  Neurological:  Psychiatric:  Endocrine:  Hematologic/Lymphatic:  Allergic/Immunologic:  [ ] All other systems negative  [ ] Unable to assess ROS because ________    OBJECTIVE:  ICU Vital Signs Last 24 Hrs  T(C): 36.6 (2023 16:10), Max: 38.7 (2023 10:30)  T(F): 97.9 (2023 16:10), Max: 101.6 (2023 10:30)  HR: 63 (2023 16:10) (63 - 81)  BP: 90/59 (2023 16:24) (80/45 - 106/66)  BP(mean): 70 (2023 16:24) (56 - 80)  ABP: --  ABP(mean): --  RR: 16 (2023 15:52) (16 - 22)  SpO2: 95% (2023 15:56) (88% - 100%)    O2 Parameters below as of 2023 15:56  Patient On (Oxygen Delivery Method): nasal cannula  O2 Flow (L/min): 4            CAPILLARY BLOOD GLUCOSE          PHYSICAL EXAM:  General:   HEENT:   Lymph Nodes:  Neck:   Respiratory:   Cardiovascular:   Abdomen:   Extremities:   Skin:   Neurological:  Psychiatry:    HOSPITAL MEDICATIONS:  MEDICATIONS  (STANDING):  lactated ringers. 1000 milliLiter(s) (100 mL/Hr) IV Continuous <Continuous>  norepinephrine Infusion 0.1 MICROgram(s)/kG/Min (13.1 mL/Hr) IV Continuous <Continuous>    MEDICATIONS  (PRN):      LABS:                        10.5   32.22 )-----------( 302      ( 2023 10:57 )             33.3     02-09    142  |  106  |  20  ----------------------------<  121<H>  4.6   |  24  |  1.13    Ca    8.9      2023 10:57    TPro  5.7<L>  /  Alb  2.9<L>  /  TBili  0.8  /  DBili  x   /  AST  18  /  ALT  9<L>  /  AlkPhos  134<H>  02-09    PT/INR - ( 2023 10:57 )   PT: 30.8 sec;   INR: 2.63 ratio         PTT - ( 2023 10:57 )  PTT:33.1 sec  Urinalysis Basic - ( 2023 10:53 )    Color: Light Orange / Appearance: Slightly Turbid / S.018 / pH: x  Gluc: x / Ketone: Negative  / Bili: Negative / Urobili: Negative   Blood: x / Protein: 30 mg/dL / Nitrite: Negative   Leuk Esterase: Large / RBC: 382 /hpf / WBC 55 /HPF   Sq Epi: x / Non Sq Epi: 0 /hpf / Bacteria: Negative        Venous Blood Gas:   @ 13:14  7.36/43/37/24/59.8  VBG Lactate: 1.9  Venous Blood Gas:   @ 10:55  7.41/47/44/30/75.3  VBG Lactate: 2.1      MICROBIOLOGY:     RADIOLOGY:  [ ] Reviewed and interpreted by me    EKG: CHIEF COMPLAINT:     HPI:  89 yo male with hx of dementia, afib s/p ppm, recent b/l hip arthroplasty s/p fall, c/b LE DVTs, LUE DVT on eliquis, chronic ye, bph, gout, subdural hematoma s/p craniotomy, BIBEMS from rehab for evaluation of hypotension in setting of known UTI. Per NH records, patient currently on cipro for UTI. Culture sensitivites in packet. Was noted ot be agitated, hypotensive and hypoxic prior to transfer to ED. Febrile at NH. Pt AOx1, unable ot provide hx. Per documentation patient is DNR/DNI. Wife at bedside. She reports patient's States patient has had dark-colored colored for several days. Pt saw urologist on Friday and     In the ED, patient febrile to 101.6, HR 80's, bp 80/50's, satting high 80's on RA. Labs with leukocytosis to 32.2, hg 10.5, plt's 302. PT/INR 30.8/2.63. Serum chemistries unremarkable. VBG ph 7.41, lactate 2.1, pco2 47. Ua positive for blood and WBC's, lare LE, negative for nitries or bacteria. RVP negative. Pending blood cultures, urine culture. Chest xray without any focal consolidations. CTA without any PE, with small-mod pleural effusions bl, bl atelactasis at the bases, dilated RV and RA. CT head negative for any acute changes, with chronic microvascular and lacunar changes.     Patient given zosyn and vanc x1. 1g ofirmev. Patient given 2L NS bolus and started on 100cc/hr LR mIVF. Patient remained hypotensive following fluids and started on levophed. Started on 4-5L NC O2 support.     Pt is DNR/DNI. MOLST in chart.     PAST MEDICAL & SURGICAL HISTORY:  AF (atrial fibrillation)      Hypertension      Arthritis      Benign prostatic hypertrophy      Gout      Subdural hematoma      Pacemaker      Alzheimer disease      Alzheimer disease      S/P craniotomy      S/P laparoscopic cholecystectomy      S/P umbilical hernia repair, follow-up exam      No significant past surgical history          FAMILY HISTORY:      SOCIAL HISTORY:  Smoking: __ packs x ___ years  EtOH Use:  Marital Status:  Occupation:  Recent Travel:  Country of Birth:  Advance Directives:    Allergies    No Known Allergies    Intolerances        HOME MEDICATIONS:    REVIEW OF SYSTEMS:  Constitutional:   Eyes:  ENT:  CV:  Resp:  GI:  :  MSK:  Integumentary:  Neurological:  Psychiatric:  Endocrine:  Hematologic/Lymphatic:  Allergic/Immunologic:  [ ] All other systems negative  [ ] Unable to assess ROS because ________    OBJECTIVE:  ICU Vital Signs Last 24 Hrs  T(C): 36.6 (2023 16:10), Max: 38.7 (2023 10:30)  T(F): 97.9 (2023 16:10), Max: 101.6 (2023 10:30)  HR: 63 (2023 16:10) (63 - 81)  BP: 90/59 (2023 16:24) (80/45 - 106/66)  BP(mean): 70 (2023 16:24) (56 - 80)  ABP: --  ABP(mean): --  RR: 16 (2023 15:52) (16 - 22)  SpO2: 95% (2023 15:56) (88% - 100%)    O2 Parameters below as of 2023 15:56  Patient On (Oxygen Delivery Method): nasal cannula  O2 Flow (L/min): 4            CAPILLARY BLOOD GLUCOSE          PHYSICAL EXAM:  General:   HEENT:   Lymph Nodes:  Neck:   Respiratory:   Cardiovascular:   Abdomen:   Extremities:   Skin:   Neurological:  Psychiatry:    HOSPITAL MEDICATIONS:  MEDICATIONS  (STANDING):  lactated ringers. 1000 milliLiter(s) (100 mL/Hr) IV Continuous <Continuous>  norepinephrine Infusion 0.1 MICROgram(s)/kG/Min (13.1 mL/Hr) IV Continuous <Continuous>    MEDICATIONS  (PRN):      LABS:                        10.5   32.22 )-----------( 302      ( 2023 10:57 )             33.3     02-09    142  |  106  |  20  ----------------------------<  121<H>  4.6   |  24  |  1.13    Ca    8.9      2023 10:57    TPro  5.7<L>  /  Alb  2.9<L>  /  TBili  0.8  /  DBili  x   /  AST  18  /  ALT  9<L>  /  AlkPhos  134<H>      PT/INR - ( 2023 10:57 )   PT: 30.8 sec;   INR: 2.63 ratio         PTT - ( 2023 10:57 )  PTT:33.1 sec  Urinalysis Basic - ( 2023 10:53 )    Color: Light Orange / Appearance: Slightly Turbid / S.018 / pH: x  Gluc: x / Ketone: Negative  / Bili: Negative / Urobili: Negative   Blood: x / Protein: 30 mg/dL / Nitrite: Negative   Leuk Esterase: Large / RBC: 382 /hpf / WBC 55 /HPF   Sq Epi: x / Non Sq Epi: 0 /hpf / Bacteria: Negative        Venous Blood Gas:   @ 13:14  7.36/43/37/24/59.8  VBG Lactate: 1.9  Venous Blood Gas:   @ 10:55  7.41/47/44/30/75.3  VBG Lactate: 2.1      MICROBIOLOGY:     RADIOLOGY:  [ ] Reviewed and interpreted by me    EKG: CHIEF COMPLAINT:     HPI:  89 yo male with hx of dementia, afib, PPM, recent b/l hip arthroplasty s/p fall, c/b LE DVTs, LUE DVT on eliquis, ye since last hospitalization, bph, gout, subdural hematoma s/p craniotomy, BIBEMS from rehab for evaluation of fever, hypoxia and hypotension in setting of known UTI. Per rehab records, patient currently on ciprofloxacin for Klebsiella UTI, pansensitive. He saw urologist on Friday. Patient's daughter reports patient has been increasingly agitated over the past couple of days and was trying to pull out ye at rehab. He was recently started on seroquel at rehab for agitation. He has had dark-colored urine for the past week and daughter reports he was also complaining of right-sided flank pain. No cough, vomiting, diarrhea. Daughter reports patient is A&Ox1 at baseline, wheelchair-bound.    In the ED, patient febrile to 101.6 rectally, HR 80's, bp 80/50's, satting high 80's on RA. Labs with leukocytosis to 32.2, hg 10.5, plt's 302. PT/INR 30.8/2.63. Serum chemistries unremarkable. VBG ph 7.41, lactate 2.1, pco2 47. Ua positive for blood and WBC's, lare LE, negative for nitries or bacteria. RVP negative. Pending blood cultures, urine culture. Chest xray without any focal consolidations. CTA without any PE, with small-mod pleural effusions bl, bl atelactasis at the bases, dilated RV and RA. CT head negative for any acute changes, with chronic microvascular and lacunar changes.     Patient given zosyn and vanc x1. 1g ofirmev. Patient given 2L NS bolus and started on 100cc/hr LR mIVF. Patient remained hypotensive following fluids and started on levophed. Started on 4-5L NC O2 support.     Pt is DNR/DNI. MOLST in chart.     PAST MEDICAL & SURGICAL HISTORY:  AF (atrial fibrillation)      Hypertension      Arthritis      Benign prostatic hypertrophy      Gout      Subdural hematoma      Pacemaker      Alzheimer disease      Alzheimer disease      S/P craniotomy      S/P laparoscopic cholecystectomy      S/P umbilical hernia repair, follow-up exam      No significant past surgical history          FAMILY HISTORY:      SOCIAL HISTORY:  Smoking: __ packs x ___ years  EtOH Use:  Marital Status:  Occupation:  Recent Travel:  Country of Birth:  Advance Directives:    Allergies    No Known Allergies    Intolerances        HOME MEDICATIONS:    REVIEW OF SYSTEMS:  [ ] Unable to assess ROS due to dementia    OBJECTIVE:  ICU Vital Signs Last 24 Hrs  T(C): 36.6 (2023 16:10), Max: 38.7 (2023 10:30)  T(F): 97.9 (2023 16:10), Max: 101.6 (2023 10:30)  HR: 63 (2023 16:10) (63 - 81)  BP: 90/59 (2023 16:24) (80/45 - 106/66)  BP(mean): 70 (2023 16:24) (56 - 80)  ABP: --  ABP(mean): --  RR: 16 (2023 15:52) (16 - 22)  SpO2: 95% (2023 15:56) (88% - 100%)    O2 Parameters below as of 2023 15:56  Patient On (Oxygen Delivery Method): nasal cannula  O2 Flow (L/min): 4            CAPILLARY BLOOD GLUCOSE    PHYSICAL EXAM:  Constitutional: ill-appearing  HEENT: DMM, EOMI, PERRL  Neck: supple, no JVD  Lungs: normal WOB at rest, CTAB  Heart: RRR, physiologic S1 and S2, no murmurs, no rubs, no gallops, no LE edema  Abd: +suprapubic tenderness soft, nondistended. Ye draining dark-colored, blood-tinged urine  Extremities: warm hands and feet, no edema  Neuro: A&Ox1, follows basic commands. No focal deficits  MSK: spontaneous movement of all 4 extremities  Skin: Warm. No rashes    HOSPITAL MEDICATIONS:  MEDICATIONS  (STANDING):  lactated ringers. 1000 milliLiter(s) (100 mL/Hr) IV Continuous <Continuous>  norepinephrine Infusion 0.1 MICROgram(s)/kG/Min (13.1 mL/Hr) IV Continuous <Continuous>    MEDICATIONS  (PRN):      LABS:                        10.5   32.22 )-----------( 302      ( 2023 10:57 )             33.3         142  |  106  |  20  ----------------------------<  121<H>  4.6   |  24  |  1.13    Ca    8.9      2023 10:57    TPro  5.7<L>  /  Alb  2.9<L>  /  TBili  0.8  /  DBili  x   /  AST  18  /  ALT  9<L>  /  AlkPhos  134<H>      PT/INR - ( 2023 10:57 )   PT: 30.8 sec;   INR: 2.63 ratio         PTT - ( 2023 10:57 )  PTT:33.1 sec  Urinalysis Basic - ( 2023 10:53 )    Color: Light Orange / Appearance: Slightly Turbid / S.018 / pH: x  Gluc: x / Ketone: Negative  / Bili: Negative / Urobili: Negative   Blood: x / Protein: 30 mg/dL / Nitrite: Negative   Leuk Esterase: Large / RBC: 382 /hpf / WBC 55 /HPF   Sq Epi: x / Non Sq Epi: 0 /hpf / Bacteria: Negative        Venous Blood Gas:   @ 13:14  7.36/43/37/24/59.8  VBG Lactate: 1.9  Venous Blood Gas:   @ 10:55  7.41/47/44/30/75.3  VBG Lactate: 2.1      MICROBIOLOGY:     RADIOLOGY:  [ ] Reviewed and interpreted by me    EKG:

## 2023-02-09 NOTE — ED PROVIDER NOTE - PHYSICAL EXAMINATION
General: appears chronically ill, AOx0  Skin: no rash, no pallor  Head: normocephalic, atraumatic  Eyes: clear conjunctiva, EOMI  ENMT: airway patent, no nasal discharge  Cardiovascular: irregularly irregular, S1/S2  Pulmonary: clear to auscultation bilaterally, no rales, rhonchi, or wheeze, hypoxic to 89% on RA, improves with 4LNC   Abdomen: soft, nontender, ye catheter in place   Musculoskeletal: moving extremities well, no deformity  Psych: calm, cooperative for PE/testing

## 2023-02-09 NOTE — ED PROVIDER NOTE - CLINICAL SUMMARY MEDICAL DECISION MAKING FREE TEXT BOX
Stephane Mathews, PGY-3- 89 year old male with recent admission for L hip arthroplasty, c/b b/l dvts, lue dvt, here for ams in setting of known uti. Pt with hypoxemia, hypotension on ems arrival. Pt previously on Cipro x5 days. Stephane Mathews, PGY-3- 89 year old male with recent admission for L hip arthroplasty, c/b b/l dvts, lue dvt, here for ams in setting of known uti. Pt with hypoxemia, hypotension on ems arrival. Pt previously on Cipro x5 days. Pt DNR/DNI per documentation. Plan to treat and evaluate for sepsis. will be given empiric zosyn/vanc, ivf, ofirmev. will need admission to hospital. plan to obtain ct head, change ye, send ua/uc from new ye. low suspicion for pe given pt on eliquis, although hypoxic, possibly concurrent pna, however, pt at high risk- will get cta if gfr can support iv contrast. BP MAP >65 in ED. Stephane Bisi, PGY-3- 89 year old male with recent admission for L hip arthroplasty, c/b b/l dvts, lue dvt, here for ams in setting of known uti. Pt with hypoxemia, hypotension on ems arrival. Pt previously on Cipro x5 days. Pt DNR/DNI per documentation. Plan to treat and evaluate for sepsis. will be given empiric zosyn/vanc, ivf, ofirmev. will need admission to hospital. plan to obtain ct head, change ye, send ua/uc from new ye. low suspicion for pe given pt on eliquis, although hypoxic, possibly concurrent pna, however, pt at high risk- will get cta if gfr can support iv contrast. BP MAP >65 in ED.    ISMAEL Murray MD: Pt is a 89 y/o male with PMH Afib, hip arthroplasty cb/ b/l LE DVTs and LUE DVT on eliquis, ye in place, BIB EMS from nursing facility for AMS, hypotension. Pt being treated for known klebsiella UTI with cipro. Pt with hypoxia on arrival as well. Concern for UTI/sepsis, pna, new PE, other. Plan: sepsis labs, CTA chest, CTAP, empiric abx, tba

## 2023-02-09 NOTE — CONSULT NOTE ADULT - ATTENDING COMMENTS
89M Hx Dementia, HTN, A.Fib on ELQ, s/p PPM, DVT, Bilateral Hip ORIF, Chronic Jamil, Falls with SDH s/p Crani Evacuation, Rehab Discharged after prolong Hospitalization p/w ED Febrile Hypotension, Bilateral Flank Pain, Microscopic Hematuria and UA +ve Klebsiella UTI.   - A&O x 1-2 per Wife at bedside answering questions slowly  - Sepsis associated Hypotension, Dehydration s/p 2Li IV Fluid Resuscitation   - Titrate MAP > 65 mmHg to wean off IV Levophed Gtt    - Empiric ABx Coverage for Kleb UTI pending CT Abdomen   - Consider 5% Albumin 500 cc and PO Midodrine as tolerated   - DNR/DNI confirmed by Wife 89M Hx Dementia, HTN, A.Fib on ELQ, s/p PPM, DVT, Bilateral Hip ORIF, Chronic Jamil, Falls with SDH s/p Crani Evacuation, Rehab Discharged after prolong Hospitalization p/w ED Febrile Hypotension, Bilateral Flank Pain, Microscopic Hematuria and UA +ve Klebsiella UTI.   - A&O x 1-2 per Wife at bedside answering questions slowly  - Sepsis associated Hypotension, Dehydration s/p 2Li IV Fluid Resuscitation   - Titrate MAP > 65 mmHg to wean off IV Levophed Gtt    - Empiric ABx Coverage for Kleb UTI pending CT Abdomen   - 5% Albumin 500 cc and PO Midodrine as tolerated   - DNR/DNI confirmed by Wife     Patient seen and examined with ICU Resident/Fellow at bedside after lab data, medical records and radiology reports reviewed. I have read and agreeable in general with resident's Documented Note, Assessment and Management Plan which reflected my opinions from bedside round and discussion.

## 2023-02-09 NOTE — ED ADULT NURSE NOTE - NSIMPLEMENTINTERV_GEN_ALL_ED
Implemented All Fall with Harm Risk Interventions:  [place] to call system. Call bell, personal items and telephone within reach. Instruct patient to call for assistance. Room bathroom lighting operational. Non-slip footwear when patient is off stretcher. Physically safe environment: no spills, clutter or unnecessary equipment. Stretcher in lowest position, wheels locked, appropriate side rails in place. Provide visual cue, wrist band, yellow gown, etc. Monitor gait and stability. Monitor for mental status changes and reorient to person, place, and time. Review medications for side effects contributing to fall risk. Reinforce activity limits and safety measures with patient and family. Provide visual clues: red socks.

## 2023-02-09 NOTE — CONSULT NOTE ADULT - ASSESSMENT
89 yo male with hx of dementia, afib, PPM, recent b/l hip arthroplasty s/p fall, c/b LE DVTs, LUE DVT on eliquis, ye since last hospitalization, bph, gout, subdural hematoma s/p craniotomy, BIBEMS from rehab for evaluation of fever, hypoxia and hypotension in setting of known UTI. Pt started on ciprofloxacin for Klebsiella UTI at rehab. Pt also with worsening agitation, dark-colored urine, and right-sided flank pain over the past several days. Found to be febrile and hypotensive in ED with WBC 23. UA with hematuria and pyuria, large LE, no bacteriuria, nitrites. CTA Chest without consolidations, small b/l pleural effusions. RVP negative. Given 2 L IVF, vanc and Zosyn and then started on levophed in ED. MICU consulted for hypotension    #Septic Shock 2/2 UTI  -s/p 2 L IVF. Recommend aggressive fluid resuscitation. Would give another liter of IVF and albumin 5% 500 cc  -CT A/P to r/o pyelonephritis and nephrolithiasis  -f/u blood cultures and urine cultures  -continue broad spectrum abx    Pt is not currently a MICU candidate. Can reassess after additional IVF given. Please feel free to reach out with any questions.    Discussed with Dr. Ofelia Sheppard MD  Internal Medicine PGY2     89 yo male with hx of dementia, afib, PPM, recent b/l hip arthroplasty s/p fall, c/b LE DVTs, LUE DVT on eliquis, ye since last hospitalization, bph, gout, subdural hematoma s/p craniotomy, BIBEMS from rehab for evaluation of fever, hypoxia and hypotension in setting of known UTI. Pt started on ciprofloxacin for Klebsiella UTI at rehab. Pt also with worsening agitation, dark-colored urine, and right-sided flank pain over the past several days. Found to be febrile and hypotensive in ED with WBC 23. UA with hematuria and pyuria, large LE, no bacteriuria, nitrites. CTA Chest without consolidations, small b/l pleural effusions. RVP negative. Given 2 L IVF, vanc and Zosyn and then started on levophed in ED. Currently at baseline mental status A&Ox1. MICU consulted for hypotension    #Septic Shock 2/2 UTI  -s/p 2 L IVF. Recommend aggressive fluid resuscitation. Would give another liter of IVF and albumin 5% 500 cc  -CT A/P to r/o pyelonephritis and nephrolithiasis  -f/u blood cultures and urine cultures  -continue broad spectrum abx    Pt is not currently a MICU candidate. Can reassess after additional IVF given. Please feel free to reach out with any questions.    Discussed with Dr. Ofelia Sheppard MD  Internal Medicine PGY2

## 2023-02-09 NOTE — ED PROVIDER NOTE - PROGRESS NOTE DETAILS
Map <60. norepi started. micu consulted. MOLST filled out and in chart micu reccs 1L fluids, wean off levo if able, CT abd for assessing for nephro. Pt unable to pass dysphagia exam, cannot give midodrine 500 cc albumin given per micu. pt off levo, maintaining BP MAP 60-65. micu declines pt admission to micu  and advises floor. awaiting CT abd CT shows stercoral colitis. stable off levo. tba  zosyn re dosed

## 2023-02-09 NOTE — ED ADULT NURSE REASSESSMENT NOTE - NS ED NURSE REASSESS COMMENT FT1
Report received from RN Elaine GUSMAN. Pt received A&Ox0, verbally speaking when nurse enters the room but not oriented. PT received with a ye placed, draining dark brown urine. Vital signs retaken and documented, as per previous ED RN and MD, pt BP MAP to remain above 60. Bed locked and in lowest position, side rials raised. Pt to be placed on 1:1 CO as pt is exhibiting sundowning and pulled out his peripheral IV line and family is leaving bedside. Currently pending CT scan.

## 2023-02-09 NOTE — ED ADULT NURSE NOTE - OBJECTIVE STATEMENT
89 y/o M with PMH of DVT in bilateral lower extremities 89 y/o M with PMH of dementia, A&OxO (baseline A&Ox1), DVT in bilateral lower extremities, PM, gout, BOH, afib, HTN presents to ED via EMS from nursing home complainin f of a UTI 89 y/o M with PMH of dementia, A&OxO (baseline A&Ox1), DVT in bilateral lower extremities, PM, gout, BPH, afib, HTN presents to ED via EMS from nursing home complaining of a UTI and fever. As per EMS, nursing home did a culture and sensitivity urine and came back as infection. Pt has been having fevers, hypotension and altered mental status as per EMS. Pt has chronic ye. 89 y/o M with PMH of dementia, A&Ox0 (baseline A&Ox1), DVT in bilateral lower extremities, PM, gout, BPH, afib, HTN presents to ED via EMS from nursing home complaining of a UTI and fever. As per EMS, nursing home did a culture and sensitivity urine and came back as infection. Pt has been having fevers, hypotension and altered mental status as per EMS. Pt has chronic ye. Pt is on apixaban. Upon arrival, pt was hypoxic at 88 on RA, hypotensive and febrile. Ye is draining dark brown and cloudy urine. MD Murray and MD Mathews at bedside. No other information can be gathered at this time due to pts mental status. 89 y/o M with PMH of dementia, A&Ox0 (baseline A&Ox1), DVT in bilateral lower extremities, PM, gout, BPH, afib, HTN, recent hip surgery, presents to ED via EMS from nursing home complaining of a UTI and fever. As per EMS, nursing home did a culture and sensitivity urine and came back as infection. Pt has been having fevers, hypotension and altered mental status as per EMS. Pt has chronic ye. Pt is on apixaban. Pt has hx of falls. Upon arrival, pt was hypoxic at 88 on RA, hypotensive and febrile. Ye is draining dark brown and cloudy urine. MD Murray and MD Mathews at bedside. No other information can be gathered at this time due to pts mental status. 89 y/o M with PMH of dementia, A&Ox0 (baseline A&Ox1), DVT in bilateral lower extremities and DVT in upper left extremity, PM, gout, BPH, afib, HTN, recent hip surgery, presents to ED via EMS from nursing home complaining of a UTI and fever. As per EMS, nursing home did a culture and sensitivity urine and came back as infection. Pt has been having fevers, hypotension and altered mental status as per EMS. Pt has chronic ye. Pt is on apixaban. Pt has hx of falls. Upon arrival, pt was hypoxic at 88 on RA, hypotensive and febrile. Ye is draining dark brown and cloudy urine. MD Murray and MD Mathews at bedside. No other information can be gathered at this time due to pts mental status. 89 y/o M with PMH of dementia, A&Ox0 (baseline A&Ox1), DVT in bilateral lower extremities and DVT in upper left extremity, PM, gout, BPH, afib, HTN, recent hip surgery, presents to ED via EMS from Rehab complaining of a UTI and fever. As per EMS, Rehab did a culture and sensitivity urine and came back as infection. Pt has been having fevers, hypotension and altered mental status as per EMS. Pt has chronic ye. Pt is on apixaban. Pt has hx of falls. Upon arrival, pt was hypoxic at 88 on RA, hypotensive and febrile. Ye is draining dark brown and cloudy urine. MD Murray and MD Mathews at bedside. No other information can be gathered at this time due to pts mental status.

## 2023-02-09 NOTE — ED PROVIDER NOTE - DIFFERENTIAL DIAGNOSIS
DDx includes but is not limited to- sepsis, uti, pna, ich, electrolyte abnormality, dehydration, odin, anemia, pe less likely on eliquis, acs Differential Diagnosis

## 2023-02-09 NOTE — ED PROVIDER NOTE - OBJECTIVE STATEMENT
Stephaen Mathews, PGY-3- 89 year old male with afib, recent hip arthroplasty s/p fall, c/b b/l LE DVTs, LUE DVT, on Eliquis, ye catheter in place, is BIBEMS from NH for evaluation of hypotension in setting of known UTI. Per NH records, pt currently on CIprofloxacin for UTI. Pt with culture sensitivities in packet. Noted to be agitated, hypotensive, and hypoxemic prior to transfer to ED. Febrile as well. Pt AOx0 unable to provide hx. No recent falls noted.  Per documentation, pt is DNR/DNI.

## 2023-02-09 NOTE — ED ADULT NURSE REASSESSMENT NOTE - NS ED NURSE REASSESS COMMENT FT1
Pts chronic ye was removed. Upon removal, pt had salvador red blood draining from Penis. As per MD Murray and MD Mathews, OK to place new ye. Indwelling urinary catheter placed using aseptic technique. Sterile equipment utalized. Second RN present to confirm sterility. Draining to gravity - initial output of 700ml. Secured w/ stat lock to upper leg. Explained procedure as it was being done - Pt tolerated procedure well. Comfort and safety provided.

## 2023-02-09 NOTE — ED PROVIDER NOTE - CONVERSATION DETAILS
pt w/ uti, possible PNA< was given broad spectrum antibiotics, pt w/ hx of recent UTI, was on cipro started on Friday, pt pending PE scan, blood pressures are down trending, may require pressors, wife ok states pt is DNR/DNI    per prior documentation from NH- DNR/DNI

## 2023-02-10 DIAGNOSIS — N39.0 URINARY TRACT INFECTION, SITE NOT SPECIFIED: ICD-10-CM

## 2023-02-10 DIAGNOSIS — R09.89 OTHER SPECIFIED SYMPTOMS AND SIGNS INVOLVING THE CIRCULATORY AND RESPIRATORY SYSTEMS: ICD-10-CM

## 2023-02-10 DIAGNOSIS — Z29.9 ENCOUNTER FOR PROPHYLACTIC MEASURES, UNSPECIFIED: ICD-10-CM

## 2023-02-10 DIAGNOSIS — A41.9 SEPSIS, UNSPECIFIED ORGANISM: ICD-10-CM

## 2023-02-10 DIAGNOSIS — I82.409 ACUTE EMBOLISM AND THROMBOSIS OF UNSPECIFIED DEEP VEINS OF UNSPECIFIED LOWER EXTREMITY: ICD-10-CM

## 2023-02-10 DIAGNOSIS — F03.90 UNSPECIFIED DEMENTIA WITHOUT BEHAVIORAL DISTURBANCE: ICD-10-CM

## 2023-02-10 DIAGNOSIS — N40.1 BENIGN PROSTATIC HYPERPLASIA WITH LOWER URINARY TRACT SYMPTOMS: ICD-10-CM

## 2023-02-10 DIAGNOSIS — I48.20 CHRONIC ATRIAL FIBRILLATION, UNSPECIFIED: ICD-10-CM

## 2023-02-10 DIAGNOSIS — I95.9 HYPOTENSION, UNSPECIFIED: ICD-10-CM

## 2023-02-10 LAB
ALBUMIN SERPL ELPH-MCNC: 2.4 G/DL — LOW (ref 3.3–5)
ALP SERPL-CCNC: 93 U/L — SIGNIFICANT CHANGE UP (ref 40–120)
ALT FLD-CCNC: 5 U/L — LOW (ref 10–45)
ANION GAP SERPL CALC-SCNC: 9 MMOL/L — SIGNIFICANT CHANGE UP (ref 5–17)
AST SERPL-CCNC: 9 U/L — LOW (ref 10–40)
BASOPHILS # BLD AUTO: 0.06 K/UL — SIGNIFICANT CHANGE UP (ref 0–0.2)
BASOPHILS NFR BLD AUTO: 0.5 % — SIGNIFICANT CHANGE UP (ref 0–2)
BILIRUB SERPL-MCNC: 0.5 MG/DL — SIGNIFICANT CHANGE UP (ref 0.2–1.2)
BUN SERPL-MCNC: 18 MG/DL — SIGNIFICANT CHANGE UP (ref 7–23)
CALCIUM SERPL-MCNC: 8.3 MG/DL — LOW (ref 8.4–10.5)
CHLORIDE SERPL-SCNC: 106 MMOL/L — SIGNIFICANT CHANGE UP (ref 96–108)
CO2 SERPL-SCNC: 25 MMOL/L — SIGNIFICANT CHANGE UP (ref 22–31)
CREAT SERPL-MCNC: 0.91 MG/DL — SIGNIFICANT CHANGE UP (ref 0.5–1.3)
E FAECALIS DNA BLD POS QL NAA+NON-PROBE: SIGNIFICANT CHANGE UP
EGFR: 79 ML/MIN/1.73M2 — SIGNIFICANT CHANGE UP
EOSINOPHIL # BLD AUTO: 0 K/UL — SIGNIFICANT CHANGE UP (ref 0–0.5)
EOSINOPHIL NFR BLD AUTO: 0 % — SIGNIFICANT CHANGE UP (ref 0–6)
GLUCOSE SERPL-MCNC: 74 MG/DL — SIGNIFICANT CHANGE UP (ref 70–99)
GRAM STN FLD: SIGNIFICANT CHANGE UP
HCT VFR BLD CALC: 22.6 % — LOW (ref 39–50)
HCT VFR BLD CALC: 31.1 % — LOW (ref 39–50)
HGB BLD-MCNC: 6.9 G/DL — CRITICAL LOW (ref 13–17)
HGB BLD-MCNC: 9.5 G/DL — LOW (ref 13–17)
IMM GRANULOCYTES NFR BLD AUTO: 0.7 % — SIGNIFICANT CHANGE UP (ref 0–0.9)
LACTATE SERPL-SCNC: 1 MMOL/L — SIGNIFICANT CHANGE UP (ref 0.5–2)
LACTATE SERPL-SCNC: 6.5 MMOL/L — CRITICAL HIGH (ref 0.5–2)
LYMPHOCYTES # BLD AUTO: 0.45 K/UL — LOW (ref 1–3.3)
LYMPHOCYTES # BLD AUTO: 3.8 % — LOW (ref 13–44)
MAGNESIUM SERPL-MCNC: 1.6 MG/DL — SIGNIFICANT CHANGE UP (ref 1.6–2.6)
MCHC RBC-ENTMCNC: 29.4 PG — SIGNIFICANT CHANGE UP (ref 27–34)
MCHC RBC-ENTMCNC: 29.5 PG — SIGNIFICANT CHANGE UP (ref 27–34)
MCHC RBC-ENTMCNC: 30.5 GM/DL — LOW (ref 32–36)
MCHC RBC-ENTMCNC: 30.5 GM/DL — LOW (ref 32–36)
MCV RBC AUTO: 96.3 FL — SIGNIFICANT CHANGE UP (ref 80–100)
MCV RBC AUTO: 96.6 FL — SIGNIFICANT CHANGE UP (ref 80–100)
METHOD TYPE: SIGNIFICANT CHANGE UP
MONOCYTES # BLD AUTO: 0.45 K/UL — SIGNIFICANT CHANGE UP (ref 0–0.9)
MONOCYTES NFR BLD AUTO: 3.8 % — SIGNIFICANT CHANGE UP (ref 2–14)
NEUTROPHILS # BLD AUTO: 10.83 K/UL — HIGH (ref 1.8–7.4)
NEUTROPHILS NFR BLD AUTO: 91.2 % — HIGH (ref 43–77)
NRBC # BLD: 0 /100 WBCS — SIGNIFICANT CHANGE UP (ref 0–0)
NRBC # BLD: 0 /100 WBCS — SIGNIFICANT CHANGE UP (ref 0–0)
PLATELET # BLD AUTO: 181 K/UL — SIGNIFICANT CHANGE UP (ref 150–400)
PLATELET # BLD AUTO: 228 K/UL — SIGNIFICANT CHANGE UP (ref 150–400)
POTASSIUM SERPL-MCNC: 4.3 MMOL/L — SIGNIFICANT CHANGE UP (ref 3.5–5.3)
POTASSIUM SERPL-SCNC: 4.3 MMOL/L — SIGNIFICANT CHANGE UP (ref 3.5–5.3)
PROT SERPL-MCNC: 4.4 G/DL — LOW (ref 6–8.3)
RBC # BLD: 2.34 M/UL — LOW (ref 4.2–5.8)
RBC # BLD: 3.23 M/UL — LOW (ref 4.2–5.8)
RBC # FLD: 16.4 % — HIGH (ref 10.3–14.5)
RBC # FLD: 16.4 % — HIGH (ref 10.3–14.5)
SODIUM SERPL-SCNC: 140 MMOL/L — SIGNIFICANT CHANGE UP (ref 135–145)
SPECIMEN SOURCE: SIGNIFICANT CHANGE UP
SPECIMEN SOURCE: SIGNIFICANT CHANGE UP
WBC # BLD: 11.87 K/UL — HIGH (ref 3.8–10.5)
WBC # BLD: 13.31 K/UL — HIGH (ref 3.8–10.5)
WBC # FLD AUTO: 11.87 K/UL — HIGH (ref 3.8–10.5)
WBC # FLD AUTO: 13.31 K/UL — HIGH (ref 3.8–10.5)

## 2023-02-10 PROCEDURE — 99223 1ST HOSP IP/OBS HIGH 75: CPT

## 2023-02-10 PROCEDURE — 99222 1ST HOSP IP/OBS MODERATE 55: CPT

## 2023-02-10 RX ORDER — APIXABAN 2.5 MG/1
5 TABLET, FILM COATED ORAL EVERY 12 HOURS
Refills: 0 | Status: DISCONTINUED | OUTPATIENT
Start: 2023-02-10 | End: 2023-02-12

## 2023-02-10 RX ORDER — TAMSULOSIN HYDROCHLORIDE 0.4 MG/1
0.4 CAPSULE ORAL AT BEDTIME
Refills: 0 | Status: DISCONTINUED | OUTPATIENT
Start: 2023-02-10 | End: 2023-03-08

## 2023-02-10 RX ORDER — AMPICILLIN SODIUM AND SULBACTAM SODIUM 250; 125 MG/ML; MG/ML
3 INJECTION, POWDER, FOR SUSPENSION INTRAMUSCULAR; INTRAVENOUS EVERY 6 HOURS
Refills: 0 | Status: DISCONTINUED | OUTPATIENT
Start: 2023-02-10 | End: 2023-02-16

## 2023-02-10 RX ORDER — ACETAMINOPHEN 500 MG
650 TABLET ORAL EVERY 6 HOURS
Refills: 0 | Status: DISCONTINUED | OUTPATIENT
Start: 2023-02-10 | End: 2023-03-08

## 2023-02-10 RX ORDER — TRAZODONE HCL 50 MG
50 TABLET ORAL AT BEDTIME
Refills: 0 | Status: DISCONTINUED | OUTPATIENT
Start: 2023-02-10 | End: 2023-02-25

## 2023-02-10 RX ORDER — ACETAMINOPHEN 500 MG
1000 TABLET ORAL ONCE
Refills: 0 | Status: COMPLETED | OUTPATIENT
Start: 2023-02-10 | End: 2023-02-10

## 2023-02-10 RX ORDER — SENNA PLUS 8.6 MG/1
2 TABLET ORAL AT BEDTIME
Refills: 0 | Status: DISCONTINUED | OUTPATIENT
Start: 2023-02-10 | End: 2023-03-08

## 2023-02-10 RX ORDER — LANOLIN ALCOHOL/MO/W.PET/CERES
3 CREAM (GRAM) TOPICAL AT BEDTIME
Refills: 0 | Status: DISCONTINUED | OUTPATIENT
Start: 2023-02-10 | End: 2023-03-08

## 2023-02-10 RX ORDER — LACTOBACILLUS ACIDOPHILUS 100MM CELL
1 CAPSULE ORAL DAILY
Refills: 0 | Status: DISCONTINUED | OUTPATIENT
Start: 2023-02-10 | End: 2023-03-08

## 2023-02-10 RX ORDER — AMIODARONE HYDROCHLORIDE 400 MG/1
200 TABLET ORAL DAILY
Refills: 0 | Status: DISCONTINUED | OUTPATIENT
Start: 2023-02-10 | End: 2023-03-08

## 2023-02-10 RX ORDER — PIPERACILLIN AND TAZOBACTAM 4; .5 G/20ML; G/20ML
3.38 INJECTION, POWDER, LYOPHILIZED, FOR SOLUTION INTRAVENOUS EVERY 8 HOURS
Refills: 0 | Status: DISCONTINUED | OUTPATIENT
Start: 2023-02-10 | End: 2023-02-10

## 2023-02-10 RX ORDER — MIDODRINE HYDROCHLORIDE 2.5 MG/1
10 TABLET ORAL EVERY 8 HOURS
Refills: 0 | Status: COMPLETED | OUTPATIENT
Start: 2023-02-10 | End: 2023-02-10

## 2023-02-10 RX ORDER — QUETIAPINE FUMARATE 200 MG/1
50 TABLET, FILM COATED ORAL AT BEDTIME
Refills: 0 | Status: DISCONTINUED | OUTPATIENT
Start: 2023-02-10 | End: 2023-02-14

## 2023-02-10 RX ORDER — ONDANSETRON 8 MG/1
4 TABLET, FILM COATED ORAL EVERY 8 HOURS
Refills: 0 | Status: DISCONTINUED | OUTPATIENT
Start: 2023-02-10 | End: 2023-03-08

## 2023-02-10 RX ORDER — ALLOPURINOL 300 MG
300 TABLET ORAL DAILY
Refills: 0 | Status: DISCONTINUED | OUTPATIENT
Start: 2023-02-10 | End: 2023-03-08

## 2023-02-10 RX ORDER — LACTOBACILLUS ACIDOPHILUS 100MM CELL
1 CAPSULE ORAL
Qty: 0 | Refills: 0 | DISCHARGE

## 2023-02-10 RX ADMIN — MIDODRINE HYDROCHLORIDE 10 MILLIGRAM(S): 2.5 TABLET ORAL at 06:07

## 2023-02-10 RX ADMIN — Medication 300 MILLIGRAM(S): at 14:20

## 2023-02-10 RX ADMIN — PIPERACILLIN AND TAZOBACTAM 25 GRAM(S): 4; .5 INJECTION, POWDER, LYOPHILIZED, FOR SOLUTION INTRAVENOUS at 15:02

## 2023-02-10 RX ADMIN — MIDODRINE HYDROCHLORIDE 10 MILLIGRAM(S): 2.5 TABLET ORAL at 14:19

## 2023-02-10 RX ADMIN — PIPERACILLIN AND TAZOBACTAM 3.38 GRAM(S): 4; .5 INJECTION, POWDER, LYOPHILIZED, FOR SOLUTION INTRAVENOUS at 02:34

## 2023-02-10 RX ADMIN — Medication 50 MILLIGRAM(S): at 23:27

## 2023-02-10 RX ADMIN — Medication 400 MILLIGRAM(S): at 03:15

## 2023-02-10 RX ADMIN — TAMSULOSIN HYDROCHLORIDE 0.4 MILLIGRAM(S): 0.4 CAPSULE ORAL at 23:27

## 2023-02-10 RX ADMIN — APIXABAN 5 MILLIGRAM(S): 2.5 TABLET, FILM COATED ORAL at 06:11

## 2023-02-10 RX ADMIN — APIXABAN 5 MILLIGRAM(S): 2.5 TABLET, FILM COATED ORAL at 17:28

## 2023-02-10 RX ADMIN — Medication 1 TABLET(S): at 15:02

## 2023-02-10 RX ADMIN — AMIODARONE HYDROCHLORIDE 200 MILLIGRAM(S): 400 TABLET ORAL at 06:08

## 2023-02-10 RX ADMIN — AMPICILLIN SODIUM AND SULBACTAM SODIUM 200 GRAM(S): 250; 125 INJECTION, POWDER, FOR SUSPENSION INTRAMUSCULAR; INTRAVENOUS at 21:51

## 2023-02-10 RX ADMIN — PIPERACILLIN AND TAZOBACTAM 25 GRAM(S): 4; .5 INJECTION, POWDER, LYOPHILIZED, FOR SOLUTION INTRAVENOUS at 06:07

## 2023-02-10 RX ADMIN — Medication 250 MILLILITER(S): at 01:00

## 2023-02-10 RX ADMIN — MIDODRINE HYDROCHLORIDE 10 MILLIGRAM(S): 2.5 TABLET ORAL at 23:27

## 2023-02-10 RX ADMIN — QUETIAPINE FUMARATE 50 MILLIGRAM(S): 200 TABLET, FILM COATED ORAL at 23:27

## 2023-02-10 RX ADMIN — SENNA PLUS 2 TABLET(S): 8.6 TABLET ORAL at 23:27

## 2023-02-10 RX ADMIN — SODIUM CHLORIDE 100 MILLILITER(S): 9 INJECTION, SOLUTION INTRAVENOUS at 06:55

## 2023-02-10 RX ADMIN — Medication 1000 MILLIGRAM(S): at 03:34

## 2023-02-10 NOTE — H&P ADULT - PROBLEM SELECTOR PLAN 2
Meets criteria by fever and leukocytosis. Appreciate pan-imaging. Mostly likely source appears to be UTI at this point. Likely Klebsiella.  -Cont. IV Zosyn  -F/u UCx  -F/u BCxs  -Cont. gentle IVF overnight  -Supportive care.

## 2023-02-10 NOTE — CHART NOTE - NSCHARTNOTEFT_GEN_A_CORE
Pt seen and examined, H+P reviewed.     89M w/ pmhx of dementia, afib on eliquis, s/p PPM, recent b/l hip arthroplasty s/p fall, c/b LE DVTs, LUE DVT on eliquis, ye since last hospitalization, subdural hematoma s/p craniotomy, BIBEMS from rehab - was being treated for Kleb UTI with cipro, noted with increasing agitation x days, now p/w fever, hypoxia and hypotension.   Was agitated this AM, requiring wrist restraints.   Calm during my exam - NAD, alert and awake, following commands, AAOx1, S1, S2, RRR, CTA b/l, abd soft, no LE swelling, able to wiggle toes.     CBC this AM diluted - repeat showing improving WBC, stable Hb; BMP stable.   Blood cx 4/4 + [facility]     Plan:  - pt with sepsis 2/2 E fecalis bacteremia, likely urinary source, f/u Ucx   - will check TTE   - ?need for ortho eval given recent hip arthoplasty  - c/w zosyn for now, ID consulted - f/u recs   - repeat Bcx in AM   - hypotensive on admission, MICU rejected - c/w midodrine 10mg TID, BP currently stable   - c/w Seroquel and Trazadone for agitations (did not receive overnight)  - c/w pureed diet   - pt DNR/DNI Pt seen and examined, H+P reviewed.     89M w/ pmhx of dementia, afib on eliquis, s/p PPM, recent b/l hip arthroplasty s/p fall, c/b LE DVTs, LUE DVT on eliquis, ye since last hospitalization, subdural hematoma s/p craniotomy, BIBEMS from rehab - was being treated for Kleb UTI with cipro, noted with increasing agitation x days, now p/w fever, hypoxia and hypotension.   Was agitated this AM, requiring wrist restraints.   Calm during my exam - NAD, alert and awake, following commands, AAOx1, S1, S2, RRR, CTA b/l, abd soft, no LE swelling, able to wiggle toes.     CBC this AM diluted - repeat showing improving WBC, stable Hb; BMP stable.   Blood cx 4/4 + [facility]     Plan:  - pt with sepsis 2/2 E fecalis bacteremia, likely urinary source, f/u Ucx   - will check TTE   - ?need for ortho eval given recent hip arthoplasty  - c/w zosyn for now, ID consulted - f/u recs   - repeat Bcx in AM   - hypotensive on admission, MICU rejected - c/w midodrine 10mg TID, BP currently stable   - c/w Seroquel and Trazadone for agitations (did not receive overnight)  - c/w pureed diet   - pt DNR/DNI    unable to reach spouse Meggan at listed for updates. Pt seen and examined, H+P reviewed.     89M w/ pmhx of dementia, afib on eliquis, s/p PPM, recent b/l hip arthroplasty s/p fall, c/b LE DVTs, LUE DVT on eliquis, ye since last hospitalization, subdural hematoma s/p craniotomy, BIBEMS from rehab - was being treated for Kleb UTI with cipro, noted with increasing agitation x days, now p/w fever, hypoxia and hypotension.   Was agitated this AM, requiring wrist restraints.   Calm during my exam - NAD, alert and awake, following commands, AAOx1, S1, S2, RRR, CTA b/l, abd soft, no LE swelling, able to wiggle toes.     CBC this AM diluted - repeat showing improving WBC, stable Hb; BMP stable.   Blood cx 4/4 + [facility]     Plan:  - pt with sepsis 2/2 E fecalis bacteremia, likely urinary source, f/u Ucx   - will check TTE   - ?need for ortho eval given recent hip arthoplasty  - c/w zosyn for now, ID consulted - f/u recs   - repeat Bcx in AM   - hypotensive on admission, MICU rejected - c/w midodrine 10mg TID, BP currently stable   - c/w Seroquel and Trazadone for agitations (did not receive overnight)  - c/w pureed diet   - pt DNR/DNI    unable to reach spouse Meggan at listed number for updates.

## 2023-02-10 NOTE — H&P ADULT - HISTORY OF PRESENT ILLNESS
89M w/ pmhx of dementia, afib on eliquis, s/p PPM, recent b/l hip arthroplasty s/p fall, c/b LE DVTs, LUE DVT on eliquis, ye since last hospitalization, bph, gout, subdural hematoma s/p craniotomy, BIBEMS from rehab for evaluation of fever, hypoxia and hypotension in setting of known UTI. Per rehab records, patient currently on ciprofloxacin for Klebsiella UTI, pansensitive. He saw urologist on Friday. Patient's daughter reported to MICU team that patient has been increasingly agitated over the past couple of days and was trying to pull out ye at rehab. [facility] paperwork documents agitation. He was recently started on Seroquel at rehab for agitation. He has had dark-colored urine for the past week and daughter reports he was also complaining of right-sided flank pain. No cough, vomiting, diarrhea. Daughter reports patient is A&Ox1 at baseline, wheelchair-bound.    In the ED, patient febrile to 101.6 rectally, HR 80's, bp 80/50's, satting high 80's on RA. Labs with leukocytosis to 32.2, hg 10.5, plt's 302. PT/INR 30.8/2.63. Serum chemistries unremarkable. VBG ph 7.41, lactate 2.1, pco2 47. Ua positive for blood and WBC's, lare LE, negative for nitries or bacteria. RVP negative. Pending blood cultures, urine culture. Chest xray without any focal consolidations. CTA without any PE, with small-mod pleural effusions bl, bl atelactasis at the bases, dilated RV and RA. CT head negative for any acute changes, with chronic microvascular and lacunar changes.     Patient given zosyn and vanc x1. 1g ofirmev. Patient given 2L NS bolus and started on 100cc/hr LR mIVF. Patient remained hypotensive following fluids and started on levophed. Started on 4-5L NC O2 support.     Pt is DNR/DNI. MOLST in chart. 89M w/ pmhx of dementia, afib on eliquis, s/p PPM, recent b/l hip arthroplasty s/p fall, c/b LE DVTs, LUE DVT on eliquis, ye since last hospitalization, bph, gout, subdural hematoma s/p craniotomy, BIBEMS from rehab for evaluation of fever, hypoxia and hypotension in setting of known UTI. Per rehab records, patient currently on ciprofloxacin for Klebsiella UTI, pansensitive. He saw urologist on Friday. Patient's daughter reported to MICU team that patient has been increasingly agitated over the past couple of days and was trying to pull out ye at rehab. [employer] paperwork documents agitation. He was recently started on Seroquel at rehab for agitation. He has had dark-colored urine for the past week and daughter reports he was also complaining of right-sided flank pain. No cough, vomiting, diarrhea. Daughter reports patient is A&Ox1 at baseline, wheelchair-bound.    In the ED, patient febrile to 101.6 rectally, HR 80's, bp 80/50's, satting high 80's on RA. Labs with leukocytosis to 32.2, hg 10.5, plt's 302. PT/INR 30.8/2.63. Serum chemistries unremarkable. VBG ph 7.41, lactate 2.1, pco2 47. Ua positive for blood and WBC's, lare LE, negative for nitries or bacteria. RVP negative. Pending blood cultures, urine culture. Chest xray without any focal consolidations. CTA without any PE, with small-mod pleural effusions bl, bl atelactasis at the bases, dilated RV and RA. CT head negative for any acute changes, with chronic microvascular and lacunar changes.     Patient given zosyn and vanc x1. 1g ofirmev. LR 2L, NS 3L, albumin 250 x2, midodrine 10mg PO, levophed briefly    Pt is DNR/DNI. MOLST in chart.

## 2023-02-10 NOTE — H&P ADULT - NSHPPHYSICALEXAM_GEN_ALL_CORE
Vital Signs Last 24 Hrs  T(C): 36.6 (02-10-23 @ 00:35), Max: 38.7 (02-09-23 @ 10:30)  T(F): 97.8 (02-10-23 @ 00:35), Max: 101.6 (02-09-23 @ 10:30)  HR: 62 (02-10-23 @ 00:35) (60 - 84)  BP: 108/60 (02-10-23 @ 00:35) (80/45 - 108/60)  BP(mean): 79 (02-09-23 @ 21:45) (56 - 80)  RR: 18 (02-10-23 @ 00:35) (16 - 22)  SpO2: 94% (02-10-23 @ 00:35) (88% - 100%)

## 2023-02-10 NOTE — CONSULT NOTE ADULT - ASSESSMENT
****INCOMPLETE*****    Pt is a 89 y/o M w/ a PMH of dementia, b/l hip arthroplasty, a fib on Eliquis, BPH, and recently diagnosed pan sensitive klebsiella UTI on ciprofloxacin who presents to the ED for fever, agitation,  ****INCOMPLETE*****    Pt is a 89 y/o M w/ a PMH of dementia, b/l hip arthroplasty, a fib on Eliquis, BPH, and recently diagnosed pan-sensitive klebsiella UTI on ciprofloxacin who presents to the ED for fever, agitation, hypotension, and hypoxia. Vital signs improved since admission s/p pip/tazo, supplemental O2 and midodrine. On PE pt did not have tenderness or erythema over PPD pocket or pain w/ active flexion of hips. He endorsed malaise, and was A&Ox1. Labs notable for leukocytosis and E faecalis bacteremia in 3 bottles. CT C/A/P shows no active infectious process in chest, but shows large volume stool in rectum and thickening of bowel wall c/w stericolitis.    #Impression  -Pt's presentation c/w sepsis 2/2 E faecalis bacteremia, most likely from impacted stool in rectum, w/ concomitant klebsiella UTI.    #Recommendations  -  d/c pip/tazo  - start Unisom 3g q6, should cover for E faecalis bacteremia as well as Klebsiella UTI  - repeat BCx in 48 hours   - Continue to access for hip pain given pt's b/l arthroplasty-- if pain worsens consider obtaining MRI  - TTE to access for endocarditis given gram positive bacteremia  - Consider SRI before discharge or if pt not improving  - Continue to monitor WBC  -  Pt is a 89 y/o M w/ a PMH of dementia, b/l hip arthroplasty, a fib on Eliquis, BPH, and recently diagnosed pan-sensitive klebsiella UTI on ciprofloxacin who presents to the ED for fever, agitation, hypotension, and hypoxia. Vital signs improved since admission s/p pip/tazo, supplemental O2 and midodrine. On PE pt did not have tenderness or erythema over PPD pocket or pain w/ active flexion of hips. He endorsed malaise, and was A&Ox1. Labs notable for leukocytosis and E faecalis bacteremia in 3 bottles. CT C/A/P shows no active infectious process in chest, but shows large volume stool in rectum and thickening of bowel wall c/w stericolitis.    #Impression  -Pt's presentation c/w sepsis 2/2 E faecalis bacteremia, most likely from impacted stool in rectum, w/ concomitant klebsiella UTI.    #Recommendations  -  d/c pip/tazo  - start Unasyn 3g q6, should cover for E faecalis bacteremia as well as Klebsiella UTI  - repeat BCx in 48 hours   - Continue to access for hip pain given pt's b/l arthroplasty-- if pain worsens consider obtaining MRI  - TTE to access for endocarditis given gram positive bacteremia  - Continue to monitor WBC

## 2023-02-10 NOTE — PATIENT PROFILE ADULT - FALL HARM RISK - HARM RISK INTERVENTIONS

## 2023-02-10 NOTE — H&P ADULT - PROBLEM SELECTOR PLAN 4
MOLST form in chart noted. S/p recent hip fractures and repair. Significant fall hx  -DNR/DNI  -Wife involved with care  -Falls and aspiration precautions MOLST form in chart noted. S/p recent hip fractures and repair. Significant fall hx  -DNR/DNI  -Wife involved with care  -Falls and aspiration precautions  -Cont. Trazodone and Seroquel QHS with hold parameters  -Will cont. prior dysphagia diet, cont. to monitor.

## 2023-02-10 NOTE — H&P ADULT - ASSESSMENT
89M w/ pmhx of dementia, afib on eliquis, s/p PPM, recent b/l hip arthroplasty s/p fall, c/b LE DVTs, LUE DVT on eliquis, ye since last hospitalization, bph, gout, subdural hematoma s/p craniotomy p/w hypotension, sepsis and UTI

## 2023-02-10 NOTE — H&P ADULT - PROBLEM SELECTOR PLAN 1
Appreciate MICU recommendations. Needed significant IVF to get response. Trial of pressors as well. Source is infection and sepsis.  -Cont. IV Zosyn  -F/u UCx and BCxs  -Gentle IVF overnight  -DNR/DNI, MOLST in chart  -Cont. midodrine TID for now  -Goal MAP>65, low threshold for repeat MICU consult

## 2023-02-10 NOTE — PATIENT PROFILE ADULT - FUNCTIONAL ASSESSMENT - BASIC MOBILITY SCORE.
Hep B, adolescent or pediatric; 2021 17:25; Gerda Arguello (RN); Hi-Dis([place]);  (Exp. Date: 03-Jan-2024); IntraMuscular; Vastus Lateralis Right.; 0.5 milliLiter(s); VIS (VIS Published: 15-Aug-2019, VIS Presented: 2021);    10

## 2023-02-10 NOTE — CONSULT NOTE ADULT - SUBJECTIVE AND OBJECTIVE BOX
****INCOMPLETE****    Patient is a 89y old  Male who presents with a chief complaint of Hypotension and sepsis (10 Feb 2023 02:41)    HPI:  89M w/ pmhx of dementia, afib on eliquis, s/p PPM, recent b/l hip arthroplasty s/p fall, c/b LE DVTs, LUE DVT on eliquis, ye since last hospitalization, bph, gout, subdural hematoma s/p craniotomy, BIBEMS from rehab for evaluation of fever, hypoxia and hypotension in setting of known UTI. Per rehab records, patient currently on ciprofloxacin for Klebsiella UTI, pansensitive. He saw urologist on Friday. Patient's daughter reported to MICU team that patient has been increasingly agitated over the past couple of days and was trying to pull out ye at rehab. [employer] paperwork documents agitation. He was recently started on Seroquel at rehab for agitation. He has had dark-colored urine for the past week and daughter reports he was also complaining of right-sided flank pain. No cough, vomiting, diarrhea. Daughter reports patient is A&Ox1 at baseline, wheelchair-bound.    In the ED, patient febrile to 101.6 rectally, HR 80's, bp 80/50's, satting high 80's on RA. Labs with leukocytosis to 32.2, hg 10.5, plt's 302. PT/INR 30.8/2.63. Serum chemistries unremarkable. VBG ph 7.41, lactate 2.1, pco2 47. Ua positive for blood and WBC's, lare LE, negative for nitries or bacteria. RVP negative. Pending blood cultures, urine culture. Chest xray without any focal consolidations. CTA without any PE, with small-mod pleural effusions bl, bl atelactasis at the bases, dilated RV and RA. CT head negative for any acute changes, with chronic microvascular and lacunar changes.     Patient given zosyn and vanc x1. 1g ofirmev. LR 2L, NS 3L, albumin 250 x2, midodrine 10mg PO, levophed briefly    Pt is DNR/DNI. MOLST in chart. (10 Feb 2023 02:41)       REVIEW OF SYSTEMS  [  ] ROS unobtainable because:    [  ] All other systems negative except as noted below    Constitutional:  [ ] fever [ ] chills  [ ] weight loss  [ ]night sweat  [ ]poor appetite/PO intake [ ]fatigue   Skin:  [ ] rash [ ] phlebitis	  Eyes: [ ] icterus [ ] pain  [ ] discharge	  ENMT: [ ] sore throat  [ ] thrush [ ] ulcers [ ] exudates [ ]anosmia  Respiratory: [ ] dyspnea [ ] hemoptysis [ ] cough [ ] sputum	  Cardiovascular:  [ ] chest pain [ ] palpitations [ ] edema	  Gastrointestinal:  [ ] nausea [ ] vomiting [ ] diarrhea [ ] constipation [ ] pain	  Genitourinary:  [ ] dysuria [ ] frequency [ ] hematuria [ ] discharge [ ] flank pain  [ ] incontinence  Musculoskeletal:  [ ] myalgias [ ] arthralgias [ ] arthritis  [ ] back pain  Neurological:  [ ] headache [ ] weakness [ ] seizures  [ ] confusion/altered mental status    prior hospital charts reviewed [V]  primary team notes reviewed [V]  other consultant notes reviewed [V]    PAST MEDICAL & SURGICAL HISTORY:  AF (atrial fibrillation)      Hypertension      Arthritis      Benign prostatic hypertrophy      Gout      Subdural hematoma      Pacemaker      Alzheimer disease      Alzheimer disease      S/P craniotomy      S/P laparoscopic cholecystectomy      S/P umbilical hernia repair, follow-up exam      No significant past surgical history          FAMILY HISTORY:  No pertinent family history in first degree relatives        SOCIAL HISTORY:  - Denied smoking/vaping/alcohol/recreational drug use    Allergies  No Known Allergies        ANTIMICROBIALS:  piperacillin/tazobactam IVPB.. 3.375 every 8 hours      ANTIMICROBIALS (past 90 days):   MEDICATIONS  (STANDING):  piperacillin/tazobactam IVPB..   25 mL/Hr IV Intermittent (23 @ 22:41)    piperacillin/tazobactam IVPB..   25 mL/Hr IV Intermittent (02-10-23 @ 06:07)    piperacillin/tazobactam IVPB...   200 mL/Hr IV Intermittent (23 @ 10:40)    vancomycin  IVPB.   250 mL/Hr IV Intermittent (23 @ 10:45)        MEDICATIONS  (STANDING):  acetaminophen     Tablet .. 650 every 6 hours PRN  allopurinol 300 daily  aMIOdarone    Tablet 200 daily  apixaban 5 every 12 hours  melatonin 3 at bedtime PRN  midodrine 10 every 8 hours  ondansetron Injectable 4 every 8 hours PRN  QUEtiapine 50 at bedtime  senna 2 at bedtime  tamsulosin 0.4 at bedtime  traZODone 50 at bedtime      VITALS:  Vital Signs Last 24 Hrs  T(F): 97.5 (02-10-23 @ 11:23), Max: 101.6 (23 @ 10:30)  Vital Signs Last 24 Hrs  HR: 62 (02-10-23 @ 11:23) (60 - 84)  BP: 115/74 (02-10-23 @ 11:23) (84/52 - 120/76)  RR: 17 (02-10-23 @ 11:23)  SpO2: 99% (02-10-23 @ 11:23) (94% - 100%)  Wt(kg): --    PHYSICAL EXAM:  Constitutional: non-toxic, no distress  HEAD/EYES: anicteric, no conjunctival injection  ENT:  supple, no thrush  Cardiovascular:   normal S1/S2, no murmur, no edema  Respiratory:  clear BS bilaterally, no wheezes, no rales  GI:  soft, non-tender, normal bowel sounds  :  no ye, no CVA tenderness  Musculoskeletal:  no synovitis, normal ROM  Neurologic: awake and alert,  no focal findings  Skin:  no rash, no erythema, no phlebitis  Heme/Onc: no lymphadenopathy   Psychiatric:  awake, alert, appropriate mood      Labs:                        9.5    13.31 )-----------( 228      ( 10 Feb 2023 10:09 )             31.1     02-10    140  |  106  |  18  ----------------------------<  74  4.3   |  25  |  0.91    Ca    8.3<L>      10 Feb 2023 07:41  Mg     1.6     02-10    TPro  4.4<L>  /  Alb  2.4<L>  /  TBili  0.5  /  DBili  x   /  AST  9<L>  /  ALT  5<L>  /  AlkPhos  93  02-10      WBC Trend:  WBC Count: 13.31 (02-10-23 @ 10:09)  WBC Count: 11.87 (02-10-23 @ 07:41)  WBC Count: 32.22 (23 @ 10:57)    Auto Neutrophil #: 10.83 K/uL (02-10-23 @ 07:41)  Auto Neutrophil #: 30.42 K/uL (23 @ 10:57)  Band Neutrophils %: 2.4 % (23 @ 10:57)  Auto Neutrophil #: 9.88 K/uL (23 @ 07:05)  Auto Neutrophil #: 10.31 K/uL (23 @ 17:46)    Auto Eosinophil %: 0.0 % (02-10-23 @ 07:41)  Auto Eosinophil %: 0.0 % (23 @ 10:57)    Urinalysis Basic - ( 2023 10:53 )    Color: Light Orange / Appearance: Slightly Turbid / S.018 / pH: x  Gluc: x / Ketone: Negative  / Bili: Negative / Urobili: Negative   Blood: x / Protein: 30 mg/dL / Nitrite: Negative   Leuk Esterase: Large / RBC: 382 /hpf / WBC 55 /HPF   Sq Epi: x / Non Sq Epi: 0 /hpf / Bacteria: Negative        MICROBIOLOGY:        Culture - Blood (collected 2023 10:15)  Source: .Blood Blood-Peripheral  Preliminary Report:    Growth in aerobic bottle: Gram positive cocci in pairs    ***Blood Panel PCR results on this specimen are available    approximately 3 hours after the Gram stain result.***    Gram stain, PCR, and/or culture results may not always    correspond due to difference in methodologies.    ************************************************************    This PCR assay was performed by multiplex PCR. This    Assay tests for 66 bacterial and resistance gene targets.    Please refer to the [facility] test directory    at [email or web address] for details.  Organism: Blood Culture PCR  Organism: Blood Culture PCR    Sensitivities:      -  Enterococcus faecalis: Detec      Method Type: PCR    Culture - Blood (collected 2023 10:09)  Source: .Blood Blood-Peripheral  Preliminary Report:    Growth in aerobic bottle: Gram positive cocci in pairs    Growth in anaerobic bottle: Gram positive cocci in pairs                        Rapid RVP Result: NotDetec ( @ 10:56)    COVID-19 PCR: NotDetec (23 @ 07:19)  COVID-19 PCR: NotDetec (23 @ 21:13)  COVID-19 PCR: NotDetec (23 @ 19:25)  COVID-19 PCR: NotDetec (23 @ 17:46)  COVID-19 PCR: NotDetec (22 @ 10:12)  COVID-19 PCR: NotDetec (22 @ 18:04)  COVID-19 PCR: NotDetec (22 @ 06:19)  COVID-19 PCR: NotDetec (22 @ 22:30)          RADIOLOGY:  imaging below personally reviewed   ****INCOMPLETE****    Per rehab records, patient currently on ciprofloxacin for Klebsiella UTI, pansensitive. He saw urologist on Friday. Patient's daughter reported to MICU team that patient has been increasingly agitated over the past couple of days and was trying to pull out ye at rehab. [employer] paperwork documents agitation. He was recently started on Seroquel at rehab for agitation. He has had dark-colored urine for the past week and daughter reports he was also complaining of right-sided flank pain. No cough, vomiting, diarrhea. Daughter reports patient is A&Ox1 at baseline, wheelchair-bound. Patient given zosyn and vanc x1. 1g ofirmev. LR 2L, NS 3L, albumin 250 x2, midodrine 10mg PO, levophed briefly.       REVIEW OF SYSTEMS  [  ] ROS unobtainable because:    [  ] All other systems negative except as noted below    Constitutional:  [x ] fever [ ] chills  [ ] weight loss  [ ]night sweat  [ ]poor appetite/PO intake [x ]fatigue [x] Malaise  Skin:  [ ] rash [ ] phlebitis	  Eyes: [ ] icterus [ ] pain  [ ] discharge	  ENMT: [ ] sore throat  [ ] thrush [ ] ulcers [ ] exudates [ ]anosmia  Respiratory: [ ] dyspnea [ ] hemoptysis [ ] cough [ ] sputum	  Cardiovascular:  [ ] chest pain [ ] palpitations [ ] edema	  Gastrointestinal:  [ ] nausea [ ] vomiting [ ] diarrhea [ x] constipation [x pain	  Genitourinary:  [ ] dysuria [ ] frequency [ ] hematuria [ ] discharge [ ] flank pain  [ ] incontinence  Musculoskeletal:  [ ] myalgias [ ] arthralgias [ ] arthritis  [ ] back pain  Neurological:  [ ] headache [ ] weakness [ ] seizures  [ ] confusion/altered mental status    prior hospital charts reviewed [V]  primary team notes reviewed [V]  other consultant notes reviewed [V]    PAST MEDICAL & SURGICAL HISTORY:  AF (atrial fibrillation)      Hypertension      Arthritis      Benign prostatic hypertrophy      Gout      Subdural hematoma      Pacemaker      Alzheimer disease      Alzheimer disease      S/P craniotomy      S/P laparoscopic cholecystectomy      S/P umbilical hernia repair, follow-up exam      No significant past surgical history          FAMILY HISTORY:  No pertinent family history in first degree relatives        SOCIAL HISTORY:  - Denied smoking/vaping/alcohol/recreational drug use    Allergies  No Known Allergies        ANTIMICROBIALS:  piperacillin/tazobactam IVPB.. 3.375 every 8 hours      ANTIMICROBIALS (past 90 days):   MEDICATIONS  (STANDING):  piperacillin/tazobactam IVPB..   25 mL/Hr IV Intermittent (23 @ 22:41)    piperacillin/tazobactam IVPB..   25 mL/Hr IV Intermittent (02-10-23 @ 06:07)    piperacillin/tazobactam IVPB...   200 mL/Hr IV Intermittent (23 @ 10:40)    vancomycin  IVPB.   250 mL/Hr IV Intermittent (23 @ 10:45)        MEDICATIONS  (STANDING):  acetaminophen     Tablet .. 650 every 6 hours PRN  allopurinol 300 daily  aMIOdarone    Tablet 200 daily  apixaban 5 every 12 hours  melatonin 3 at bedtime PRN  midodrine 10 every 8 hours  ondansetron Injectable 4 every 8 hours PRN  QUEtiapine 50 at bedtime  senna 2 at bedtime  tamsulosin 0.4 at bedtime  traZODone 50 at bedtime      VITALS:  Vital Signs Last 24 Hrs  T(F): 97.5 (02-10-23 @ 11:23), Max: 101.6 (23 @ 10:30)  Vital Signs Last 24 Hrs  HR: 62 (02-10-23 @ 11:23) (60 - 84)  BP: 115/74 (02-10-23 @ 11:23) (84/52 - 120/76)  RR: 17 (02-10-23 @ 11:23)  SpO2: 99% (02-10-23 @ 11:23) (94% - 100%)  Wt(kg): --    PHYSICAL EXAM:  Constitutional: non-toxic, no distress  HEAD/EYES: anicteric, no conjunctival injection  ENT:  supple, no thrush  Cardiovascular:   normal S1/S2, no murmur, no edema, PPD pocket w/o erythema or tenderness to palpation  Respiratory:  clear BS bilaterally, no wheezes, no rales  GI:  soft, non-tender, normal bowel sounds  :  no ye, no CVA tenderness  Musculoskeletal:  no synovitis, normal ROM at hip-- pt able to flex hip against gravity w/o pain, no tenderness to palpation of hip, surgical sites b/l clean and w/o erythema  Neurologic: awake and alert,  no focal findings  Skin:  no rash, no erythema, no phlebitis  Heme/Onc: no lymphadenopathy   Psychiatric:  awake, alert, appropriate mood      Labs:                        9.5    13.31 )-----------( 228      ( 10 Feb 2023 10:09 )             31.1     02-10    140  |  106  |  18  ----------------------------<  74  4.3   |  25  |  0.91    Ca    8.3<L>      10 Feb 2023 07:41  Mg     1.6     02-10    TPro  4.4<L>  /  Alb  2.4<L>  /  TBili  0.5  /  DBili  x   /  AST  9<L>  /  ALT  5<L>  /  AlkPhos  93  02-10      WBC Trend:  WBC Count: 13.31 (02-10-23 @ 10:09)  WBC Count: 11.87 (02-10-23 @ 07:41)  WBC Count: 32.22 (23 @ 10:57)    Auto Neutrophil #: 10.83 K/uL (02-10-23 @ 07:41)  Auto Neutrophil #: 30.42 K/uL (23 @ 10:57)  Band Neutrophils %: 2.4 % (23 @ 10:57)  Auto Neutrophil #: 9.88 K/uL (23 @ 07:05)  Auto Neutrophil #: 10.31 K/uL (23 @ 17:46)    Auto Eosinophil %: 0.0 % (02-10-23 @ 07:41)  Auto Eosinophil %: 0.0 % (23 @ 10:57)    Urinalysis Basic - ( 2023 10:53 )    Color: Light Orange / Appearance: Slightly Turbid / S.018 / pH: x  Gluc: x / Ketone: Negative  / Bili: Negative / Urobili: Negative   Blood: x / Protein: 30 mg/dL / Nitrite: Negative   Leuk Esterase: Large / RBC: 382 /hpf / WBC 55 /HPF   Sq Epi: x / Non Sq Epi: 0 /hpf / Bacteria: Negative        MICROBIOLOGY:        Culture - Blood (collected 2023 10:15)  Source: .Blood Blood-Peripheral  Preliminary Report:    Growth in aerobic bottle: Gram positive cocci in pairs    ***Blood Panel PCR results on this specimen are available    approximately 3 hours after the Gram stain result.***    Gram stain, PCR, and/or culture results may not always    correspond due to difference in methodologies.    ************************************************************    This PCR assay was performed by multiplex PCR. This    Assay tests for 66 bacterial and resistance gene targets.    Please refer to the [facility] test directory    at [email or web address] for details.  Organism: Blood Culture PCR  Organism: Blood Culture PCR    Sensitivities:      -  Enterococcus faecalis: Detec      Method Type: PCR    Culture - Blood (collected 2023 10:09)  Source: .Blood Blood-Peripheral  Preliminary Report:    Growth in aerobic bottle: Gram positive cocci in pairs    Growth in anaerobic bottle: Gram positive cocci in pairs                        Rapid RVP Result: NotDetec ( @ 10:56)    COVID-19 PCR: NotDetec (23 @ 07:19)  COVID-19 PCR: NotDetec (23 @ 21:13)  COVID-19 PCR: NotDetec (23 @ 19:25)  COVID-19 PCR: NotDetec (23 @ 17:46)  COVID-19 PCR: NotDetec (22 @ 10:12)  COVID-19 PCR: NotDetec (22 @ 18:04)  COVID-19 PCR: NotDetec (22 @ 06:19)  COVID-19 PCR: NotDetec (22 @ 22:30)          RADIOLOGY:  imaging below personally reviewed    < from: CT Abdomen and Pelvis w/ IV Cont (23 @ 21:00) >    ACC: [id number] EXAM:  CT ABDOMEN AND PELVIS IC   ORDERED BY: JACK PARADA     PROCEDURE DATE:  2023          INTERPRETATION:  CLINICAL INFORMATION: Fever. Concern for pyelonephritis.    COMPARISON: CT abdomen and pelvis 10/17/2015    CONTRAST/COMPLICATIONS:  IV Contrast: Omnipaque 350  90 cc administered   10 cc discarded  Oral Contrast: NONE  Complications: None reported at time of study completion    PROCEDURE:  CT of the Abdomen and Pelvis was performed.  Sagittal and coronal reformats were performed.    FINDINGS:  LOWER CHEST: Partially visualized pacemaker leads terminating in the   right atrium and right ventricle. Cardiomegaly. Small bilateral pleural   effusions with associated compressive atelectasis.    LIVER: Within normal limits.  BILE DUCTS: Normal caliber.  GALLBLADDER: Cholecystectomy.  SPLEEN: Within normal limits.  PANCREAS: Within normal limits.  ADRENALS: Within normal limits.  KIDNEYS/URETERS: Mild bilateral renal atrophy. Kidneys enhance   symmetrically without hydronephrosis. Bilateral renal cysts.    BLADDER: Collapsed around a Ye catheter.  REPRODUCTIVE ORGANS: Not well evaluated secondary to streak artifact from   metallic hardware.    BOWEL: No bowel obstruction. Appendix not visualized. Large rectal stool   burden with rectal wall thickening, correlate for stercoral colitis.  PERITONEUM: No ascites or pneumoperitoneum. No mesenteric lymphadenopathy.  VESSELS: Atherosclerotic calcifications of the aortoiliac tree and   abdominal aortic branches. Normal caliber abdominal aorta.  RETROPERITONEUM/LYMPH NODES: No lymphadenopathy.  ABDOMINAL WALL: Generalized soft tissue edema.  BONES: Bilateral total hip arthroplasty. Degenerative changes.    IMPRESSION:  Large rectal stool burden with surrounding rectal wall thickening,   correlate for stercoral colitis.    No CT evidence of pyelonephritis.    Small bilateral pleural effusions and bibasilar compressive atelectasis.    --- End of Report ---           EDGAR FINCH MD; Resident Radiologist  This document has been electronically signed.  NATHANIEL GAFFNEY DO; Attending Radiologist  This document has been electronically signed. 2023 10:16PM    < end of copied text >  < from: CT Angio Chest PE Protocol w/ IV Cont (23 @ 15:07) >  ACC: [id number] EXAM:  CT ANGIO CHEST PULM [facility]   ORDERED BY: SEPIDEH LOPEZ     PROCEDURE DATE:  2023          INTERPRETATION:  HISTORY: Hypoxia.    EXAMINATION: CTA CHEST was performed for evaluation of the pulmonary   arteries. Multiplanar reformatted images and MIPS were acquired.    COMPARISON: No prior CT chest comparison.    CONTRAST/COMPLICATIONS:  IV Contrast: Omnipaque 350  70 cc administered   30 cc discarded  Oral Contrast: NONE  Complications: None reported at time of studycompletion    FINDINGS:    PULMONARY ARTERIES: Respiratory motion limits assessment of multiple   segmental and subsegmental pulmonary arterial branches. Otherwise, no   pulmonary embolism detected.    MEDIASTINUM: Dilated right atrium and right ventricle. Cardiac pacemaker   leads terminate in the right atrium and right ventricle. No pericardial   effusion. Dilated ascending thoracic aorta measures 4.5 cm.  No large   mediastinal lymph nodes.    AIRWAYS, LUNGS, PLEURA: Trachea and mainstem bronchi patent.   Small/moderate bilateral pleural effusions and bibasilar atelectasis.    IMAGED ABDOMEN: Cholecystectomy. Bilateral renal hypodensities are not   adequately characterized due to motion artifact.    SOFT TISSUES: Unremarkable.    BONES: Unremarkable.      IMPRESSION:.    Image quality degraded due to extensive respiratory motion.    No pulmonary embolism detected.    Small/moderate bilateral pleural effusions and bibasilar atelectasis.    --- End of Report ---    < end of copied text >

## 2023-02-10 NOTE — H&P ADULT - NSHPLABSRESULTS_GEN_ALL_CORE
I have reviewed the labs, imaging and ekg. EKG with afib HR 84 QTc 472 TWI V5, new from prior. Trace R pleural effusion

## 2023-02-10 NOTE — CONSULT NOTE ADULT - ATTENDING COMMENTS
89-year-old male with a past medical history of dementia, atrial fibrillation on apixaban, pacemaker placement, bilateral hip arthroplasty, DVT, recent Jamil placement, subdural hematoma status post craniotomy who was brought in to the hospital from rehab due to fever, hypoxia hypotension.    Patient had developed symptoms a few days prior to presentation.  Of note, patient had been evaluated by urology and found to have a urinary tract infection with culture growing Klebsiella.  Patient was placed on ciprofloxacin.  Patient was also noted to have worsening agitation over the past few days.    In the ED, patient febrile to 101.6 Fahrenheit, also noted to be hypotensive, hypoxic.  Initial labs show leukocytosis to 32.2.  CMP with renal function within normal limits, .  BNP 5200.  Blood cultures were obtained and are growing Enterococcus faecalis.  CT head showed no acute intracranial pathology.  CTA thorax showed no acute PE, bilateral pleural effusions were noted.  CT abdomen pelvis showed evidence for stercoral colitis.  Patient was started on piperacillin/tazobactam on admission.    Of note, patient underwent recent left hip hemiarthroplasty on 1/5/2023.    Impression  #Enterococcal bacteremia, Enterococcus faecalis growth  #Abnormal imaging of the abdomen  #Evidence for colitis, most likely source for bacteremia  #Presence of orthopedic hardware, bilateral hip replacements  #Presence of PPM, no evidence for infection on exam  #Acute cystitis  #Leukocytosis, improving    Recommendations  Discontinue piperacillin/tazobactam, start ampicillin/sulbactam 3 g every 6 hours  This will also cover Klebsiella noted in outpatient urine culture in addition to the Enterococcus in the blood  Repeat blood cultures to ensure clearance of bacteremia  Obtain TTE  Would hold off SRI while awaiting repeat blood cultures  Monitor for worsening hip pain given bacteremia and Enterococcus  Follow fever curve and WBC count    Mika Sommers MD  Division of Infectious Diseases

## 2023-02-11 DIAGNOSIS — R07.9 CHEST PAIN, UNSPECIFIED: ICD-10-CM

## 2023-02-11 DIAGNOSIS — R78.81 BACTEREMIA: ICD-10-CM

## 2023-02-11 DIAGNOSIS — D64.9 ANEMIA, UNSPECIFIED: ICD-10-CM

## 2023-02-11 LAB
ALBUMIN SERPL ELPH-MCNC: 2.9 G/DL — LOW (ref 3.3–5)
ALP SERPL-CCNC: 107 U/L — SIGNIFICANT CHANGE UP (ref 40–120)
ALT FLD-CCNC: 7 U/L — LOW (ref 10–45)
ANION GAP SERPL CALC-SCNC: 9 MMOL/L — SIGNIFICANT CHANGE UP (ref 5–17)
AST SERPL-CCNC: 9 U/L — LOW (ref 10–40)
BILIRUB SERPL-MCNC: 0.4 MG/DL — SIGNIFICANT CHANGE UP (ref 0.2–1.2)
BUN SERPL-MCNC: 22 MG/DL — SIGNIFICANT CHANGE UP (ref 7–23)
CALCIUM SERPL-MCNC: 8.7 MG/DL — SIGNIFICANT CHANGE UP (ref 8.4–10.5)
CHLORIDE SERPL-SCNC: 107 MMOL/L — SIGNIFICANT CHANGE UP (ref 96–108)
CK MB CFR SERPL CALC: 1.2 NG/ML — SIGNIFICANT CHANGE UP (ref 0–6.7)
CO2 SERPL-SCNC: 26 MMOL/L — SIGNIFICANT CHANGE UP (ref 22–31)
CREAT SERPL-MCNC: 1.02 MG/DL — SIGNIFICANT CHANGE UP (ref 0.5–1.3)
EGFR: 69 ML/MIN/1.73M2 — SIGNIFICANT CHANGE UP
GLUCOSE SERPL-MCNC: 129 MG/DL — HIGH (ref 70–99)
HCT VFR BLD CALC: 32.7 % — LOW (ref 39–50)
HGB BLD-MCNC: 10.1 G/DL — LOW (ref 13–17)
MAGNESIUM SERPL-MCNC: 1.8 MG/DL — SIGNIFICANT CHANGE UP (ref 1.6–2.6)
MCHC RBC-ENTMCNC: 28.6 PG — SIGNIFICANT CHANGE UP (ref 27–34)
MCHC RBC-ENTMCNC: 30.9 GM/DL — LOW (ref 32–36)
MCV RBC AUTO: 92.6 FL — SIGNIFICANT CHANGE UP (ref 80–100)
NRBC # BLD: 0 /100 WBCS — SIGNIFICANT CHANGE UP (ref 0–0)
PLATELET # BLD AUTO: 283 K/UL — SIGNIFICANT CHANGE UP (ref 150–400)
POTASSIUM SERPL-MCNC: 3.9 MMOL/L — SIGNIFICANT CHANGE UP (ref 3.5–5.3)
POTASSIUM SERPL-SCNC: 3.9 MMOL/L — SIGNIFICANT CHANGE UP (ref 3.5–5.3)
PROT SERPL-MCNC: 5.5 G/DL — LOW (ref 6–8.3)
RBC # BLD: 3.53 M/UL — LOW (ref 4.2–5.8)
RBC # FLD: 16.5 % — HIGH (ref 10.3–14.5)
SODIUM SERPL-SCNC: 142 MMOL/L — SIGNIFICANT CHANGE UP (ref 135–145)
TROPONIN T, HIGH SENSITIVITY RESULT: 38 NG/L — SIGNIFICANT CHANGE UP (ref 0–51)
WBC # BLD: 8.79 K/UL — SIGNIFICANT CHANGE UP (ref 3.8–10.5)
WBC # FLD AUTO: 8.79 K/UL — SIGNIFICANT CHANGE UP (ref 3.8–10.5)

## 2023-02-11 PROCEDURE — 93010 ELECTROCARDIOGRAM REPORT: CPT

## 2023-02-11 PROCEDURE — 99233 SBSQ HOSP IP/OBS HIGH 50: CPT

## 2023-02-11 RX ORDER — POLYETHYLENE GLYCOL 3350 17 G/17G
17 POWDER, FOR SOLUTION ORAL DAILY
Refills: 0 | Status: DISCONTINUED | OUTPATIENT
Start: 2023-02-11 | End: 2023-03-08

## 2023-02-11 RX ORDER — CHLORHEXIDINE GLUCONATE 213 G/1000ML
1 SOLUTION TOPICAL DAILY
Refills: 0 | Status: DISCONTINUED | OUTPATIENT
Start: 2023-02-11 | End: 2023-03-08

## 2023-02-11 RX ADMIN — AMIODARONE HYDROCHLORIDE 200 MILLIGRAM(S): 400 TABLET ORAL at 05:50

## 2023-02-11 RX ADMIN — Medication 1 TABLET(S): at 11:27

## 2023-02-11 RX ADMIN — AMPICILLIN SODIUM AND SULBACTAM SODIUM 200 GRAM(S): 250; 125 INJECTION, POWDER, FOR SUSPENSION INTRAMUSCULAR; INTRAVENOUS at 11:26

## 2023-02-11 RX ADMIN — QUETIAPINE FUMARATE 50 MILLIGRAM(S): 200 TABLET, FILM COATED ORAL at 21:07

## 2023-02-11 RX ADMIN — AMPICILLIN SODIUM AND SULBACTAM SODIUM 200 GRAM(S): 250; 125 INJECTION, POWDER, FOR SUSPENSION INTRAMUSCULAR; INTRAVENOUS at 22:11

## 2023-02-11 RX ADMIN — Medication 50 MILLIGRAM(S): at 21:06

## 2023-02-11 RX ADMIN — SENNA PLUS 2 TABLET(S): 8.6 TABLET ORAL at 21:06

## 2023-02-11 RX ADMIN — TAMSULOSIN HYDROCHLORIDE 0.4 MILLIGRAM(S): 0.4 CAPSULE ORAL at 21:06

## 2023-02-11 RX ADMIN — CHLORHEXIDINE GLUCONATE 1 APPLICATION(S): 213 SOLUTION TOPICAL at 17:14

## 2023-02-11 RX ADMIN — AMPICILLIN SODIUM AND SULBACTAM SODIUM 200 GRAM(S): 250; 125 INJECTION, POWDER, FOR SUSPENSION INTRAMUSCULAR; INTRAVENOUS at 05:24

## 2023-02-11 RX ADMIN — APIXABAN 5 MILLIGRAM(S): 2.5 TABLET, FILM COATED ORAL at 17:14

## 2023-02-11 RX ADMIN — APIXABAN 5 MILLIGRAM(S): 2.5 TABLET, FILM COATED ORAL at 05:51

## 2023-02-11 RX ADMIN — AMPICILLIN SODIUM AND SULBACTAM SODIUM 200 GRAM(S): 250; 125 INJECTION, POWDER, FOR SUSPENSION INTRAMUSCULAR; INTRAVENOUS at 17:14

## 2023-02-11 RX ADMIN — Medication 300 MILLIGRAM(S): at 11:26

## 2023-02-11 RX ADMIN — POLYETHYLENE GLYCOL 3350 17 GRAM(S): 17 POWDER, FOR SOLUTION ORAL at 13:41

## 2023-02-11 NOTE — PROGRESS NOTE ADULT - PROBLEM SELECTOR PLAN 9
-Cont. ephraim, exchanged in ER, likely some trauma at rehab with pt pulling  -Cont. flomax -Cont. Eliquis BID  - Extremity precuations

## 2023-02-11 NOTE — PROGRESS NOTE ADULT - PROBLEM SELECTOR PLAN 7
-Cont. Eliquis BID  -Cont. amiodarone daily MOLST form in chart noted. S/p recent hip fractures and repair. Significant fall hx  -DNR/DNI  -Wife involved with care  -Falls and aspiration precautions  -Cont. Trazodone and Seroquel QHS with hold parameters  -Will cont. prior dysphagia diet, cont. to monitor.  - A + O x 1, at baseline

## 2023-02-11 NOTE — PROGRESS NOTE ADULT - PROBLEM SELECTOR PLAN 5
Unclear if patient has true chest pain due to dementia and unreliable ROS  - Obtained EKG - LBBB, similar to previous EKG  - CE negative  - Appeared well clinically and not hypoxic at this time  - Will continue to monitor Likely due to AOCD  - B12 and folate - good  - Pending iron study and ferritin  - H&H stable, no signs of bleeding  - Continue to monitor

## 2023-02-11 NOTE — PROGRESS NOTE ADULT - SUBJECTIVE AND OBJECTIVE BOX
PROGRESS NOTE:   Authored by Sabiha Raya MD     Patient is a 89y old  Male who presents with a chief complaint of Hypotension and sepsis (10 Feb 2023 14:07)      SUBJECTIVE / OVERNIGHT EVENTS: Spoke to patient with Maltese ; [id number].  Patient was unable to answer questions even with the  and extended effort. When asked where he was, he answered, sitting with his mother and brother.   Unable to obtain reliable ROS, but stated that he has "pain in the heart". Unable to elicit more details.        MEDICATIONS  (STANDING):  allopurinol 300 milliGRAM(s) Oral daily  aMIOdarone    Tablet 200 milliGRAM(s) Oral daily  ampicillin/sulbactam  IVPB 3 Gram(s) IV Intermittent every 6 hours  apixaban 5 milliGRAM(s) Oral every 12 hours  chlorhexidine 2% Cloths 1 Application(s) Topical daily  lactated ringers. 1000 milliLiter(s) (100 mL/Hr) IV Continuous <Continuous>  lactobacillus acidophilus 1 Tablet(s) Oral daily  polyethylene glycol 3350 17 Gram(s) Oral daily  QUEtiapine 50 milliGRAM(s) Oral at bedtime  senna 2 Tablet(s) Oral at bedtime  tamsulosin 0.4 milliGRAM(s) Oral at bedtime  traZODone 50 milliGRAM(s) Oral at bedtime    MEDICATIONS  (PRN):  acetaminophen     Tablet .. 650 milliGRAM(s) Oral every 6 hours PRN Temp greater or equal to 38C (100.4F), Mild Pain (1 - 3)  melatonin 3 milliGRAM(s) Oral at bedtime PRN Insomnia  ondansetron Injectable 4 milliGRAM(s) IV Push every 8 hours PRN Nausea and/or Vomiting      CAPILLARY BLOOD GLUCOSE        I&O's Summary    10 Feb 2023 07:01  -  11 Feb 2023 07:00  --------------------------------------------------------  IN: 80 mL / OUT: 0 mL / NET: 80 mL    11 Feb 2023 07:01  -  11 Feb 2023 13:54  --------------------------------------------------------  IN: 0 mL / OUT: 400 mL / NET: -400 mL        PHYSICAL EXAM:  Vital Signs Last 24 Hrs  T(C): 36.9 (11 Feb 2023 07:22), Max: 36.9 (11 Feb 2023 07:22)  T(F): 98.4 (11 Feb 2023 07:22), Max: 98.4 (11 Feb 2023 07:22)  HR: 80 (11 Feb 2023 07:22) (74 - 84)  BP: 126/71 (11 Feb 2023 07:22) (117/74 - 131/77)  BP(mean): --  RR: 18 (11 Feb 2023 07:22) (18 - 18)  SpO2: 94% (11 Feb 2023 07:22) (93% - 95%)    Parameters below as of 11 Feb 2023 07:22  Patient On (Oxygen Delivery Method): nasal cannula  O2 Flow (L/min): 4      GENERAL: No acute distress, well-developed  HEAD:  Atraumatic, Normocephalic  EYES: EOMI, PERRLA, conjunctiva and sclera clear  NECK: Supple  CHEST/LUNG: CTAB; No wheezes, rales, or rhonchi, on room air with normal respiratory effort  HEART: Regular rate and rhythm; No murmurs, rubs, or gallops; PPM site clean and no erythema/tenderness  ABDOMEN: Soft, non-tender, non-distended; normal bowel sounds; (+) Jamil with concentrated urine  EXTREMITIES:  2+ peripheral pulses b/l, No clubbing, cyanosis, or edema; (+) B/l hip old surgical site non-tender and no erythema with well healed scars  NEUROLOGY: A&O x 1, no focal deficits grossly, but difficulty to obtain full neuro exam, not following commands.   SKIN: No new rashes or lesions    LABS:                        10.1   8.79  )-----------( 283      ( 11 Feb 2023 13:04 )             32.7     02-11    142  |  107  |  22  ----------------------------<  129<H>  3.9   |  26  |  1.02    Ca    8.7      11 Feb 2023 13:04  Mg     1.8     02-11    TPro  5.5<L>  /  Alb  2.9<L>  /  TBili  0.4  /  DBili  x   /  AST  9<L>  /  ALT  x   /  AlkPhos  107  02-11      CARDIAC MARKERS ( 11 Feb 2023 13:04 )  x     / x     / x     / x     / 1.2 ng/mL          Culture - Urine (collected 09 Feb 2023 10:53)  Source: Clean Catch Clean Catch (Midstream)  Preliminary Report (11 Feb 2023 00:38):    >100,000 CFU/ml Enterococcus species    Culture - Blood (collected 09 Feb 2023 10:15)  Source: .Blood Blood-Peripheral  Gram Stain (10 Feb 2023 10:08):    Growth in aerobic bottle: Gram positive cocci in pairs  Preliminary Report (11 Feb 2023 09:44):    Growth in aerobic bottle: Enterococcus faecalis    ***Blood Panel PCR results on this specimen are available    approximately 3 hours after the Gram stain result.***    Gram stain, PCR, and/or culture results may not always    correspond due to difference in methodologies.    ************************************************************    This PCR assay was performed by multiplex PCR. This    Assay tests for 66 bacterial and resistance gene targets.    Please refer to the [facility] test directory    at [email or web address] for details.  Organism: Blood Culture PCR (10 Feb 2023 12:09)  Organism: Blood Culture PCR (10 Feb 2023 12:09)    Culture - Blood (collected 09 Feb 2023 10:09)  Source: .Blood Blood-Peripheral  Gram Stain (10 Feb 2023 10:32):    Growth in aerobic bottle: Gram positive cocci in pairs    Growth in anaerobic bottle: Gram positive cocci in pairs  Preliminary Report (11 Feb 2023 09:50):    Growth in aerobic and anaerobic bottles: Enterococcus faecalis    See previous culture [id number]        RADIOLOGY & ADDITIONAL TESTS:  Repeat Bcx pending  TTE pending    COORDINATION OF CARE:  Care Discussed with Consultants/Other Providers [Y/N]: N  Prior or Outpatient Records Reviewed [Y/N]: N

## 2023-02-11 NOTE — PROGRESS NOTE ADULT - ASSESSMENT
89M w/ pmhx of dementia (A+O x 1 baseline), afib on eliquis, s/p PPM, recent b/l hip arthroplasty s/p fall, c/b LE DVTs, LUE DVT on eliquis, ye since last hospitalization, bph, gout, subdural hematoma s/p craniotomy p/w hypotension, sepsis and UTI. Currently improving.

## 2023-02-11 NOTE — PROGRESS NOTE ADULT - PROBLEM SELECTOR PLAN 10
-Pt on Eliquis  - Needs feeding for meals  - Reassess needs for restrains pending clinical improvement  - DNR/DNI    - Updated wife 2/11. -Cont. ephraim, exchanged in ER, likely some trauma at rehab with pt pulling  -Cont. flomax

## 2023-02-11 NOTE — PROGRESS NOTE ADULT - PROBLEM SELECTOR PLAN 6
MOLST form in chart noted. S/p recent hip fractures and repair. Significant fall hx  -DNR/DNI  -Wife involved with care  -Falls and aspiration precautions  -Cont. Trazodone and Seroquel QHS with hold parameters  -Will cont. prior dysphagia diet, cont. to monitor.  - A + O x 1, at baseline Unclear if patient has true chest pain due to dementia and unreliable ROS  - Obtained EKG - LBBB, similar to previous EKG  - CE negative  - Appeared well clinically and not hypoxic at this time  - Will continue to monitor

## 2023-02-12 DIAGNOSIS — R31.9 HEMATURIA, UNSPECIFIED: ICD-10-CM

## 2023-02-12 LAB
-  AMPICILLIN: SIGNIFICANT CHANGE UP
-  AMPICILLIN: SIGNIFICANT CHANGE UP
-  CIPROFLOXACIN: SIGNIFICANT CHANGE UP
-  GENTAMICIN SYNERGY: SIGNIFICANT CHANGE UP
-  LEVOFLOXACIN: SIGNIFICANT CHANGE UP
-  NITROFURANTOIN: SIGNIFICANT CHANGE UP
-  STREPTOMYCIN SYNERGY: SIGNIFICANT CHANGE UP
-  TETRACYCLINE: SIGNIFICANT CHANGE UP
-  VANCOMYCIN: SIGNIFICANT CHANGE UP
-  VANCOMYCIN: SIGNIFICANT CHANGE UP
ANION GAP SERPL CALC-SCNC: 12 MMOL/L — SIGNIFICANT CHANGE UP (ref 5–17)
BUN SERPL-MCNC: 21 MG/DL — SIGNIFICANT CHANGE UP (ref 7–23)
CALCIUM SERPL-MCNC: 8.8 MG/DL — SIGNIFICANT CHANGE UP (ref 8.4–10.5)
CHLORIDE SERPL-SCNC: 107 MMOL/L — SIGNIFICANT CHANGE UP (ref 96–108)
CO2 SERPL-SCNC: 22 MMOL/L — SIGNIFICANT CHANGE UP (ref 22–31)
CREAT SERPL-MCNC: 0.92 MG/DL — SIGNIFICANT CHANGE UP (ref 0.5–1.3)
CULTURE RESULTS: SIGNIFICANT CHANGE UP
EGFR: 78 ML/MIN/1.73M2 — SIGNIFICANT CHANGE UP
FERRITIN SERPL-MCNC: 393 NG/ML — SIGNIFICANT CHANGE UP (ref 30–400)
GLUCOSE SERPL-MCNC: 94 MG/DL — SIGNIFICANT CHANGE UP (ref 70–99)
GRAM STN FLD: SIGNIFICANT CHANGE UP
HCT VFR BLD CALC: 32.6 % — LOW (ref 39–50)
HGB BLD-MCNC: 10.3 G/DL — LOW (ref 13–17)
IRON SATN MFR SERPL: 11 % — LOW (ref 16–55)
IRON SATN MFR SERPL: 37 UG/DL — LOW (ref 45–165)
MCHC RBC-ENTMCNC: 29.9 PG — SIGNIFICANT CHANGE UP (ref 27–34)
MCHC RBC-ENTMCNC: 31.6 GM/DL — LOW (ref 32–36)
MCV RBC AUTO: 94.5 FL — SIGNIFICANT CHANGE UP (ref 80–100)
METHOD TYPE: SIGNIFICANT CHANGE UP
METHOD TYPE: SIGNIFICANT CHANGE UP
NRBC # BLD: 0 /100 WBCS — SIGNIFICANT CHANGE UP (ref 0–0)
ORGANISM # SPEC MICROSCOPIC CNT: SIGNIFICANT CHANGE UP
PLATELET # BLD AUTO: 268 K/UL — SIGNIFICANT CHANGE UP (ref 150–400)
POTASSIUM SERPL-MCNC: 4.4 MMOL/L — SIGNIFICANT CHANGE UP (ref 3.5–5.3)
POTASSIUM SERPL-SCNC: 4.4 MMOL/L — SIGNIFICANT CHANGE UP (ref 3.5–5.3)
RBC # BLD: 3.45 M/UL — LOW (ref 4.2–5.8)
RBC # FLD: 16.6 % — HIGH (ref 10.3–14.5)
SODIUM SERPL-SCNC: 141 MMOL/L — SIGNIFICANT CHANGE UP (ref 135–145)
SPECIMEN SOURCE: SIGNIFICANT CHANGE UP
TIBC SERPL-MCNC: 339 UG/DL — SIGNIFICANT CHANGE UP (ref 220–430)
UIBC SERPL-MCNC: 302 UG/DL — SIGNIFICANT CHANGE UP (ref 110–370)
WBC # BLD: 7.92 K/UL — SIGNIFICANT CHANGE UP (ref 3.8–10.5)
WBC # FLD AUTO: 7.92 K/UL — SIGNIFICANT CHANGE UP (ref 3.8–10.5)

## 2023-02-12 PROCEDURE — 71045 X-RAY EXAM CHEST 1 VIEW: CPT | Mod: 26

## 2023-02-12 PROCEDURE — 99233 SBSQ HOSP IP/OBS HIGH 50: CPT | Mod: 25

## 2023-02-12 PROCEDURE — 51703 INSERT BLADDER CATH COMPLEX: CPT

## 2023-02-12 RX ORDER — FERROUS SULFATE 325(65) MG
325 TABLET ORAL DAILY
Refills: 0 | Status: DISCONTINUED | OUTPATIENT
Start: 2023-02-12 | End: 2023-03-08

## 2023-02-12 RX ORDER — APIXABAN 2.5 MG/1
5 TABLET, FILM COATED ORAL EVERY 12 HOURS
Refills: 0 | Status: DISCONTINUED | OUTPATIENT
Start: 2023-02-12 | End: 2023-02-16

## 2023-02-12 RX ORDER — SODIUM CHLORIDE 9 MG/ML
1000 INJECTION, SOLUTION INTRAVENOUS
Refills: 0 | Status: DISCONTINUED | OUTPATIENT
Start: 2023-02-12 | End: 2023-02-12

## 2023-02-12 RX ADMIN — Medication 300 MILLIGRAM(S): at 11:18

## 2023-02-12 RX ADMIN — Medication 1 TABLET(S): at 11:18

## 2023-02-12 RX ADMIN — Medication 50 MILLIGRAM(S): at 21:04

## 2023-02-12 RX ADMIN — AMPICILLIN SODIUM AND SULBACTAM SODIUM 200 GRAM(S): 250; 125 INJECTION, POWDER, FOR SUSPENSION INTRAMUSCULAR; INTRAVENOUS at 11:17

## 2023-02-12 RX ADMIN — TAMSULOSIN HYDROCHLORIDE 0.4 MILLIGRAM(S): 0.4 CAPSULE ORAL at 21:04

## 2023-02-12 RX ADMIN — AMPICILLIN SODIUM AND SULBACTAM SODIUM 200 GRAM(S): 250; 125 INJECTION, POWDER, FOR SUSPENSION INTRAMUSCULAR; INTRAVENOUS at 17:41

## 2023-02-12 RX ADMIN — CHLORHEXIDINE GLUCONATE 1 APPLICATION(S): 213 SOLUTION TOPICAL at 11:18

## 2023-02-12 RX ADMIN — Medication 3 MILLIGRAM(S): at 21:05

## 2023-02-12 RX ADMIN — SENNA PLUS 2 TABLET(S): 8.6 TABLET ORAL at 21:04

## 2023-02-12 RX ADMIN — QUETIAPINE FUMARATE 50 MILLIGRAM(S): 200 TABLET, FILM COATED ORAL at 21:04

## 2023-02-12 RX ADMIN — AMIODARONE HYDROCHLORIDE 200 MILLIGRAM(S): 400 TABLET ORAL at 05:40

## 2023-02-12 RX ADMIN — APIXABAN 5 MILLIGRAM(S): 2.5 TABLET, FILM COATED ORAL at 17:41

## 2023-02-12 RX ADMIN — AMPICILLIN SODIUM AND SULBACTAM SODIUM 200 GRAM(S): 250; 125 INJECTION, POWDER, FOR SUSPENSION INTRAMUSCULAR; INTRAVENOUS at 05:40

## 2023-02-12 RX ADMIN — POLYETHYLENE GLYCOL 3350 17 GRAM(S): 17 POWDER, FOR SOLUTION ORAL at 11:18

## 2023-02-12 RX ADMIN — APIXABAN 5 MILLIGRAM(S): 2.5 TABLET, FILM COATED ORAL at 05:43

## 2023-02-12 NOTE — PROVIDER CONTACT NOTE (CRITICAL VALUE NOTIFICATION) - ACTION/TREATMENT ORDERED:
Repeat labs
Monitor
Provider notified, and said it is the same bacteria pt. was admitted with for the UTI.

## 2023-02-12 NOTE — PROGRESS NOTE ADULT - PROBLEM SELECTOR PLAN 6
Unclear if patient has true chest pain due to dementia and unreliable ROS  - Obtained EKG - LBBB, similar to previous EKG  - CE negative  - Appeared well clinically and not hypoxic at this time  - Will continue to monitor  - Currently asymptomatic

## 2023-02-12 NOTE — PROGRESS NOTE ADULT - PROBLEM SELECTOR PROBLEM 9
Physical Therapy   Initial Evaluation     Patient Name: Yessica Fry  Age:  58 y.o., Sex:  female  Medical Record #: [id number]  Today's Date: 10/27/2022     Precautions  Precautions: (P) Weight Bearing As Tolerated Left Lower Extremity    Assessment  Patient is 58 y.o. female with a diagnosis of L TKR Pt lives at home with BF and is not very active.Pt is c/o severe pain L knee and calf area.Calf area is extremely swollen and painful RN notified.Pt was very unsteady on the stairs.Acute PT needed to improve transfers ,ambulation and stairs     Plan    Recommend Physical Therapy daily until therapy goals are met for the following treatments:  Gait Training, Neuro Re-Education / Balance, Stair Training, Therapeutic Activities, and Therapeutic Exercises    DC Equipment Recommendations: (P) Front-Wheel Walker        Objective       10/27/22 1024   Charge Group   PT Evaluation PT Evaluation High   Total Time Spent   PT Total Time Yes   PT Evaluation Time Spent (Mins) 45   Initial Contact Note    Initial Contact Note Order Received and Verified, Physical Therapy Evaluation in Progress with Full Report to Follow.   Precautions   Precautions Weight Bearing As Tolerated Left Lower Extremity   Prior Living Situation   Prior Services None   Housing / Facility 1 Story Apartment / Condo   Steps Into Home 14   Steps In Home 0   Equipment Owned None   Lives with - Patient's Self Care Capacity Significant Other   Prior Level of Functional Mobility   Bed Mobility Independent   Transfer Status Independent   Ambulation Independent   Distance Ambulation (Feet)   (community amb)   Assistive Devices Used None   Stairs Independent   Cognition    Cognition / Consciousness WDL   Level of Consciousness Alert   Passive ROM Lower Body   Passive ROM Lower Body X   Lt Knee Flexion Degrees 80   Lt Knee Extension Degrees 0   Active ROM Lower Body    Active ROM Lower Body  X   Strength Lower Body   Lower Body Strength  X   Sensation Lower  Body   Lower Extremity Sensation   X   Coordination Lower Body    Coordination Lower Body  WDL   Balance Assessment   Sitting Balance (Static) Good   Sitting Balance (Dynamic) Good   Standing Balance (Static) Fair   Standing Balance (Dynamic) Fair   Weight Shift Sitting Fair   Weight Shift Standing Fair   Gait Analysis   Gait Level Of Assist Contact Guard Assist   Assistive Device Front Wheel Walker   Distance (Feet) 30   # of Times Distance was Traveled 2   Deviation Antalgic;Decreased Heel Strike   # of Stairs Climbed 3   Level of Assist with Stairs Moderate Assist   Weight Bearing Status wbat L   Bed Mobility    Supine to Sit Modified Independent   Sit to Supine Modified Independent   Scooting Modified Independent   Functional Mobility   Sit to Stand Contact Guard Assist   Bed, Chair, Wheelchair Transfer Contact Guard Assist   Transfer Method Stand Step   How much difficulty does the patient currently have...   Turning over in bed (including adjusting bedclothes, sheets and blankets)? 4   Sitting down on and standing up from a chair with arms (e.g., wheelchair, bedside commode, etc.) 3   Moving from lying on back to sitting on the side of the bed? 4   How much help from another person does the patient currently need...   Moving to and from a bed to a chair (including a wheelchair)? 3   Need to walk in a hospital room? 3   Climbing 3-5 steps with a railing? 2   6 clicks Mobility Score 19   Activity Tolerance   Sitting Edge of Bed 10   Standing 10   Patient / Family Goals    Patient / Family Goal #1 Home   Short Term Goals    Short Term Goal # 1 S transfers in 4 V   Short Term Goal # 2 S amb x 200 feet in 4 V   Short Term Goal # 3 Min A x 3 stairs in 4 V   Problem List    Problems Impaired Transfers;Impaired Ambulation;Functional ROM Deficit;Functional Strength Deficit   Anticipated Discharge Equipment and Recommendations   DC Equipment Recommendations Front-Wheel Walker   Interdisciplinary Plan of Care  Collaboration   IDT Collaboration with  Physical Therapist;Nursing   Patient Position at End of Therapy In Bed   Collaboration Comments Hold eval- pt with high pain, fatigue. Anticipate will stay night; unable to safely manuevre stairs.   Session Information   Date / Session Number  10/27-1 1/7 11/3   Priority 4      Deep vein thrombosis (DVT)

## 2023-02-12 NOTE — PROVIDER CONTACT NOTE (CRITICAL VALUE NOTIFICATION) - SITUATION
Hb 6.9 Lactate 6.5
Positive blood culture in aerobic bottle indicating enterococcus faecalis
Gram + cocci in pairs both bottles

## 2023-02-12 NOTE — PROGRESS NOTE ADULT - SUBJECTIVE AND OBJECTIVE BOX
PROGRESS NOTE:   Authored by Sabiha Raya MD     Patient is a 89y old  Male who presents with a chief complaint of Hypotension and sepsis (11 Feb 2023 13:53)      SUBJECTIVE / OVERNIGHT EVENTS: Patient was seen in AM with Niuean  [id number]. Denied pain or SOB (hypoxic ON, and was placed on NC). Only able to answer simple questions, and sleepy.      Unable to answer full ROS questions, falling alseep    MEDICATIONS  (STANDING):  allopurinol 300 milliGRAM(s) Oral daily  aMIOdarone    Tablet 200 milliGRAM(s) Oral daily  ampicillin/sulbactam  IVPB 3 Gram(s) IV Intermittent every 6 hours  apixaban 5 milliGRAM(s) Oral every 12 hours  chlorhexidine 2% Cloths 1 Application(s) Topical daily  lactobacillus acidophilus 1 Tablet(s) Oral daily  polyethylene glycol 3350 17 Gram(s) Oral daily  QUEtiapine 50 milliGRAM(s) Oral at bedtime  senna 2 Tablet(s) Oral at bedtime  tamsulosin 0.4 milliGRAM(s) Oral at bedtime  traZODone 50 milliGRAM(s) Oral at bedtime    MEDICATIONS  (PRN):  acetaminophen     Tablet .. 650 milliGRAM(s) Oral every 6 hours PRN Temp greater or equal to 38C (100.4F), Mild Pain (1 - 3)  melatonin 3 milliGRAM(s) Oral at bedtime PRN Insomnia  ondansetron Injectable 4 milliGRAM(s) IV Push every 8 hours PRN Nausea and/or Vomiting      CAPILLARY BLOOD GLUCOSE        I&O's Summary    11 Feb 2023 07:01  -  12 Feb 2023 07:00  --------------------------------------------------------  IN: 230 mL / OUT: 1150 mL / NET: -920 mL    12 Feb 2023 07:01  -  12 Feb 2023 19:33  --------------------------------------------------------  IN: 0 mL / OUT: 1300 mL / NET: -1300 mL        PHYSICAL EXAM:  Vital Signs Last 24 Hrs  T(C): 36.3 (12 Feb 2023 16:12), Max: 37.6 (11 Feb 2023 21:40)  T(F): 97.4 (12 Feb 2023 16:12), Max: 99.6 (11 Feb 2023 21:40)  HR: 66 (12 Feb 2023 16:12) (66 - 83)  BP: 133/77 (12 Feb 2023 16:12) (114/72 - 160/74)  BP(mean): --  RR: 18 (12 Feb 2023 16:12) (17 - 18)  SpO2: 98% (12 Feb 2023 16:12) (91% - 98%)    Parameters below as of 12 Feb 2023 16:12  Patient On (Oxygen Delivery Method): room air        GENERAL: No acute distress, sleepy, but arousable  HEAD:  Atraumatic, Normocephalic  EYES: EOMI, conjunctiva and sclera clear  NECK: Supple  CHEST/LUNG: CTAB; No wheezes, rales, or rhonchi; no respiratory distress on O2; PPM site no erythema or tenderness  HEART: Irregular; No murmurs, rubs, or gallops  ABDOMEN: Soft, non-tender, non-distended; normal bowel sounds, no organomegaly; (+) Jamil  EXTREMITIES: 2+ peripheral pulses b/l, (+) hip surgical site scar well healed and no tenderness  NEUROLOGY: A&O x 1, no focal deficits  SKIN: No rashes or lesions    LABS:                        10.3   7.92  )-----------( 268      ( 12 Feb 2023 07:19 )             32.6     02-12    141  |  107  |  21  ----------------------------<  94  4.4   |  22  |  0.92    Ca    8.8      12 Feb 2023 07:18  Mg     1.8     02-11    TPro  5.5<L>  /  Alb  2.9<L>  /  TBili  0.4  /  DBili  x   /  AST  9<L>  /  ALT  7<L>  /  AlkPhos  107  02-11      CARDIAC MARKERS ( 11 Feb 2023 13:04 )  x     / x     / x     / x     / 1.2 ng/mL          Culture - Blood (collected 11 Feb 2023 09:40)  Source: .Blood Blood-Peripheral  Preliminary Report (12 Feb 2023 14:01):    No growth to date.    Culture - Blood (collected 11 Feb 2023 07:20)  Source: .Blood Blood-Venous  Preliminary Report (12 Feb 2023 11:01):    No growth to date.        RADIOLOGY & ADDITIONAL TESTS:  < from: Xray Chest 1 View- PORTABLE-Urgent (Xray Chest 1 View- PORTABLE-Urgent .) (02.12.23 @ 10:35) >  COMPARISON STUDY: 2/9/2023    Frontal expiratory view of the chest shows the heart to be similar in   size. Left cardiac pacemaker is again noted.    The lungs show increased pulmonary congestion with progression of small   effusions and there is no evidence of pneumothorax.    IMPRESSION:  Congestive changes as noted.    < end of copied text >      COORDINATION OF CARE:  Care Discussed with Consultants/Other Providers [Y/N]: N  Prior or Outpatient Records Reviewed [Y/N]: N

## 2023-02-12 NOTE — PROVIDER CONTACT NOTE (CRITICAL VALUE NOTIFICATION) - ASSESSMENT
Pt. A&Ox1. Pt. has a new ye catheter inserted 2/12 by urology. Pt. VSS.
A&Ox1, VSS, Jamil WNL, Restraints WNL  s/p fluids
A&Ox1, VSS  Patient on Zosyn

## 2023-02-12 NOTE — PROGRESS NOTE ADULT - PROBLEM SELECTOR PLAN 7
MOLST form in chart noted. S/p recent hip fractures and repair. Significant fall hx  -DNR/DNI  -Wife involved with care  -Falls and aspiration precautions  -Cont. Trazodone and Seroquel QHS with hold parameters  -Will cont. prior dysphagia diet, cont. to monitor.  - A + O x 1, at baseline

## 2023-02-12 NOTE — PROCEDURE NOTE - ADDITIONAL PROCEDURE DETAILS
Urology called to place ye after pt self removed ye half way with balloon intact. Per RN there was bleeding from urethral meatus therefore manual pressure was applied until bleeding subsided. Pt on wrist restraints. On exam noted to have some scant bloody oozing from urethral meatus. 20f 3-way placed using aseptic technique. Clear yellow urine immediately drained.     - Monitor for post obstructive diuresis (urine output >200/hr for 3 hours)  - If in post obstructive diuresis will need to repeat BMP q6 hours and replete electrolytes as needed  - Drink to thrist, place 2 liters of water at bedside at all times, assist if needed   - Continue flomax 0.4mg  - Do not remove ye for at least 5 days given large volume retention Urology called to place ye after pt self removed ye half way with balloon intact. Per RN there was bleeding from urethral meatus therefore manual pressure was applied until bleeding subsided. Pt on wrist restraints. On exam noted to have some scant bloody oozing from urethral meatus. 20f 3-way placed using aseptic technique. Clear yellow urine immediately drained.     - Monitor for post obstructive diuresis (urine output >200/hr for 3 hours)  - If in post obstructive diuresis will need to repeat BMP q6 hours and replete electrolytes as needed  - Drink to thrist, place 2 liters of water at bedside at all times, assist if needed   - Continue flomax 0.4mg  - Do not remove ye for at least 5 days given large volume retention,   - There is 30cc in ye balloon, RN made aware

## 2023-02-12 NOTE — PROGRESS NOTE ADULT - PROBLEM SELECTOR PLAN 5
- B12 and folate - good  - (+) Iron deficiency, started ferrous sulfate 325 daily  - Component of AOCD as well  - H&H stable, no signs of bleeding  - Continue to monitor

## 2023-02-12 NOTE — PROVIDER CONTACT NOTE (CRITICAL VALUE NOTIFICATION) - BACKGROUND
Dx UTI
Dx UTI, uroseptic w/u
Pt. brought in for UTI/ Urosepsis. PMHx of dementia, afib, b/l hip arthroplasty, DVTs. Pt. has a ye inserted. Pt. has a history of E. faecalis bacteremia unasyn.

## 2023-02-12 NOTE — PROVIDER CONTACT NOTE (CRITICAL VALUE NOTIFICATION) - TEST AND RESULT REPORTED:
Positive blood culture in aerobic bottle indicating enterococcus faecalis
Hb 6.9 Lactate 6.5
Gram + cocci in pairs both bottles

## 2023-02-13 LAB
CULTURE RESULTS: SIGNIFICANT CHANGE UP
HCT VFR BLD CALC: 29.2 % — LOW (ref 39–50)
HGB BLD-MCNC: 8.7 G/DL — LOW (ref 13–17)
MCHC RBC-ENTMCNC: 29.3 PG — SIGNIFICANT CHANGE UP (ref 27–34)
MCHC RBC-ENTMCNC: 29.8 GM/DL — LOW (ref 32–36)
MCV RBC AUTO: 98.3 FL — SIGNIFICANT CHANGE UP (ref 80–100)
NRBC # BLD: 0 /100 WBCS — SIGNIFICANT CHANGE UP (ref 0–0)
PLATELET # BLD AUTO: 193 K/UL — SIGNIFICANT CHANGE UP (ref 150–400)
RBC # BLD: 2.97 M/UL — LOW (ref 4.2–5.8)
RBC # FLD: 16.4 % — HIGH (ref 10.3–14.5)
WBC # BLD: 6.5 K/UL — SIGNIFICANT CHANGE UP (ref 3.8–10.5)
WBC # FLD AUTO: 6.5 K/UL — SIGNIFICANT CHANGE UP (ref 3.8–10.5)

## 2023-02-13 PROCEDURE — 93306 TTE W/DOPPLER COMPLETE: CPT | Mod: 26

## 2023-02-13 PROCEDURE — 99232 SBSQ HOSP IP/OBS MODERATE 35: CPT

## 2023-02-13 RX ORDER — HALOPERIDOL DECANOATE 100 MG/ML
0.5 INJECTION INTRAMUSCULAR ONCE
Refills: 0 | Status: COMPLETED | OUTPATIENT
Start: 2023-02-13 | End: 2023-02-13

## 2023-02-13 RX ORDER — QUETIAPINE FUMARATE 200 MG/1
25 TABLET, FILM COATED ORAL ONCE
Refills: 0 | Status: COMPLETED | OUTPATIENT
Start: 2023-02-13 | End: 2023-02-13

## 2023-02-13 RX ADMIN — Medication 3 MILLIGRAM(S): at 20:46

## 2023-02-13 RX ADMIN — Medication 1 TABLET(S): at 12:10

## 2023-02-13 RX ADMIN — CHLORHEXIDINE GLUCONATE 1 APPLICATION(S): 213 SOLUTION TOPICAL at 12:10

## 2023-02-13 RX ADMIN — QUETIAPINE FUMARATE 50 MILLIGRAM(S): 200 TABLET, FILM COATED ORAL at 20:47

## 2023-02-13 RX ADMIN — TAMSULOSIN HYDROCHLORIDE 0.4 MILLIGRAM(S): 0.4 CAPSULE ORAL at 20:46

## 2023-02-13 RX ADMIN — SENNA PLUS 2 TABLET(S): 8.6 TABLET ORAL at 20:46

## 2023-02-13 RX ADMIN — AMIODARONE HYDROCHLORIDE 200 MILLIGRAM(S): 400 TABLET ORAL at 06:23

## 2023-02-13 RX ADMIN — Medication 300 MILLIGRAM(S): at 12:11

## 2023-02-13 RX ADMIN — QUETIAPINE FUMARATE 25 MILLIGRAM(S): 200 TABLET, FILM COATED ORAL at 16:28

## 2023-02-13 RX ADMIN — Medication 50 MILLIGRAM(S): at 20:46

## 2023-02-13 RX ADMIN — AMPICILLIN SODIUM AND SULBACTAM SODIUM 200 GRAM(S): 250; 125 INJECTION, POWDER, FOR SUSPENSION INTRAMUSCULAR; INTRAVENOUS at 17:35

## 2023-02-13 RX ADMIN — AMPICILLIN SODIUM AND SULBACTAM SODIUM 200 GRAM(S): 250; 125 INJECTION, POWDER, FOR SUSPENSION INTRAMUSCULAR; INTRAVENOUS at 12:09

## 2023-02-13 RX ADMIN — HALOPERIDOL DECANOATE 0.5 MILLIGRAM(S): 100 INJECTION INTRAMUSCULAR at 19:29

## 2023-02-13 RX ADMIN — AMPICILLIN SODIUM AND SULBACTAM SODIUM 200 GRAM(S): 250; 125 INJECTION, POWDER, FOR SUSPENSION INTRAMUSCULAR; INTRAVENOUS at 06:22

## 2023-02-13 RX ADMIN — POLYETHYLENE GLYCOL 3350 17 GRAM(S): 17 POWDER, FOR SOLUTION ORAL at 12:10

## 2023-02-13 RX ADMIN — Medication 650 MILLIGRAM(S): at 17:40

## 2023-02-13 RX ADMIN — AMPICILLIN SODIUM AND SULBACTAM SODIUM 200 GRAM(S): 250; 125 INJECTION, POWDER, FOR SUSPENSION INTRAMUSCULAR; INTRAVENOUS at 00:46

## 2023-02-13 RX ADMIN — Medication 325 MILLIGRAM(S): at 12:11

## 2023-02-13 RX ADMIN — APIXABAN 5 MILLIGRAM(S): 2.5 TABLET, FILM COATED ORAL at 17:40

## 2023-02-13 RX ADMIN — AMPICILLIN SODIUM AND SULBACTAM SODIUM 200 GRAM(S): 250; 125 INJECTION, POWDER, FOR SUSPENSION INTRAMUSCULAR; INTRAVENOUS at 23:31

## 2023-02-13 RX ADMIN — APIXABAN 5 MILLIGRAM(S): 2.5 TABLET, FILM COATED ORAL at 06:23

## 2023-02-13 NOTE — PROGRESS NOTE ADULT - PROBLEM SELECTOR PLAN 10
Unclear if patient has true chest pain due to dementia and unreliable ROS  - Obtained EKG - LBBB, similar to previous EKG  - CE negative  - Appeared well clinically and not hypoxic at this time  - Will continue to monitor  - Currently asymptomatic.

## 2023-02-13 NOTE — PROGRESS NOTE ADULT - SUBJECTIVE AND OBJECTIVE BOX
[facility] Division of Hospital Medicine  Denys Arredondo MD  Available via MS Teams      SUBJECTIVE / OVERNIGHT EVENTS:  No acute events overnight. Patient seen at bedside this AM, somnolent. Awakes to verbal stimuli, intermittently following commands but does not verbalize full sentences.   Denies any complaints but unreliable historian. Son at bedside.    ADDITIONAL REVIEW OF SYSTEMS: unable to be obtained due to patients mental status    MEDICATIONS  (STANDING):  allopurinol 300 milliGRAM(s) Oral daily  aMIOdarone    Tablet 200 milliGRAM(s) Oral daily  ampicillin/sulbactam  IVPB 3 Gram(s) IV Intermittent every 6 hours  apixaban 5 milliGRAM(s) Oral every 12 hours  chlorhexidine 2% Cloths 1 Application(s) Topical daily  ferrous    sulfate 325 milliGRAM(s) Oral daily  lactobacillus acidophilus 1 Tablet(s) Oral daily  polyethylene glycol 3350 17 Gram(s) Oral daily  QUEtiapine 50 milliGRAM(s) Oral at bedtime  senna 2 Tablet(s) Oral at bedtime  tamsulosin 0.4 milliGRAM(s) Oral at bedtime  traZODone 50 milliGRAM(s) Oral at bedtime    MEDICATIONS  (PRN):  acetaminophen     Tablet .. 650 milliGRAM(s) Oral every 6 hours PRN Temp greater or equal to 38C (100.4F), Mild Pain (1 - 3)  melatonin 3 milliGRAM(s) Oral at bedtime PRN Insomnia  ondansetron Injectable 4 milliGRAM(s) IV Push every 8 hours PRN Nausea and/or Vomiting      I&O's Summary    12 Feb 2023 07:01  -  13 Feb 2023 07:00  --------------------------------------------------------  IN: 240 mL / OUT: 2350 mL / NET: -2110 mL    13 Feb 2023 07:01  -  13 Feb 2023 13:39  --------------------------------------------------------  IN: 200 mL / OUT: 150 mL / NET: 50 mL        PHYSICAL EXAM:  Vital Signs Last 24 Hrs  T(C): 36.2 (13 Feb 2023 09:24), Max: 36.4 (12 Feb 2023 23:55)  T(F): 97.2 (13 Feb 2023 09:24), Max: 97.5 (12 Feb 2023 23:55)  HR: 61 (13 Feb 2023 09:24) (61 - 70)  BP: 119/70 (13 Feb 2023 09:24) (114/72 - 148/74)  BP(mean): --  RR: 18 (13 Feb 2023 09:24) (18 - 18)  SpO2: 95% (13 Feb 2023 09:24) (95% - 98%)    Parameters below as of 13 Feb 2023 09:24  Patient On (Oxygen Delivery Method): nasal cannula      GENERAL: No acute distress, sleepy, but arousable  HEAD:  Atraumatic, Normocephalic  EYES: EOMI, conjunctiva and sclera clear  NECK: Supple  CHEST/LUNG: CTAB; No wheezes, rales, or rhonchi; no respiratory distress on O2; PPM site no erythema or tenderness  HEART: Irregular; No murmurs, rubs, or gallops  ABDOMEN: Soft, non-tender, non-distended; normal bowel sounds, no organomegaly; (+) Jamil  EXTREMITIES: 2+ peripheral pulses b/l, (+) hip surgical site scar well healed and no tenderness  NEUROLOGY: A&O x 1, no focal deficits  SKIN: No rashes or lesions    LABS:                        8.7    6.50  )-----------( 193      ( 13 Feb 2023 09:24 )             29.2     02-12    141  |  107  |  21  ----------------------------<  94  4.4   |  22  |  0.92    Ca    8.8      12 Feb 2023 07:18                Culture - Blood (collected 11 Feb 2023 09:40)  Source: .Blood Blood-Peripheral  Preliminary Report (12 Feb 2023 14:01):    No growth to date.    Culture - Blood (collected 11 Feb 2023 07:20)  Source: .Blood Blood-Venous  Preliminary Report (12 Feb 2023 11:01):    No growth to date.      SARS-CoV-2: NotDetec (09 Feb 2023 10:56)  COVID-19 PCR: NotDetec (19 Jan 2023 07:19)  COVID-19 PCR: NotDetec (16 Jan 2023 21:13)  COVID-19 PCR: NotDetec (08 Jan 2023 19:25)  COVID-19 PCR: NotDetec (04 Jan 2023 17:46)  COVID-19 PCR: NotDetec (30 Dec 2022 10:12)  COVID-19 PCR: NotDetec (26 Dec 2022 18:04)  COVID-19 PCR: NotDetec (23 Dec 2022 06:19)  COVID-19 PCR: NotDetec (19 Dec 2022 22:30)      RADIOLOGY & ADDITIONAL TESTS:  ekg, labs, micro imaging personally reviewed      COORDINATION OF CARE:  care discussed and coordinated with consultants.

## 2023-02-13 NOTE — PROGRESS NOTE ADULT - PROBLEM SELECTOR PLAN 6
Cont. Eliquis BID  - Extremity precautions.  - monitor for bleeding, H/H downtrending  - if continues to drop will consider holding eliquis

## 2023-02-13 NOTE — PROGRESS NOTE ADULT - ASSESSMENT
Pt is a 89 y/o M w/ a PMH of dementia, b/l hip arthroplasty, a fib on Eliquis, BPH, and recently diagnosed pan-sensitive klebsiella UTI on ciprofloxacin who presents to the ED for fever, agitation, hypotension, and hypoxia. Vital signs improved since admission s/p pip/tazo, supplemental O2 and midodrine. On PE pt did not have tenderness or erythema over PPD pocket or pain w/ active flexion of hips. He endorsed malaise, and was A&Ox1. Labs notable for leukocytosis and E faecalis bacteremia in 3 bottles. CT C/A/P shows no active infectious process in chest, but shows large volume stool in rectum and thickening of bowel wall c/w stericolitis.    #Impression  -Pt's presentation c/w sepsis 2/2 E faecalis bacteremia, most likely from impacted stool in rectum, w/ concomitant klebsiella UTI.    #Recommendations    - on Unasyn 3g q6, should cover for E faecalis bacteremia  and UTI  - repeat BCx in 48 hours   - Continue to access for hip pain given pt's b/l arthroplasty-- if pain worsens consider obtaining MRI  - TTE to access for endocarditis given gram positive bacteremia- result noted  Discussed with EPS , as pt with PPM , FOrtunately cultures rapidly cleared. will continue to treat medically   - Continue to monitor WBC    Delia Levin M.D. ,   please reach via teams   If no answer, or after 5PM/ weekends,  then please call  [phone]    Assessment and plan discussed with the primary team .

## 2023-02-13 NOTE — PROGRESS NOTE ADULT - SUBJECTIVE AND OBJECTIVE BOX
Patient is a 89y old  Male who presents with a chief complaint of Hypotension and sepsis (12 Feb 2023 16:07)    Being followed by ID for        Interval history:  No other acute events      ROS:  No cough,SOB,CP  No N/V/D  No abd pain  No urinary complaints  No HA  No joint or limb pain  No other complaints    PAST MEDICAL & SURGICAL HISTORY:  AF (atrial fibrillation)      Hypertension      Arthritis      Benign prostatic hypertrophy      Gout      Subdural hematoma      Pacemaker      Alzheimer disease      Alzheimer disease      S/P craniotomy      S/P laparoscopic cholecystectomy      S/P umbilical hernia repair, follow-up exam      No significant past surgical history        Allergies    No Known Allergies    Intolerances      Antimicrobials:    ampicillin/sulbactam  IVPB 3 Gram(s) IV Intermittent every 6 hours    MEDICATIONS  (STANDING):  allopurinol 300 milliGRAM(s) Oral daily  aMIOdarone    Tablet 200 milliGRAM(s) Oral daily  ampicillin/sulbactam  IVPB 3 Gram(s) IV Intermittent every 6 hours  apixaban 5 milliGRAM(s) Oral every 12 hours  chlorhexidine 2% Cloths 1 Application(s) Topical daily  ferrous    sulfate 325 milliGRAM(s) Oral daily  lactobacillus acidophilus 1 Tablet(s) Oral daily  polyethylene glycol 3350 17 Gram(s) Oral daily  QUEtiapine 50 milliGRAM(s) Oral at bedtime  senna 2 Tablet(s) Oral at bedtime  tamsulosin 0.4 milliGRAM(s) Oral at bedtime  traZODone 50 milliGRAM(s) Oral at bedtime      Vital Signs Last 24 Hrs  T(C): 36.4 (02-12-23 @ 23:55), Max: 36.4 (02-12-23 @ 23:55)  T(F): 97.5 (02-12-23 @ 23:55), Max: 97.5 (02-12-23 @ 23:55)  HR: 70 (02-12-23 @ 23:55) (66 - 74)  BP: 132/71 (02-12-23 @ 23:55) (114/72 - 148/74)  BP(mean): --  RR: 18 (02-12-23 @ 23:55) (18 - 18)  SpO2: 95% (02-12-23 @ 23:55) (95% - 98%)    Physical Exam:    Constitutional well preserved,comfortable,pleasant    HEENT PERRLA EOMI,No pallor or icterus    No oral exudate or erythema    Neck supple no JVD or LN    Chest Good AE,CTA    CVS RRR S1 S2 WNl No murmur or rub or gallop    Abd soft BS normal No tenderness no masses    Ext No cyanosis clubbing or edema    IV site no erythema tenderness or discharge    Joints no swelling or LOM    CNS AAO X 3 no focal    Lab Data:                          10.3   7.92  )-----------( 268      ( 12 Feb 2023 07:19 )             32.6       02-12    141  |  107  |  21  ----------------------------<  94  4.4   |  22  |  0.92    Ca    8.8      12 Feb 2023 07:18  Mg     1.8     02-11    TPro  5.5<L>  /  Alb  2.9<L>  /  TBili  0.4  /  DBili  x   /  AST  9<L>  /  ALT  7<L>  /  AlkPhos  107  02-11          .Blood Blood-Peripheral  02-11-23   No growth to date.  --  --      .Blood Blood-Venous  02-11-23   No growth to date.  --  --      Clean Catch Clean Catch (Midstream)  02-09-23   >100,000 CFU/ml Enterococcus faecalis  --  Enterococcus faecalis      .Blood Blood-Peripheral  02-09-23   Growth in aerobic and anaerobic bottles: Enterococcus faecalis  Anaerobic blood culture bottle turned positive after the final report was  released.  ***Blood Panel PCR results on this specimen are available  approximately 3 hours after the Gram stain result.***  Gram stain, PCR, and/or culture results may not always  correspond due to difference in methodologies.  ************************************************************  This PCR assay was performed by multiplex PCR. This  Assay tests for 66 bacterial and resistance gene targets.  Please refer to the [facility] test directory  at [email or web address] for details.  --  Blood Culture PCR  Enterococcus faecalis      .Blood Blood-Peripheral  02-09-23   Growth in aerobic and anaerobic bottles: Enterococcus faecalis  See previous culture [id number]  --    Growth in aerobic bottle: Gram positive cocci in pairs  Growth in anaerobic bottle: Gram positive cocci in pairs                    WBC Count: 7.92 (02-12-23 @ 07:19)  WBC Count: 8.79 (02-11-23 @ 13:04)  WBC Count: 13.31 (02-10-23 @ 10:09)  WBC Count: 11.87 (02-10-23 @ 07:41)  WBC Count: 32.22 (02-09-23 @ 10:57)             Patient is a 89y old  Male who presents with a chief complaint of Hypotension and sepsis (12 Feb 2023 16:07)    Being followed by ID for enterococcal bacteremia        Interval history:  pt sleeping , did not awaken  events noted, urology replaced ye   No other acute events          PAST MEDICAL & SURGICAL HISTORY:  AF (atrial fibrillation)      Hypertension      Arthritis      Benign prostatic hypertrophy      Gout      Subdural hematoma      Pacemaker      Alzheimer disease      Alzheimer disease      S/P craniotomy      S/P laparoscopic cholecystectomy      S/P umbilical hernia repair, follow-up exam      No significant past surgical history        Allergies    No Known Allergies    Intolerances      Antimicrobials:    ampicillin/sulbactam  IVPB 3 Gram(s) IV Intermittent every 6 hours    MEDICATIONS  (STANDING):  allopurinol 300 milliGRAM(s) Oral daily  aMIOdarone    Tablet 200 milliGRAM(s) Oral daily  ampicillin/sulbactam  IVPB 3 Gram(s) IV Intermittent every 6 hours  apixaban 5 milliGRAM(s) Oral every 12 hours  chlorhexidine 2% Cloths 1 Application(s) Topical daily  ferrous    sulfate 325 milliGRAM(s) Oral daily  lactobacillus acidophilus 1 Tablet(s) Oral daily  polyethylene glycol 3350 17 Gram(s) Oral daily  QUEtiapine 50 milliGRAM(s) Oral at bedtime  senna 2 Tablet(s) Oral at bedtime  tamsulosin 0.4 milliGRAM(s) Oral at bedtime  traZODone 50 milliGRAM(s) Oral at bedtime      Vital Signs Last 24 Hrs  T(C): 36.4 (02-12-23 @ 23:55), Max: 36.4 (02-12-23 @ 23:55)  T(F): 97.5 (02-12-23 @ 23:55), Max: 97.5 (02-12-23 @ 23:55)  HR: 70 (02-12-23 @ 23:55) (66 - 74)  BP: 132/71 (02-12-23 @ 23:55) (114/72 - 148/74)  BP(mean): --  RR: 18 (02-12-23 @ 23:55) (18 - 18)  SpO2: 95% (02-12-23 @ 23:55) (95% - 98%)    Physical Exam:    Constitutional sleeping    Neck supple no JVD or LN    Chest Good AE,CTA  PM site with no erythema  CVS  S1 S2     Abd soft     Ext No cyanosis clubbing or edema    IV site no erythema tenderness or discharge        Lab Data:                          10.3   7.92  )-----------( 268      ( 12 Feb 2023 07:19 )             32.6       02-12    141  |  107  |  21  ----------------------------<  94  4.4   |  22  |  0.92    Ca    8.8      12 Feb 2023 07:18  Mg     1.8     02-11    TPro  5.5<L>  /  Alb  2.9<L>  /  TBili  0.4  /  DBili  x   /  AST  9<L>  /  ALT  7<L>  /  AlkPhos  107  02-11    Serum Pro-Brain Natriuretic Peptide (02.09.23 @ 10:57)    Serum Pro-Brain Natriuretic Peptide: 5255 pg/mL          .Blood Blood-Peripheral  02-11-23   No growth to date.  --  --      .Blood Blood-Venous  02-11-23   No growth to date.  --  --      Clean Catch Clean Catch (Midstream)  02-09-23   >100,000 CFU/ml Enterococcus faecalis  --  Enterococcus faecalis      .Blood Blood-Peripheral  02-09-23   Growth in aerobic and anaerobic bottles: Enterococcus faecalis  Anaerobic blood culture bottle turned positive after the final report was  released.  ***Blood Panel PCR results on this specimen are available  approximately 3 hours after the Gram stain result.***  Gram stain, PCR, and/or culture results may not always  correspond due to difference in methodologies.  ************************************************************  This PCR assay was performed by multiplex PCR. This  Assay tests for 66 bacterial and resistance gene targets.  Please refer to the [facility] test directory  at [email or web address] for details.  --  Blood Culture PCR  Enterococcus faecalis      .Blood Blood-Peripheral  02-09-23   Growth in aerobic and anaerobic bottles: Enterococcus faecalis  See previous culture [id number]  --    Growth in aerobic bottle: Gram positive cocci in pairs  Growth in anaerobic bottle: Gram positive cocci in pairs        WBC Count: 7.92 (02-12-23 @ 07:19)  WBC Count: 8.79 (02-11-23 @ 13:04)  WBC Count: 13.31 (02-10-23 @ 10:09)  WBC Count: 11.87 (02-10-23 @ 07:41)  WBC Count: 32.22 (02-09-23 @ 10:57)        < from: TTE with Doppler (w/Cont) (02.13.23 @ 13:28) >  ------------------------------------------------------------------------  Conclusions:  1. Normal left ventricular internal dimensions and wall  thicknesses.  2. Normal left ventricular systolic function.   Endocardial  visualization enhanced with intravenous injection of  Ultrasonic Enhancing Agent (Definity).  3. Severe right atrial enlargement.  4. Right ventricular enlargement with decreased right  ventricular systolic function.   A device wire is noted in  the right heart.  5. Normal tricuspid valve. Moderate-severe tricuspid  regurgitation.  6. Estimated pulmonary artery systolic pressure equals 49  mm Hg, assuming right atrial pressure equals 8 mm Hg,  consistent with mild pulmonary pressures.  *** No recent echocardiogram for comparison.  Unable to rule out endocarditis.  ------------------------------------------------------------------------  Confirmed on  2/13/2023 - 14:53:16 by NIC Morgan  ------------------------------------------------------------------------    < end of copied text >

## 2023-02-13 NOTE — PROGRESS NOTE ADULT - PROBLEM SELECTOR PLAN 9
B12 and folate - good  - (+) Iron deficiency, started ferrous sulfate 325 daily  - Component of AOCD as well, possibly exacerbated by acute blood loss from hematuria  - H&H downtrending, but no signs of bleeding  - Continue to monitor

## 2023-02-14 PROCEDURE — 99222 1ST HOSP IP/OBS MODERATE 55: CPT

## 2023-02-14 PROCEDURE — 99232 SBSQ HOSP IP/OBS MODERATE 35: CPT

## 2023-02-14 RX ORDER — HALOPERIDOL DECANOATE 100 MG/ML
0.5 INJECTION INTRAMUSCULAR ONCE
Refills: 0 | Status: COMPLETED | OUTPATIENT
Start: 2023-02-14 | End: 2023-02-14

## 2023-02-14 RX ORDER — QUETIAPINE FUMARATE 200 MG/1
50 TABLET, FILM COATED ORAL
Refills: 0 | Status: DISCONTINUED | OUTPATIENT
Start: 2023-02-15 | End: 2023-02-15

## 2023-02-14 RX ORDER — QUETIAPINE FUMARATE 200 MG/1
50 TABLET, FILM COATED ORAL DAILY
Refills: 0 | Status: DISCONTINUED | OUTPATIENT
Start: 2023-02-14 | End: 2023-02-14

## 2023-02-14 RX ORDER — QUETIAPINE FUMARATE 200 MG/1
50 TABLET, FILM COATED ORAL
Refills: 0 | Status: DISCONTINUED | OUTPATIENT
Start: 2023-02-14 | End: 2023-02-14

## 2023-02-14 RX ADMIN — CHLORHEXIDINE GLUCONATE 1 APPLICATION(S): 213 SOLUTION TOPICAL at 11:16

## 2023-02-14 RX ADMIN — SENNA PLUS 2 TABLET(S): 8.6 TABLET ORAL at 21:21

## 2023-02-14 RX ADMIN — QUETIAPINE FUMARATE 50 MILLIGRAM(S): 200 TABLET, FILM COATED ORAL at 16:42

## 2023-02-14 RX ADMIN — APIXABAN 5 MILLIGRAM(S): 2.5 TABLET, FILM COATED ORAL at 17:31

## 2023-02-14 RX ADMIN — AMIODARONE HYDROCHLORIDE 200 MILLIGRAM(S): 400 TABLET ORAL at 05:45

## 2023-02-14 RX ADMIN — Medication 325 MILLIGRAM(S): at 11:17

## 2023-02-14 RX ADMIN — Medication 3 MILLIGRAM(S): at 21:21

## 2023-02-14 RX ADMIN — Medication 1 TABLET(S): at 11:17

## 2023-02-14 RX ADMIN — POLYETHYLENE GLYCOL 3350 17 GRAM(S): 17 POWDER, FOR SOLUTION ORAL at 11:17

## 2023-02-14 RX ADMIN — HALOPERIDOL DECANOATE 0.5 MILLIGRAM(S): 100 INJECTION INTRAMUSCULAR at 11:50

## 2023-02-14 RX ADMIN — Medication 300 MILLIGRAM(S): at 11:17

## 2023-02-14 RX ADMIN — AMPICILLIN SODIUM AND SULBACTAM SODIUM 200 GRAM(S): 250; 125 INJECTION, POWDER, FOR SUSPENSION INTRAMUSCULAR; INTRAVENOUS at 22:31

## 2023-02-14 RX ADMIN — APIXABAN 5 MILLIGRAM(S): 2.5 TABLET, FILM COATED ORAL at 05:45

## 2023-02-14 RX ADMIN — AMPICILLIN SODIUM AND SULBACTAM SODIUM 200 GRAM(S): 250; 125 INJECTION, POWDER, FOR SUSPENSION INTRAMUSCULAR; INTRAVENOUS at 05:45

## 2023-02-14 RX ADMIN — AMPICILLIN SODIUM AND SULBACTAM SODIUM 200 GRAM(S): 250; 125 INJECTION, POWDER, FOR SUSPENSION INTRAMUSCULAR; INTRAVENOUS at 11:16

## 2023-02-14 RX ADMIN — AMPICILLIN SODIUM AND SULBACTAM SODIUM 200 GRAM(S): 250; 125 INJECTION, POWDER, FOR SUSPENSION INTRAMUSCULAR; INTRAVENOUS at 17:27

## 2023-02-14 RX ADMIN — TAMSULOSIN HYDROCHLORIDE 0.4 MILLIGRAM(S): 0.4 CAPSULE ORAL at 21:22

## 2023-02-14 RX ADMIN — Medication 50 MILLIGRAM(S): at 21:23

## 2023-02-14 NOTE — PROGRESS NOTE ADULT - SUBJECTIVE AND OBJECTIVE BOX
Patient is a 89y old  Male who presents with a chief complaint of Hypotension and sepsis (14 Feb 2023 13:51)    Being followed by ID for enterococcal bacteremia        Interval history:  pt restrained  unable to give adequate history  afebrile  ye in place  No other acute events        PAST MEDICAL & SURGICAL HISTORY:  AF (atrial fibrillation)      Hypertension      Arthritis      Benign prostatic hypertrophy      Gout      Subdural hematoma      Pacemaker      Alzheimer disease      Alzheimer disease      S/P craniotomy      S/P laparoscopic cholecystectomy      S/P umbilical hernia repair, follow-up exam      No significant past surgical history        Allergies    No Known Allergies    Intolerances      Antimicrobials:    ampicillin/sulbactam  IVPB 3 Gram(s) IV Intermittent every 6 hours    MEDICATIONS  (STANDING):  allopurinol 300 milliGRAM(s) Oral daily  aMIOdarone    Tablet 200 milliGRAM(s) Oral daily  ampicillin/sulbactam  IVPB 3 Gram(s) IV Intermittent every 6 hours  apixaban 5 milliGRAM(s) Oral every 12 hours  chlorhexidine 2% Cloths 1 Application(s) Topical daily  ferrous    sulfate 325 milliGRAM(s) Oral daily  lactobacillus acidophilus 1 Tablet(s) Oral daily  polyethylene glycol 3350 17 Gram(s) Oral daily  senna 2 Tablet(s) Oral at bedtime  tamsulosin 0.4 milliGRAM(s) Oral at bedtime  traZODone 50 milliGRAM(s) Oral at bedtime      Vital Signs Last 24 Hrs  T(C): 36.4 (02-14-23 @ 15:40), Max: 36.6 (02-14-23 @ 11:02)  T(F): 97.6 (02-14-23 @ 15:40), Max: 97.8 (02-14-23 @ 11:02)  HR: 80 (02-14-23 @ 15:40) (61 - 80)  BP: 166/91 (02-14-23 @ 15:40) (120/68 - 166/91)  BP(mean): --  RR: 18 (02-14-23 @ 15:40) (18 - 18)  SpO2: 93% (02-14-23 @ 15:40) (93% - 97%)    Physical Exam:    Constitutional restrained     HEENT PERRLA EOMI,No pallor or icterus    No oral exudate or erythema    Neck supple no JVD or LN    Chest Good AE,CTA    CVS  S1 S2     Abd soft BS normal No tenderness     Ext edema  hip incisions no erythema  IV site no erythema tenderness or discharge    Joints no swelling or LOM        Lab Data:                          8.7    6.50  )-----------( 193      ( 13 Feb 2023 09:24 )             29.2                   .Blood Blood-Peripheral  02-11-23   No growth to date.  --  --      .Blood Blood-Venous  02-11-23   No growth to date.  --  --      Clean Catch Clean Catch (Midstream)  02-09-23   >100,000 CFU/ml Enterococcus faecalis  --  Enterococcus faecalis      .Blood Blood-Peripheral  02-09-23   Growth in aerobic and anaerobic bottles: Enterococcus faecalis  Anaerobic blood culture bottle turned positive after the final report was  released.  ***Blood Panel PCR results on this specimen are available  approximately 3 hours after the Gram stain result.***  Gram stain, PCR, and/or culture results may not always  correspond due to difference in methodologies.  ************************************************************  This PCR assay was performed by multiplex PCR. This  Assay tests for 66 bacterial and resistance gene targets.  Please refer to the [facility] Labs test directory  at [email or web address] for details.  --  Blood Culture PCR  Enterococcus faecalis      .Blood Blood-Peripheral  02-09-23   Growth in aerobic and anaerobic bottles: Enterococcus faecalis  See previous culture [id number]  --    Growth in aerobic bottle: Gram positive cocci in pairs  Growth in anaerobic bottle: Gram positive cocci in pairs    < from: Xray Chest 1 View- PORTABLE-Urgent (Xray Chest 1 View- PORTABLE-Urgent .) (02.12.23 @ 10:35) >    ACC: [id number] EXAM:  XR CHEST PORTABLE URGENT 1V   ORDERED BY: NOAH UNDERWOOD     PROCEDURE DATE:  02/12/2023          INTERPRETATION:  Chest one view    HISTORY: Hypoxia    COMPARISON STUDY: 2/9/2023    Frontal expiratory view of the chest shows the heart to be similar in   size. Left cardiac pacemaker is again noted.    The lungs show increased pulmonary congestion with progression of small   effusions and there is no evidence of pneumothorax.    IMPRESSION:  Congestive changes as noted.      Thank you for the courtesy of this referral.    < end of copied text >                  WBC Count: 6.50 (02-13-23 @ 09:24)  WBC Count: 7.92 (02-12-23 @ 07:19)  WBC Count: 8.79 (02-11-23 @ 13:04)  WBC Count: 13.31 (02-10-23 @ 10:09)  WBC Count: 11.87 (02-10-23 @ 07:41)  WBC Count: 32.22 (02-09-23 @ 10:57)

## 2023-02-14 NOTE — PROGRESS NOTE ADULT - PROBLEM SELECTOR PLAN 6
Cont. Eliquis BID  - Extremity precautions.  - monitor for bleeding  - if H/H continues to drop will consider holding eliquis.

## 2023-02-14 NOTE — BH CONSULTATION LIAISON ASSESSMENT NOTE - SUMMARY
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    Plan:  -Consider one dose of Seroquel now  -Consider increasing Seroquel regimen to Seroquel 50mg at 12pm, Seroquel 50mg at 6pm, and Seroquel 25mg q 6 hours PRN for agitation, hold for sedation. Monitor EKG for QTc<500.  -Continue with Trazadone 50mg HS standing  -Reassess patient  89M,  for 42yrs, domiciled with wife (Lelia [phone]) and part-time HHA 2x weekly from 12pm-4pm, retired manager of a parking garage, with PPHx dementia dx in 2019 on Donepezil, no previous SA or psych admissions, no psychiatrist, no active substance abuse, with PMHx significant for BPH, afib w/ PPM, bilateral hip arthroplasty (R 12/2022, L hemiarthroplasty 1/5/2023), DVT on Eliquis, subdural hematoma s/p craniotomy, admitted for urosepsis, and psychiatry consulted for medication recommendations for agitation. Patient at bedside appears to be calm but AxOx0. Patient notes feeling sadness but denies SI, difficulty with sleep or appetite. Per NP and wife, patient was agitated and tried to climb out of bed. Patient previously had an episode of trying to remove his ye, causing trauma.    Plan:  -Consider one dose of Seroquel now  -Consider increasing Seroquel regimen to Seroquel 50mg at 12pm, Seroquel 50mg at 6pm, and Seroquel 25mg q 6 hours PRN for agitation, hold for sedation. Monitor EKG for QTc<500.  -Continue with Trazadone 50mg HS standing  -Reassess patient

## 2023-02-14 NOTE — BH CONSULTATION LIAISON ASSESSMENT NOTE - NSBHMSESPABN_PSY_A_CORE
Soft volume/Slowed rate/Decreased productivity/Impaired articulation Soft volume/Slowed rate/Impaired articulation

## 2023-02-14 NOTE — BH CONSULTATION LIAISON ASSESSMENT NOTE - DESCRIPTION
for 42yrs, domiciled with wife and part-time HHA 2x weekly from 12pm-4pm, retired manager of a parking garage, has adult children  to wife, Lelia for 42y years, domiciled with wife and part-time HHA 2x weekly from 12pm-4pm, retired manager of [employer], has adult child Lance and 4 grandchildren

## 2023-02-14 NOTE — PROGRESS NOTE ADULT - SUBJECTIVE AND OBJECTIVE BOX
[facility] Division of Hospital Medicine  Denys Arredondo MD  Available via MS Teams      SUBJECTIVE / OVERNIGHT EVENTS:  Patient was agitated yesterday and this morning. He received haldol and also Seroquel His agitation improved with haldol. Psych team was consulted as well. Patient appears more conversive and alert today. Limited history due to patients mental status/dementia. He denies chest pain, sob, but unreliable historian.     ADDITIONAL REVIEW OF SYSTEMS:  unable to be obtained due to mental status  MEDICATIONS  (STANDING):  allopurinol 300 milliGRAM(s) Oral daily  aMIOdarone    Tablet 200 milliGRAM(s) Oral daily  ampicillin/sulbactam  IVPB 3 Gram(s) IV Intermittent every 6 hours  apixaban 5 milliGRAM(s) Oral every 12 hours  chlorhexidine 2% Cloths 1 Application(s) Topical daily  ferrous    sulfate 325 milliGRAM(s) Oral daily  lactobacillus acidophilus 1 Tablet(s) Oral daily  polyethylene glycol 3350 17 Gram(s) Oral daily  QUEtiapine 50 milliGRAM(s) Oral at bedtime  senna 2 Tablet(s) Oral at bedtime  tamsulosin 0.4 milliGRAM(s) Oral at bedtime  traZODone 50 milliGRAM(s) Oral at bedtime    MEDICATIONS  (PRN):  acetaminophen     Tablet .. 650 milliGRAM(s) Oral every 6 hours PRN Temp greater or equal to 38C (100.4F), Mild Pain (1 - 3)  melatonin 3 milliGRAM(s) Oral at bedtime PRN Insomnia  ondansetron Injectable 4 milliGRAM(s) IV Push every 8 hours PRN Nausea and/or Vomiting      I&O's Summary    13 Feb 2023 07:01  -  14 Feb 2023 07:00  --------------------------------------------------------  IN: 680 mL / OUT: 600 mL / NET: 80 mL    14 Feb 2023 07:01  -  14 Feb 2023 13:51  --------------------------------------------------------  IN: 470 mL / OUT: 400 mL / NET: 70 mL        PHYSICAL EXAM:  Vital Signs Last 24 Hrs  T(C): 36.6 (14 Feb 2023 11:02), Max: 36.6 (14 Feb 2023 11:02)  T(F): 97.8 (14 Feb 2023 11:02), Max: 97.8 (14 Feb 2023 11:02)  HR: 74 (14 Feb 2023 11:02) (61 - 78)  BP: 120/68 (14 Feb 2023 11:02) (120/68 - 149/73)  BP(mean): --  RR: 18 (14 Feb 2023 11:02) (18 - 18)  SpO2: 97% (14 Feb 2023 11:02) (94% - 97%)    Parameters below as of 14 Feb 2023 11:02  Patient On (Oxygen Delivery Method): nasal cannula  O2 Flow (L/min): 2      GENERAL: No acute distress, alert  HEAD:  Atraumatic, Normocephalic  EYES: EOMI, conjunctiva and sclera clear  NECK: Supple  CHEST/LUNG: CTABL; No wheezes, rales, or rhonchi; no respiratory distress on O2; PPM site no erythema or tenderness  HEART: Irregular; No murmurs, rubs, or gallops  ABDOMEN: Soft, non-tender, non-distended; normal bowel sounds, no organomegaly; (+) Jamil  EXTREMITIES: 2+ peripheral pulses b/l, (+) hip surgical site scar well healed and no tenderness  NEUROLOGY: A&O x 1, no focal deficits  SKIN: No rashes or lesions    LABS:                        8.7    6.50  )-----------( 193      ( 13 Feb 2023 09:24 )             29.2                     SARS-CoV-2: NotDetec (09 Feb 2023 10:56)  COVID-19 PCR: NotDetec (19 Jan 2023 07:19)  COVID-19 PCR: NotDetec (16 Jan 2023 21:13)  COVID-19 PCR: NotDetec (08 Jan 2023 19:25)  COVID-19 PCR: NotDetec (04 Jan 2023 17:46)  COVID-19 PCR: NotDetec (30 Dec 2022 10:12)  COVID-19 PCR: NotDetec (26 Dec 2022 18:04)  COVID-19 PCR: NotDetec (23 Dec 2022 06:19)  COVID-19 PCR: NotDetec (19 Dec 2022 22:30)      RADIOLOGY & ADDITIONAL TESTS:  ekg, labs, micro imaging personally reviewed      COORDINATION OF CARE:  care discussed and coordinated with consultants.

## 2023-02-14 NOTE — BH CONSULTATION LIAISON ASSESSMENT NOTE - NSBHCHARTREVIEWVS_PSY_A_CORE FT
Vital Signs Last 24 Hrs  T(C): 36.6 (14 Feb 2023 11:02), Max: 36.6 (14 Feb 2023 11:02)  T(F): 97.8 (14 Feb 2023 11:02), Max: 97.8 (14 Feb 2023 11:02)  HR: 74 (14 Feb 2023 11:02) (61 - 78)  BP: 120/68 (14 Feb 2023 11:02) (120/68 - 149/73)  BP(mean): --  RR: 18 (14 Feb 2023 11:02) (18 - 18)  SpO2: 97% (14 Feb 2023 11:02) (94% - 97%)    Parameters below as of 14 Feb 2023 11:02  Patient On (Oxygen Delivery Method): nasal cannula  O2 Flow (L/min): 2

## 2023-02-14 NOTE — PROGRESS NOTE ADULT - PROBLEM SELECTOR PLAN 10
Unclear if patient has true chest pain due to dementia and unreliable ROS  - Obtained EKG - LBBB, similar to previous EKG  - CE negative  - Appeared well clinically and not hypoxic at this time  - Will continue to monitor,   - Currently asymptomatic, denies chest pain

## 2023-02-14 NOTE — BH CONSULTATION LIAISON ASSESSMENT NOTE - NSBHATTESTCOMMENTATTENDFT_PSY_A_CORE
89M,  for 42yrs, domiciled with wife (Lelia [phone]) and part-time HHA 2x weekly from 12pm-4pm, retired manager of a parking garage, with PPHx dementia dx in 2019 on Donepezil, no previous SA or psych admissions, no psychiatrist, no active substance abuse, with PMHx significant for BPH, afib w/ PPM, bilateral hip arthroplasty (R 12/2022, L hemiarthroplasty 1/5/2023), DVT on Eliquis, subdural hematoma s/p craniotomy, admitted for urosepsis, and psychiatry consulted for medication recommendations for agitation. Patient at bedside appears to be calm but AxOx0. Patient notes feeling sadness but denies SI, difficulty with sleep or appetite. Per NP and wife, patient was agitated and tried to climb out of bed. Patient previously had an episode of trying to remove his ye, causing trauma. Agree with plan  -Consider increasing Seroquel regimen to Seroquel 50mg at 12pm, Seroquel 50mg at 6pm, and Seroquel 25mg q 6 hours PRN for agitation, hold for sedation. Monitor EKG for QTc<500.  -Continue with Trazadone 50mg HS standing

## 2023-02-14 NOTE — PROGRESS NOTE ADULT - ASSESSMENT
Pt is a 89 y/o M w/ a PMH of dementia, b/l hip arthroplasty, a fib on Eliquis, BPH, and recently diagnosed pan-sensitive klebsiella UTI on ciprofloxacin who presents to the ED for fever, agitation, hypotension, and hypoxia. Vital signs improved since admission s/p pip/tazo, supplemental O2 and midodrine. On PE pt did not have tenderness or erythema over PPD pocket or pain w/ active flexion of hips. He endorsed malaise, and was A&Ox1. Labs notable for leukocytosis and E faecalis bacteremia in 3 bottles. CT C/A/P shows no active infectious process in chest, but shows large volume stool in rectum and thickening of bowel wall c/w stericolitis.    #Impression  -Pt's presentation c/w sepsis 2/2 E faecalis bacteremia, most likely from impacted stool in rectum, w/ concomitant klebsiella UTI.    #Recommendations    - on Unasyn 3g q6, should cover for E faecalis bacteremia  and UTI  - repeat BCx in 48 hours   - Continue to access for hip pain given pt's b/l arthroplasty-- if pain worsens consider obtaining MRI  - TTE to access for endocarditis given gram positive bacteremia- result noted  Discussed with EPS , as pt with PPM , FOrtunately cultures rapidly cleared. will continue to treat medically   - Continue to monitor WBC    Delia Levin M.D. ,   please reach via teams   If no answer, or after 5PM/ weekends,  then please call  [phone]    Assessment and plan discussed with the primary team .

## 2023-02-14 NOTE — BH CONSULTATION LIAISON ASSESSMENT NOTE - RISK ASSESSMENT
Risk factors: PPHx, symptoms of agitation  Protective factors: supportive family, residential stability

## 2023-02-14 NOTE — BH CONSULTATION LIAISON ASSESSMENT NOTE - OTHER PAST PSYCHIATRIC HISTORY (INCLUDE DETAILS REGARDING ONSET, COURSE OF ILLNESS, INPATIENT/OUTPATIENT TREATMENT)
dementia on donepezil, no prior SA or psych admissions Dementia on donepezil, no prior SA or psych admissions

## 2023-02-14 NOTE — PROGRESS NOTE ADULT - PROBLEM SELECTOR PLAN 9
B12 and folate - good  - (+) Iron deficiency, started ferrous sulfate 325 daily  - Component of AOCD as well, possibly exacerbated by acute blood loss from hematuria  - H&H downtrending, but no signs of bleeding  - Continue to monitor.

## 2023-02-14 NOTE — PROGRESS NOTE ADULT - PROBLEM SELECTOR PLAN 8
Likely traumatic  - Patient kicked on Ye, and Ye was found to be malfunctioning in PM 2/12  - Gross hematuria with Ye removal  - Urology replaced the ye, and cleared for continuing Eliquis  - HD downtrending but no overt signs of bleeding  - If continue to have gross hematuria, will check STAT CBC.

## 2023-02-14 NOTE — BH CONSULTATION LIAISON ASSESSMENT NOTE - NSBHCONSULTRECOMMENDOTHER_PSY_A_CORE FT
1. consider Seroquel 25mg PO qdaily PRN for agitation, and 50 mg qhs standing, hold for sedation.  Monitor EKG for QTc<500    2. Melatonin 3mg PO qHS PRN insomnia.    3. Minimize use of benzos, opioids, anticholinergics, or other deliriogenic agents when possible.  Maintain sleep wake cycle.  Provide frequent reorientation and redirection.  Family member at bedside if possible. Assess for need for glasses and hearing aid (if applicable).    4. Pt does not meet criteria for psychiatric hospitalization.  Recommend outpt psych f/u at [facility] after d/c- [phone]. [facility] will follow.    5. dtr in accord with plan above

## 2023-02-14 NOTE — BH CONSULTATION LIAISON ASSESSMENT NOTE - CURRENT MEDICATION
MEDICATIONS  (STANDING):  allopurinol 300 milliGRAM(s) Oral daily  aMIOdarone    Tablet 200 milliGRAM(s) Oral daily  ampicillin/sulbactam  IVPB 3 Gram(s) IV Intermittent every 6 hours  apixaban 5 milliGRAM(s) Oral every 12 hours  chlorhexidine 2% Cloths 1 Application(s) Topical daily  ferrous    sulfate 325 milliGRAM(s) Oral daily  lactobacillus acidophilus 1 Tablet(s) Oral daily  polyethylene glycol 3350 17 Gram(s) Oral daily  QUEtiapine 50 milliGRAM(s) Oral at bedtime  senna 2 Tablet(s) Oral at bedtime  tamsulosin 0.4 milliGRAM(s) Oral at bedtime  traZODone 50 milliGRAM(s) Oral at bedtime    MEDICATIONS  (PRN):  acetaminophen     Tablet .. 650 milliGRAM(s) Oral every 6 hours PRN Temp greater or equal to 38C (100.4F), Mild Pain (1 - 3)  melatonin 3 milliGRAM(s) Oral at bedtime PRN Insomnia  ondansetron Injectable 4 milliGRAM(s) IV Push every 8 hours PRN Nausea and/or Vomiting   MEDICATIONS  (STANDING):  allopurinol 300 milliGRAM(s) Oral daily  aMIOdarone    Tablet 200 milliGRAM(s) Oral daily  ampicillin/sulbactam  IVPB 3 Gram(s) IV Intermittent every 6 hours  apixaban 5 milliGRAM(s) Oral every 12 hours  chlorhexidine 2% Cloths 1 Application(s) Topical daily  ferrous    sulfate 325 milliGRAM(s) Oral daily  lactobacillus acidophilus 1 Tablet(s) Oral daily  polyethylene glycol 3350 17 Gram(s) Oral daily  QUEtiapine 50 milliGRAM(s) Oral daily  QUEtiapine 50 milliGRAM(s) Oral daily  senna 2 Tablet(s) Oral at bedtime  tamsulosin 0.4 milliGRAM(s) Oral at bedtime  traZODone 50 milliGRAM(s) Oral at bedtime    MEDICATIONS  (PRN):  acetaminophen     Tablet .. 650 milliGRAM(s) Oral every 6 hours PRN Temp greater or equal to 38C (100.4F), Mild Pain (1 - 3)  melatonin 3 milliGRAM(s) Oral at bedtime PRN Insomnia  ondansetron Injectable 4 milliGRAM(s) IV Push every 8 hours PRN Nausea and/or Vomiting

## 2023-02-14 NOTE — BH CONSULTATION LIAISON ASSESSMENT NOTE - HPI (INCLUDE ILLNESS QUALITY, SEVERITY, DURATION, TIMING, CONTEXT, MODIFYING FACTORS, ASSOCIATED SIGNS AND SYMPTOMS)
89M,  for 42yrs, domiciled with wife and part-time HHA 2x weekly from 12pm-4pm, retired manager of a parking garage, with PPHx dementia dx in 2019 on donepezil, no previous SA or psych admissions, no previous inpt psychiatric admissions, no active substance abuse, with PMH significant for BPH, Afib w/PPM, recent admission for RT femoral head fx s/p R Femoral Neck Fx Amaury arthroplasty in 12/2022, presented with L femoral neck fx s/p L his hemiarthroplasty 1/5/2023, admitted for urosepsis, psychiatry consulted for management of agitation. 89M,  for 42yrs, domiciled with wife (Lelia [phone]) and part-time HHA 2x weekly from 12pm-4pm, retired manager of a parking garage, with PPHx dementia dx in 2019 on Donepezil, no previous SA or psych admissions, no psychiatrist, no active substance abuse, with PMHx significant for BPH, afib w/ PPM, bilateral hip arthroplasty (R 12/2022, L hemiarthroplasty 1/5/2023), DVT on Eliquis, subdural hematoma s/p craniotomy, admitted for urosepsis, and psychiatry consulted for medication recommendations for agitation.    Patient at bedside appears to be calm and alert. Patient disoriented to persons, place, time, and reason for admission. Patient provided incorrect information regarding his children and grandchildren, noted he has 3 children by the name of Dawn, Wayne, and Maile with 3 grandchildren but as per patient wife, patient has 1 son named Lance and 4 grandchildren. Patient notes feeling sadness but denies SI, difficulty with sleep or appetite.    As per NP, patient was agitated and tried to climb out of bed. Patient previously had an episode of trying to remove his ye, causing trauma. Called and spoke with patient spouse who states noticing patient's agitation as well. Notes he mumbles to himself when agitated.

## 2023-02-15 LAB
HCT VFR BLD CALC: 31.3 % — LOW (ref 39–50)
HGB BLD-MCNC: 9.7 G/DL — LOW (ref 13–17)
MCHC RBC-ENTMCNC: 29.3 PG — SIGNIFICANT CHANGE UP (ref 27–34)
MCHC RBC-ENTMCNC: 31 GM/DL — LOW (ref 32–36)
MCV RBC AUTO: 94.6 FL — SIGNIFICANT CHANGE UP (ref 80–100)
NRBC # BLD: 0 /100 WBCS — SIGNIFICANT CHANGE UP (ref 0–0)
PLATELET # BLD AUTO: 305 K/UL — SIGNIFICANT CHANGE UP (ref 150–400)
RBC # BLD: 3.31 M/UL — LOW (ref 4.2–5.8)
RBC # FLD: 16.2 % — HIGH (ref 10.3–14.5)
WBC # BLD: 5.92 K/UL — SIGNIFICANT CHANGE UP (ref 3.8–10.5)
WBC # FLD AUTO: 5.92 K/UL — SIGNIFICANT CHANGE UP (ref 3.8–10.5)

## 2023-02-15 PROCEDURE — 99232 SBSQ HOSP IP/OBS MODERATE 35: CPT

## 2023-02-15 RX ORDER — QUETIAPINE FUMARATE 200 MG/1
50 TABLET, FILM COATED ORAL ONCE
Refills: 0 | Status: COMPLETED | OUTPATIENT
Start: 2023-02-15 | End: 2023-02-15

## 2023-02-15 RX ORDER — QUETIAPINE FUMARATE 200 MG/1
50 TABLET, FILM COATED ORAL AT BEDTIME
Refills: 0 | Status: DISCONTINUED | OUTPATIENT
Start: 2023-02-15 | End: 2023-02-17

## 2023-02-15 RX ORDER — FUROSEMIDE 40 MG
40 TABLET ORAL ONCE
Refills: 0 | Status: DISCONTINUED | OUTPATIENT
Start: 2023-02-15 | End: 2023-02-15

## 2023-02-15 RX ORDER — QUETIAPINE FUMARATE 200 MG/1
50 TABLET, FILM COATED ORAL
Refills: 0 | Status: DISCONTINUED | OUTPATIENT
Start: 2023-02-15 | End: 2023-02-15

## 2023-02-15 RX ORDER — QUETIAPINE FUMARATE 200 MG/1
25 TABLET, FILM COATED ORAL DAILY
Refills: 0 | Status: DISCONTINUED | OUTPATIENT
Start: 2023-02-15 | End: 2023-02-17

## 2023-02-15 RX ORDER — FUROSEMIDE 40 MG
40 TABLET ORAL ONCE
Refills: 0 | Status: COMPLETED | OUTPATIENT
Start: 2023-02-15 | End: 2023-02-15

## 2023-02-15 RX ADMIN — AMPICILLIN SODIUM AND SULBACTAM SODIUM 200 GRAM(S): 250; 125 INJECTION, POWDER, FOR SUSPENSION INTRAMUSCULAR; INTRAVENOUS at 11:32

## 2023-02-15 RX ADMIN — SENNA PLUS 2 TABLET(S): 8.6 TABLET ORAL at 22:48

## 2023-02-15 RX ADMIN — Medication 325 MILLIGRAM(S): at 11:33

## 2023-02-15 RX ADMIN — Medication 1 TABLET(S): at 11:33

## 2023-02-15 RX ADMIN — POLYETHYLENE GLYCOL 3350 17 GRAM(S): 17 POWDER, FOR SOLUTION ORAL at 11:33

## 2023-02-15 RX ADMIN — AMPICILLIN SODIUM AND SULBACTAM SODIUM 200 GRAM(S): 250; 125 INJECTION, POWDER, FOR SUSPENSION INTRAMUSCULAR; INTRAVENOUS at 06:45

## 2023-02-15 RX ADMIN — QUETIAPINE FUMARATE 50 MILLIGRAM(S): 200 TABLET, FILM COATED ORAL at 22:49

## 2023-02-15 RX ADMIN — CHLORHEXIDINE GLUCONATE 1 APPLICATION(S): 213 SOLUTION TOPICAL at 11:32

## 2023-02-15 RX ADMIN — AMIODARONE HYDROCHLORIDE 200 MILLIGRAM(S): 400 TABLET ORAL at 06:45

## 2023-02-15 RX ADMIN — Medication 50 MILLIGRAM(S): at 22:53

## 2023-02-15 RX ADMIN — APIXABAN 5 MILLIGRAM(S): 2.5 TABLET, FILM COATED ORAL at 17:24

## 2023-02-15 RX ADMIN — TAMSULOSIN HYDROCHLORIDE 0.4 MILLIGRAM(S): 0.4 CAPSULE ORAL at 22:49

## 2023-02-15 RX ADMIN — Medication 40 MILLIGRAM(S): at 15:36

## 2023-02-15 RX ADMIN — APIXABAN 5 MILLIGRAM(S): 2.5 TABLET, FILM COATED ORAL at 06:45

## 2023-02-15 RX ADMIN — AMPICILLIN SODIUM AND SULBACTAM SODIUM 200 GRAM(S): 250; 125 INJECTION, POWDER, FOR SUSPENSION INTRAMUSCULAR; INTRAVENOUS at 17:24

## 2023-02-15 RX ADMIN — AMPICILLIN SODIUM AND SULBACTAM SODIUM 200 GRAM(S): 250; 125 INJECTION, POWDER, FOR SUSPENSION INTRAMUSCULAR; INTRAVENOUS at 23:55

## 2023-02-15 RX ADMIN — Medication 300 MILLIGRAM(S): at 11:33

## 2023-02-15 RX ADMIN — QUETIAPINE FUMARATE 50 MILLIGRAM(S): 200 TABLET, FILM COATED ORAL at 03:13

## 2023-02-15 NOTE — PROGRESS NOTE ADULT - PROBLEM SELECTOR PLAN 10
Unclear if patient has true chest pain due to dementia and unreliable ROS  - Obtained EKG - LBBB, similar to previous EKG  - CE negative  - Appeared well clinically and not hypoxic at this time  - Will continue to monitor,   - Currently asymptomatic, denies chest pain.

## 2023-02-15 NOTE — PROGRESS NOTE ADULT - ASSESSMENT
Pt is a 89 y/o M w/ a PMH of dementia, b/l hip arthroplasty, a fib on Eliquis, BPH, and recently diagnosed pan-sensitive klebsiella UTI on ciprofloxacin who presents to the ED for fever, agitation, hypotension, and Vital signs improved since admission s/p pip/tazo, supplemental O2 and midodrine. On PE pt did not have tenderness or erythema over PPD pocket or pain w/ active flexion of hips. He endorsed malaise, and was A&Ox1. Labs notable for leukocytosis and E faecalis bacteremia in 3 bottles. CT C/A/P shows no active infectious process in chest, but shows large volume stool in rectum and thickening of bowel wall c/w stericolitis.    #Impression  -Pt's presentation c/w sepsis 2/2 E faecalis bacteremia, most likely from impacted stool in rectum, w/ concomitant klebsiella UTI.    #Recommendations  Continue amp/sulbactam 3g q6, should cover for E faecalis bacteremia  and UTI  Follow pending blood cultures  TTE without evidence for endocarditis, rapid clearance of cultures - lower suspicion for endocarditis  Follow fever curve and WBC count    Mika Sommers MD  please reach via teams   If no answer, or after 5PM/ weekends,  then please call  [phone]     Pt is a 89 y/o M w/ a PMH of dementia, b/l hip arthroplasty, a fib on Eliquis, BPH, and recently diagnosed pan-sensitive klebsiella UTI on ciprofloxacin who presents to the ED for fever, agitation, hypotension, and Vital signs improved since admission s/p pip/tazo, supplemental O2 and midodrine. On PE pt did not have tenderness or erythema over PPD pocket or pain w/ active flexion of hips. He endorsed malaise, and was A&Ox1. Labs notable for leukocytosis and E faecalis bacteremia in 3 bottles. CT C/A/P shows no active infectious process in chest, but shows large volume stool in rectum and thickening of bowel wall c/w stericolitis.    #Impression  -Pt's presentation c/w sepsis 2/2 E faecalis bacteremia, most likely from impacted stool in rectum, w/ concomitant klebsiella UTI.    #Recommendations  Would switch to ampicillin, will dose for ease 4g over q8h - discussed with pharmacy  Follow pending blood cultures  TTE without evidence for endocarditis, rapid clearance of cultures - lower suspicion for endocarditis, discussed with EPS given PPM  Follow fever curve and WBC count    Mika Sommers MD  please reach via teams   If no answer, or after 5PM/ weekends,  then please call  [phone]     Pt is a 89 y/o M w/ a PMH of dementia, b/l hip arthroplasty, a fib on Eliquis, BPH, and recently diagnosed pan-sensitive klebsiella UTI on ciprofloxacin who presents to the ED for fever, agitation, hypotension, and Vital signs improved since admission s/p pip/tazo, supplemental O2 and midodrine. On PE pt did not have tenderness or erythema over PPD pocket or pain w/ active flexion of hips. He endorsed malaise, and was A&Ox1. Labs notable for leukocytosis and E faecalis bacteremia in 3 bottles. CT C/A/P shows no active infectious process in chest, but shows large volume stool in rectum and thickening of bowel wall c/w stericolitis.    #Impression  -Pt's presentation c/w sepsis 2/2 E faecalis bacteremia, most likely from impacted stool in rectum, w/ concomitant klebsiella UTI.    #Recommendations  Continue ampicillin/sulbactam 3g 6h for now, will plan for possible continuous infusion of ampicillin to complete therapy  Will plan for 14 days from negative cultures   Follow pending blood cultures  TTE without evidence for endocarditis, rapid clearance of cultures - lower suspicion for endocarditis, discussed with EPS given PPM  Follow fever curve and WBC count    Mika Sommers MD  please reach via teams   If no answer, or after 5PM/ weekends,  then please call  [phone]

## 2023-02-15 NOTE — PHYSICAL THERAPY INITIAL EVALUATION ADULT - ACTIVE RANGE OF MOTION EXAMINATION, REHAB EVAL
left shoulder flex 80degrees/bilateral upper extremity Active ROM was WFL (within functional limits)/bilateral  lower extremity Active ROM was WFL (within functional limits)

## 2023-02-15 NOTE — PROGRESS NOTE ADULT - SUBJECTIVE AND OBJECTIVE BOX
Follow Up:  bacteremia    Interval History/ROS:  Overnight: No acute events.  Patient remains afebrile, otherwise hemodynamically stable on 2 L nasal cannula.  Latest labs show no leukocytosis, repeat blood cultures from 2/11/2023 negative to date.  TTE without evidence for valvular vegetation.    Patient seen examined at bedside.    Allergies  No Known Allergies    ANTIMICROBIALS:  ampicillin/sulbactam  IVPB 3 every 6 hours      OTHER MEDS:  MEDICATIONS  (STANDING):  acetaminophen     Tablet .. 650 every 6 hours PRN  allopurinol 300 daily  aMIOdarone    Tablet 200 daily  apixaban 5 every 12 hours  melatonin 3 at bedtime PRN  ondansetron Injectable 4 every 8 hours PRN  polyethylene glycol 3350 17 daily  QUEtiapine 50 <User Schedule>  senna 2 at bedtime  tamsulosin 0.4 at bedtime  traZODone 50 at bedtime      Vital Signs Last 24 Hrs  T(C): 36.4 (15 Feb 2023 00:48), Max: 36.4 (14 Feb 2023 15:40)  T(F): 97.5 (15 Feb 2023 00:48), Max: 97.6 (14 Feb 2023 15:40)  HR: 69 (15 Feb 2023 00:48) (69 - 80)  BP: 149/74 (15 Feb 2023 00:48) (146/74 - 166/91)  BP(mean): --  RR: 18 (15 Feb 2023 00:48) (18 - 18)  SpO2: 93% (15 Feb 2023 00:48) (93% - 95%)    Parameters below as of 15 Feb 2023 00:48  Patient On (Oxygen Delivery Method): nasal cannula  O2 Flow (L/min): 2      PHYSICAL EXAM:  Constitutional restrained   HEENT PERRLA EOMI,No pallor or icterus  Chest Good AE,CTA  CVS  S1 S2   Abd soft BS normal No tenderness   Ext edema  hip incisions no erythema  IV site no erythema tenderness or discharge  Joints no swelling or LOM                                  9.7    5.92  )-----------( 305      ( 15 Feb 2023 09:51 )             31.3                   MICROBIOLOGY:  v    Culture - Blood (collected 11 Feb 2023 09:40)  Source: .Blood Blood-Peripheral  Preliminary Report (12 Feb 2023 14:01):    No growth to date.    Culture - Blood (collected 11 Feb 2023 07:20)  Source: .Blood Blood-Venous  Preliminary Report (12 Feb 2023 11:01):    No growth to date.              Rapid RVP Result: [facility] (02-09 @ 10:56)         Follow Up:  bacteremia    Interval History/ROS:  Overnight: No acute events.  Patient remains afebrile, otherwise hemodynamically stable on 2 L nasal cannula.  Latest labs show no leukocytosis, repeat blood cultures from 2/11/2023 negative to date.  TTE without evidence for valvular vegetation.    Patient seen examined at bedside. Appears comfortable.    Allergies  No Known Allergies    ANTIMICROBIALS:  ampicillin/sulbactam  IVPB 3 every 6 hours      OTHER MEDS:  MEDICATIONS  (STANDING):  acetaminophen     Tablet .. 650 every 6 hours PRN  allopurinol 300 daily  aMIOdarone    Tablet 200 daily  apixaban 5 every 12 hours  melatonin 3 at bedtime PRN  ondansetron Injectable 4 every 8 hours PRN  polyethylene glycol 3350 17 daily  QUEtiapine 50 <User Schedule>  senna 2 at bedtime  tamsulosin 0.4 at bedtime  traZODone 50 at bedtime      Vital Signs Last 24 Hrs  T(C): 36.4 (15 Feb 2023 00:48), Max: 36.4 (14 Feb 2023 15:40)  T(F): 97.5 (15 Feb 2023 00:48), Max: 97.6 (14 Feb 2023 15:40)  HR: 69 (15 Feb 2023 00:48) (69 - 80)  BP: 149/74 (15 Feb 2023 00:48) (146/74 - 166/91)  BP(mean): --  RR: 18 (15 Feb 2023 00:48) (18 - 18)  SpO2: 93% (15 Feb 2023 00:48) (93% - 95%)    Parameters below as of 15 Feb 2023 00:48  Patient On (Oxygen Delivery Method): nasal cannula  O2 Flow (L/min): 2      PHYSICAL EXAM:  Constitutional restrained   HEENT PERRLA EOMI,No pallor or icterus  Chest Good AE,CTA  CVS  S1 S2   Abd soft BS normal No tenderness   Ext edema  hip incisions no erythema  IV site no erythema tenderness or discharge  Joints no swelling or LOM                                  9.7    5.92  )-----------( 305      ( 15 Feb 2023 09:51 )             31.3                   MICROBIOLOGY:  v    Culture - Blood (collected 11 Feb 2023 09:40)  Source: .Blood Blood-Peripheral  Preliminary Report (12 Feb 2023 14:01):    No growth to date.    Culture - Blood (collected 11 Feb 2023 07:20)  Source: .Blood Blood-Venous  Preliminary Report (12 Feb 2023 11:01):    No growth to date.              Rapid RVP Result: Brandy (02-09 @ 10:56)         Follow Up:  bacteremia    Interval History/ROS:  Overnight: No acute events.  Patient remains afebrile, otherwise hemodynamically stable on 2 L nasal cannula.  Latest labs show no leukocytosis, repeat blood cultures from 2/11/2023 negative to date.  TTE without evidence for valvular vegetation.    Patient seen examined at bedside. Appears comfortable.    Allergies  No Known Allergies    ANTIMICROBIALS:  ampicillin/sulbactam  IVPB 3 every 6 hours      OTHER MEDS:  MEDICATIONS  (STANDING):  acetaminophen     Tablet .. 650 every 6 hours PRN  allopurinol 300 daily  aMIOdarone    Tablet 200 daily  apixaban 5 every 12 hours  melatonin 3 at bedtime PRN  ondansetron Injectable 4 every 8 hours PRN  polyethylene glycol 3350 17 daily  QUEtiapine 50 <User Schedule>  senna 2 at bedtime  tamsulosin 0.4 at bedtime  traZODone 50 at bedtime      Vital Signs Last 24 Hrs  T(C): 36.4 (15 Feb 2023 00:48), Max: 36.4 (14 Feb 2023 15:40)  T(F): 97.5 (15 Feb 2023 00:48), Max: 97.6 (14 Feb 2023 15:40)  HR: 69 (15 Feb 2023 00:48) (69 - 80)  BP: 149/74 (15 Feb 2023 00:48) (146/74 - 166/91)  BP(mean): --  RR: 18 (15 Feb 2023 00:48) (18 - 18)  SpO2: 93% (15 Feb 2023 00:48) (93% - 95%)    Parameters below as of 15 Feb 2023 00:48  Patient On (Oxygen Delivery Method): nasal cannula  O2 Flow (L/min): 2      PHYSICAL EXAM:  Constitutional restrained   HEENT PERRLA EOMI,No pallor or icterus  Chest Good AE,CTA  CVS  S1 S2   Abd soft BS normal No tenderness   Ext edema  hip incisions no erythema  IV site no erythema tenderness or discharge  Joints no swelling or LOM                                  9.7    5.92  )-----------( 305      ( 15 Feb 2023 09:51 )             31.3     MICROBIOLOGY:  v    Culture - Blood (collected 11 Feb 2023 09:40)  Source: .Blood Blood-Peripheral  Preliminary Report (12 Feb 2023 14:01):    No growth to date.    Culture - Blood (collected 11 Feb 2023 07:20)  Source: .Blood Blood-Venous  Preliminary Report (12 Feb 2023 11:01):    No growth to date.    Rapid RVP Result: Brandy (02-09 @ 10:56)

## 2023-02-15 NOTE — PROGRESS NOTE ADULT - SUBJECTIVE AND OBJECTIVE BOX
[facility] Division of Hospital Medicine  Denys Arredondo MD  Available via MS Teams      SUBJECTIVE / OVERNIGHT EVENTS:  Patient seen and examined at bedside.  Arousable to sternal rub but quite somnolent.   ADDITIONAL REVIEW OF SYSTEMS:    MEDICATIONS  (STANDING):  allopurinol 300 milliGRAM(s) Oral daily  aMIOdarone    Tablet 200 milliGRAM(s) Oral daily  ampicillin/sulbactam  IVPB 3 Gram(s) IV Intermittent every 6 hours  apixaban 5 milliGRAM(s) Oral every 12 hours  chlorhexidine 2% Cloths 1 Application(s) Topical daily  ferrous    sulfate 325 milliGRAM(s) Oral daily  lactobacillus acidophilus 1 Tablet(s) Oral daily  polyethylene glycol 3350 17 Gram(s) Oral daily  QUEtiapine 50 milliGRAM(s) Oral <User Schedule>  senna 2 Tablet(s) Oral at bedtime  tamsulosin 0.4 milliGRAM(s) Oral at bedtime  traZODone 50 milliGRAM(s) Oral at bedtime    MEDICATIONS  (PRN):  acetaminophen     Tablet .. 650 milliGRAM(s) Oral every 6 hours PRN Temp greater or equal to 38C (100.4F), Mild Pain (1 - 3)  melatonin 3 milliGRAM(s) Oral at bedtime PRN Insomnia  ondansetron Injectable 4 milliGRAM(s) IV Push every 8 hours PRN Nausea and/or Vomiting      I&O's Summary    14 Feb 2023 07:01  -  15 Feb 2023 07:00  --------------------------------------------------------  IN: 470 mL / OUT: 1550 mL / NET: -1080 mL        PHYSICAL EXAM:  Vital Signs Last 24 Hrs  T(C): 36.4 (15 Feb 2023 00:48), Max: 36.4 (14 Feb 2023 15:40)  T(F): 97.5 (15 Feb 2023 00:48), Max: 97.6 (14 Feb 2023 15:40)  HR: 69 (15 Feb 2023 00:48) (69 - 80)  BP: 149/74 (15 Feb 2023 00:48) (146/74 - 166/91)  BP(mean): --  RR: 18 (15 Feb 2023 00:48) (18 - 18)  SpO2: 93% (15 Feb 2023 00:48) (93% - 95%)    Parameters below as of 15 Feb 2023 00:48  Patient On (Oxygen Delivery Method): nasal cannula  O2 Flow (L/min): 2    CONSTITUTIONAL: NAD, well-developed, well-groomed  EYES: PERRLA; conjunctiva and sclera clear  ENMT: Moist oral mucosa, no pharyngeal injection or exudates; normal dentition  NECK: Supple, no palpable masses; no thyromegaly  RESPIRATORY: Normal respiratory effort; lungs are clear to auscultation bilaterally  CARDIOVASCULAR: Regular rate and rhythm, normal S1 and S2, no murmur/rub/gallop; No lower extremity edema; Peripheral pulses are 2+ bilaterally  ABDOMEN: Nontender to palpation, normoactive bowel sounds, no rebound/guarding; No hepatosplenomegaly  MUSCULOSKELETAL:  Normal gait; no clubbing or cyanosis of digits; no joint swelling or tenderness to palpation  PSYCH: A+O to person, place, and time; affect appropriate  NEUROLOGY: CN 2-12 are intact and symmetric; no gross sensory deficits   SKIN: No rashes; no palpable lesions    LABS:                        9.7    5.92  )-----------( 305      ( 15 Feb 2023 09:51 )             31.3                     SARS-CoV-2: NotDetec (09 Feb 2023 10:56)  COVID-19 PCR: NotDetec (19 Jan 2023 07:19)  COVID-19 PCR: NotDetec (16 Jan 2023 21:13)  COVID-19 PCR: NotDetec (08 Jan 2023 19:25)  COVID-19 PCR: NotDetec (04 Jan 2023 17:46)  COVID-19 PCR: NotDetec (30 Dec 2022 10:12)  COVID-19 PCR: NotDetec (26 Dec 2022 18:04)  COVID-19 PCR: NotDetec (23 Dec 2022 06:19)  COVID-19 PCR: NotDetec (19 Dec 2022 22:30)      RADIOLOGY & ADDITIONAL TESTS:  ekg, labs, micro imaging personally reviewed      COORDINATION OF CARE:  care discussed and coordinated with consultants.     [facility] Division of Hospital Medicine  Denys Arredondo MD  Available via MS Teams      SUBJECTIVE / OVERNIGHT EVENTS:  Patient seen and examined at bedside.  Arousable to sternal rub but quite somnolent.   ADDITIONAL REVIEW OF SYSTEMS:  REVIEW OF SYSTEMS:    CONSTITUTIONAL: No weakness, fevers or chills  EYES: no blurry vision or eye pain.   ENT: No throat pain. No dysphagia.    NECK: No pain or stiffness  RESPIRATORY: No cough, wheezing, hemoptysis; No shortness of breath  CARDIOVASCULAR: No chest pain or palpitations.  GASTROINTESTINAL: No abdominal pain. No nausea or vomiting; No diarrhea or constipation. No melena or hematochezia.  GENITOURINARY: No dysuria, frequency or hematuria  NEUROLOGICAL: No numbness or weakness. No dizziness or falls.   SKIN: No itching, burning, rashes, or lesions.   LYMPHATIC: No masses or swelling.   All other review of systems is negative unless indicated above.    MEDICATIONS  (STANDING):  allopurinol 300 milliGRAM(s) Oral daily  aMIOdarone    Tablet 200 milliGRAM(s) Oral daily  ampicillin/sulbactam  IVPB 3 Gram(s) IV Intermittent every 6 hours  apixaban 5 milliGRAM(s) Oral every 12 hours  chlorhexidine 2% Cloths 1 Application(s) Topical daily  ferrous    sulfate 325 milliGRAM(s) Oral daily  lactobacillus acidophilus 1 Tablet(s) Oral daily  polyethylene glycol 3350 17 Gram(s) Oral daily  QUEtiapine 50 milliGRAM(s) Oral <User Schedule>  senna 2 Tablet(s) Oral at bedtime  tamsulosin 0.4 milliGRAM(s) Oral at bedtime  traZODone 50 milliGRAM(s) Oral at bedtime    MEDICATIONS  (PRN):  acetaminophen     Tablet .. 650 milliGRAM(s) Oral every 6 hours PRN Temp greater or equal to 38C (100.4F), Mild Pain (1 - 3)  melatonin 3 milliGRAM(s) Oral at bedtime PRN Insomnia  ondansetron Injectable 4 milliGRAM(s) IV Push every 8 hours PRN Nausea and/or Vomiting      I&O's Summary    14 Feb 2023 07:01  -  15 Feb 2023 07:00  --------------------------------------------------------  IN: 470 mL / OUT: 1550 mL / NET: -1080 mL        PHYSICAL EXAM:  Vital Signs Last 24 Hrs  T(C): 36.4 (15 Feb 2023 00:48), Max: 36.4 (14 Feb 2023 15:40)  T(F): 97.5 (15 Feb 2023 00:48), Max: 97.6 (14 Feb 2023 15:40)  HR: 69 (15 Feb 2023 00:48) (69 - 80)  BP: 149/74 (15 Feb 2023 00:48) (146/74 - 166/91)  BP(mean): --  RR: 18 (15 Feb 2023 00:48) (18 - 18)  SpO2: 93% (15 Feb 2023 00:48) (93% - 95%)    Parameters below as of 15 Feb 2023 00:48  Patient On (Oxygen Delivery Method): nasal cannula  O2 Flow (L/min): 2      CONSTITUTIONAL: NAD, well-developed, well-groomed  EYES: PERRLA; conjunctiva and sclera clear  ENMT: Moist oral mucosa, no pharyngeal injection or exudates; normal dentition  NECK: Supple, no palpable masses; no thyromegaly  RESPIRATORY: Normal respiratory effort; lungs are clear to auscultation bilaterally  CARDIOVASCULAR: Regular rate and rhythm, normal S1 and S2, no murmur/rub/gallop; No lower extremity edema; Peripheral pulses are 2+ bilaterally  ABDOMEN: Nontender to palpation, normoactive bowel sounds, no rebound/guarding; No hepatosplenomegaly  MUSCULOSKELETAL:  Normal gait; no clubbing or cyanosis of digits; no joint swelling or tenderness to palpation  PSYCH: A+O X0-1 affect appropriate  NEUROLOGY: CN 2-12 are intact and symmetric; no gross sensory deficits   SKIN: No rashes; no palpable lesions      LABS:                        9.7    5.92  )-----------( 305      ( 15 Feb 2023 09:51 )             31.3                     SARS-CoV-2: NotDetec (09 Feb 2023 10:56)  COVID-19 PCR: NotDetec (19 Jan 2023 07:19)  COVID-19 PCR: NotDetec (16 Jan 2023 21:13)  COVID-19 PCR: NotDetec (08 Jan 2023 19:25)  COVID-19 PCR: NotDetec (04 Jan 2023 17:46)  COVID-19 PCR: NotDetec (30 Dec 2022 10:12)  COVID-19 PCR: NotDetec (26 Dec 2022 18:04)  COVID-19 PCR: NotDetec (23 Dec 2022 06:19)  COVID-19 PCR: NotDetec (19 Dec 2022 22:30)      RADIOLOGY & ADDITIONAL TESTS:  ekg, labs, micro imaging personally reviewed      COORDINATION OF CARE:  care discussed and coordinated with consultants.

## 2023-02-15 NOTE — PROGRESS NOTE ADULT - PROBLEM SELECTOR PLAN 12
-Pt on Eliquis  - Needs feeding for meals  - Reassess needs for restrains pending clinical improvement  - DNR/DNI.
Initial (On Arrival)

## 2023-02-15 NOTE — PHYSICAL THERAPY INITIAL EVALUATION ADULT - PERTINENT HX OF CURRENT PROBLEM, REHAB EVAL
89 y/o M w/ a PMH of dementia, b/l hip arthroplasty, a fib on Eliquis, BPH, and recently diagnosed pan-sensitive klebsiella UTI on ciprofloxacin who presents to the ED for fever, agitation, hypotension, and hypoxia. Vital signs improved since admission s/p pip/tazo, supplemental O2 and midodrine. On PE pt did not have tenderness or erythema over PPD pocket or pain w/ active flexion of hips. He endorsed malaise, and was A&Ox1. Labs notable for leukocytosis and E faecalis bacteremia in 3 bottles. CT C/A/P shows no active infectious process in chest, but shows large volume stool in rectum and thickening of bowel wall c/w stericolitis. CT abd/pelvis:Large rectal stool burden with surrounding rectal wall thickening,   correlate for stercoral colitis.No CT evidence of pyelonephritis.Small bilateral pleural effusions and bibasilar compressive atelectasis. (-) PE, CT head; (-); CXR: Trace right pleural effusion. No focal consolidations. TTE 2/13

## 2023-02-15 NOTE — CHART NOTE - NSCHARTNOTEFT_GEN_A_CORE
PT dose of seroquel changed from 50mg at 1200 and 1800 to Seroquel 25mg morning, 50mg at night based on previous recs. Pt also very sedated today

## 2023-02-15 NOTE — PROGRESS NOTE ADULT - PROBLEM SELECTOR PLAN 9
B12 and folate - good  - (+) Iron deficiency, started ferrous sulfate 325 daily  - Component of AOCD as well, possibly exacerbated by acute blood loss from hematuria  - H&H at baseline  - Continue to monitor.

## 2023-02-15 NOTE — PROGRESS NOTE ADULT - PROBLEM SELECTOR PLAN 6
Cont. Eliquis BID  - Extremity precautions.  - monitor for bleeding  - if H/H continues to drop will consider holding eliquis. Cont. Eliquis BID  - Extremity precautions.  - monitor for bleeding

## 2023-02-15 NOTE — PHYSICAL THERAPY INITIAL EVALUATION ADULT - ADDITIONAL COMMENTS
pt poor historian, as per chart, pt resides with wife in private house, ind with ADLs, owns rolling walker

## 2023-02-15 NOTE — PHYSICAL THERAPY INITIAL EVALUATION ADULT - IMPAIRMENTS CONTRIBUTING TO GAIT DEVIATIONS, PT EVAL
Pt placed in wheelchair for pt comfort.  Pt verbalized understanding of all oral and written discharged instructions.    
Pt states this happened when taking her nightly pills, pain is more on R back rib area  
decreased flexibility/decreased strength

## 2023-02-16 LAB
ANION GAP SERPL CALC-SCNC: 12 MMOL/L — SIGNIFICANT CHANGE UP (ref 5–17)
BUN SERPL-MCNC: 14 MG/DL — SIGNIFICANT CHANGE UP (ref 7–23)
CALCIUM SERPL-MCNC: 9.1 MG/DL — SIGNIFICANT CHANGE UP (ref 8.4–10.5)
CHLORIDE SERPL-SCNC: 105 MMOL/L — SIGNIFICANT CHANGE UP (ref 96–108)
CO2 SERPL-SCNC: 26 MMOL/L — SIGNIFICANT CHANGE UP (ref 22–31)
CREAT SERPL-MCNC: 0.94 MG/DL — SIGNIFICANT CHANGE UP (ref 0.5–1.3)
CULTURE RESULTS: SIGNIFICANT CHANGE UP
CULTURE RESULTS: SIGNIFICANT CHANGE UP
EGFR: 76 ML/MIN/1.73M2 — SIGNIFICANT CHANGE UP
GLUCOSE SERPL-MCNC: 87 MG/DL — SIGNIFICANT CHANGE UP (ref 70–99)
HCT VFR BLD CALC: 34.2 % — LOW (ref 39–50)
HGB BLD-MCNC: 10.6 G/DL — LOW (ref 13–17)
MCHC RBC-ENTMCNC: 29.1 PG — SIGNIFICANT CHANGE UP (ref 27–34)
MCHC RBC-ENTMCNC: 31 GM/DL — LOW (ref 32–36)
MCV RBC AUTO: 94 FL — SIGNIFICANT CHANGE UP (ref 80–100)
NRBC # BLD: 0 /100 WBCS — SIGNIFICANT CHANGE UP (ref 0–0)
PLATELET # BLD AUTO: 324 K/UL — SIGNIFICANT CHANGE UP (ref 150–400)
POTASSIUM SERPL-MCNC: 3.9 MMOL/L — SIGNIFICANT CHANGE UP (ref 3.5–5.3)
POTASSIUM SERPL-SCNC: 3.9 MMOL/L — SIGNIFICANT CHANGE UP (ref 3.5–5.3)
RBC # BLD: 3.64 M/UL — LOW (ref 4.2–5.8)
RBC # FLD: 16.1 % — HIGH (ref 10.3–14.5)
SODIUM SERPL-SCNC: 143 MMOL/L — SIGNIFICANT CHANGE UP (ref 135–145)
SPECIMEN SOURCE: SIGNIFICANT CHANGE UP
SPECIMEN SOURCE: SIGNIFICANT CHANGE UP
WBC # BLD: 9.27 K/UL — SIGNIFICANT CHANGE UP (ref 3.8–10.5)
WBC # FLD AUTO: 9.27 K/UL — SIGNIFICANT CHANGE UP (ref 3.8–10.5)

## 2023-02-16 PROCEDURE — 99232 SBSQ HOSP IP/OBS MODERATE 35: CPT

## 2023-02-16 RX ORDER — AMPICILLIN TRIHYDRATE 250 MG
12 CAPSULE ORAL EVERY 24 HOURS
Refills: 0 | Status: COMPLETED | OUTPATIENT
Start: 2023-02-16 | End: 2023-02-25

## 2023-02-16 RX ADMIN — Medication 300 MILLIGRAM(S): at 12:18

## 2023-02-16 RX ADMIN — APIXABAN 5 MILLIGRAM(S): 2.5 TABLET, FILM COATED ORAL at 17:03

## 2023-02-16 RX ADMIN — AMPICILLIN SODIUM AND SULBACTAM SODIUM 200 GRAM(S): 250; 125 INJECTION, POWDER, FOR SUSPENSION INTRAMUSCULAR; INTRAVENOUS at 05:39

## 2023-02-16 RX ADMIN — TAMSULOSIN HYDROCHLORIDE 0.4 MILLIGRAM(S): 0.4 CAPSULE ORAL at 22:10

## 2023-02-16 RX ADMIN — POLYETHYLENE GLYCOL 3350 17 GRAM(S): 17 POWDER, FOR SOLUTION ORAL at 12:18

## 2023-02-16 RX ADMIN — Medication 1 TABLET(S): at 12:18

## 2023-02-16 RX ADMIN — AMIODARONE HYDROCHLORIDE 200 MILLIGRAM(S): 400 TABLET ORAL at 05:39

## 2023-02-16 RX ADMIN — Medication 325 MILLIGRAM(S): at 12:23

## 2023-02-16 RX ADMIN — QUETIAPINE FUMARATE 25 MILLIGRAM(S): 200 TABLET, FILM COATED ORAL at 12:19

## 2023-02-16 RX ADMIN — QUETIAPINE FUMARATE 50 MILLIGRAM(S): 200 TABLET, FILM COATED ORAL at 22:10

## 2023-02-16 RX ADMIN — AMPICILLIN SODIUM AND SULBACTAM SODIUM 200 GRAM(S): 250; 125 INJECTION, POWDER, FOR SUSPENSION INTRAMUSCULAR; INTRAVENOUS at 12:24

## 2023-02-16 RX ADMIN — APIXABAN 5 MILLIGRAM(S): 2.5 TABLET, FILM COATED ORAL at 05:39

## 2023-02-16 RX ADMIN — CHLORHEXIDINE GLUCONATE 1 APPLICATION(S): 213 SOLUTION TOPICAL at 12:18

## 2023-02-16 RX ADMIN — SENNA PLUS 2 TABLET(S): 8.6 TABLET ORAL at 22:09

## 2023-02-16 RX ADMIN — Medication 50 MILLIGRAM(S): at 22:17

## 2023-02-16 RX ADMIN — Medication 20.83 GRAM(S): at 16:02

## 2023-02-16 NOTE — PROGRESS NOTE ADULT - SUBJECTIVE AND OBJECTIVE BOX
Follow Up:  bacteremia    Interval History/ROS:  Overnight: no acute events. Patient remains afebrile.  Otherwise hemodynamically stable requiring 2 L nasal cannula.  Latest labs show no leukocytosis, BMP with renal function within normal limits.  Blood cultures from 2/11/2023 and 2/15/2023 remain negative to date.    Patient seen examined at bedside.  Alert but confused, denies any new pain or discomfort.      Allergies  No Known Allergies        ANTIMICROBIALS:  ampicillin  IVPB 12 every 24 hours      OTHER MEDS:  MEDICATIONS  (STANDING):  acetaminophen     Tablet .. 650 every 6 hours PRN  allopurinol 300 daily  aMIOdarone    Tablet 200 daily  apixaban 5 every 12 hours  melatonin 3 at bedtime PRN  ondansetron Injectable 4 every 8 hours PRN  polyethylene glycol 3350 17 daily  QUEtiapine 25 daily  QUEtiapine 50 at bedtime  senna 2 at bedtime  tamsulosin 0.4 at bedtime  traZODone 50 at bedtime      Vital Signs Last 24 Hrs  T(C): 36.8 (16 Feb 2023 15:29), Max: 36.8 (15 Feb 2023 22:50)  T(F): 98.2 (16 Feb 2023 15:29), Max: 98.3 (15 Feb 2023 22:50)  HR: 66 (16 Feb 2023 15:29) (66 - 90)  BP: 144/75 (16 Feb 2023 15:29) (127/68 - 153/87)  BP(mean): --  RR: 18 (16 Feb 2023 15:29) (18 - 20)  SpO2: 93% (16 Feb 2023 15:29) (93% - 100%)    Parameters below as of 16 Feb 2023 15:29  Patient On (Oxygen Delivery Method): room air        PHYSICAL EXAM:  Constitutional in no distress, confused, restraints in place  HEENT PERRLA EOMI,No pallor or icterus  Chest Good AE,CTA  CVS  S1 S2   Abd soft BS normal No tenderness   Ext edema  IV site no erythema tenderness or discharge  Joints no swelling or LOM                                  10.6   9.27  )-----------( 324      ( 16 Feb 2023 08:48 )             34.2       02-16    143  |  105  |  14  ----------------------------<  87  3.9   |  26  |  0.94    Ca    9.1      16 Feb 2023 08:48            MICROBIOLOGY:  v    Culture - Blood (collected 15 Feb 2023 09:09)  Source: .Blood Blood-Peripheral  Preliminary Report (16 Feb 2023 15:01):    No growth to date.    Culture - Blood (collected 15 Feb 2023 09:03)  Source: .Blood Blood-Peripheral  Preliminary Report (16 Feb 2023 15:01):    No growth to date.

## 2023-02-16 NOTE — PROGRESS NOTE ADULT - SUBJECTIVE AND OBJECTIVE BOX
[facility] Division of Hospital Medicine  Isaak Tam  Pager (NILO, 8A-5P): [phone]  Other Times:  [phone]      SUBJECTIVE / OVERNIGHT EVENTS:  pulled at ye overnight  this am calm cooperative  unable to participate in ROS    ADDITIONAL REVIEW OF SYSTEMS:    MEDICATIONS  (STANDING):  allopurinol 300 milliGRAM(s) Oral daily  aMIOdarone    Tablet 200 milliGRAM(s) Oral daily  ampicillin  IVPB 12 Gram(s) IV Intermittent every 24 hours  apixaban 5 milliGRAM(s) Oral every 12 hours  chlorhexidine 2% Cloths 1 Application(s) Topical daily  ferrous    sulfate 325 milliGRAM(s) Oral daily  lactobacillus acidophilus 1 Tablet(s) Oral daily  polyethylene glycol 3350 17 Gram(s) Oral daily  QUEtiapine 25 milliGRAM(s) Oral daily  QUEtiapine 50 milliGRAM(s) Oral at bedtime  senna 2 Tablet(s) Oral at bedtime  tamsulosin 0.4 milliGRAM(s) Oral at bedtime  traZODone 50 milliGRAM(s) Oral at bedtime    MEDICATIONS  (PRN):  acetaminophen     Tablet .. 650 milliGRAM(s) Oral every 6 hours PRN Temp greater or equal to 38C (100.4F), Mild Pain (1 - 3)  melatonin 3 milliGRAM(s) Oral at bedtime PRN Insomnia  ondansetron Injectable 4 milliGRAM(s) IV Push every 8 hours PRN Nausea and/or Vomiting      I&O's Summary    15 Feb 2023 07:01  -  16 Feb 2023 07:00  --------------------------------------------------------  IN: 100 mL / OUT: 3100 mL / NET: -3000 mL    16 Feb 2023 07:01  -  16 Feb 2023 14:54  --------------------------------------------------------  IN: 400 mL / OUT: 0 mL / NET: 400 mL        PHYSICAL EXAM:  Vital Signs Last 24 Hrs  T(C): 36.2 (16 Feb 2023 08:52), Max: 37.1 (15 Feb 2023 15:47)  T(F): 97.1 (16 Feb 2023 08:52), Max: 98.7 (15 Feb 2023 15:47)  HR: 67 (16 Feb 2023 08:52) (60 - 90)  BP: 127/68 (16 Feb 2023 08:52) (127/68 - 153/87)  BP(mean): --  RR: 18 (16 Feb 2023 08:52) (18 - 20)  SpO2: 97% (16 Feb 2023 08:52) (97% - 100%)    Parameters below as of 16 Feb 2023 08:52  Patient On (Oxygen Delivery Method): nasal cannula  O2 Flow (L/min): 2    CONSTITUTIONAL: NAD, well-developed, well-groomed  EYES: PERRLA; conjunctiva and sclera clear  RESPIRATORY: Normal respiratory effort; lungs are clear to auscultation bilaterally  CARDIOVASCULAR: Regular rate and rhythm, normal S1 and S2, no murmur/rub/gallop; No lower extremity edema; Peripheral pulses are 2+ bilaterally  ABDOMEN: Nontender to palpation, normoactive bowel sounds  MUSCULOSKELETAL: no clubbing or cyanosis of digits; no joint swelling or tenderness to palpation  PSYCH: A+O X0-1 affect appropriate  NEUROLOGY: CN 2-12 are intact and symmetric; no gross sensory deficits   SKIN: No rashes; no palpable lesions  LABS:                        10.6   9.27  )-----------( 324      ( 16 Feb 2023 08:48 )             34.2     02-16    143  |  105  |  14  ----------------------------<  87  3.9   |  26  |  0.94    Ca    9.1      16 Feb 2023 08:48            bcx 2/11 ngtd

## 2023-02-16 NOTE — PROGRESS NOTE ADULT - PROBLEM SELECTOR PLAN 8
Likely traumatic  - Patient kicked on Ye, and Ye was found to be malfunctioning in PM 2/12  - Gross hematuria with Ye removal  - Urology replaced the ye, and cleared for continuing Eliquis  - resolved

## 2023-02-16 NOTE — PROGRESS NOTE ADULT - ASSESSMENT
Pt is a 89 y/o M w/ a PMH of dementia, b/l hip arthroplasty, a fib on Eliquis, BPH, and recently diagnosed pan-sensitive klebsiella UTI on ciprofloxacin who presents to the ED for fever, agitation, hypotension, and Vital signs improved since admission s/p pip/tazo, supplemental O2 and midodrine. On PE pt did not have tenderness or erythema over PPD pocket or pain w/ active flexion of hips. He endorsed malaise, and was A&Ox1. Labs notable for leukocytosis and E faecalis bacteremia in 3 bottles. CT C/A/P shows no active infectious process in chest, but shows large volume stool in rectum and thickening of bowel wall c/w stericolitis.    #Impression  -Pt's presentation c/w sepsis 2/2 E faecalis bacteremia, most likely from impacted stool in rectum, w/ concomitant klebsiella UTI.  Initially treated with amp/sulbactam for both bacteremia and UTI however now completed UTI course and will continue with course for bacteremia as below    #Recommendations  Switch to ampicillin continuous infusion 12g daily over 24 hours  Will plan for 14 days from negative cultures (end date: 2/25/23)  Follow pending blood cultures  TTE without evidence for endocarditis, rapid clearance of cultures - lower suspicion for endocarditis, discussed with EPS given PPM  Follow fever curve and WBC count    Mika Sommers MD  please reach via teams   If no answer, or after 5PM/ weekends,  then please call  [phone]

## 2023-02-17 PROCEDURE — 99232 SBSQ HOSP IP/OBS MODERATE 35: CPT

## 2023-02-17 RX ORDER — APIXABAN 2.5 MG/1
5 TABLET, FILM COATED ORAL EVERY 12 HOURS
Refills: 0 | Status: DISCONTINUED | OUTPATIENT
Start: 2023-02-17 | End: 2023-03-08

## 2023-02-17 RX ORDER — QUETIAPINE FUMARATE 200 MG/1
50 TABLET, FILM COATED ORAL
Refills: 0 | Status: DISCONTINUED | OUTPATIENT
Start: 2023-02-17 | End: 2023-02-23

## 2023-02-17 RX ADMIN — QUETIAPINE FUMARATE 25 MILLIGRAM(S): 200 TABLET, FILM COATED ORAL at 11:23

## 2023-02-17 RX ADMIN — Medication 325 MILLIGRAM(S): at 11:23

## 2023-02-17 RX ADMIN — Medication 50 MILLIGRAM(S): at 22:00

## 2023-02-17 RX ADMIN — Medication 20.83 GRAM(S): at 16:45

## 2023-02-17 RX ADMIN — CHLORHEXIDINE GLUCONATE 1 APPLICATION(S): 213 SOLUTION TOPICAL at 11:24

## 2023-02-17 RX ADMIN — POLYETHYLENE GLYCOL 3350 17 GRAM(S): 17 POWDER, FOR SOLUTION ORAL at 11:23

## 2023-02-17 RX ADMIN — SENNA PLUS 2 TABLET(S): 8.6 TABLET ORAL at 22:00

## 2023-02-17 RX ADMIN — AMIODARONE HYDROCHLORIDE 200 MILLIGRAM(S): 400 TABLET ORAL at 05:46

## 2023-02-17 RX ADMIN — Medication 3 MILLIGRAM(S): at 23:19

## 2023-02-17 RX ADMIN — APIXABAN 5 MILLIGRAM(S): 2.5 TABLET, FILM COATED ORAL at 18:00

## 2023-02-17 RX ADMIN — Medication 300 MILLIGRAM(S): at 11:24

## 2023-02-17 RX ADMIN — QUETIAPINE FUMARATE 50 MILLIGRAM(S): 200 TABLET, FILM COATED ORAL at 18:36

## 2023-02-17 RX ADMIN — TAMSULOSIN HYDROCHLORIDE 0.4 MILLIGRAM(S): 0.4 CAPSULE ORAL at 22:00

## 2023-02-17 RX ADMIN — Medication 1 TABLET(S): at 11:23

## 2023-02-17 NOTE — BH CONSULTATION LIAISON PROGRESS NOTE - NSBHATTESTCOMMENTATTENDFT_PSY_A_CORE
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 Patient at bedside appears to be calm but AxOx0. Patient notes feeling sadness but denies SI, difficulty with sleep or appetite. Per NP and wife, patient was agitated and tried to climb out of bed. Patient previously had an episode of trying to remove his ye, causing trauma. Agree with plan  -Consider increasing Seroquel regimen to Seroquel 50mg at 12pm, Seroquel 50mg at 6pm, and Seroquel 25mg q 6 hours PRN for agitation, hold for sedation. Monitor EKG for QTc<500.  -Continue with Trazadone 50mg HS standing Pt is a 89yr old man  for 42yrs, domiciled with wife (Lelia [phone]) and part-time HHA 2x weekly from 12pm-4pm, retired manager of a parking garage, with PPHx dementia dx in 2019 on Donepezil, no previous SA or psych admissions, no psychiatrist, no active substance abuse, with PMHx significant for BPH, afib w/ PPM, bilateral hip arthroplasty (R 12/2022, L hemiarthroplasty 1/5/2023), DVT on Eliquis, subdural hematoma s/p craniotomy, admitted for urosepsis, and psychiatry consulted for medication recommendations for agitation. Patient at bedside appears to be calm but AxOx0. Patient notes feeling sadness but denies SI, difficulty with sleep or appetite. Per NP and wife, patient was agitated and tried to climb out of bed. Patient previously had an episode of trying to remove his ye, causing trauma. Agree with plan  -Consider increasing Seroquel regimen to Seroquel 50mg at 12pm, Seroquel 50mg at 6pm, and Seroquel 25mg q 6 hours PRN for agitation, hold for sedation. Monitor EKG for QTc<500.  -Continue but change Trazodone 25mg HS standing and Trazodone 25mg po qhs prn insomnia

## 2023-02-17 NOTE — PROGRESS NOTE ADULT - ASSESSMENT
Pt is a 89 y/o M w/ a PMH of dementia, b/l hip arthroplasty, a fib on Eliquis, BPH, and recently diagnosed pan-sensitive klebsiella UTI on ciprofloxacin who presents to the ED for fever, agitation, hypotension, and Vital signs improved since admission s/p pip/tazo, supplemental O2 and midodrine. On PE pt did not have tenderness or erythema over PPD pocket or pain w/ active flexion of hips. He endorsed malaise, and was A&Ox1. Labs notable for leukocytosis and E faecalis bacteremia in 3 bottles. CT C/A/P shows no active infectious process in chest, but shows large volume stool in rectum and thickening of bowel wall c/w stericolitis.    #Impression  -Pt's presentation c/w sepsis 2/2 E faecalis bacteremia, most likely from impacted stool in rectum, w/ concomitant klebsiella UTI.  Initially treated with amp/sulbactam for both bacteremia and UTI however now completed UTI course and will continue with course for bacteremia as below    #Recommendations  Switch to ampicillin continuous infusion 12g daily over 24 hours  Will plan for 14 days from negative cultures (end date: 2/25/23)  Can follow-up in ID clinic if discharged, please place referral at discharge  Bcx with negative growth to date  TTE without evidence for endocarditis, rapid clearance of cultures - lower suspicion for endocarditis, discussed with EPS given PPM  Follow fever curve and WBC count    ID to sign off. please contact as issues arise.    Mika Sommers MD  please reach via teams   If no answer, or after 5PM/ weekends,  then please call  [phone]     Pt is a 89 y/o M w/ a PMH of dementia, b/l hip arthroplasty, a fib on Eliquis, BPH, and recently diagnosed pan-sensitive klebsiella UTI on ciprofloxacin who presents to the ED for fever, agitation, hypotension, and Vital signs improved since admission s/p pip/tazo, supplemental O2 and midodrine. On PE pt did not have tenderness or erythema over PPD pocket or pain w/ active flexion of hips. He endorsed malaise, and was A&Ox1. Labs notable for leukocytosis and E faecalis bacteremia in 3 bottles. CT C/A/P shows no active infectious process in chest, but shows large volume stool in rectum and thickening of bowel wall c/w stericolitis.    #Impression  -Pt's presentation c/w sepsis 2/2 E faecalis bacteremia, most likely from impacted stool in rectum, w/ concomitant klebsiella UTI.  Initially treated with amp/sulbactam for both bacteremia and UTI however now completed UTI course and will continue with course for bacteremia as below    #Recommendations  Switch to ampicillin continuous infusion 12g daily over 24 hours  Will plan for 14 days from negative cultures (end date: 2/25/23)  Can follow-up in ID clinic if discharged, please place referral at discharge  Bcx with negative growth to date  TTE without evidence for endocarditis, rapid clearance of cultures - lower suspicion for endocarditis, discussed with EPS given PPM and will not be removed  Follow fever curve and WBC count    ID to sign off. please contact as issues arise.    Mika Sommers MD  please reach via teams   If no answer, or after 5PM/ weekends,  then please call  [phone]

## 2023-02-17 NOTE — BH CONSULTATION LIAISON PROGRESS NOTE - NSBHASSESSMENTFT_PSY_ALL_CORE
89M,  for 42yrs, domiciled with wife (Lelia [phone]) and part-time HHA 2x weekly from 12pm-4pm, retired manager of a parking garage, with PPHx dementia dx in 2019 on Donepezil, no previous SA or psych admissions, no psychiatrist, no active substance abuse, with PMHx significant for BPH, afib w/ PPM, bilateral hip arthroplasty (R 12/2022, L hemiarthroplasty 1/5/2023), DVT on Eliquis, subdural hematoma s/p craniotomy, admitted for urosepsis, and psychiatry consulted for medication recommendations for agitation.    Patient interviewed at bedside, accompanied by his daughter Dea. Patient off restraints, mildly restless, seen repeatedly removing his blanket. Patient AxOx1 (oriented to self, disoriented to place and time). Patient states he is sad but unable to provide a reason. Denies auditory hallucinations. Was unable to understand questions related to SI. Per covering nurse, Melissa WALDRON, patient was agitated, asked for his shoes and wanted to leave, attempted to get out of bed. Per nurse manager, this occurs cyclically, patient typically becomes agitated during this time of day and wants to leave. Aside from 2 episodes of agitation today, patient was resting well in bed. Patient daughter is requesting 1:1 to be sure patient will not remove his ye and states she does not want him to go back on Haldol.

## 2023-02-17 NOTE — BH CONSULTATION LIAISON PROGRESS NOTE - NSBHFUPINTERVALHXFT_PSY_A_CORE
Patient interviewed at bedside, accompanied by his daughter Dea. Patient off restraints, mildly restless, seen repeatedly removing his blanket. Patient AxOx1 (oriented to self, disoriented to place and time). Patient states he is sad but unable to provide a reason. Denies auditory hallucinations. Was unable to understand questions related to SI. Per covering nurse, Melissa WALDRON, patient was agitated, asked for his shoes and wanted to leave, attempted to get out of bed. Per nurse manager, this occurs cyclically, patient typically becomes agitated during this time of day and wants to leave. Patient daughter is requesting 1:1 to be sure patient will not remove his ye and states she does not want him to go back on Haldol.

## 2023-02-17 NOTE — PROGRESS NOTE ADULT - SUBJECTIVE AND OBJECTIVE BOX
[facility] Division of Hospital Medicine  Isaak Tam  Pager (JOHN-F, 8A-5P): [phone]  Other Times:  [phone]      SUBJECTIVE / OVERNIGHT EVENTS:  No events overnight  Calm this am  Unable to participate further given mental status    ADDITIONAL REVIEW OF SYSTEMS:    MEDICATIONS  (STANDING):  allopurinol 300 milliGRAM(s) Oral daily  aMIOdarone    Tablet 200 milliGRAM(s) Oral daily  ampicillin  IVPB 12 Gram(s) IV Intermittent every 24 hours  chlorhexidine 2% Cloths 1 Application(s) Topical daily  ferrous    sulfate 325 milliGRAM(s) Oral daily  lactobacillus acidophilus 1 Tablet(s) Oral daily  polyethylene glycol 3350 17 Gram(s) Oral daily  QUEtiapine 25 milliGRAM(s) Oral daily  QUEtiapine 50 milliGRAM(s) Oral at bedtime  senna 2 Tablet(s) Oral at bedtime  tamsulosin 0.4 milliGRAM(s) Oral at bedtime  traZODone 50 milliGRAM(s) Oral at bedtime    MEDICATIONS  (PRN):  acetaminophen     Tablet .. 650 milliGRAM(s) Oral every 6 hours PRN Temp greater or equal to 38C (100.4F), Mild Pain (1 - 3)  melatonin 3 milliGRAM(s) Oral at bedtime PRN Insomnia  ondansetron Injectable 4 milliGRAM(s) IV Push every 8 hours PRN Nausea and/or Vomiting      I&O's Summary    16 Feb 2023 07:01  -  17 Feb 2023 07:00  --------------------------------------------------------  IN: 652 mL / OUT: 1500 mL / NET: -848 mL        PHYSICAL EXAM:  Vital Signs Last 24 Hrs  T(C): 36.6 (17 Feb 2023 10:14), Max: 36.9 (16 Feb 2023 22:00)  T(F): 97.8 (17 Feb 2023 10:14), Max: 98.5 (16 Feb 2023 22:00)  HR: 90 (17 Feb 2023 10:14) (60 - 90)  BP: 137/75 (17 Feb 2023 10:14) (133/80 - 155/88)  BP(mean): --  RR: 18 (17 Feb 2023 10:14) (18 - 18)  SpO2: 98% (17 Feb 2023 10:14) (90% - 98%)    Parameters below as of 17 Feb 2023 10:14  Patient On (Oxygen Delivery Method): room air      CONSTITUTIONAL: NAD, well-developed, well-groomed  EYES: PERRLA; conjunctiva and sclera clear  RESPIRATORY: Normal respiratory effort; lungs are clear to auscultation bilaterally  CARDIOVASCULAR: Regular rate and rhythm, normal S1 and S2, no murmur/rub/gallop; No lower extremity edema; Peripheral pulses are 2+ bilaterally  ABDOMEN: Nontender to palpation, normoactive bowel sounds  MUSCULOSKELETAL: no clubbing or cyanosis of digits; no joint swelling or tenderness to palpation  PSYCH: A+O X0-1 affect appropriate  NEUROLOGY: CN 2-12 are intact and symmetric; no gross sensory deficits   SKIN: No rashes; no palpable lesions    LABS:                        10.6   9.27  )-----------( 324      ( 16 Feb 2023 08:48 )             34.2     02-16    143  |  105  |  14  ----------------------------<  87  3.9   |  26  |  0.94    Ca    9.1      16 Feb 2023 08:48                Culture - Blood (collected 15 Feb 2023 09:09)  Source: .Blood Blood-Peripheral  Preliminary Report (16 Feb 2023 15:01):    No growth to date.    Culture - Blood (collected 15 Feb 2023 09:03)  Source: .Blood Blood-Peripheral  Preliminary Report (16 Feb 2023 15:01):    No growth to date.

## 2023-02-17 NOTE — PROGRESS NOTE ADULT - SUBJECTIVE AND OBJECTIVE BOX
Follow Up:  bacteremia    Interval History/ROS:  Overnight: No acute events.  Patient remains afebrile otherwise hemodynamically stable on 2 L.  Latest labs show no leukocytosis, BMP shows renal function within normal limits.  Blood cultures from 2/11/2023 remain negative.    Patient seen examined at bedside.  Nonverbal complaints however continues to be confused.    Allergies  No Known Allergies    ANTIMICROBIALS:  ampicillin  IVPB 12 every 24 hours      OTHER MEDS:  MEDICATIONS  (STANDING):  acetaminophen     Tablet .. 650 every 6 hours PRN  allopurinol 300 daily  aMIOdarone    Tablet 200 daily  melatonin 3 at bedtime PRN  ondansetron Injectable 4 every 8 hours PRN  polyethylene glycol 3350 17 daily  QUEtiapine 25 daily  QUEtiapine 50 at bedtime  senna 2 at bedtime  tamsulosin 0.4 at bedtime  traZODone 50 at bedtime      Vital Signs Last 24 Hrs  T(C): 36.6 (17 Feb 2023 10:14), Max: 36.9 (16 Feb 2023 22:00)  T(F): 97.8 (17 Feb 2023 10:14), Max: 98.5 (16 Feb 2023 22:00)  HR: 90 (17 Feb 2023 10:14) (60 - 90)  BP: 137/75 (17 Feb 2023 10:14) (133/80 - 155/88)  BP(mean): --  RR: 18 (17 Feb 2023 10:14) (18 - 18)  SpO2: 98% (17 Feb 2023 10:14) (90% - 98%)    Parameters below as of 17 Feb 2023 10:14  Patient On (Oxygen Delivery Method): room air        PHYSICAL EXAM:  Constitutional in no distress, confused, restraints in place  HEENT PERRLA EOMI,No pallor or icterus  Chest Good AE,CTA  CVS  S1 S2   Abd soft BS normal No tenderness   Ext edema  IV site no erythema tenderness or discharge  Joints no swelling                         10.6   9.27  )-----------( 324      ( 16 Feb 2023 08:48 )             34.2       02-16    143  |  105  |  14  ----------------------------<  87  3.9   |  26  |  0.94    Ca    9.1      16 Feb 2023 08:48            MICROBIOLOGY:  v    Culture - Blood (collected 15 Feb 2023 09:09)  Source: .Blood Blood-Peripheral  Preliminary Report (16 Feb 2023 15:01):    No growth to date.    Culture - Blood (collected 15 Feb 2023 09:03)  Source: .Blood Blood-Peripheral  Preliminary Report (16 Feb 2023 15:01):    No growth to date.         Follow Up:  bacteremia    Interval History/ROS:  Overnight: No acute events.  Patient remains afebrile otherwise hemodynamically stable on 2 L.  Latest labs show no leukocytosis, BMP shows renal function within normal limits.  Blood cultures from 2/11/2023 remain negative.    Patient seen examined at bedside. No new complaints however continues to be confused.    Allergies  No Known Allergies    ANTIMICROBIALS:  ampicillin  IVPB 12 every 24 hours      OTHER MEDS:  MEDICATIONS  (STANDING):  acetaminophen     Tablet .. 650 every 6 hours PRN  allopurinol 300 daily  aMIOdarone    Tablet 200 daily  melatonin 3 at bedtime PRN  ondansetron Injectable 4 every 8 hours PRN  polyethylene glycol 3350 17 daily  QUEtiapine 25 daily  QUEtiapine 50 at bedtime  senna 2 at bedtime  tamsulosin 0.4 at bedtime  traZODone 50 at bedtime      Vital Signs Last 24 Hrs  T(C): 36.6 (17 Feb 2023 10:14), Max: 36.9 (16 Feb 2023 22:00)  T(F): 97.8 (17 Feb 2023 10:14), Max: 98.5 (16 Feb 2023 22:00)  HR: 90 (17 Feb 2023 10:14) (60 - 90)  BP: 137/75 (17 Feb 2023 10:14) (133/80 - 155/88)  BP(mean): --  RR: 18 (17 Feb 2023 10:14) (18 - 18)  SpO2: 98% (17 Feb 2023 10:14) (90% - 98%)    Parameters below as of 17 Feb 2023 10:14  Patient On (Oxygen Delivery Method): room air        PHYSICAL EXAM:  Constitutional in no distress, confused, restraints in place  HEENT PERRLA EOMI,No pallor or icterus  Chest Good AE,CTA  CVS  S1 S2   Abd soft BS normal No tenderness   Ext edema  IV site no erythema tenderness or discharge  Joints no swelling                         10.6   9.27  )-----------( 324      ( 16 Feb 2023 08:48 )             34.2       02-16    143  |  105  |  14  ----------------------------<  87  3.9   |  26  |  0.94    Ca    9.1      16 Feb 2023 08:48            MICROBIOLOGY:  v    Culture - Blood (collected 15 Feb 2023 09:09)  Source: .Blood Blood-Peripheral  Preliminary Report (16 Feb 2023 15:01):    No growth to date.    Culture - Blood (collected 15 Feb 2023 09:03)  Source: .Blood Blood-Peripheral  Preliminary Report (16 Feb 2023 15:01):    No growth to date.

## 2023-02-17 NOTE — BH CONSULTATION LIAISON PROGRESS NOTE - NSBHCONSULTPRIMARYDISCUSSYES_PSY_A_CORE FT
discussed medications   changing to Seroquel 50mg po q noon and 6pm  and Trazodone 25mg po qhs and as a prn

## 2023-02-18 PROCEDURE — 99232 SBSQ HOSP IP/OBS MODERATE 35: CPT

## 2023-02-18 PROCEDURE — 93010 ELECTROCARDIOGRAM REPORT: CPT

## 2023-02-18 RX ORDER — QUETIAPINE FUMARATE 200 MG/1
25 TABLET, FILM COATED ORAL EVERY 6 HOURS
Refills: 0 | Status: DISCONTINUED | OUTPATIENT
Start: 2023-02-18 | End: 2023-02-23

## 2023-02-18 RX ADMIN — CHLORHEXIDINE GLUCONATE 1 APPLICATION(S): 213 SOLUTION TOPICAL at 11:42

## 2023-02-18 RX ADMIN — POLYETHYLENE GLYCOL 3350 17 GRAM(S): 17 POWDER, FOR SOLUTION ORAL at 11:37

## 2023-02-18 RX ADMIN — AMIODARONE HYDROCHLORIDE 200 MILLIGRAM(S): 400 TABLET ORAL at 05:46

## 2023-02-18 RX ADMIN — TAMSULOSIN HYDROCHLORIDE 0.4 MILLIGRAM(S): 0.4 CAPSULE ORAL at 21:25

## 2023-02-18 RX ADMIN — Medication 325 MILLIGRAM(S): at 11:38

## 2023-02-18 RX ADMIN — Medication 300 MILLIGRAM(S): at 11:38

## 2023-02-18 RX ADMIN — Medication 50 MILLIGRAM(S): at 21:24

## 2023-02-18 RX ADMIN — APIXABAN 5 MILLIGRAM(S): 2.5 TABLET, FILM COATED ORAL at 05:46

## 2023-02-18 RX ADMIN — QUETIAPINE FUMARATE 50 MILLIGRAM(S): 200 TABLET, FILM COATED ORAL at 17:08

## 2023-02-18 RX ADMIN — APIXABAN 5 MILLIGRAM(S): 2.5 TABLET, FILM COATED ORAL at 17:08

## 2023-02-18 RX ADMIN — Medication 3 MILLIGRAM(S): at 23:41

## 2023-02-18 RX ADMIN — QUETIAPINE FUMARATE 50 MILLIGRAM(S): 200 TABLET, FILM COATED ORAL at 11:38

## 2023-02-18 RX ADMIN — Medication 1 TABLET(S): at 11:38

## 2023-02-18 RX ADMIN — Medication 650 MILLIGRAM(S): at 23:40

## 2023-02-18 RX ADMIN — QUETIAPINE FUMARATE 25 MILLIGRAM(S): 200 TABLET, FILM COATED ORAL at 21:24

## 2023-02-18 RX ADMIN — Medication 20.83 GRAM(S): at 17:07

## 2023-02-18 RX ADMIN — SENNA PLUS 2 TABLET(S): 8.6 TABLET ORAL at 21:24

## 2023-02-18 NOTE — PROGRESS NOTE ADULT - ASSESSMENT
89M w/ pmhx of dementia (A+O x 1 baseline), afib on eliquis, s/p PPM, recent b/l hip arthroplasty s/p fall, c/b LE DVTs, LUE DVT on eliquis, ye since last hospitalization, bph, gout, subdural hematoma s/p craniotomy p/w hypotension, sepsis and UTI. Currently improved. Course c/b agitation.

## 2023-02-18 NOTE — PROGRESS NOTE ADULT - SUBJECTIVE AND OBJECTIVE BOX
[facility] Division of Hospital Medicine  Isaak Tam  Pager (M-F, 8A-5P): [phone]  Other Times:  [phone]      SUBJECTIVE / OVERNIGHT EVENTS:  No events overnight  this am agitated attempting to climb out of the bed  unable to participate in ros    ADDITIONAL REVIEW OF SYSTEMS:    MEDICATIONS  (STANDING):  allopurinol 300 milliGRAM(s) Oral daily  aMIOdarone    Tablet 200 milliGRAM(s) Oral daily  ampicillin  IVPB 12 Gram(s) IV Intermittent every 24 hours  apixaban 5 milliGRAM(s) Oral every 12 hours  chlorhexidine 2% Cloths 1 Application(s) Topical daily  ferrous    sulfate 325 milliGRAM(s) Oral daily  lactobacillus acidophilus 1 Tablet(s) Oral daily  polyethylene glycol 3350 17 Gram(s) Oral daily  QUEtiapine 50 milliGRAM(s) Oral <User Schedule>  senna 2 Tablet(s) Oral at bedtime  tamsulosin 0.4 milliGRAM(s) Oral at bedtime  traZODone 50 milliGRAM(s) Oral at bedtime    MEDICATIONS  (PRN):  acetaminophen     Tablet .. 650 milliGRAM(s) Oral every 6 hours PRN Temp greater or equal to 38C (100.4F), Mild Pain (1 - 3)  melatonin 3 milliGRAM(s) Oral at bedtime PRN Insomnia  ondansetron Injectable 4 milliGRAM(s) IV Push every 8 hours PRN Nausea and/or Vomiting  QUEtiapine 25 milliGRAM(s) Oral every 6 hours PRN agitation      I&O's Summary    17 Feb 2023 07:01  -  18 Feb 2023 07:00  --------------------------------------------------------  IN: 0 mL / OUT: 700 mL / NET: -700 mL        PHYSICAL EXAM:  Vital Signs Last 24 Hrs  T(C): 36.8 (18 Feb 2023 08:57), Max: 36.8 (18 Feb 2023 08:57)  T(F): 98.2 (18 Feb 2023 08:57), Max: 98.2 (18 Feb 2023 08:57)  HR: 69 (18 Feb 2023 08:57) (66 - 88)  BP: 148/79 (18 Feb 2023 08:57) (139/76 - 148/79)  BP(mean): --  RR: 18 (18 Feb 2023 08:57) (18 - 19)  SpO2: 97% (18 Feb 2023 08:57) (94% - 98%)    Parameters below as of 18 Feb 2023 08:57  Patient On (Oxygen Delivery Method): room air      CONSTITUTIONAL: NAD, well-developed, well-groomed  EYES: PERRLA; conjunctiva and sclera clear  RESPIRATORY: Normal respiratory effort; lungs are clear to auscultation bilaterally  CARDIOVASCULAR: Regular rate and rhythm, normal S1 and S2, no murmur/rub/gallop; No lower extremity edema; Peripheral pulses are 2+ bilaterally  ABDOMEN: Nontender to palpation, normoactive bowel sounds  MUSCULOSKELETAL: no clubbing or cyanosis of digits; no joint swelling or tenderness to palpation  PSYCH: A+O X0-1, attempting to climb out of the bed  NEUROLOGY: CN 2-12 are intact and symmetric; no gross sensory deficits   SKIN: No rashes; no palpable lesions                      RADIOLOGY & ADDITIONAL TESTS:  Results Reviewed:   Imaging Personally Reviewed:  Electrocardiogram Personally Reviewed:    COORDINATION OF CARE:  Care Discussed with Consultants/Other Providers [Y/N]:  Prior or Outpatient Records Reviewed [Y/N]:

## 2023-02-19 LAB
APPEARANCE UR: ABNORMAL
BACTERIA # UR AUTO: NEGATIVE — SIGNIFICANT CHANGE UP
BILIRUB UR-MCNC: ABNORMAL
COLOR SPEC: ABNORMAL
DIFF PNL FLD: ABNORMAL
EPI CELLS # UR: NEGATIVE — SIGNIFICANT CHANGE UP
GLUCOSE UR QL: NEGATIVE — SIGNIFICANT CHANGE UP
HCT VFR BLD CALC: 32.4 % — LOW (ref 39–50)
HGB BLD-MCNC: 9.9 G/DL — LOW (ref 13–17)
HYALINE CASTS # UR AUTO: 4 /LPF — HIGH (ref 0–2)
KETONES UR-MCNC: ABNORMAL
LEUKOCYTE ESTERASE UR-ACNC: ABNORMAL
MCHC RBC-ENTMCNC: 27.9 PG — SIGNIFICANT CHANGE UP (ref 27–34)
MCHC RBC-ENTMCNC: 30.6 GM/DL — LOW (ref 32–36)
MCV RBC AUTO: 91.3 FL — SIGNIFICANT CHANGE UP (ref 80–100)
NITRITE UR-MCNC: POSITIVE
NRBC # BLD: 0 /100 WBCS — SIGNIFICANT CHANGE UP (ref 0–0)
PH UR: 6 — SIGNIFICANT CHANGE UP (ref 5–8)
PLATELET # BLD AUTO: 329 K/UL — SIGNIFICANT CHANGE UP (ref 150–400)
PROT UR-MCNC: ABNORMAL
RBC # BLD: 3.55 M/UL — LOW (ref 4.2–5.8)
RBC # FLD: 15.9 % — HIGH (ref 10.3–14.5)
RBC CASTS # UR COMP ASSIST: 116 /HPF — HIGH (ref 0–4)
SP GR SPEC: 1.02 — SIGNIFICANT CHANGE UP (ref 1.01–1.02)
UROBILINOGEN FLD QL: ABNORMAL
WBC # BLD: 6.67 K/UL — SIGNIFICANT CHANGE UP (ref 3.8–10.5)
WBC # FLD AUTO: 6.67 K/UL — SIGNIFICANT CHANGE UP (ref 3.8–10.5)
WBC UR QL: SIGNIFICANT CHANGE UP

## 2023-02-19 PROCEDURE — 51700 IRRIGATION OF BLADDER: CPT

## 2023-02-19 PROCEDURE — 99232 SBSQ HOSP IP/OBS MODERATE 35: CPT

## 2023-02-19 RX ORDER — HALOPERIDOL DECANOATE 100 MG/ML
0.5 INJECTION INTRAMUSCULAR ONCE
Refills: 0 | Status: COMPLETED | OUTPATIENT
Start: 2023-02-19 | End: 2023-02-19

## 2023-02-19 RX ORDER — HALOPERIDOL DECANOATE 100 MG/ML
0.5 INJECTION INTRAMUSCULAR ONCE
Refills: 0 | Status: DISCONTINUED | OUTPATIENT
Start: 2023-02-19 | End: 2023-02-20

## 2023-02-19 RX ORDER — FINASTERIDE 5 MG/1
5 TABLET, FILM COATED ORAL DAILY
Refills: 0 | Status: DISCONTINUED | OUTPATIENT
Start: 2023-02-19 | End: 2023-03-08

## 2023-02-19 RX ADMIN — AMIODARONE HYDROCHLORIDE 200 MILLIGRAM(S): 400 TABLET ORAL at 05:24

## 2023-02-19 RX ADMIN — APIXABAN 5 MILLIGRAM(S): 2.5 TABLET, FILM COATED ORAL at 05:24

## 2023-02-19 RX ADMIN — Medication 300 MILLIGRAM(S): at 11:11

## 2023-02-19 RX ADMIN — Medication 50 MILLIGRAM(S): at 21:13

## 2023-02-19 RX ADMIN — Medication 325 MILLIGRAM(S): at 11:11

## 2023-02-19 RX ADMIN — CHLORHEXIDINE GLUCONATE 1 APPLICATION(S): 213 SOLUTION TOPICAL at 11:27

## 2023-02-19 RX ADMIN — HALOPERIDOL DECANOATE 0.5 MILLIGRAM(S): 100 INJECTION INTRAMUSCULAR at 01:05

## 2023-02-19 RX ADMIN — QUETIAPINE FUMARATE 50 MILLIGRAM(S): 200 TABLET, FILM COATED ORAL at 17:21

## 2023-02-19 RX ADMIN — Medication 650 MILLIGRAM(S): at 00:40

## 2023-02-19 RX ADMIN — Medication 20.83 GRAM(S): at 17:21

## 2023-02-19 RX ADMIN — QUETIAPINE FUMARATE 50 MILLIGRAM(S): 200 TABLET, FILM COATED ORAL at 12:18

## 2023-02-19 RX ADMIN — QUETIAPINE FUMARATE 25 MILLIGRAM(S): 200 TABLET, FILM COATED ORAL at 21:17

## 2023-02-19 RX ADMIN — APIXABAN 5 MILLIGRAM(S): 2.5 TABLET, FILM COATED ORAL at 17:21

## 2023-02-19 RX ADMIN — FINASTERIDE 5 MILLIGRAM(S): 5 TABLET, FILM COATED ORAL at 12:12

## 2023-02-19 RX ADMIN — TAMSULOSIN HYDROCHLORIDE 0.4 MILLIGRAM(S): 0.4 CAPSULE ORAL at 21:13

## 2023-02-19 RX ADMIN — Medication 3 MILLIGRAM(S): at 21:14

## 2023-02-19 RX ADMIN — Medication 1 TABLET(S): at 11:11

## 2023-02-19 NOTE — PROGRESS NOTE ADULT - SUBJECTIVE AND OBJECTIVE BOX
[facility] Division of Hospital Medicine  Isaak Tam  Pager (JOHN-F, 8A-5P): [phone]  Other Times:  [phone]      SUBJECTIVE / OVERNIGHT EVENTS:  Overnight noted to have worsening hematuria iso likely pulling on it  Urology consulted  this am urine yellow to pink  Overnight more agitated, required seroquel and received haldol *1  this am asleep, responding to pain    ADDITIONAL REVIEW OF SYSTEMS:    MEDICATIONS  (STANDING):  allopurinol 300 milliGRAM(s) Oral daily  aMIOdarone    Tablet 200 milliGRAM(s) Oral daily  ampicillin  IVPB 12 Gram(s) IV Intermittent every 24 hours  apixaban 5 milliGRAM(s) Oral every 12 hours  chlorhexidine 2% Cloths 1 Application(s) Topical daily  ferrous    sulfate 325 milliGRAM(s) Oral daily  lactobacillus acidophilus 1 Tablet(s) Oral daily  polyethylene glycol 3350 17 Gram(s) Oral daily  QUEtiapine 50 milliGRAM(s) Oral <User Schedule>  senna 2 Tablet(s) Oral at bedtime  tamsulosin 0.4 milliGRAM(s) Oral at bedtime  traZODone 50 milliGRAM(s) Oral at bedtime    MEDICATIONS  (PRN):  acetaminophen     Tablet .. 650 milliGRAM(s) Oral every 6 hours PRN Temp greater or equal to 38C (100.4F), Mild Pain (1 - 3)  melatonin 3 milliGRAM(s) Oral at bedtime PRN Insomnia  ondansetron Injectable 4 milliGRAM(s) IV Push every 8 hours PRN Nausea and/or Vomiting  QUEtiapine 25 milliGRAM(s) Oral every 6 hours PRN agitation      I&O's Summary    2023 07:01  -  2023 07:00  --------------------------------------------------------  IN: 360 mL / OUT: 850 mL / NET: -490 mL        PHYSICAL EXAM:  Vital Signs Last 24 Hrs  T(C): 36.2 (2023 09:22), Max: 36.4 (2023 23:44)  T(F): 97.2 (2023 09:22), Max: 97.6 (2023 23:44)  HR: 60 (2023 09:22) (60 - 80)  BP: 129/84 (2023 09:22) (128/82 - 152/82)  BP(mean): --  RR: 18 (2023 09:22) (18 - 18)  SpO2: 91% (:) (91% - 96%)    Parameters below as of 2023 09:  Patient On (Oxygen Delivery Method): room air      CONSTITUTIONAL: NAD, well-developed  EYES: PERRLA; conjunctiva and sclera clear  RESPIRATORY: Normal respiratory effort; lungs are clear to auscultation bilaterally  CARDIOVASCULAR: Regular rate and rhythm, normal S1 and S2, no murmur/rub/gallop; No lower extremity edema; Peripheral pulses are 2+ bilaterally  ABDOMEN: Nontender to palpation, normoactive bowel sounds  MUSCULOSKELETAL: no clubbing or cyanosis of digits; no joint swelling or tenderness to palpation  PSYCH: A+O X0, asleep responding to pain  NEUROLOGY: CN 2-12 are intact and symmetric; no gross sensory deficits   SKIN: No rashes; no palpable lesions    LABS:                        9.9    6.67  )-----------( 329      ( 2023 03:04 )             32.4                 Urinalysis Basic - ( 2023 03:04 )    Color: Red / Appearance: Turbid / S.022 / pH: x  Gluc: x / Ketone: Small  / Bili: Large / Urobili: 2 mg/dL   Blood: x / Protein: 300 mg/dL Test Performed on Clinitek and Refractometer / Nitrite: Positive   Leuk Esterase: Large / RBC: 116 /hpf / WBC 0-2   Sq Epi: x / Non Sq Epi: Negative / Bacteria: Negative

## 2023-02-19 NOTE — CHART NOTE - NSCHARTNOTEFT_GEN_A_CORE
MEDICINE NP- EPISODIC NOTE    Called by RN pt with hematuria. Seen and evaluated pt at bedside, pt resting, NAD, ye with punch-colored urine in bag and tubing, no clots. VSS. Check CBC, UA. On Eliquis for Afib. Unsure 2/2 trauma vs AC. Urology consulted.    Catherine Noriega, NP-BC  [phone]

## 2023-02-19 NOTE — PROGRESS NOTE ADULT - PROBLEM SELECTOR PLAN 7
Cont. ye, exchanged in ER, likely some trauma at rehab with pt pulling  -Cont. flomax.  -adding finasteride

## 2023-02-19 NOTE — CONSULT NOTE ADULT - SUBJECTIVE AND OBJECTIVE BOX
"89M w/ pmhx of dementia, afib on eliquis, s/p PPM, recent b/l hip arthroplasty s/p fall, c/b LE DVTs, LUE DVT on eliquis, ye since last hospitalization, bph, gout, subdural hematoma s/p craniotomy, BIBEMS from rehab for evaluation of fever, hypoxia and hypotension in setting of known UTI. Per rehab records, patient currently on ciprofloxacin for Klebsiella UTI, pansensitive. He saw urologist on Friday. Patient's daughter reported to MICU team that patient has been increasingly agitated over the past couple of days and was trying to pull out ye at rehab. [employer] paperwork documents agitation. He was recently started on Seroquel at rehab for agitation. He has had dark-colored urine for the past week and daughter reports he was also complaining of right-sided flank pain. No cough, vomiting, diarrhea. Daughter reports patient is A&Ox1 at baseline, wheelchair-bound.    In the ED, patient febrile to 101.6 rectally, HR 80's, bp 80/50's, satting high 80's on RA. Labs with leukocytosis to 32.2, hg 10.5, plt's 302. PT/INR 30.8/2.63. Serum chemistries unremarkable. VBG ph 7.41, lactate 2.1, pco2 47. Ua positive for blood and WBC's, lare LE, negative for nitries or bacteria. RVP negative. Pending blood cultures, urine culture. Chest xray without any focal consolidations. CTA without any PE, with small-mod pleural effusions bl, bl atelactasis at the bases, dilated RV and RA. CT head negative for any acute changes, with chronic microvascular and lacunar changes.     Patient given zosyn and vanc x1. 1g ofirmev. LR 2L, NS 3L, albumin 250 x2, midodrine 10mg PO, levophed briefly"    Urology consulted for gross hematuria. Pt followed by urologist ELEONORA Cowart MD for urinary retention, last seen earlier this month.  Since current admission urology was called after patient pulled out his ye catheter nursing home. Urology placed 20F 3way 2/12 with yellow urine return.  Pt has been pulling on his ey catheter and is currently on restrain mittens.  Called by primary team to evaluate patient with bright red gross hematuria.       PAST MEDICAL & SURGICAL HISTORY:  AF (atrial fibrillation)      Hypertension      Arthritis      Benign prostatic hypertrophy      Gout      Subdural hematoma      Pacemaker      Alzheimer disease      Alzheimer disease      S/P craniotomy      S/P laparoscopic cholecystectomy      S/P umbilical hernia repair, follow-up exam      No significant past surgical history        FAMILY HISTORY:  No pertinent family history in first degree relatives      SOCIAL HISTORY:   Tobacco hx:   MEDICATIONS  (STANDING):  allopurinol 300 milliGRAM(s) Oral daily  aMIOdarone    Tablet 200 milliGRAM(s) Oral daily  ampicillin  IVPB 12 Gram(s) IV Intermittent every 24 hours  apixaban 5 milliGRAM(s) Oral every 12 hours  chlorhexidine 2% Cloths 1 Application(s) Topical daily  ferrous    sulfate 325 milliGRAM(s) Oral daily  lactobacillus acidophilus 1 Tablet(s) Oral daily  polyethylene glycol 3350 17 Gram(s) Oral daily  QUEtiapine 50 milliGRAM(s) Oral <User Schedule>  senna 2 Tablet(s) Oral at bedtime  tamsulosin 0.4 milliGRAM(s) Oral at bedtime  traZODone 50 milliGRAM(s) Oral at bedtime    MEDICATIONS  (PRN):  acetaminophen     Tablet .. 650 milliGRAM(s) Oral every 6 hours PRN Temp greater or equal to 38C (100.4F), Mild Pain (1 - 3)  melatonin 3 milliGRAM(s) Oral at bedtime PRN Insomnia  ondansetron Injectable 4 milliGRAM(s) IV Push every 8 hours PRN Nausea and/or Vomiting  QUEtiapine 25 milliGRAM(s) Oral every 6 hours PRN agitation    Allergies    No Known Allergies    Intolerances        REVIEW OF SYSTEMS: Pertinent positives and negatives as stated in HPI, otherwise negative    Vital signs  T(C): 36.4 (02-18-23 @ 23:44), Max: 36.8 (02-18-23 @ 08:57)  HR: 78 (02-18-23 @ 23:44)  BP: 152/82 (02-18-23 @ 23:44)  SpO2: 92% (02-18-23 @ 23:44)  Wt(kg): --    Physical Exam  Gen: NAD  Abd: Soft, NT, ND, no rebound tenderness or guarding  : 20F 3way ye in place, no CBI, urine yellow to pink. ye irrigated with normal saline, no clots evacuated, urine clears easily to yellow  Back: No CVAT     LABS:      02-19 @ 03:04    WBC 6.67  / Hct 32.4  / SCr --         Urine Cx: 2/9 >100K enterococcus  Blood Cx: 2/9 >100K enterococcus  Blood Cx: 2/15 no growth    Radiology:    ACC: [id number] EXAM:  CT ABDOMEN AND PELVIS IC   ORDERED BY: JACK PARADA     PROCEDURE DATE:  02/09/2023          INTERPRETATION:  CLINICAL INFORMATION: Fever. Concern for pyelonephritis.    COMPARISON: CT abdomen and pelvis 10/17/2015    CONTRAST/COMPLICATIONS:  IV Contrast: Omnipaque 350  90 cc administered   10 cc discarded  Oral Contrast: NONE  Complications: None reported at time of study completion    PROCEDURE:  CT of the Abdomen and Pelvis was performed.  Sagittal and coronal reformats were performed.    FINDINGS:  LOWER CHEST: Partially visualized pacemaker leads terminating in the   right atrium and right ventricle. Cardiomegaly. Small bilateral pleural   effusions with associated compressive atelectasis.    LIVER: Within normal limits.  BILE DUCTS: Normal caliber.  GALLBLADDER: Cholecystectomy.  SPLEEN: Within normal limits.  PANCREAS: Within normal limits.  ADRENALS: Within normal limits.  KIDNEYS/URETERS: Mild bilateral renal atrophy. Kidneys enhance   symmetrically without hydronephrosis. Bilateral renal cysts.    BLADDER: Collapsed around a Ye catheter.  REPRODUCTIVE ORGANS: Not well evaluated secondary to streak artifact from   metallic hardware.    BOWEL: No bowel obstruction. Appendix not visualized. Large rectal stool   burden with rectal wall thickening, correlate for stercoral colitis.  PERITONEUM: No ascites or pneumoperitoneum. No mesenteric lymphadenopathy.  VESSELS: Atherosclerotic calcifications of the aortoiliac tree and   abdominal aortic branches. Normal caliber abdominal aorta.  RETROPERITONEUM/LYMPH NODES: No lymphadenopathy.  ABDOMINAL WALL: Generalized soft tissue edema.  BONES: Bilateral total hip arthroplasty. Degenerative changes.    IMPRESSION:  Large rectal stool burden with surrounding rectal wall thickening,   correlate for stercoral colitis.    No CT evidence of pyelonephritis.    Small bilateral pleural effusions and bibasilar compressive atelectasis.    --- End of Report ---         EDGAR FINCH MD; Resident Radiologist  This document has been electronically signed.  NATHANIEL GAFFNEY DO; Attending Radiologist  This document has been electronically signed. Feb 9 2023 10:16PM

## 2023-02-19 NOTE — PROGRESS NOTE ADULT - PROBLEM SELECTOR PLAN 8
traumatic iso kicking/pulling on multiple attempts  -Urology following appreciate recs  -Urine quick to clear, hgb stable, continue ye and eliquis

## 2023-02-19 NOTE — CONSULT NOTE ADULT - ASSESSMENT
89 y/o male with hx of urinary retention, admitted 2/9 with enterococcus bacteremia.  Urology consulted for gross hematuria- likely secondary to ye trauma      -continue mittens as appropriate  -hematuria improving. keep ye in place. no role for upsizing or CBI  -no acute gu intervention necessary at this time.   will discuss with attending, full recs to follow 89 y/o male with hx of urinary retention, admitted 2/9 with enterococcus bacteremia.  Urology consulted for gross hematuria- likely secondary to ye trauma      -continue mittens as appropriate  -continue eiliquis  -hematuria improving. keep ye in place. no role for upsizing or CBI  -no acute gu intervention necessary at this time.   will discuss with attending, full recs to follow 89 y/o male with hx of urinary retention, admitted 2/9 with enterococcus bacteremia.  Urology consulted for gross hematuria- likely secondary to ye trauma      -continue eiliquis  -hematuria improving. keep ye in place. no role for upsizing or CBI  -no acute gu intervention necessary at this time.   -can add finasteride and recommend catheter concealing techniques and/or continued mittens to avoid tugging.   discussed with urology attending, D.Hoenig MD 89 y/o male with hx of urinary retention, admitted 2/9 with enterococcus bacteremia.  Urology consulted for gross hematuria- likely secondary to ye trauma    -continue eiliquis  -hematuria improving. keep ye in place. no role for upsizing or CBI  -no acute gu intervention necessary at this time.   -continue tamsulosin  -can add finasteride 5mg daily  -recommend catheter concealing techniques and/or continued mittens to avoid tugging.   discussed with urology attending, D.Hoenig MD

## 2023-02-20 LAB
CULTURE RESULTS: SIGNIFICANT CHANGE UP
CULTURE RESULTS: SIGNIFICANT CHANGE UP
HCT VFR BLD CALC: 33.3 % — LOW (ref 39–50)
HGB BLD-MCNC: 10.3 G/DL — LOW (ref 13–17)
MCHC RBC-ENTMCNC: 28.6 PG — SIGNIFICANT CHANGE UP (ref 27–34)
MCHC RBC-ENTMCNC: 30.9 GM/DL — LOW (ref 32–36)
MCV RBC AUTO: 92.5 FL — SIGNIFICANT CHANGE UP (ref 80–100)
NRBC # BLD: 0 /100 WBCS — SIGNIFICANT CHANGE UP (ref 0–0)
PLATELET # BLD AUTO: 314 K/UL — SIGNIFICANT CHANGE UP (ref 150–400)
RBC # BLD: 3.6 M/UL — LOW (ref 4.2–5.8)
RBC # FLD: 15.9 % — HIGH (ref 10.3–14.5)
SPECIMEN SOURCE: SIGNIFICANT CHANGE UP
SPECIMEN SOURCE: SIGNIFICANT CHANGE UP
WBC # BLD: 9.98 K/UL — SIGNIFICANT CHANGE UP (ref 3.8–10.5)
WBC # FLD AUTO: 9.98 K/UL — SIGNIFICANT CHANGE UP (ref 3.8–10.5)

## 2023-02-20 PROCEDURE — 99232 SBSQ HOSP IP/OBS MODERATE 35: CPT

## 2023-02-20 RX ADMIN — APIXABAN 5 MILLIGRAM(S): 2.5 TABLET, FILM COATED ORAL at 17:34

## 2023-02-20 RX ADMIN — SENNA PLUS 2 TABLET(S): 8.6 TABLET ORAL at 21:03

## 2023-02-20 RX ADMIN — FINASTERIDE 5 MILLIGRAM(S): 5 TABLET, FILM COATED ORAL at 11:34

## 2023-02-20 RX ADMIN — APIXABAN 5 MILLIGRAM(S): 2.5 TABLET, FILM COATED ORAL at 05:36

## 2023-02-20 RX ADMIN — Medication 650 MILLIGRAM(S): at 20:42

## 2023-02-20 RX ADMIN — POLYETHYLENE GLYCOL 3350 17 GRAM(S): 17 POWDER, FOR SOLUTION ORAL at 11:33

## 2023-02-20 RX ADMIN — Medication 650 MILLIGRAM(S): at 05:35

## 2023-02-20 RX ADMIN — QUETIAPINE FUMARATE 50 MILLIGRAM(S): 200 TABLET, FILM COATED ORAL at 11:33

## 2023-02-20 RX ADMIN — Medication 50 MILLIGRAM(S): at 21:03

## 2023-02-20 RX ADMIN — Medication 1 TABLET(S): at 11:33

## 2023-02-20 RX ADMIN — QUETIAPINE FUMARATE 25 MILLIGRAM(S): 200 TABLET, FILM COATED ORAL at 20:42

## 2023-02-20 RX ADMIN — Medication 650 MILLIGRAM(S): at 21:26

## 2023-02-20 RX ADMIN — QUETIAPINE FUMARATE 25 MILLIGRAM(S): 200 TABLET, FILM COATED ORAL at 05:35

## 2023-02-20 RX ADMIN — Medication 20.83 GRAM(S): at 17:33

## 2023-02-20 RX ADMIN — QUETIAPINE FUMARATE 50 MILLIGRAM(S): 200 TABLET, FILM COATED ORAL at 17:35

## 2023-02-20 RX ADMIN — CHLORHEXIDINE GLUCONATE 1 APPLICATION(S): 213 SOLUTION TOPICAL at 11:39

## 2023-02-20 RX ADMIN — AMIODARONE HYDROCHLORIDE 200 MILLIGRAM(S): 400 TABLET ORAL at 05:36

## 2023-02-20 RX ADMIN — TAMSULOSIN HYDROCHLORIDE 0.4 MILLIGRAM(S): 0.4 CAPSULE ORAL at 21:03

## 2023-02-20 RX ADMIN — Medication 300 MILLIGRAM(S): at 11:33

## 2023-02-20 RX ADMIN — Medication 325 MILLIGRAM(S): at 11:33

## 2023-02-20 RX ADMIN — Medication 650 MILLIGRAM(S): at 06:25

## 2023-02-20 RX ADMIN — Medication 3 MILLIGRAM(S): at 21:06

## 2023-02-20 NOTE — PROGRESS NOTE ADULT - SUBJECTIVE AND OBJECTIVE BOX
[facility] Division of Hospital Medicine  Isaak Tam  Pager (JOHN-F, 8A-5P): [phone]  Other Times:  [phone]      SUBJECTIVE / OVERNIGHT EVENTS:  Agitated overnight requiring seroquel and haldol  this am asleep, not participating in ROS    ADDITIONAL REVIEW OF SYSTEMS:    MEDICATIONS  (STANDING):  allopurinol 300 milliGRAM(s) Oral daily  aMIOdarone    Tablet 200 milliGRAM(s) Oral daily  ampicillin  IVPB 12 Gram(s) IV Intermittent every 24 hours  apixaban 5 milliGRAM(s) Oral every 12 hours  chlorhexidine 2% Cloths 1 Application(s) Topical daily  ferrous    sulfate 325 milliGRAM(s) Oral daily  finasteride 5 milliGRAM(s) Oral daily  lactobacillus acidophilus 1 Tablet(s) Oral daily  polyethylene glycol 3350 17 Gram(s) Oral daily  QUEtiapine 50 milliGRAM(s) Oral <User Schedule>  senna 2 Tablet(s) Oral at bedtime  tamsulosin 0.4 milliGRAM(s) Oral at bedtime  traZODone 50 milliGRAM(s) Oral at bedtime    MEDICATIONS  (PRN):  acetaminophen     Tablet .. 650 milliGRAM(s) Oral every 6 hours PRN Temp greater or equal to 38C (100.4F), Mild Pain (1 - 3)  melatonin 3 milliGRAM(s) Oral at bedtime PRN Insomnia  ondansetron Injectable 4 milliGRAM(s) IV Push every 8 hours PRN Nausea and/or Vomiting  QUEtiapine 25 milliGRAM(s) Oral every 6 hours PRN agitation      I&O's Summary    2023 07:  -  2023 07:00  --------------------------------------------------------  IN: 449.6 mL / OUT: 650 mL / NET: -200.4 mL    2023 07:01  -  2023 17:49  --------------------------------------------------------  IN: 0 mL / OUT: 300 mL / NET: -300 mL        PHYSICAL EXAM:  Vital Signs Last 24 Hrs  T(C): 36.1 (2023 16:23), Max: 36.5 (2023 21:26)  T(F): 97 (2023 16:23), Max: 97.7 (2023 21:26)  HR: 60 (2023 16:23) (60 - 84)  BP: 126/79 (2023 16:23) (126/79 - 167/84)  BP(mean): --  RR: 18 (2023 16:23) (16 - 18)  SpO2: 94% (2023 16:23) (92% - 95%)    Parameters below as of 2023 16:23  Patient On (Oxygen Delivery Method): room air      CONSTITUTIONAL: NAD, well-developed  EYES: PERRLA; conjunctiva and sclera clear  RESPIRATORY: Normal respiratory effort; lungs are clear to auscultation bilaterally  CARDIOVASCULAR: Regular rate and rhythm, normal S1 and S2, no murmur/rub/gallop; No lower extremity edema; Peripheral pulses are 2+ bilaterally  ABDOMEN: Nontender to palpation, normoactive bowel sounds, Jamil in place with yellow urine with rbc sediment  MUSCULOSKELETAL: no clubbing or cyanosis of digits; no joint swelling or tenderness to palpation  PSYCH: A+O X0, asleep responding to pain  NEUROLOGY: CN 2-12 are intact and symmetric; no gross sensory deficits   SKIN: No rashes; no palpable lesions    LABS:                        10.3   9.98  )-----------( 314      ( 2023 07:13 )             33.3                 Urinalysis Basic - ( 2023 03:04 )    Color: Red / Appearance: Turbid / S.022 / pH: x  Gluc: x / Ketone: Small  / Bili: Large / Urobili: 2 mg/dL   Blood: x / Protein: 300 mg/dL Test Performed on Clinitek and Refractometer / Nitrite: Positive   Leuk Esterase: Large / RBC: 116 /hpf / WBC 0-2   Sq Epi: x / Non Sq Epi: Negative / Bacteria: Negative

## 2023-02-20 NOTE — PROGRESS NOTE ADULT - PROBLEM SELECTOR PLAN 7
Cont. ye, exchanged in ER, likely some trauma at rehab with pt pulling  -Cont. flomax.  -added finasteride

## 2023-02-21 PROCEDURE — 71045 X-RAY EXAM CHEST 1 VIEW: CPT | Mod: 26

## 2023-02-21 PROCEDURE — 99232 SBSQ HOSP IP/OBS MODERATE 35: CPT

## 2023-02-21 RX ORDER — FUROSEMIDE 40 MG
40 TABLET ORAL ONCE
Refills: 0 | Status: COMPLETED | OUTPATIENT
Start: 2023-02-21 | End: 2023-02-21

## 2023-02-21 RX ORDER — FUROSEMIDE 40 MG
20 TABLET ORAL ONCE
Refills: 0 | Status: DISCONTINUED | OUTPATIENT
Start: 2023-02-21 | End: 2023-02-21

## 2023-02-21 RX ADMIN — Medication 20.83 GRAM(S): at 16:23

## 2023-02-21 RX ADMIN — Medication 325 MILLIGRAM(S): at 12:12

## 2023-02-21 RX ADMIN — Medication 650 MILLIGRAM(S): at 04:39

## 2023-02-21 RX ADMIN — Medication 1 TABLET(S): at 12:12

## 2023-02-21 RX ADMIN — Medication 3 MILLIGRAM(S): at 23:56

## 2023-02-21 RX ADMIN — APIXABAN 5 MILLIGRAM(S): 2.5 TABLET, FILM COATED ORAL at 17:51

## 2023-02-21 RX ADMIN — CHLORHEXIDINE GLUCONATE 1 APPLICATION(S): 213 SOLUTION TOPICAL at 12:13

## 2023-02-21 RX ADMIN — QUETIAPINE FUMARATE 25 MILLIGRAM(S): 200 TABLET, FILM COATED ORAL at 23:56

## 2023-02-21 RX ADMIN — Medication 40 MILLIGRAM(S): at 17:51

## 2023-02-21 RX ADMIN — QUETIAPINE FUMARATE 50 MILLIGRAM(S): 200 TABLET, FILM COATED ORAL at 17:51

## 2023-02-21 RX ADMIN — Medication 300 MILLIGRAM(S): at 12:13

## 2023-02-21 RX ADMIN — TAMSULOSIN HYDROCHLORIDE 0.4 MILLIGRAM(S): 0.4 CAPSULE ORAL at 22:06

## 2023-02-21 RX ADMIN — FINASTERIDE 5 MILLIGRAM(S): 5 TABLET, FILM COATED ORAL at 12:13

## 2023-02-21 RX ADMIN — QUETIAPINE FUMARATE 25 MILLIGRAM(S): 200 TABLET, FILM COATED ORAL at 04:39

## 2023-02-21 RX ADMIN — SENNA PLUS 2 TABLET(S): 8.6 TABLET ORAL at 22:06

## 2023-02-21 RX ADMIN — Medication 650 MILLIGRAM(S): at 05:12

## 2023-02-21 RX ADMIN — POLYETHYLENE GLYCOL 3350 17 GRAM(S): 17 POWDER, FOR SOLUTION ORAL at 12:13

## 2023-02-21 RX ADMIN — APIXABAN 5 MILLIGRAM(S): 2.5 TABLET, FILM COATED ORAL at 04:45

## 2023-02-21 RX ADMIN — AMIODARONE HYDROCHLORIDE 200 MILLIGRAM(S): 400 TABLET ORAL at 04:45

## 2023-02-21 RX ADMIN — Medication 50 MILLIGRAM(S): at 22:06

## 2023-02-21 RX ADMIN — QUETIAPINE FUMARATE 50 MILLIGRAM(S): 200 TABLET, FILM COATED ORAL at 12:15

## 2023-02-21 RX ADMIN — QUETIAPINE FUMARATE 25 MILLIGRAM(S): 200 TABLET, FILM COATED ORAL at 16:23

## 2023-02-21 NOTE — PROGRESS NOTE ADULT - SUBJECTIVE AND OBJECTIVE BOX
[facility] Division of Hospital Medicine  Isaak Tam  Pager (JOHN-F, 8A-5P): [phone]  Other Times:  [phone]      SUBJECTIVE / OVERNIGHT EVENTS:  Continues to have episodes of agitation overnight and during the am sporadically  currently calm, answering with 1 word  unable to participate in ros  ADDITIONAL REVIEW OF SYSTEMS:    MEDICATIONS  (STANDING):  allopurinol 300 milliGRAM(s) Oral daily  aMIOdarone    Tablet 200 milliGRAM(s) Oral daily  ampicillin  IVPB 12 Gram(s) IV Intermittent every 24 hours  apixaban 5 milliGRAM(s) Oral every 12 hours  chlorhexidine 2% Cloths 1 Application(s) Topical daily  ferrous    sulfate 325 milliGRAM(s) Oral daily  finasteride 5 milliGRAM(s) Oral daily  lactobacillus acidophilus 1 Tablet(s) Oral daily  polyethylene glycol 3350 17 Gram(s) Oral daily  QUEtiapine 50 milliGRAM(s) Oral <User Schedule>  senna 2 Tablet(s) Oral at bedtime  tamsulosin 0.4 milliGRAM(s) Oral at bedtime  traZODone 50 milliGRAM(s) Oral at bedtime    MEDICATIONS  (PRN):  acetaminophen     Tablet .. 650 milliGRAM(s) Oral every 6 hours PRN Temp greater or equal to 38C (100.4F), Mild Pain (1 - 3)  melatonin 3 milliGRAM(s) Oral at bedtime PRN Insomnia  ondansetron Injectable 4 milliGRAM(s) IV Push every 8 hours PRN Nausea and/or Vomiting  QUEtiapine 25 milliGRAM(s) Oral every 6 hours PRN agitation      I&O's Summary    20 Feb 2023 07:01  -  21 Feb 2023 07:00  --------------------------------------------------------  IN: 235 mL / OUT: 950 mL / NET: -715 mL    21 Feb 2023 07:01  -  21 Feb 2023 11:45  --------------------------------------------------------  IN: 240 mL / OUT: 0 mL / NET: 240 mL        PHYSICAL EXAM:  Vital Signs Last 24 Hrs  T(C): 36.8 (21 Feb 2023 11:31), Max: 36.8 (21 Feb 2023 11:31)  T(F): 98.3 (21 Feb 2023 11:31), Max: 98.3 (21 Feb 2023 11:31)  HR: 83 (21 Feb 2023 11:31) (60 - 83)  BP: 147/80 (21 Feb 2023 11:31) (126/79 - 167/84)  BP(mean): --  RR: 18 (21 Feb 2023 11:31) (18 - 18)  SpO2: 94% (21 Feb 2023 11:31) (90% - 95%)    Parameters below as of 21 Feb 2023 11:31  Patient On (Oxygen Delivery Method): room air      CONSTITUTIONAL: NAD, well-developed  EYES: PERRLA; conjunctiva and sclera clear  RESPIRATORY: Normal respiratory effort; lungs are clear to auscultation bilaterally  CARDIOVASCULAR: Regular rate and rhythm, normal S1 and S2, no murmur/rub/gallop; No lower extremity edema; Peripheral pulses are 2+ bilaterally  ABDOMEN: Nontender to palpation, normoactive bowel sounds, Jamil in place with yellow urine with rbc sediment  MUSCULOSKELETAL: no clubbing or cyanosis of digits; no joint swelling or tenderness to palpation  PSYCH: A+O X1, responding with 1 word answers  NEUROLOGY: CN 2-12 are intact and symmetric; no gross sensory deficits   SKIN: No rashes; no palpable lesions    LABS:                        10.3   9.98  )-----------( 314      ( 20 Feb 2023 07:13 )             33.3

## 2023-02-21 NOTE — BH CONSULTATION LIAISON PROGRESS NOTE - NSBHATTESTCOMMENTATTENDFT_PSY_A_CORE
Pt is a 89yr old man  for 42yrs, domiciled with wife (Lelia [phone]) and part-time HHA 2x weekly from 12pm-4pm, retired manager of a parking garage, with PPHx dementia dx in 2019 on Donepezil, no previous SA or psych admissions, no psychiatrist, no active substance abuse, with PMHx significant for BPH, afib w/ PPM, bilateral hip arthroplasty (R 12/2022, L hemiarthroplasty 1/5/2023), DVT on Eliquis, subdural hematoma s/p craniotomy, admitted for urosepsis, and psychiatry consulted for medication recommendations for agitation. Patient at bedside appears to be calm but AxOx0. Patient notes feeling sadness but denies SI, difficulty with sleep or appetite. Per NP and wife, patient was agitated and tried to climb out of bed. Patient previously had an episode of trying to remove his ye, causing trauma. Agree with plan  -Consider increasing Seroquel regimen to Seroquel 50mg at 12pm, Seroquel 50mg at 6pm, and Seroquel 25mg q 6 hours PRN for agitation, hold for sedation. Monitor EKG for QTc<500.  -Continue but change Trazodone 25mg HS standing and Trazodone 25mg po qhs prn insomnia

## 2023-02-21 NOTE — BH CONSULTATION LIAISON PROGRESS NOTE - NSBHFUPINTERVALHXFT_PSY_A_CORE
Patient interviewed at bedside. Patient off restraints and AxOx1 (oriented to self, disoriented to place and time). Patient denies depressed emotions and difficulty with appetite and sleep. Was unable to understand questions related to SI. Per nurse, patient was restless and agitated overnight. Patient was given Seroquel 25mg at 0400 and now showing improvement with agitation and resting.  Patient interviewed at bedside. Patient off restraints and AxOx1 (oriented to self, disoriented to place and time). Pt was complaining of SOB O2 Sat was 93%-96% so alerted RN and NP Yumiko Darby.    Patient denies depressed emotions and difficulty with appetite and sleep. Was unable to understand questions related to SI. Per nurse, patient was restless and agitated overnight. Patient was given Seroquel 25mg at 0400 and now showing improvement with agitation and resting.

## 2023-02-21 NOTE — CHART NOTE - NSCHARTNOTEFT_GEN_A_CORE
Briefly Mr nevaeh admiited for UTI and currently on unasyn , hospital course has been challenged by delritory and seen by psychiatry for mangement of delirium This evening Dr. Stafford from psychiary noted patient with labored breathing.   Writer evaluated patient and noted scattered rhonchi and wife acknoweldge as well labored breathing   ROS limited due to delirium  Vital Signs Last 24 Hrs  T(C): 36.2 (21 Feb 2023 16:22), Max: 36.8 (21 Feb 2023 11:31)  T(F): 97.2 (21 Feb 2023 16:22), Max: 98.3 (21 Feb 2023 11:31)  HR: 72 (21 Feb 2023 17:50) (62 - 83)  BP: 130/80 (21 Feb 2023 17:50) (130/80 - 163/88)  BP(mean): --  RR: 18 (21 Feb 2023 17:50) (18 - 18)  SpO2: 100% (21 Feb 2023 17:50) (90% - 100%)    Parameters below as of 21 Feb 2023 17:50  Patient On (Oxygen Delivery Method): nasal cannula  O2 Flow (L/min): 2  Heent :acceptable  Lungs : with poor inspiratory effort but scattered rhonchi  Gu: ye insitu with dark hematuria  ext : no edema                        10.3   9.98  )-----------( 314      ( 20 Feb 2023 07:13 )             33.3   `cxr pending 2/21 but from my view it appeared increased pulm congestion in  comparison to 2/12  a/p labored breathing   stat cxr   probnp  lasix 40 mgs ivp x1   f/up final reading Briefly Mr nevaeh admiited for UTI and currently on unasyn , hospital course has been challenged by delritory and seen by psychiatry for mangement of delirium This evening Dr. Stafford from psychiary noted patient with labored breathing.   Writer evaluated patient and noted scattered rhonchi and wife acknoweldge as well labored breathing   ROS limited due to delirium  Vital Signs Last 24 Hrs  T(C): 36.2 (21 Feb 2023 16:22), Max: 36.8 (21 Feb 2023 11:31)  T(F): 97.2 (21 Feb 2023 16:22), Max: 98.3 (21 Feb 2023 11:31)  HR: 72 (21 Feb 2023 17:50) (62 - 83)  BP: 130/80 (21 Feb 2023 17:50) (130/80 - 163/88)  BP(mean): --  RR: 18 (21 Feb 2023 17:50) (18 - 18)  SpO2: 100% (21 Feb 2023 17:50) (90% - 100%)    Parameters below as of 21 Feb 2023 17:50  Patient On (Oxygen Delivery Method): nasal cannula  O2 Flow (L/min): 2  Heent :acceptable  Lungs : with poor inspiratory effort but scattered rhonchi  Gu: ye insitu with dark hematuria  ext : no edema                        10.3   9.98  )-----------( 314      ( 20 Feb 2023 07:13 )             33.3   `cxr pending 2/21 but from my view it appeared increased pulm congestion in  comparison to 2/12  a/p labored breathing   stat cxr   probnp  lasix 40 mgs ivp x1   f/up final reading  strict i/o

## 2023-02-21 NOTE — BH CONSULTATION LIAISON PROGRESS NOTE - NSBHASSESSMENTFT_PSY_ALL_CORE
89M,  for 42yrs, domiciled with wife (Lelia [phone]) and part-time HHA 2x weekly from 12pm-4pm, retired manager of a parking garage, with PPHx dementia dx in 2019 on Donepezil, no previous SA or psych admissions, no psychiatrist, no active substance abuse, with PMHx significant for BPH, afib w/ PPM, bilateral hip arthroplasty (R 12/2022, L hemiarthroplasty 1/5/2023), DVT on Eliquis, subdural hematoma s/p craniotomy, admitted for urosepsis, and psychiatry consulted for medication recommendations for agitation.    Patient interviewed at bedside. Patient off restraints and AxOx1 (oriented to self, disoriented to place and time). Patient denies depressed emotions and difficulty with appetite and sleep. Was unable to understand questions related to SI. Per nurse, patient was restless and agitated overnight. Patient was given Seroquel 25mg at 0400 and now showing improvement with agitation and resting.     Plan: continue medications as recommended below  -Seroquel 50mg at 12pm, Seroquel 50mg at 6pm, and Seroquel 25mg q 6 hours PRN for agitation, hold for sedation and monitor EKG for QTc<500.  -Trazodone 50mg PO HS standing for insomnia

## 2023-02-22 DIAGNOSIS — J96.01 ACUTE RESPIRATORY FAILURE WITH HYPOXIA: ICD-10-CM

## 2023-02-22 LAB
ANION GAP SERPL CALC-SCNC: 10 MMOL/L — SIGNIFICANT CHANGE UP (ref 5–17)
BUN SERPL-MCNC: 14 MG/DL — SIGNIFICANT CHANGE UP (ref 7–23)
CALCIUM SERPL-MCNC: 8.9 MG/DL — SIGNIFICANT CHANGE UP (ref 8.4–10.5)
CHLORIDE SERPL-SCNC: 103 MMOL/L — SIGNIFICANT CHANGE UP (ref 96–108)
CO2 SERPL-SCNC: 29 MMOL/L — SIGNIFICANT CHANGE UP (ref 22–31)
CREAT SERPL-MCNC: 1.08 MG/DL — SIGNIFICANT CHANGE UP (ref 0.5–1.3)
EGFR: 64 ML/MIN/1.73M2 — SIGNIFICANT CHANGE UP
GLUCOSE SERPL-MCNC: 106 MG/DL — HIGH (ref 70–99)
HCT VFR BLD CALC: 32.3 % — LOW (ref 39–50)
HGB BLD-MCNC: 9.9 G/DL — LOW (ref 13–17)
MCHC RBC-ENTMCNC: 28.2 PG — SIGNIFICANT CHANGE UP (ref 27–34)
MCHC RBC-ENTMCNC: 30.7 GM/DL — LOW (ref 32–36)
MCV RBC AUTO: 92 FL — SIGNIFICANT CHANGE UP (ref 80–100)
NRBC # BLD: 0 /100 WBCS — SIGNIFICANT CHANGE UP (ref 0–0)
NT-PROBNP SERPL-SCNC: 2393 PG/ML — HIGH (ref 0–300)
PLATELET # BLD AUTO: 286 K/UL — SIGNIFICANT CHANGE UP (ref 150–400)
POTASSIUM SERPL-MCNC: 3.8 MMOL/L — SIGNIFICANT CHANGE UP (ref 3.5–5.3)
POTASSIUM SERPL-SCNC: 3.8 MMOL/L — SIGNIFICANT CHANGE UP (ref 3.5–5.3)
RBC # BLD: 3.51 M/UL — LOW (ref 4.2–5.8)
RBC # FLD: 16.3 % — HIGH (ref 10.3–14.5)
SODIUM SERPL-SCNC: 142 MMOL/L — SIGNIFICANT CHANGE UP (ref 135–145)
WBC # BLD: 5.1 K/UL — SIGNIFICANT CHANGE UP (ref 3.8–10.5)
WBC # FLD AUTO: 5.1 K/UL — SIGNIFICANT CHANGE UP (ref 3.8–10.5)

## 2023-02-22 PROCEDURE — 99232 SBSQ HOSP IP/OBS MODERATE 35: CPT

## 2023-02-22 PROCEDURE — 93010 ELECTROCARDIOGRAM REPORT: CPT

## 2023-02-22 RX ORDER — HALOPERIDOL DECANOATE 100 MG/ML
0.5 INJECTION INTRAMUSCULAR ONCE
Refills: 0 | Status: COMPLETED | OUTPATIENT
Start: 2023-02-22 | End: 2023-02-22

## 2023-02-22 RX ORDER — FUROSEMIDE 40 MG
40 TABLET ORAL ONCE
Refills: 0 | Status: COMPLETED | OUTPATIENT
Start: 2023-02-22 | End: 2023-02-22

## 2023-02-22 RX ADMIN — Medication 650 MILLIGRAM(S): at 22:09

## 2023-02-22 RX ADMIN — HALOPERIDOL DECANOATE 0.5 MILLIGRAM(S): 100 INJECTION INTRAMUSCULAR at 22:57

## 2023-02-22 RX ADMIN — CHLORHEXIDINE GLUCONATE 1 APPLICATION(S): 213 SOLUTION TOPICAL at 11:48

## 2023-02-22 RX ADMIN — POLYETHYLENE GLYCOL 3350 17 GRAM(S): 17 POWDER, FOR SOLUTION ORAL at 11:50

## 2023-02-22 RX ADMIN — Medication 300 MILLIGRAM(S): at 11:48

## 2023-02-22 RX ADMIN — Medication 3 MILLIGRAM(S): at 20:45

## 2023-02-22 RX ADMIN — Medication 40 MILLIGRAM(S): at 16:57

## 2023-02-22 RX ADMIN — FINASTERIDE 5 MILLIGRAM(S): 5 TABLET, FILM COATED ORAL at 11:50

## 2023-02-22 RX ADMIN — Medication 325 MILLIGRAM(S): at 11:48

## 2023-02-22 RX ADMIN — APIXABAN 5 MILLIGRAM(S): 2.5 TABLET, FILM COATED ORAL at 17:04

## 2023-02-22 RX ADMIN — Medication 650 MILLIGRAM(S): at 22:39

## 2023-02-22 RX ADMIN — QUETIAPINE FUMARATE 50 MILLIGRAM(S): 200 TABLET, FILM COATED ORAL at 11:49

## 2023-02-22 RX ADMIN — Medication 50 MILLIGRAM(S): at 21:01

## 2023-02-22 RX ADMIN — TAMSULOSIN HYDROCHLORIDE 0.4 MILLIGRAM(S): 0.4 CAPSULE ORAL at 21:00

## 2023-02-22 RX ADMIN — AMIODARONE HYDROCHLORIDE 200 MILLIGRAM(S): 400 TABLET ORAL at 05:42

## 2023-02-22 RX ADMIN — Medication 1 TABLET(S): at 11:48

## 2023-02-22 RX ADMIN — QUETIAPINE FUMARATE 25 MILLIGRAM(S): 200 TABLET, FILM COATED ORAL at 20:45

## 2023-02-22 RX ADMIN — SENNA PLUS 2 TABLET(S): 8.6 TABLET ORAL at 21:01

## 2023-02-22 RX ADMIN — Medication 20.83 GRAM(S): at 16:57

## 2023-02-22 RX ADMIN — APIXABAN 5 MILLIGRAM(S): 2.5 TABLET, FILM COATED ORAL at 05:42

## 2023-02-22 RX ADMIN — QUETIAPINE FUMARATE 50 MILLIGRAM(S): 200 TABLET, FILM COATED ORAL at 18:12

## 2023-02-22 NOTE — PROGRESS NOTE ADULT - SUBJECTIVE AND OBJECTIVE BOX
[facility] Division of Hospital Medicine  Isaak Tam  Pager (JOHN-F, 8A-5P): [phone]  Other Times:  [phone]      SUBJECTIVE / OVERNIGHT EVENTS:  Yesterday evening more SOB, CXR with worsening effusions, s/p IV lasix with improvement  Agitated overnight  asleep this am  unable to participate in ros  ADDITIONAL REVIEW OF SYSTEMS:    MEDICATIONS  (STANDING):  allopurinol 300 milliGRAM(s) Oral daily  aMIOdarone    Tablet 200 milliGRAM(s) Oral daily  ampicillin  IVPB 12 Gram(s) IV Intermittent every 24 hours  apixaban 5 milliGRAM(s) Oral every 12 hours  chlorhexidine 2% Cloths 1 Application(s) Topical daily  ferrous    sulfate 325 milliGRAM(s) Oral daily  finasteride 5 milliGRAM(s) Oral daily  lactobacillus acidophilus 1 Tablet(s) Oral daily  polyethylene glycol 3350 17 Gram(s) Oral daily  QUEtiapine 50 milliGRAM(s) Oral <User Schedule>  senna 2 Tablet(s) Oral at bedtime  tamsulosin 0.4 milliGRAM(s) Oral at bedtime  traZODone 50 milliGRAM(s) Oral at bedtime    MEDICATIONS  (PRN):  acetaminophen     Tablet .. 650 milliGRAM(s) Oral every 6 hours PRN Temp greater or equal to 38C (100.4F), Mild Pain (1 - 3)  melatonin 3 milliGRAM(s) Oral at bedtime PRN Insomnia  ondansetron Injectable 4 milliGRAM(s) IV Push every 8 hours PRN Nausea and/or Vomiting  QUEtiapine 25 milliGRAM(s) Oral every 6 hours PRN agitation      I&O's Summary    21 Feb 2023 07:01  -  22 Feb 2023 07:00  --------------------------------------------------------  IN: 639.6 mL / OUT: 3250 mL / NET: -2610.4 mL    22 Feb 2023 07:01  -  22 Feb 2023 15:21  --------------------------------------------------------  IN: 180 mL / OUT: 400 mL / NET: -220 mL        PHYSICAL EXAM:  Vital Signs Last 24 Hrs  T(C): 36.4 (22 Feb 2023 12:27), Max: 36.6 (21 Feb 2023 23:58)  T(F): 97.5 (22 Feb 2023 12:27), Max: 97.9 (22 Feb 2023 08:46)  HR: 64 (22 Feb 2023 12:27) (61 - 79)  BP: 133/73 (22 Feb 2023 12:27) (130/80 - 153/83)  BP(mean): --  RR: 18 (22 Feb 2023 12:27) (18 - 18)  SpO2: 98% (22 Feb 2023 12:27) (96% - 100%)    Parameters below as of 22 Feb 2023 12:27  Patient On (Oxygen Delivery Method): nasal cannula  O2 Flow (L/min): 2    CONSTITUTIONAL: NAD, well-developed  EYES: PERRLA; conjunctiva and sclera clear  RESPIRATORY: Normal respiratory effort; lung bases decreased breath sounds  CARDIOVASCULAR: Regular rate and rhythm, normal S1 and S2, no murmur/rub/gallop; No lower extremity edema; Peripheral pulses are 2+ bilaterally  ABDOMEN: Nontender to palpation, normoactive bowel sounds, Jamil in place with yellow urine  MUSCULOSKELETAL: no clubbing or cyanosis of digits; no joint swelling or tenderness to palpation  PSYCH: A+O X1, responding with 1 word answers  NEUROLOGY: CN 2-12 are intact and symmetric; no gross sensory deficits   SKIN: No rashes; no palpable lesions    LABS:                        9.9    5.10  )-----------( 286      ( 22 Feb 2023 06:59 )             32.3     02-22    142  |  103  |  14  ----------------------------<  106<H>  3.8   |  29  |  1.08    Ca    8.9      22 Feb 2023 06:56

## 2023-02-22 NOTE — PROGRESS NOTE ADULT - PROBLEM SELECTOR PLAN 5
MOLST form in chart noted. S/p recent hip fractures and repair. Significant fall hx  -DNR/DNI  -Wife involved with care  -Falls and aspiration precautions  -Cont. Trazodone and Seroquel(currently 50qam, 50 qhs) with hold parameters, seroquel 25mg q6 prn, repeat dosing as needed. May need to increase night time medications if continues to be agitated at night  - psych following appreciate recs  - A + O x 1 at baseline.

## 2023-02-23 DIAGNOSIS — F03.90 UNSPECIFIED DEMENTIA WITHOUT BEHAVIORAL DISTURBANCE: ICD-10-CM

## 2023-02-23 LAB
ANION GAP SERPL CALC-SCNC: 11 MMOL/L — SIGNIFICANT CHANGE UP (ref 5–17)
BUN SERPL-MCNC: 13 MG/DL — SIGNIFICANT CHANGE UP (ref 7–23)
CALCIUM SERPL-MCNC: 8.7 MG/DL — SIGNIFICANT CHANGE UP (ref 8.4–10.5)
CHLORIDE SERPL-SCNC: 101 MMOL/L — SIGNIFICANT CHANGE UP (ref 96–108)
CO2 SERPL-SCNC: 29 MMOL/L — SIGNIFICANT CHANGE UP (ref 22–31)
CREAT SERPL-MCNC: 1.17 MG/DL — SIGNIFICANT CHANGE UP (ref 0.5–1.3)
EGFR: 58 ML/MIN/1.73M2 — LOW
GLUCOSE SERPL-MCNC: 113 MG/DL — HIGH (ref 70–99)
HCT VFR BLD CALC: 31.1 % — LOW (ref 39–50)
HGB BLD-MCNC: 9.7 G/DL — LOW (ref 13–17)
MCHC RBC-ENTMCNC: 28.9 PG — SIGNIFICANT CHANGE UP (ref 27–34)
MCHC RBC-ENTMCNC: 31.2 GM/DL — LOW (ref 32–36)
MCV RBC AUTO: 92.6 FL — SIGNIFICANT CHANGE UP (ref 80–100)
NRBC # BLD: 0 /100 WBCS — SIGNIFICANT CHANGE UP (ref 0–0)
PLATELET # BLD AUTO: 254 K/UL — SIGNIFICANT CHANGE UP (ref 150–400)
POTASSIUM SERPL-MCNC: 3.3 MMOL/L — LOW (ref 3.5–5.3)
POTASSIUM SERPL-SCNC: 3.3 MMOL/L — LOW (ref 3.5–5.3)
RBC # BLD: 3.36 M/UL — LOW (ref 4.2–5.8)
RBC # FLD: 16.2 % — HIGH (ref 10.3–14.5)
SODIUM SERPL-SCNC: 141 MMOL/L — SIGNIFICANT CHANGE UP (ref 135–145)
WBC # BLD: 5.81 K/UL — SIGNIFICANT CHANGE UP (ref 3.8–10.5)
WBC # FLD AUTO: 5.81 K/UL — SIGNIFICANT CHANGE UP (ref 3.8–10.5)

## 2023-02-23 PROCEDURE — 99233 SBSQ HOSP IP/OBS HIGH 50: CPT

## 2023-02-23 PROCEDURE — 99232 SBSQ HOSP IP/OBS MODERATE 35: CPT

## 2023-02-23 PROCEDURE — 93010 ELECTROCARDIOGRAM REPORT: CPT | Mod: 76

## 2023-02-23 RX ORDER — POTASSIUM CHLORIDE 20 MEQ
40 PACKET (EA) ORAL ONCE
Refills: 0 | Status: COMPLETED | OUTPATIENT
Start: 2023-02-23 | End: 2023-02-23

## 2023-02-23 RX ORDER — QUETIAPINE FUMARATE 200 MG/1
50 TABLET, FILM COATED ORAL
Refills: 0 | Status: DISCONTINUED | OUTPATIENT
Start: 2023-02-23 | End: 2023-02-24

## 2023-02-23 RX ORDER — QUETIAPINE FUMARATE 200 MG/1
25 TABLET, FILM COATED ORAL EVERY 6 HOURS
Refills: 0 | Status: DISCONTINUED | OUTPATIENT
Start: 2023-02-23 | End: 2023-03-04

## 2023-02-23 RX ORDER — POTASSIUM CHLORIDE 20 MEQ
20 PACKET (EA) ORAL
Refills: 0 | Status: DISCONTINUED | OUTPATIENT
Start: 2023-02-23 | End: 2023-02-23

## 2023-02-23 RX ADMIN — APIXABAN 5 MILLIGRAM(S): 2.5 TABLET, FILM COATED ORAL at 05:51

## 2023-02-23 RX ADMIN — QUETIAPINE FUMARATE 25 MILLIGRAM(S): 200 TABLET, FILM COATED ORAL at 22:59

## 2023-02-23 RX ADMIN — QUETIAPINE FUMARATE 50 MILLIGRAM(S): 200 TABLET, FILM COATED ORAL at 16:37

## 2023-02-23 RX ADMIN — Medication 0.25 MILLIGRAM(S): at 17:19

## 2023-02-23 RX ADMIN — SENNA PLUS 2 TABLET(S): 8.6 TABLET ORAL at 22:58

## 2023-02-23 RX ADMIN — Medication 1 TABLET(S): at 12:06

## 2023-02-23 RX ADMIN — Medication 20.83 GRAM(S): at 16:37

## 2023-02-23 RX ADMIN — FINASTERIDE 5 MILLIGRAM(S): 5 TABLET, FILM COATED ORAL at 12:07

## 2023-02-23 RX ADMIN — Medication 650 MILLIGRAM(S): at 06:22

## 2023-02-23 RX ADMIN — Medication 650 MILLIGRAM(S): at 12:06

## 2023-02-23 RX ADMIN — Medication 325 MILLIGRAM(S): at 12:07

## 2023-02-23 RX ADMIN — POLYETHYLENE GLYCOL 3350 17 GRAM(S): 17 POWDER, FOR SOLUTION ORAL at 12:07

## 2023-02-23 RX ADMIN — CHLORHEXIDINE GLUCONATE 1 APPLICATION(S): 213 SOLUTION TOPICAL at 12:07

## 2023-02-23 RX ADMIN — QUETIAPINE FUMARATE 50 MILLIGRAM(S): 200 TABLET, FILM COATED ORAL at 12:06

## 2023-02-23 RX ADMIN — Medication 650 MILLIGRAM(S): at 13:06

## 2023-02-23 RX ADMIN — Medication 50 MILLIGRAM(S): at 22:57

## 2023-02-23 RX ADMIN — APIXABAN 5 MILLIGRAM(S): 2.5 TABLET, FILM COATED ORAL at 16:37

## 2023-02-23 RX ADMIN — Medication 300 MILLIGRAM(S): at 12:06

## 2023-02-23 RX ADMIN — QUETIAPINE FUMARATE 25 MILLIGRAM(S): 200 TABLET, FILM COATED ORAL at 07:50

## 2023-02-23 RX ADMIN — TAMSULOSIN HYDROCHLORIDE 0.4 MILLIGRAM(S): 0.4 CAPSULE ORAL at 22:57

## 2023-02-23 RX ADMIN — Medication 650 MILLIGRAM(S): at 05:52

## 2023-02-23 RX ADMIN — AMIODARONE HYDROCHLORIDE 200 MILLIGRAM(S): 400 TABLET ORAL at 05:51

## 2023-02-23 RX ADMIN — Medication 40 MILLIEQUIVALENT(S): at 09:29

## 2023-02-23 NOTE — BH CONSULTATION LIAISON PROGRESS NOTE - NSBHASSESSMENTFT_PSY_ALL_CORE
89M,  for 42yrs, domiciled with wife (Lelia [phone]) and part-time HHA 2x weekly from 12pm-4pm, retired manager of a parking garage, with PPHx dementia dx in 2019 on Donepezil, no previous SA or psych admissions, no psychiatrist, no active substance abuse, with PMHx significant for BPH, afib w/ PPM, bilateral hip arthroplasty (R 12/2022, L hemiarthroplasty 1/5/2023), DVT on Eliquis, subdural hematoma s/p craniotomy, admitted for urosepsis, and psychiatry consulted for medication recommendations for agitation.    Patient interviewed at bedside. Patient off restraints and AxOx1 (oriented to self, disoriented to place and time). Patient denies depressed emotions and difficulty with appetite and sleep. Was unable to understand questions related to SI. Per nurse, patient was restless and agitated overnight. Patient was given Seroquel 25mg earlier this am and now showing improvement with agitation and resting.     Plan: continue medications as recommended below  -may increase:  Seroquel 50mg at 12pm, Seroquel 50mg at 6pm but consider making this TID    and Seroquel 25mg q 6 hours PRN for agitation, hold for sedation and monitor EKG for QTc<500.  -Trazodone 50mg PO HS standing for insomnia

## 2023-02-23 NOTE — PROGRESS NOTE ADULT - PROBLEM SELECTOR PLAN 5
MOLST form in chart noted. S/p recent hip fractures and repair. Significant fall hx  -DNR/DNI  -Wife involved with care  -Falls and aspiration precautions  -Cont. Trazodone and Seroquel(currently 50qam, 50 qhs) with hold parameters, seroquel 25mg q6 prn,   -QTC 500s but has a pacemaker. Will increase seroquel 50 to TID med if QTC stable/improving on ekg  - psych following appreciate recs  - A + O x 1 at baseline.

## 2023-02-23 NOTE — CHART NOTE - NSCHARTNOTEFT_GEN_A_CORE
RN reported severe agitation and restlessness ; found patient screaming and trying to climb out of bed, he received seroquel 50 mgs and as per nursing it is not working. As per nursing staff pt on a daily basis becomes very agitated and restless especially in the evening . Psych on board and recommended to continue current dose and can consider increasing Seroquel to tid  Noted patient has been receiving periodic doses pf haldol and ineffective . An EKG was done this evening; patient has a PPM A/V pacer; uncertain if its demand but with sensing noted at end of qrs. As per EP , to accurately measure QTC , must measure the JT interval, EKG show qtc 489 but JT interval 347. Patient on seroquel and based on current JT , can continue current dose of Seroquel. Will need PPM interrogation to ensure PPM working effectively . D/W Dr Gamble RN reported severe agitation and restlessness ; found patient screaming and trying to climb out of bed, he received seroquel 50 mgs and as per nursing it is not working. Ativan 0.25 mgs was given. As per nursing staff pt on a daily basis becomes very agitated and restless especially in the evening . Psych on board and recommended to continue current dose and can consider increasing Seroquel to tid  Noted patient has been receiving periodic doses pf haldol and ineffective . Patient on seroquel but qtc was initally elevated >500 but as per EP an accurate JT has to be measured with patient with A/V PPM. EKG done yesterday show qtc >500 An EKG was done this evening; EKG show qtc 489 but JT interval 347. Patient on seroquel and based on current JT , can continue current dose of Seroquel. Will need PPM interrogation to ensure PPM working effectively . D/W Dr Gamble

## 2023-02-23 NOTE — PROGRESS NOTE ADULT - SUBJECTIVE AND OBJECTIVE BOX
[facility] Division of Hospital Medicine  Isaak Tam  Pager (JOHN-MARTI, 8A-5P): [phone]  Other Times:  [phone]      SUBJECTIVE / OVERNIGHT EVENTS:  agitated overnight  calm this am  AO*1, otherwise not responding to questions    ADDITIONAL REVIEW OF SYSTEMS:    MEDICATIONS  (STANDING):  allopurinol 300 milliGRAM(s) Oral daily  aMIOdarone    Tablet 200 milliGRAM(s) Oral daily  ampicillin  IVPB 12 Gram(s) IV Intermittent every 24 hours  apixaban 5 milliGRAM(s) Oral every 12 hours  chlorhexidine 2% Cloths 1 Application(s) Topical daily  ferrous    sulfate 325 milliGRAM(s) Oral daily  finasteride 5 milliGRAM(s) Oral daily  lactobacillus acidophilus 1 Tablet(s) Oral daily  polyethylene glycol 3350 17 Gram(s) Oral daily  QUEtiapine 50 milliGRAM(s) Oral <User Schedule>  senna 2 Tablet(s) Oral at bedtime  tamsulosin 0.4 milliGRAM(s) Oral at bedtime  traZODone 50 milliGRAM(s) Oral at bedtime    MEDICATIONS  (PRN):  acetaminophen     Tablet .. 650 milliGRAM(s) Oral every 6 hours PRN Temp greater or equal to 38C (100.4F), Mild Pain (1 - 3)  melatonin 3 milliGRAM(s) Oral at bedtime PRN Insomnia  ondansetron Injectable 4 milliGRAM(s) IV Push every 8 hours PRN Nausea and/or Vomiting  QUEtiapine 25 milliGRAM(s) Oral every 6 hours PRN agitation      I&O's Summary    22 Feb 2023 07:01  -  23 Feb 2023 07:00  --------------------------------------------------------  IN: 420 mL / OUT: 2200 mL / NET: -1780 mL    23 Feb 2023 07:01  -  23 Feb 2023 16:11  --------------------------------------------------------  IN: 500 mL / OUT: 0 mL / NET: 500 mL        PHYSICAL EXAM:  Vital Signs Last 24 Hrs  T(C): 36.8 (23 Feb 2023 10:26), Max: 36.8 (23 Feb 2023 10:26)  T(F): 98.2 (23 Feb 2023 10:26), Max: 98.2 (23 Feb 2023 10:26)  HR: 75 (23 Feb 2023 10:26) (60 - 77)  BP: 122/70 (23 Feb 2023 10:26) (122/70 - 157/78)  BP(mean): --  RR: 19 (23 Feb 2023 10:26) (18 - 19)  SpO2: 98% (23 Feb 2023 10:26) (98% - 99%)    Parameters below as of 23 Feb 2023 10:26  Patient On (Oxygen Delivery Method): nasal cannula  O2 Flow (L/min): 2    CONSTITUTIONAL: NAD, well-developed  EYES: PERRLA; conjunctiva and sclera clear  RESPIRATORY: Normal respiratory effort; lung bases decreased breath sounds  CARDIOVASCULAR: Regular rate and rhythm, normal S1 and S2, no murmur/rub/gallop; No lower extremity edema; Peripheral pulses are 2+ bilaterally  ABDOMEN: Nontender to palpation, normoactive bowel sounds, Jamil in place with yellow urine  MUSCULOSKELETAL: no clubbing or cyanosis of digits; no joint swelling or tenderness to palpation  PSYCH: A+O X1, responding with 1 word answers  NEUROLOGY: CN 2-12 are intact and symmetric; no gross sensory deficits   SKIN: No rashes; no palpable lesions    LABS:                        9.7    5.81  )-----------( 254      ( 23 Feb 2023 06:54 )             31.1     02-23    141  |  101  |  13  ----------------------------<  113<H>  3.3<L>   |  29  |  1.17    Ca    8.7      23 Feb 2023 06:54

## 2023-02-23 NOTE — BH CONSULTATION LIAISON PROGRESS NOTE - NSBHFUPINTERVALHXFT_PSY_A_CORE
Patient interviewed at bedside. Patient with restraints and AxOx1 (oriented to self, disoriented to place and time).    Patient denies depressed emotions and difficulty with appetite and sleep. Was unable to understand questions related to SI. Per nurse, patient was restless and agitated overnight. Patient was given Seroquel 25mg  earlier this am but uncomfortable in bed with restraints.

## 2023-02-24 LAB
ANION GAP SERPL CALC-SCNC: 7 MMOL/L — SIGNIFICANT CHANGE UP (ref 5–17)
BUN SERPL-MCNC: 12 MG/DL — SIGNIFICANT CHANGE UP (ref 7–23)
CALCIUM SERPL-MCNC: 8.7 MG/DL — SIGNIFICANT CHANGE UP (ref 8.4–10.5)
CHLORIDE SERPL-SCNC: 104 MMOL/L — SIGNIFICANT CHANGE UP (ref 96–108)
CO2 SERPL-SCNC: 32 MMOL/L — HIGH (ref 22–31)
CREAT SERPL-MCNC: 1.1 MG/DL — SIGNIFICANT CHANGE UP (ref 0.5–1.3)
EGFR: 63 ML/MIN/1.73M2 — SIGNIFICANT CHANGE UP
GLUCOSE SERPL-MCNC: 101 MG/DL — HIGH (ref 70–99)
POTASSIUM SERPL-MCNC: 3.7 MMOL/L — SIGNIFICANT CHANGE UP (ref 3.5–5.3)
POTASSIUM SERPL-SCNC: 3.7 MMOL/L — SIGNIFICANT CHANGE UP (ref 3.5–5.3)
SODIUM SERPL-SCNC: 143 MMOL/L — SIGNIFICANT CHANGE UP (ref 135–145)

## 2023-02-24 PROCEDURE — 99232 SBSQ HOSP IP/OBS MODERATE 35: CPT

## 2023-02-24 RX ORDER — QUETIAPINE FUMARATE 200 MG/1
50 TABLET, FILM COATED ORAL EVERY 8 HOURS
Refills: 0 | Status: DISCONTINUED | OUTPATIENT
Start: 2023-02-24 | End: 2023-02-25

## 2023-02-24 RX ORDER — CLONAZEPAM 1 MG
0.25 TABLET ORAL
Refills: 0 | Status: DISCONTINUED | OUTPATIENT
Start: 2023-02-24 | End: 2023-03-03

## 2023-02-24 RX ADMIN — QUETIAPINE FUMARATE 25 MILLIGRAM(S): 200 TABLET, FILM COATED ORAL at 08:16

## 2023-02-24 RX ADMIN — TAMSULOSIN HYDROCHLORIDE 0.4 MILLIGRAM(S): 0.4 CAPSULE ORAL at 22:56

## 2023-02-24 RX ADMIN — APIXABAN 5 MILLIGRAM(S): 2.5 TABLET, FILM COATED ORAL at 06:12

## 2023-02-24 RX ADMIN — APIXABAN 5 MILLIGRAM(S): 2.5 TABLET, FILM COATED ORAL at 17:55

## 2023-02-24 RX ADMIN — Medication 20.83 GRAM(S): at 15:48

## 2023-02-24 RX ADMIN — Medication 325 MILLIGRAM(S): at 12:37

## 2023-02-24 RX ADMIN — QUETIAPINE FUMARATE 25 MILLIGRAM(S): 200 TABLET, FILM COATED ORAL at 18:25

## 2023-02-24 RX ADMIN — QUETIAPINE FUMARATE 50 MILLIGRAM(S): 200 TABLET, FILM COATED ORAL at 13:18

## 2023-02-24 RX ADMIN — POLYETHYLENE GLYCOL 3350 17 GRAM(S): 17 POWDER, FOR SOLUTION ORAL at 12:37

## 2023-02-24 RX ADMIN — Medication 0.25 MILLIGRAM(S): at 17:52

## 2023-02-24 RX ADMIN — Medication 650 MILLIGRAM(S): at 15:41

## 2023-02-24 RX ADMIN — FINASTERIDE 5 MILLIGRAM(S): 5 TABLET, FILM COATED ORAL at 12:37

## 2023-02-24 RX ADMIN — Medication 650 MILLIGRAM(S): at 14:54

## 2023-02-24 RX ADMIN — QUETIAPINE FUMARATE 50 MILLIGRAM(S): 200 TABLET, FILM COATED ORAL at 22:56

## 2023-02-24 RX ADMIN — Medication 300 MILLIGRAM(S): at 12:36

## 2023-02-24 RX ADMIN — Medication 50 MILLIGRAM(S): at 22:56

## 2023-02-24 RX ADMIN — AMIODARONE HYDROCHLORIDE 200 MILLIGRAM(S): 400 TABLET ORAL at 06:11

## 2023-02-24 RX ADMIN — Medication 1 TABLET(S): at 12:36

## 2023-02-24 RX ADMIN — CHLORHEXIDINE GLUCONATE 1 APPLICATION(S): 213 SOLUTION TOPICAL at 12:38

## 2023-02-24 RX ADMIN — SENNA PLUS 2 TABLET(S): 8.6 TABLET ORAL at 22:55

## 2023-02-24 NOTE — PROGRESS NOTE ADULT - PROBLEM SELECTOR PLAN 5
Known 21:50 MOLST form in chart noted. S/p recent hip fractures and repair. Significant fall hx  -DNR/DNI  -Wife involved with care  -Falls and aspiration precautions  -Cont. Trazodone  -Increasing seroquel to 50 TID, continue 25mg q6 prn  -psych following appreciate recs  -A + O x 1 at baseline.

## 2023-02-24 NOTE — PROGRESS NOTE ADULT - TIME BILLING
reviewing emr, labs, imaging, coordination of care, discussion with patient, documentation
reviewing emr, labs, imaging, coordination of care, discussion with patient, documentation
reviewing emr, labs, imaging, coordination of care, exam, documentation, discussion with family
reviewing emr, labs, imaging, coordination of care, discussion with family bedside, documentation
review emr, labs, imaging, coordination of care, documentation, attempted to call family, no response
reviewing emr, labs, imaging, coordination of care, interviewing patient, documentation
reviewing emr, labs, imaging, coordination of care, documentation
reviewing emr, labs, imaging, coordination of care, discussion with patient, documentation
reviewing emr, labs, imaging, coordination of care, discussion with family, documentation

## 2023-02-24 NOTE — ED PROVIDER NOTE - WR INTERPRETED BY 1
Final Anesthesia Post-op Assessment    Patient: Parth Bernard  Procedure(s) Performed: COLONOSCOPY  Anesthesia type: MAC    Vitals Value Taken Time   Temp 36.7 °C (98 °F) 02/24/23 1340   Pulse 56 02/24/23 1340   Resp 16 02/24/23 1340   SpO2 97 % 02/24/23 1340   /68 02/24/23 1340         Patient Location: Phase II  Post-op Vital Signs:stable  Level of Consciousness: awake and alert  Respiratory Status: spontaneous ventilation  Cardiovascular stable  Hydration: euvolemic  Pain Management: adequately controlled  Handoff: Handoff to receiving clinician was performed and questions were answered  Vomiting: none  Nausea: None  Airway Patency:patent  Post-op Assessment: no complications, patient tolerated procedure well with no complications, dentition within defined limits, moving all extremities and No Corneal Abrasion      No notable events documented.   
Elbert Bunch

## 2023-02-24 NOTE — PROGRESS NOTE ADULT - SUBJECTIVE AND OBJECTIVE BOX
[facility] Division of Hospital Medicine  Isaak Tam  Pager (JOHN-MARTI, 8A-5P): [phone]  Other Times:  [phone]      SUBJECTIVE / OVERNIGHT EVENTS:  Agitated last night requiring ativan,  This am calm, speaking in 1 word response, attempting to take of his gown  Unable to participate in ros    ADDITIONAL REVIEW OF SYSTEMS:    MEDICATIONS  (STANDING):  allopurinol 300 milliGRAM(s) Oral daily  aMIOdarone    Tablet 200 milliGRAM(s) Oral daily  ampicillin  IVPB 12 Gram(s) IV Intermittent every 24 hours  apixaban 5 milliGRAM(s) Oral every 12 hours  chlorhexidine 2% Cloths 1 Application(s) Topical daily  ferrous    sulfate 325 milliGRAM(s) Oral daily  finasteride 5 milliGRAM(s) Oral daily  lactobacillus acidophilus 1 Tablet(s) Oral daily  polyethylene glycol 3350 17 Gram(s) Oral daily  QUEtiapine 50 milliGRAM(s) Oral every 8 hours  senna 2 Tablet(s) Oral at bedtime  tamsulosin 0.4 milliGRAM(s) Oral at bedtime  traZODone 50 milliGRAM(s) Oral at bedtime    MEDICATIONS  (PRN):  acetaminophen     Tablet .. 650 milliGRAM(s) Oral every 6 hours PRN Temp greater or equal to 38C (100.4F), Mild Pain (1 - 3)  melatonin 3 milliGRAM(s) Oral at bedtime PRN Insomnia  ondansetron Injectable 4 milliGRAM(s) IV Push every 8 hours PRN Nausea and/or Vomiting  QUEtiapine 25 milliGRAM(s) Oral every 6 hours PRN agitation      I&O's Summary    23 Feb 2023 07:01  -  24 Feb 2023 07:00  --------------------------------------------------------  IN: 746.6 mL / OUT: 500 mL / NET: 246.6 mL        PHYSICAL EXAM:  Vital Signs Last 24 Hrs  T(C): 36.7 (24 Feb 2023 08:52), Max: 36.9 (23 Feb 2023 16:20)  T(F): 98 (24 Feb 2023 08:52), Max: 98.4 (23 Feb 2023 16:20)  HR: 70 (24 Feb 2023 08:52) (60 - 80)  BP: 151/80 (24 Feb 2023 08:52) (126/67 - 167/79)  BP(mean): --  RR: 18 (24 Feb 2023 08:52) (18 - 18)  SpO2: 96% (24 Feb 2023 08:52) (93% - 100%)    Parameters below as of 24 Feb 2023 08:52  Patient On (Oxygen Delivery Method): nasal cannula  O2 Flow (L/min): 2    CONSTITUTIONAL: NAD, well-developed  EYES: PERRLA; conjunctiva and sclera clear  RESPIRATORY: Normal respiratory effort; lung bases decreased breath sounds  CARDIOVASCULAR: Regular rate and rhythm, normal S1 and S2, no murmur/rub/gallop; No lower extremity edema; Peripheral pulses are 2+ bilaterally  ABDOMEN: Nontender to palpation, normoactive bowel sounds, Jamil in place with yellow urine  MUSCULOSKELETAL: no clubbing or cyanosis of digits; no joint swelling or tenderness to palpation  PSYCH: A+O X1, responding with 1 word answers  NEUROLOGY: CN 2-12 are intact and symmetric; no gross sensory deficits   SKIN: No rashes; no palpable lesions      LABS:                        9.7    5.81  )-----------( 254      ( 23 Feb 2023 06:54 )             31.1     02-24    143  |  104  |  12  ----------------------------<  101<H>  3.7   |  32<H>  |  1.10    Ca    8.7      24 Feb 2023 07:07

## 2023-02-24 NOTE — BH CONSULTATION LIAISON PROGRESS NOTE - NSBHASSESSMENTFT_PSY_ALL_CORE
89M,  for 42yrs, domiciled with wife (Lelia [phone]) and part-time HHA 2x weekly from 12pm-4pm, retired manager of a parking garage, with PPHx dementia dx in 2019 on Donepezil, no previous SA or psych admissions, no psychiatrist, no active substance abuse, with PMHx significant for BPH, afib w/ PPM, bilateral hip arthroplasty (R 12/2022, L hemiarthroplasty 1/5/2023), DVT on Eliquis, subdural hematoma s/p craniotomy, admitted for urosepsis, and psychiatry consulted for medication recommendations for agitation.    Patient interviewed at bedside. Patient off restraints and AxOx1 (oriented to self, disoriented to place and time). Patient denies depressed emotions and difficulty with appetite and sleep. Was unable to understand questions related to SI. Per nurse, patient was restless and agitated overnight. Patient was given Seroquel 25mg earlier this am and now showing improvement with agitation and resting.     Plan: continue medications as recommended below  -may increase:  Seroquel 50mg at 12pm, Seroquel 50mg at 6pm but consider making this TID    and Seroquel 25mg q 6 hours PRN for agitation, hold for sedation and monitor EKG for QTc<500.  -Trazodone 50mg PO HS standing for insomnia  Clonazepam 0.25mg po q6pm for increased anxiety in the evening.  (nursing reports he did benefit from small doses of Lorazepam IV)

## 2023-02-24 NOTE — BH CONSULTATION LIAISON PROGRESS NOTE - NSBHFUPINTERVALHXFT_PSY_A_CORE
Patient interviewed at bedside. Patient with restraints and AxOx1 (oriented to self, disoriented to place and time).    Patient denies depressed emotions and difficulty with appetite and sleep. Was unable to understand questions related to SI. Per nurse, patient was restless and agitated overnight. Patient was calmer after a small dose of Lorazepam for anxiety in the evening last night, so suggested a small dose of Clonazepam 0.25mg po.

## 2023-02-25 PROCEDURE — 93010 ELECTROCARDIOGRAM REPORT: CPT

## 2023-02-25 PROCEDURE — 99232 SBSQ HOSP IP/OBS MODERATE 35: CPT

## 2023-02-25 RX ORDER — QUETIAPINE FUMARATE 200 MG/1
50 TABLET, FILM COATED ORAL
Refills: 0 | Status: DISCONTINUED | OUTPATIENT
Start: 2023-02-25 | End: 2023-02-25

## 2023-02-25 RX ORDER — TRAZODONE HCL 50 MG
50 TABLET ORAL
Refills: 0 | Status: DISCONTINUED | OUTPATIENT
Start: 2023-02-25 | End: 2023-03-02

## 2023-02-25 RX ORDER — QUETIAPINE FUMARATE 200 MG/1
50 TABLET, FILM COATED ORAL
Refills: 0 | Status: ACTIVE | OUTPATIENT
Start: 2023-02-25 | End: 2024-01-24

## 2023-02-25 RX ADMIN — POLYETHYLENE GLYCOL 3350 17 GRAM(S): 17 POWDER, FOR SOLUTION ORAL at 11:09

## 2023-02-25 RX ADMIN — TAMSULOSIN HYDROCHLORIDE 0.4 MILLIGRAM(S): 0.4 CAPSULE ORAL at 21:23

## 2023-02-25 RX ADMIN — Medication 0.25 MILLIGRAM(S): at 17:21

## 2023-02-25 RX ADMIN — QUETIAPINE FUMARATE 50 MILLIGRAM(S): 200 TABLET, FILM COATED ORAL at 21:23

## 2023-02-25 RX ADMIN — Medication 50 MILLIGRAM(S): at 21:23

## 2023-02-25 RX ADMIN — APIXABAN 5 MILLIGRAM(S): 2.5 TABLET, FILM COATED ORAL at 05:46

## 2023-02-25 RX ADMIN — Medication 1 TABLET(S): at 11:07

## 2023-02-25 RX ADMIN — FINASTERIDE 5 MILLIGRAM(S): 5 TABLET, FILM COATED ORAL at 11:09

## 2023-02-25 RX ADMIN — QUETIAPINE FUMARATE 50 MILLIGRAM(S): 200 TABLET, FILM COATED ORAL at 06:00

## 2023-02-25 RX ADMIN — APIXABAN 5 MILLIGRAM(S): 2.5 TABLET, FILM COATED ORAL at 17:03

## 2023-02-25 RX ADMIN — QUETIAPINE FUMARATE 50 MILLIGRAM(S): 200 TABLET, FILM COATED ORAL at 12:35

## 2023-02-25 RX ADMIN — SENNA PLUS 2 TABLET(S): 8.6 TABLET ORAL at 21:23

## 2023-02-25 RX ADMIN — Medication 325 MILLIGRAM(S): at 11:08

## 2023-02-25 RX ADMIN — Medication 300 MILLIGRAM(S): at 11:07

## 2023-02-25 RX ADMIN — AMIODARONE HYDROCHLORIDE 200 MILLIGRAM(S): 400 TABLET ORAL at 05:46

## 2023-02-25 RX ADMIN — Medication 650 MILLIGRAM(S): at 13:38

## 2023-02-25 RX ADMIN — Medication 20.83 GRAM(S): at 17:02

## 2023-02-25 RX ADMIN — QUETIAPINE FUMARATE 25 MILLIGRAM(S): 200 TABLET, FILM COATED ORAL at 15:20

## 2023-02-25 RX ADMIN — CHLORHEXIDINE GLUCONATE 1 APPLICATION(S): 213 SOLUTION TOPICAL at 11:08

## 2023-02-25 RX ADMIN — Medication 650 MILLIGRAM(S): at 14:23

## 2023-02-25 NOTE — BH CONSULTATION LIAISON PROGRESS NOTE - NSBHFUPINTERVALHXFT_PSY_A_CORE
Patient continues to have difficulties in the evening hours as per his nursing staff with requests to move his medications to an earlier time since he starts to become agitated in the early evening hours.   Patient with restraints and AxOx1 (oriented to self, disoriented to place and time).    Patient denies depressed emotions and difficulty with appetite and sleep. Was unable to understand questions related to SI. Per nurse, patient was restless and agitated overnight. Patient was calmer after a small dose of Lorazepam for anxiety in the evening last night, so suggested a small dose of Clonazepam 0.25mg po.

## 2023-02-25 NOTE — BH CONSULTATION LIAISON PROGRESS NOTE - NSBHASSESSMENTFT_PSY_ALL_CORE
89M,  for 42yrs, domiciled with wife (Lelia [phone]) and part-time HHA 2x weekly from 12pm-4pm, retired manager of a parking garage, with PPHx dementia dx in 2019 on Donepezil, no previous SA or psych admissions, no psychiatrist, no active substance abuse, with PMHx significant for BPH, afib w/ PPM, bilateral hip arthroplasty (R 12/2022, L hemiarthroplasty 1/5/2023), DVT on Eliquis, subdural hematoma s/p craniotomy, admitted for urosepsis, and psychiatry consulted for medication recommendations for agitation.    Patient interviewed at bedside. Patient off restraints and AxOx1 (oriented to self, disoriented to place and time). Patient denies depressed emotions and difficulty with appetite and sleep. Was unable to understand questions related to SI. Per nurse, patient was restless and agitated overnight.   Patient continues to have difficulties in the evening hours as per his nursing staff with requests to move his medications to an earlier time since he starts to become agitated in the early evening hours (his last dose of Seroquel had been at 10mg which was too late so was changed to 6pm).  Plan: continue medications as recommended below  -reordered to change to earlier times in the evening:  Seroquel 50mg at 8am, 2pm and 6pm    and Seroquel 25mg q 6 hours PRN for agitation, hold for sedation and monitor EKG for QTc<500.  -Trazodone 50mg PO HS standing for insomnia  Clonazepam 0.25mg po q6pm for increased anxiety in the evening.  (nursing reports he did benefit from small doses of Lorazepam IV) 89M,  for 42yrs, domiciled with wife (Lelia [phone]) and part-time HHA 2x weekly from 12pm-4pm, retired manager of a parking garage, with PPHx dementia dx in 2019 on Donepezil, no previous SA or psych admissions, no psychiatrist, no active substance abuse, with PMHx significant for BPH, afib w/ PPM, bilateral hip arthroplasty (R 12/2022, L hemiarthroplasty 1/5/2023), DVT on Eliquis, subdural hematoma s/p craniotomy, admitted for urosepsis, and psychiatry consulted for medication recommendations for agitation.    Patient interviewed at bedside. Patient off restraints and AxOx1 (oriented to self, disoriented to place and time). Patient denies depressed emotions and difficulty with appetite and sleep. Was unable to understand questions related to SI. Per nurse, patient was restless and agitated overnight.   Patient continues to have difficulties in the evening hours as per his nursing staff with requests to move his medications to an earlier time since he starts to become agitated in the early evening hours (his last dose of Seroquel had been at 10pm which was too late so was changed to 6pm).  Plan: continue medications as recommended below  -reordered to change to earlier times in the evening:  Seroquel 50mg at 8am, 2pm and 6pm    and Seroquel 25mg q 6 hours PRN for agitation, hold for sedation and monitor EKG for QTc<500.  -Trazodone 50mg PO HS standing for insomnia  Clonazepam 0.25mg po q6pm for increased anxiety in the evening.  (nursing reports he did benefit from small doses of Lorazepam IV)

## 2023-02-25 NOTE — PROGRESS NOTE ADULT - PROBLEM SELECTOR PLAN 5
MOLST form in chart noted. S/p recent hip fractures and repair. Significant fall hx  -DNR/DNI  -Wife involved with care  -Falls and aspiration precautions  -Cont. Trazodone  -Increasing seroquel to 50 TID, continue 25mg q6 prn  -psych following appreciate recs  -A + O x 1 at baseline. MOLST form in chart noted. S/p recent hip fractures and repair. Significant fall hx  -DNR/DNI  -Wife involved with care  -Falls and aspiration precautions  -Cont. Trazodone  -Increasing seroquel to 50 TID, continue 25mg q6 prn  -psych following appreciate recs  -A + O x 1 at baseline.  - monitor EKG for QTc

## 2023-02-25 NOTE — PROGRESS NOTE ADULT - SUBJECTIVE AND OBJECTIVE BOX
[facility] Division of Hospital Medicine  Sweetie Webster DO  Available on Teams Michael    Patient is a 89y old  Male who presents with a chief complaint of Hypotension and sepsis (24 Feb 2023 13:48)      SUBJECTIVE / OVERNIGHT EVENTS: per nurse, was very agitation yesterday early evening, trying to climb out of bed, not redirectable. Got meds and was able to sleep through the night. This morning, very sleepy.   ADDITIONAL REVIEW OF SYSTEMS: negative    MEDICATIONS  (STANDING):  allopurinol 300 milliGRAM(s) Oral daily  aMIOdarone    Tablet 200 milliGRAM(s) Oral daily  ampicillin  IVPB 12 Gram(s) IV Intermittent every 24 hours  apixaban 5 milliGRAM(s) Oral every 12 hours  chlorhexidine 2% Cloths 1 Application(s) Topical daily  clonazePAM  Tablet 0.25 milliGRAM(s) Oral <User Schedule>  ferrous    sulfate 325 milliGRAM(s) Oral daily  finasteride 5 milliGRAM(s) Oral daily  lactobacillus acidophilus 1 Tablet(s) Oral daily  polyethylene glycol 3350 17 Gram(s) Oral daily  QUEtiapine 50 milliGRAM(s) Oral every 8 hours  senna 2 Tablet(s) Oral at bedtime  tamsulosin 0.4 milliGRAM(s) Oral at bedtime  traZODone 50 milliGRAM(s) Oral at bedtime    MEDICATIONS  (PRN):  acetaminophen     Tablet .. 650 milliGRAM(s) Oral every 6 hours PRN Temp greater or equal to 38C (100.4F), Mild Pain (1 - 3)  melatonin 3 milliGRAM(s) Oral at bedtime PRN Insomnia  ondansetron Injectable 4 milliGRAM(s) IV Push every 8 hours PRN Nausea and/or Vomiting  QUEtiapine 25 milliGRAM(s) Oral every 6 hours PRN agitation      CAPILLARY BLOOD GLUCOSE        I&O's Summary    24 Feb 2023 07:01  -  25 Feb 2023 07:00  --------------------------------------------------------  IN: 250 mL / OUT: 550 mL / NET: -300 mL    25 Feb 2023 07:01  -  25 Feb 2023 13:29  --------------------------------------------------------  IN: 350 mL / OUT: 300 mL / NET: 50 mL        PHYSICAL EXAM:  Vital Signs Last 24 Hrs  T(C): 36.5 (25 Feb 2023 10:46), Max: 36.7 (24 Feb 2023 15:52)  T(F): 97.7 (25 Feb 2023 10:46), Max: 98.1 (24 Feb 2023 15:52)  HR: 65 (25 Feb 2023 10:46) (65 - 73)  BP: 131/80 (25 Feb 2023 10:46) (131/80 - 150/81)  BP(mean): --  RR: 18 (25 Feb 2023 10:46) (18 - 18)  SpO2: 94% (25 Feb 2023 10:46) (94% - 98%)    Parameters below as of 25 Feb 2023 10:46  Patient On (Oxygen Delivery Method): room air        CONSTITUTIONAL: Well-groomed, in no apparent distress  EYES: No conjunctival or scleral injection, non-icteric  ENMT: No external nasal lesions; no pharyngeal injection or exudates, oral mucosa with moist membranes  NECK: Trachea midline without palpable neck mass; thyroid not enlarged and non-tender  RESPIRATORY: Breathing comfortably; lungs CTA without wheeze/rhonchi/rales  CARDIOVASCULAR: +S1S2, RRR, no M/G/R; pedal pulses full and symmetric; no lower extremity edema  GASTROINTESTINAL: No palpable masses or tenderness, +BS throughout, no rebound/guarding; no hepatosplenomegaly; no hernia palpated  MUSCULOSKELETAL: no digital clubbing or cyanosis; no paraspinal tenderness; normal strength and tone of extremities  SKIN: No rashes or ulcers noted; no subcutaneous nodules or induration palpable  PSYCHIATRIC: asleep, unable to assess    LABS:    02-24    143  |  104  |  12  ----------------------------<  101<H>  3.7   |  32<H>  |  1.10    Ca    8.7      24 Feb 2023 07:07

## 2023-02-26 LAB
ANION GAP SERPL CALC-SCNC: 11 MMOL/L — SIGNIFICANT CHANGE UP (ref 5–17)
BUN SERPL-MCNC: 12 MG/DL — SIGNIFICANT CHANGE UP (ref 7–23)
CALCIUM SERPL-MCNC: 9.1 MG/DL — SIGNIFICANT CHANGE UP (ref 8.4–10.5)
CHLORIDE SERPL-SCNC: 103 MMOL/L — SIGNIFICANT CHANGE UP (ref 96–108)
CO2 SERPL-SCNC: 28 MMOL/L — SIGNIFICANT CHANGE UP (ref 22–31)
CREAT SERPL-MCNC: 1.03 MG/DL — SIGNIFICANT CHANGE UP (ref 0.5–1.3)
EGFR: 68 ML/MIN/1.73M2 — SIGNIFICANT CHANGE UP
GLUCOSE BLDC GLUCOMTR-MCNC: 91 MG/DL — SIGNIFICANT CHANGE UP (ref 70–99)
GLUCOSE SERPL-MCNC: 101 MG/DL — HIGH (ref 70–99)
MAGNESIUM SERPL-MCNC: 1.8 MG/DL — SIGNIFICANT CHANGE UP (ref 1.6–2.6)
PHOSPHATE SERPL-MCNC: 2.5 MG/DL — SIGNIFICANT CHANGE UP (ref 2.5–4.5)
POTASSIUM SERPL-MCNC: 3.8 MMOL/L — SIGNIFICANT CHANGE UP (ref 3.5–5.3)
POTASSIUM SERPL-SCNC: 3.8 MMOL/L — SIGNIFICANT CHANGE UP (ref 3.5–5.3)
RAPID RVP RESULT: SIGNIFICANT CHANGE UP
SARS-COV-2 RNA SPEC QL NAA+PROBE: SIGNIFICANT CHANGE UP
SODIUM SERPL-SCNC: 142 MMOL/L — SIGNIFICANT CHANGE UP (ref 135–145)

## 2023-02-26 PROCEDURE — 99232 SBSQ HOSP IP/OBS MODERATE 35: CPT

## 2023-02-26 RX ADMIN — QUETIAPINE FUMARATE 50 MILLIGRAM(S): 200 TABLET, FILM COATED ORAL at 15:32

## 2023-02-26 RX ADMIN — QUETIAPINE FUMARATE 50 MILLIGRAM(S): 200 TABLET, FILM COATED ORAL at 08:19

## 2023-02-26 RX ADMIN — QUETIAPINE FUMARATE 50 MILLIGRAM(S): 200 TABLET, FILM COATED ORAL at 18:24

## 2023-02-26 RX ADMIN — Medication 1 TABLET(S): at 15:30

## 2023-02-26 RX ADMIN — QUETIAPINE FUMARATE 25 MILLIGRAM(S): 200 TABLET, FILM COATED ORAL at 21:06

## 2023-02-26 RX ADMIN — Medication 300 MILLIGRAM(S): at 15:29

## 2023-02-26 RX ADMIN — FINASTERIDE 5 MILLIGRAM(S): 5 TABLET, FILM COATED ORAL at 15:30

## 2023-02-26 RX ADMIN — Medication 50 MILLIGRAM(S): at 21:06

## 2023-02-26 RX ADMIN — CHLORHEXIDINE GLUCONATE 1 APPLICATION(S): 213 SOLUTION TOPICAL at 15:31

## 2023-02-26 RX ADMIN — Medication 0.25 MILLIGRAM(S): at 18:24

## 2023-02-26 RX ADMIN — APIXABAN 5 MILLIGRAM(S): 2.5 TABLET, FILM COATED ORAL at 05:42

## 2023-02-26 RX ADMIN — APIXABAN 5 MILLIGRAM(S): 2.5 TABLET, FILM COATED ORAL at 18:24

## 2023-02-26 RX ADMIN — Medication 325 MILLIGRAM(S): at 15:30

## 2023-02-26 RX ADMIN — AMIODARONE HYDROCHLORIDE 200 MILLIGRAM(S): 400 TABLET ORAL at 05:42

## 2023-02-26 RX ADMIN — SENNA PLUS 2 TABLET(S): 8.6 TABLET ORAL at 21:06

## 2023-02-26 RX ADMIN — TAMSULOSIN HYDROCHLORIDE 0.4 MILLIGRAM(S): 0.4 CAPSULE ORAL at 21:06

## 2023-02-26 NOTE — BH CONSULTATION LIAISON PROGRESS NOTE - ORIENTATION
Patient AxOx1 (oriented to self, disoriented to place and time)

## 2023-02-26 NOTE — BH CONSULTATION LIAISON PROGRESS NOTE - NSBHFUPINTERVALHXFT_PSY_A_CORE
Patient continues to have difficulties in the evening hours as per his nursing staff with requests to move his medications to an earlier time since he starts to become agitated in the early evening hours.   Patient with restraints and AxOx1 (oriented to self, disoriented to place and time).    Patient denies depressed emotions and difficulty with appetite and sleep. Was unable to understand questions related to SI. Per nurse, patient was restless and agitated overnight. Patient was calmer after Clonazepam 0.25mg po so will continue this small dose.

## 2023-02-26 NOTE — BH CONSULTATION LIAISON PROGRESS NOTE - NSBHASSESSMENTFT_PSY_ALL_CORE
Pt is a 89yr old man,  for 42yrs, domiciled with wife (Lelia [phone]) and part-time HHA 2x weekly from 12pm-4pm, retired manager of a parking garage, with PPHx dementia dx in 2019 on Donepezil, no previous SA or psych admissions, no psychiatrist, no active substance abuse, with PMHx significant for BPH, afib w/ PPM, bilateral hip arthroplasty (R 12/2022, L hemiarthroplasty 1/5/2023), DVT on Eliquis, subdural hematoma s/p craniotomy, admitted for urosepsis, and psychiatry consulted for medication recommendations for agitation.    Patient interviewed at bedside. Patient off restraints and AxOx1 (oriented to self, disoriented to place and time). Patient denies depressed emotions and difficulty with appetite and sleep. Was unable to understand questions related to SI.   Patient continues to have difficulties in the evening hours as per his nursing staff with requests to move his medications to an earlier time since he starts to become agitated in the early evening hours (his last dose of Seroquel had been at 10pm which was too late so was changed to 6pm).  Plan: continue medications as recommended below  -reordered to change to earlier times in the evening:  Seroquel 50mg at 8am, 2pm and 6pm    and Seroquel 25mg q 6 hours PRN for agitation, hold for sedation and monitor EKG for QTc<500.  -Trazodone 50mg PO HS standing for insomnia  Clonazepam 0.25mg po q6pm for increased anxiety in the evening.  (nursing reports he did benefit )

## 2023-02-26 NOTE — PROGRESS NOTE ADULT - PROBLEM SELECTOR PLAN 5
-New O2 requirement of 2L NC  -previously received lasix for pleural effusions seen on CXR (also noted to have b/l opacities)  -recheck RVP, CXR  -prob also a component of atelectasis, however patient is not able to follow directions to use incentive spirometry  -try to mobilize OOB - PT consulted

## 2023-02-26 NOTE — PROGRESS NOTE ADULT - SUBJECTIVE AND OBJECTIVE BOX
[facility] Division of Hospital Medicine  Sweetie Webster DO  Available on Teams Michael    Patient is a 89y old  Male who presents with a chief complaint of Hypotension and sepsis (25 Feb 2023 13:28)      SUBJECTIVE / OVERNIGHT EVENTS: Yesterday in the afternoon, was very active, calling out constantly. Overnight did not sleep well per bedside nurse. This morning he is very sleepy.  ADDITIONAL REVIEW OF SYSTEMS: negative    MEDICATIONS  (STANDING):  allopurinol 300 milliGRAM(s) Oral daily  aMIOdarone    Tablet 200 milliGRAM(s) Oral daily  apixaban 5 milliGRAM(s) Oral every 12 hours  chlorhexidine 2% Cloths 1 Application(s) Topical daily  clonazePAM  Tablet 0.25 milliGRAM(s) Oral <User Schedule>  ferrous    sulfate 325 milliGRAM(s) Oral daily  finasteride 5 milliGRAM(s) Oral daily  lactobacillus acidophilus 1 Tablet(s) Oral daily  polyethylene glycol 3350 17 Gram(s) Oral daily  QUEtiapine 50 milliGRAM(s) Oral <User Schedule>  senna 2 Tablet(s) Oral at bedtime  tamsulosin 0.4 milliGRAM(s) Oral at bedtime  traZODone 50 milliGRAM(s) Oral <User Schedule>    MEDICATIONS  (PRN):  acetaminophen     Tablet .. 650 milliGRAM(s) Oral every 6 hours PRN Temp greater or equal to 38C (100.4F), Mild Pain (1 - 3)  melatonin 3 milliGRAM(s) Oral at bedtime PRN Insomnia  ondansetron Injectable 4 milliGRAM(s) IV Push every 8 hours PRN Nausea and/or Vomiting  QUEtiapine 25 milliGRAM(s) Oral every 6 hours PRN agitation      CAPILLARY BLOOD GLUCOSE      POCT Blood Glucose.: 91 mg/dL (26 Feb 2023 11:29)    I&O's Summary    25 Feb 2023 07:01  -  26 Feb 2023 07:00  --------------------------------------------------------  IN: 600 mL / OUT: 1300 mL / NET: -700 mL    26 Feb 2023 07:01  -  26 Feb 2023 13:13  --------------------------------------------------------  IN: 240 mL / OUT: 0 mL / NET: 240 mL        PHYSICAL EXAM:  Vital Signs Last 24 Hrs  T(C): 36.5 (26 Feb 2023 10:02), Max: 36.8 (25 Feb 2023 23:01)  T(F): 97.7 (26 Feb 2023 10:02), Max: 98.2 (25 Feb 2023 23:01)  HR: 71 (26 Feb 2023 10:02) (71 - 91)  BP: 108/68 (26 Feb 2023 10:02) (108/68 - 148/91)  BP(mean): --  RR: 18 (26 Feb 2023 10:02) (18 - 18)  SpO2: 94% (26 Feb 2023 10:02) (90% - 95%)    Parameters below as of 26 Feb 2023 10:02  Patient On (Oxygen Delivery Method): nasal cannula      CONSTITUTIONAL: Well-groomed, in no apparent distress, elderly  EYES: No conjunctival or scleral injection, non-icteric  ENMT: No external nasal lesions; no pharyngeal injection or exudates, oral mucosa with moist membranes  NECK: Trachea midline without palpable neck mass; thyroid not enlarged and non-tender  RESPIRATORY: Breathing comfortably; lungs CTA without wheeze/rhonchi/rales  CARDIOVASCULAR: +S1S2, RRR, no M/G/R; pedal pulses full and symmetric; no lower extremity edema  GASTROINTESTINAL: No palpable masses or tenderness, +BS throughout, no rebound/guarding; no hepatosplenomegaly; no hernia palpated  MUSCULOSKELETAL: no digital clubbing or cyanosis; no paraspinal tenderness; normal strength and tone of extremities  SKIN: No rashes or ulcers noted; no subcutaneous nodules or induration palpable  PSYCHIATRIC: asleep, unable to assess    LABS:    02-26    142  |  103  |  12  ----------------------------<  101<H>  3.8   |  28  |  1.03    Ca    9.1      26 Feb 2023 08:18  Phos  2.5     02-26  Mg     1.8     02-26

## 2023-02-27 PROCEDURE — 71045 X-RAY EXAM CHEST 1 VIEW: CPT | Mod: 26

## 2023-02-27 PROCEDURE — 99232 SBSQ HOSP IP/OBS MODERATE 35: CPT

## 2023-02-27 RX ORDER — FUROSEMIDE 40 MG
20 TABLET ORAL ONCE
Refills: 0 | Status: DISCONTINUED | OUTPATIENT
Start: 2023-02-27 | End: 2023-02-27

## 2023-02-27 RX ADMIN — AMIODARONE HYDROCHLORIDE 200 MILLIGRAM(S): 400 TABLET ORAL at 05:46

## 2023-02-27 RX ADMIN — Medication 50 MILLIGRAM(S): at 21:47

## 2023-02-27 RX ADMIN — QUETIAPINE FUMARATE 25 MILLIGRAM(S): 200 TABLET, FILM COATED ORAL at 05:46

## 2023-02-27 RX ADMIN — APIXABAN 5 MILLIGRAM(S): 2.5 TABLET, FILM COATED ORAL at 17:01

## 2023-02-27 RX ADMIN — Medication 300 MILLIGRAM(S): at 13:03

## 2023-02-27 RX ADMIN — APIXABAN 5 MILLIGRAM(S): 2.5 TABLET, FILM COATED ORAL at 05:46

## 2023-02-27 RX ADMIN — Medication 1 TABLET(S): at 13:03

## 2023-02-27 RX ADMIN — QUETIAPINE FUMARATE 50 MILLIGRAM(S): 200 TABLET, FILM COATED ORAL at 17:02

## 2023-02-27 RX ADMIN — Medication 325 MILLIGRAM(S): at 13:04

## 2023-02-27 RX ADMIN — QUETIAPINE FUMARATE 50 MILLIGRAM(S): 200 TABLET, FILM COATED ORAL at 13:04

## 2023-02-27 RX ADMIN — Medication 0.25 MILLIGRAM(S): at 17:02

## 2023-02-27 RX ADMIN — SENNA PLUS 2 TABLET(S): 8.6 TABLET ORAL at 21:47

## 2023-02-27 RX ADMIN — CHLORHEXIDINE GLUCONATE 1 APPLICATION(S): 213 SOLUTION TOPICAL at 17:02

## 2023-02-27 RX ADMIN — TAMSULOSIN HYDROCHLORIDE 0.4 MILLIGRAM(S): 0.4 CAPSULE ORAL at 21:47

## 2023-02-27 RX ADMIN — FINASTERIDE 5 MILLIGRAM(S): 5 TABLET, FILM COATED ORAL at 13:04

## 2023-02-27 NOTE — DIETITIAN INITIAL EVALUATION ADULT - PERTINENT MEDS FT
MEDICATIONS  (STANDING):  allopurinol 300 milliGRAM(s) Oral daily  aMIOdarone    Tablet 200 milliGRAM(s) Oral daily  apixaban 5 milliGRAM(s) Oral every 12 hours  chlorhexidine 2% Cloths 1 Application(s) Topical daily  clonazePAM  Tablet 0.25 milliGRAM(s) Oral <User Schedule>  ferrous    sulfate 325 milliGRAM(s) Oral daily  finasteride 5 milliGRAM(s) Oral daily  lactobacillus acidophilus 1 Tablet(s) Oral daily  polyethylene glycol 3350 17 Gram(s) Oral daily  QUEtiapine 50 milliGRAM(s) Oral <User Schedule>  senna 2 Tablet(s) Oral at bedtime  tamsulosin 0.4 milliGRAM(s) Oral at bedtime  traZODone 50 milliGRAM(s) Oral <User Schedule>    MEDICATIONS  (PRN):  acetaminophen     Tablet .. 650 milliGRAM(s) Oral every 6 hours PRN Temp greater or equal to 38C (100.4F), Mild Pain (1 - 3)  melatonin 3 milliGRAM(s) Oral at bedtime PRN Insomnia  ondansetron Injectable 4 milliGRAM(s) IV Push every 8 hours PRN Nausea and/or Vomiting  QUEtiapine 25 milliGRAM(s) Oral every 6 hours PRN agitation

## 2023-02-27 NOTE — DIETITIAN INITIAL EVALUATION ADULT - REASON FOR ADMISSION
Per chart, "89M w/ pmhx of dementia (A+O x 1 baseline), afib on eliquis, s/p PPM, recent b/l hip arthroplasty s/p fall, c/b LE DVTs, LUE DVT on eliquis, ye since last hospitalization, bph, gout, subdural hematoma s/p craniotomy p/w hypotension, sepsis and UTI. Currently improved. Course c/b agitation."

## 2023-02-27 NOTE — PROGRESS NOTE ADULT - PROBLEM SELECTOR PLAN 5
-New O2 requirement of 2L NC, intermittently  -previously received lasix for pleural effusions seen on CXR (also noted to have b/l opacities)  -negative RVP, repeat CXR appears similar to before  - trial another 20mg IV lasix  -prob also a component of atelectasis, however patient is not able to follow directions to use incentive spirometry  -try to mobilize OOB - PT consulted

## 2023-02-27 NOTE — DIETITIAN INITIAL EVALUATION ADULT - PHYSCIAL ASSESSMENT
Weight Hx Per:  - Source: patient's wife  - UBW: 165 pounds   - Reported weight changes: Pt's wife suspects weight loss since admission.     Weight Hx Per [facility] HIE:  - 74.8 kg (12/20/2022)  - 75.8 kg (6/6/2022)  - 77.6 kg (1/3/2023)    Current Admission Weights:  - Dosing weight: 177.4 pounds (80.4 kg)  - Daily weight: 77.8 kg (2/22)    Weight Change:  - 4% weight loss noted x 1 week. (daily weight vs dosing weight) Of note, pt is being diuresed in house, some weight loss can be attributed to fluid shifts.     **  Will continue to monitor weight trends as available/able.      IBW: 154 pounds   %IBW: 115%

## 2023-02-27 NOTE — DIETITIAN INITIAL EVALUATION ADULT - ORAL INTAKE PTA/DIET HISTORY
Pt's wife reports good appetite/PO intake PTA. Reports pt consumes 3 meals a day.   - NKFA/intolerances reported.   - No therapeutic restrictions reported.  - Micronutrient/Other supplementation: multivitamin   - Protein-energy supplementation: Ensure shakes as needed   - No hx of chewing/swallowing difficulties. Of note, confirms use of dentures however pt is not using while in-patient.

## 2023-02-27 NOTE — DIETITIAN INITIAL EVALUATION ADULT - PERTINENT LABORATORY DATA
02-26    142  |  103  |  12  ----------------------------<  101<H>  3.8   |  28  |  1.03    Ca    9.1      26 Feb 2023 08:18  Phos  2.5     02-26  Mg     1.8     02-26

## 2023-02-27 NOTE — DIETITIAN INITIAL EVALUATION ADULT - REASON INDICATOR FOR ASSESSMENT
Nutrition assessment warranted for: length of stay   Information obtained from: electronic medical record and patient's wife at bedside. Pt seen out of bed to chair, of note, pt with hx of dementia; A&Ox1, unable to participate in nutritional interview.   Chart reviewed, events noted.

## 2023-02-27 NOTE — DIETITIAN INITIAL EVALUATION ADULT - PERSON TAUGHT/METHOD
Emphasized the importance of adequate kcal and protein intake, provided recommendations to optimize nutritional intake in case of decreased appetite, recommended small frequent meals by consuming nutrient-dense snacks between meals, to start with protein, and sips of supplement throughout the day./verbal instruction/spouse instructed

## 2023-02-27 NOTE — DIETITIAN INITIAL EVALUATION ADULT - ENERGY INTAKE
Intake:   - Pt's wife reports fair appetite/PO intake since admission. Reports pt is consuming ~50% of meals.   - Pt's wife would like to receive Ensure shakes 2x/day.

## 2023-02-27 NOTE — DIETITIAN INITIAL EVALUATION ADULT - OTHER INFO
- Nutrition-related concerns:   - Pt is ordered for Lasix and Pacerone in house.   - Micronutrient/Other supplementation: ferrous sulfate.

## 2023-02-27 NOTE — PROGRESS NOTE ADULT - NSPROGADDITIONALINFOA_GEN_ALL_CORE
Plan discussed with ACP Aruna Webster, DO  Available on Teams deirdre Plan discussed with ACP Aruna Webster, DO  Available on Teams deirdre    Spoke with wife at bedside for updates.

## 2023-02-27 NOTE — PROGRESS NOTE ADULT - SUBJECTIVE AND OBJECTIVE BOX
[facility] Division of Hospital Medicine  Sweetie Webster DO  Available on Teams Michael    Patient is a 89y old  Male who presents with a chief complaint of Hypotension and sepsis (26 Feb 2023 13:13)      SUBJECTIVE / OVERNIGHT EVENTS: None. Sleeping now. Yesterday afternoon was awake, having a lot of white sputum, confused, but interacting with his wife.  ADDITIONAL REVIEW OF SYSTEMS: negative    MEDICATIONS  (STANDING):  allopurinol 300 milliGRAM(s) Oral daily  aMIOdarone    Tablet 200 milliGRAM(s) Oral daily  apixaban 5 milliGRAM(s) Oral every 12 hours  chlorhexidine 2% Cloths 1 Application(s) Topical daily  clonazePAM  Tablet 0.25 milliGRAM(s) Oral <User Schedule>  ferrous    sulfate 325 milliGRAM(s) Oral daily  finasteride 5 milliGRAM(s) Oral daily  lactobacillus acidophilus 1 Tablet(s) Oral daily  polyethylene glycol 3350 17 Gram(s) Oral daily  QUEtiapine 50 milliGRAM(s) Oral <User Schedule>  senna 2 Tablet(s) Oral at bedtime  tamsulosin 0.4 milliGRAM(s) Oral at bedtime  traZODone 50 milliGRAM(s) Oral <User Schedule>    MEDICATIONS  (PRN):  acetaminophen     Tablet .. 650 milliGRAM(s) Oral every 6 hours PRN Temp greater or equal to 38C (100.4F), Mild Pain (1 - 3)  melatonin 3 milliGRAM(s) Oral at bedtime PRN Insomnia  ondansetron Injectable 4 milliGRAM(s) IV Push every 8 hours PRN Nausea and/or Vomiting  QUEtiapine 25 milliGRAM(s) Oral every 6 hours PRN agitation      CAPILLARY BLOOD GLUCOSE        I&O's Summary    26 Feb 2023 07:01  -  27 Feb 2023 07:00  --------------------------------------------------------  IN: 240 mL / OUT: 800 mL / NET: -560 mL        PHYSICAL EXAM:  Vital Signs Last 24 Hrs  T(C): 36.4 (26 Feb 2023 22:56), Max: 36.4 (26 Feb 2023 22:56)  T(F): 97.5 (26 Feb 2023 22:56), Max: 97.5 (26 Feb 2023 22:56)  HR: 85 (26 Feb 2023 22:56) (66 - 85)  BP: 157/85 (26 Feb 2023 22:56) (128/77 - 157/85)  BP(mean): --  RR: 18 (26 Feb 2023 22:56) (18 - 18)  SpO2: 93% (26 Feb 2023 23:46) (82% - 97%)    Parameters below as of 26 Feb 2023 23:46  Patient On (Oxygen Delivery Method): nasal cannula  O2 Flow (L/min): 2      CONSTITUTIONAL: Well-groomed, in no apparent distress, elderly  EYES: No conjunctival or scleral injection, non-icteric  ENMT: No external nasal lesions; no pharyngeal injection or exudates, oral mucosa with moist membranes  NECK: Trachea midline without palpable neck mass; thyroid not enlarged and non-tender  RESPIRATORY: Breathing comfortably; lungs CTA without wheeze/rhonchi/rales  CARDIOVASCULAR: +S1S2, RRR, no M/G/R; pedal pulses full and symmetric; no lower extremity edema  GASTROINTESTINAL: No palpable masses or tenderness, +BS throughout, no rebound/guarding; no hepatosplenomegaly; no hernia palpated  MUSCULOSKELETAL: no digital clubbing or cyanosis; no paraspinal tenderness; normal strength and tone of extremities  SKIN: No rashes or ulcers noted; no subcutaneous nodules or induration palpable  PSYCHIATRIC: asleep, unable to assess    LABS:    02-26    142  |  103  |  12  ----------------------------<  101<H>  3.8   |  28  |  1.03    Ca    9.1      26 Feb 2023 08:18  Phos  2.5     02-26  Mg     1.8     02-26                  RADIOLOGY & ADDITIONAL TESTS:  Results Reviewed:   Imaging Personally Reviewed:  Electrocardiogram Personally Reviewed:    COORDINATION OF CARE:  Care Discussed with Consultants/Other Providers [Y/N]:  Prior or Outpatient Records Reviewed [Y/N]:

## 2023-02-27 NOTE — DIETITIAN INITIAL EVALUATION ADULT - ADD RECOMMEND
1) New malnutrition notification sent.   2) Continue current diet order; no therapeutic diet restrictions.   --> Consider swallow evaluation for appropriate texture/consistency recommendations.   3) Recommend Ensure Shake 2x/day (700 bishop, 40 g protein) to optimize PO intake.   4) Encourage adequate intake of meals and supplements to optimize PO intake.   5) Monitor PO intake/tolerance, weights, labs, hydration status, bowels, and skin integrity.

## 2023-02-28 LAB
ANION GAP SERPL CALC-SCNC: 10 MMOL/L — SIGNIFICANT CHANGE UP (ref 5–17)
BUN SERPL-MCNC: 12 MG/DL — SIGNIFICANT CHANGE UP (ref 7–23)
CALCIUM SERPL-MCNC: 9.3 MG/DL — SIGNIFICANT CHANGE UP (ref 8.4–10.5)
CHLORIDE SERPL-SCNC: 101 MMOL/L — SIGNIFICANT CHANGE UP (ref 96–108)
CO2 SERPL-SCNC: 30 MMOL/L — SIGNIFICANT CHANGE UP (ref 22–31)
CREAT SERPL-MCNC: 1.06 MG/DL — SIGNIFICANT CHANGE UP (ref 0.5–1.3)
EGFR: 66 ML/MIN/1.73M2 — SIGNIFICANT CHANGE UP
GLUCOSE SERPL-MCNC: 108 MG/DL — HIGH (ref 70–99)
HCT VFR BLD CALC: 35.8 % — LOW (ref 39–50)
HGB BLD-MCNC: 10.9 G/DL — LOW (ref 13–17)
MAGNESIUM SERPL-MCNC: 1.8 MG/DL — SIGNIFICANT CHANGE UP (ref 1.6–2.6)
MCHC RBC-ENTMCNC: 28.2 PG — SIGNIFICANT CHANGE UP (ref 27–34)
MCHC RBC-ENTMCNC: 30.4 GM/DL — LOW (ref 32–36)
MCV RBC AUTO: 92.7 FL — SIGNIFICANT CHANGE UP (ref 80–100)
NRBC # BLD: 0 /100 WBCS — SIGNIFICANT CHANGE UP (ref 0–0)
PHOSPHATE SERPL-MCNC: 2.9 MG/DL — SIGNIFICANT CHANGE UP (ref 2.5–4.5)
PLATELET # BLD AUTO: 220 K/UL — SIGNIFICANT CHANGE UP (ref 150–400)
POTASSIUM SERPL-MCNC: 3.8 MMOL/L — SIGNIFICANT CHANGE UP (ref 3.5–5.3)
POTASSIUM SERPL-SCNC: 3.8 MMOL/L — SIGNIFICANT CHANGE UP (ref 3.5–5.3)
RBC # BLD: 3.86 M/UL — LOW (ref 4.2–5.8)
RBC # FLD: 16.3 % — HIGH (ref 10.3–14.5)
SODIUM SERPL-SCNC: 141 MMOL/L — SIGNIFICANT CHANGE UP (ref 135–145)
WBC # BLD: 6.49 K/UL — SIGNIFICANT CHANGE UP (ref 3.8–10.5)
WBC # FLD AUTO: 6.49 K/UL — SIGNIFICANT CHANGE UP (ref 3.8–10.5)

## 2023-02-28 PROCEDURE — 99232 SBSQ HOSP IP/OBS MODERATE 35: CPT

## 2023-02-28 PROCEDURE — 99231 SBSQ HOSP IP/OBS SF/LOW 25: CPT

## 2023-02-28 RX ORDER — QUETIAPINE FUMARATE 200 MG/1
50 TABLET, FILM COATED ORAL
Refills: 0 | Status: DISCONTINUED | OUTPATIENT
Start: 2023-02-28 | End: 2023-03-02

## 2023-02-28 RX ORDER — QUETIAPINE FUMARATE 200 MG/1
75 TABLET, FILM COATED ORAL AT BEDTIME
Refills: 0 | Status: DISCONTINUED | OUTPATIENT
Start: 2023-02-28 | End: 2023-03-01

## 2023-02-28 RX ADMIN — Medication 325 MILLIGRAM(S): at 12:35

## 2023-02-28 RX ADMIN — Medication 50 MILLIGRAM(S): at 22:43

## 2023-02-28 RX ADMIN — QUETIAPINE FUMARATE 50 MILLIGRAM(S): 200 TABLET, FILM COATED ORAL at 17:34

## 2023-02-28 RX ADMIN — POLYETHYLENE GLYCOL 3350 17 GRAM(S): 17 POWDER, FOR SOLUTION ORAL at 12:35

## 2023-02-28 RX ADMIN — Medication 0.25 MILLIGRAM(S): at 17:35

## 2023-02-28 RX ADMIN — QUETIAPINE FUMARATE 50 MILLIGRAM(S): 200 TABLET, FILM COATED ORAL at 13:52

## 2023-02-28 RX ADMIN — SENNA PLUS 2 TABLET(S): 8.6 TABLET ORAL at 22:44

## 2023-02-28 RX ADMIN — FINASTERIDE 5 MILLIGRAM(S): 5 TABLET, FILM COATED ORAL at 12:35

## 2023-02-28 RX ADMIN — CHLORHEXIDINE GLUCONATE 1 APPLICATION(S): 213 SOLUTION TOPICAL at 12:36

## 2023-02-28 RX ADMIN — Medication 300 MILLIGRAM(S): at 12:35

## 2023-02-28 RX ADMIN — TAMSULOSIN HYDROCHLORIDE 0.4 MILLIGRAM(S): 0.4 CAPSULE ORAL at 22:44

## 2023-02-28 RX ADMIN — QUETIAPINE FUMARATE 50 MILLIGRAM(S): 200 TABLET, FILM COATED ORAL at 07:18

## 2023-02-28 RX ADMIN — Medication 1 TABLET(S): at 12:35

## 2023-02-28 RX ADMIN — AMIODARONE HYDROCHLORIDE 200 MILLIGRAM(S): 400 TABLET ORAL at 05:33

## 2023-02-28 RX ADMIN — APIXABAN 5 MILLIGRAM(S): 2.5 TABLET, FILM COATED ORAL at 05:33

## 2023-02-28 RX ADMIN — QUETIAPINE FUMARATE 75 MILLIGRAM(S): 200 TABLET, FILM COATED ORAL at 22:44

## 2023-02-28 RX ADMIN — QUETIAPINE FUMARATE 25 MILLIGRAM(S): 200 TABLET, FILM COATED ORAL at 03:35

## 2023-02-28 RX ADMIN — APIXABAN 5 MILLIGRAM(S): 2.5 TABLET, FILM COATED ORAL at 17:34

## 2023-02-28 NOTE — PROGRESS NOTE ADULT - PROBLEM SELECTOR PLAN 5
-New O2 requirement of 2L NC, intermittently  -previously received lasix for pleural effusions seen on CXR (also noted to have b/l opacities)  -negative RVP, repeat CXR appears similar to before  -prob also a component of atelectasis, however patient is not able to follow directions to use incentive spirometry  -try to mobilize OOB - PT consulted

## 2023-02-28 NOTE — PROGRESS NOTE ADULT - NSPROGADDITIONALINFOA_GEN_ALL_CORE
Plan discussed with NP Bushra Webster, DO  Available on Teams deirdre    Spoke with wife at bedside for updates yesterday, will speak with her again today.

## 2023-02-28 NOTE — BH CONSULTATION LIAISON PROGRESS NOTE - NSBHINDICATION_PSY_ALL_CORE
for pulling on ephraim

## 2023-02-28 NOTE — BH CONSULTATION LIAISON PROGRESS NOTE - NSBHFUPINTERVALHXFT_PSY_A_CORE
team requested f/u for prns, restlessness. pt denies complaints, denies depression, anxiety, psychosis. pt currently calm. as per staff, pt at times restless, trying to get out of bed. pt received sreoquel prn last night

## 2023-02-28 NOTE — PROGRESS NOTE ADULT - SUBJECTIVE AND OBJECTIVE BOX
[facility] Division of Hospital Medicine  Sweetie Webster DO  Available on Teams Michael    Patient is a 89y old  Male who presents with a chief complaint of Per chart, "89M w/ pmhx of dementia (A+O x 1 baseline), afib on eliquis, s/p PPM, recent b/l hip arthroplasty s/p fall, c/b LE DVTs, LUE DVT on eliquis, ye since last hospitalization, bph, gout, subdural hematoma s/p craniotomy p/w hypotension, sepsis and UTI. Currently improved. Course c/b agitation."     (27 Feb 2023 16:19)      SUBJECTIVE / OVERNIGHT EVENTS: no events. Still required 1 prn seroquel this morning. This morning he is awake, confused, a bit agitated, but not trying to climb out of bed.  ADDITIONAL REVIEW OF SYSTEMS: negative    MEDICATIONS  (STANDING):  allopurinol 300 milliGRAM(s) Oral daily  aMIOdarone    Tablet 200 milliGRAM(s) Oral daily  apixaban 5 milliGRAM(s) Oral every 12 hours  chlorhexidine 2% Cloths 1 Application(s) Topical daily  clonazePAM  Tablet 0.25 milliGRAM(s) Oral <User Schedule>  ferrous    sulfate 325 milliGRAM(s) Oral daily  finasteride 5 milliGRAM(s) Oral daily  lactobacillus acidophilus 1 Tablet(s) Oral daily  polyethylene glycol 3350 17 Gram(s) Oral daily  QUEtiapine 50 milliGRAM(s) Oral <User Schedule>  senna 2 Tablet(s) Oral at bedtime  tamsulosin 0.4 milliGRAM(s) Oral at bedtime  traZODone 50 milliGRAM(s) Oral <User Schedule>    MEDICATIONS  (PRN):  acetaminophen     Tablet .. 650 milliGRAM(s) Oral every 6 hours PRN Temp greater or equal to 38C (100.4F), Mild Pain (1 - 3)  melatonin 3 milliGRAM(s) Oral at bedtime PRN Insomnia  ondansetron Injectable 4 milliGRAM(s) IV Push every 8 hours PRN Nausea and/or Vomiting  QUEtiapine 25 milliGRAM(s) Oral every 6 hours PRN agitation      CAPILLARY BLOOD GLUCOSE        I&O's Summary    27 Feb 2023 07:01  -  28 Feb 2023 07:00  --------------------------------------------------------  IN: 0 mL / OUT: 400 mL / NET: -400 mL    28 Feb 2023 07:01  -  28 Feb 2023 13:18  --------------------------------------------------------  IN: 120 mL / OUT: 0 mL / NET: 120 mL        PHYSICAL EXAM:  Vital Signs Last 24 Hrs  T(C): 36.4 (28 Feb 2023 13:31), Max: 36.4 (28 Feb 2023 13:31)  T(F): 97.6 (28 Feb 2023 13:31), Max: 97.6 (28 Feb 2023 13:31)  HR: 66 (28 Feb 2023 13:31) (66 - 69)  BP: 152/80 (28 Feb 2023 13:31) (132/70 - 152/80)  BP(mean): --  RR: 18 (28 Feb 2023 13:31) (18 - 18)  SpO2: 94% (28 Feb 2023 13:31) (92% - 94%)    Parameters below as of 28 Feb 2023 13:31  Patient On (Oxygen Delivery Method): room air      CONSTITUTIONAL: Well-groomed, in no apparent distress, elderly  EYES: No conjunctival or scleral injection, non-icteric  ENMT: No external nasal lesions; no pharyngeal injection or exudates, oral mucosa with moist membranes  NECK: Trachea midline without palpable neck mass; thyroid not enlarged and non-tender  RESPIRATORY: Breathing comfortably; lungs CTA without wheeze/rhonchi/rales  CARDIOVASCULAR: +S1S2, RRR, no M/G/R; pedal pulses full and symmetric; no lower extremity edema  GASTROINTESTINAL: No palpable masses or tenderness, +BS throughout, no rebound/guarding; no hepatosplenomegaly; no hernia palpated  MUSCULOSKELETAL: no digital clubbing or cyanosis; no paraspinal tenderness; normal strength and tone of extremities  SKIN: No rashes or ulcers noted; no subcutaneous nodules or induration palpable  PSYCHIATRIC: awake, calm, but fidgeting. aaox0    LABS:                        10.9   6.49  )-----------( 220      ( 28 Feb 2023 07:02 )             35.8     02-28    141  |  101  |  12  ----------------------------<  108<H>  3.8   |  30  |  1.06    Ca    9.3      28 Feb 2023 07:02  Phos  2.9     02-28  Mg     1.8     02-28

## 2023-02-28 NOTE — BH CONSULTATION LIAISON PROGRESS NOTE - NSBHCHARTREVIEWLAB_PSY_A_CORE FT
10.9   6.49  )-----------( 220      ( 28 Feb 2023 07:02 )             35.8     02-28    141  |  101  |  12  ----------------------------<  108<H>  3.8   |  30  |  1.06    Ca    9.3      28 Feb 2023 07:02  Phos  2.9     02-28  Mg     1.8     02-28

## 2023-02-28 NOTE — BH CONSULTATION LIAISON PROGRESS NOTE - PRN MEDS
Several doses of Seroquel 25mg PO q6hr PRN was given since last evaluation
on
Several doses of Seroquel 25mg PO q6hr PRN was given since last evaluation

## 2023-02-28 NOTE — BH CONSULTATION LIAISON PROGRESS NOTE - NSBHASSESSMENTFT_PSY_ALL_CORE
Pt is a 89yr old man,  for 42yrs, domiciled with wife (Lelia [phone]) and part-time HHA 2x weekly from 12pm-4pm, retired manager of a parking garage, with PPHx dementia dx in 2019 on Donepezil, no previous SA or psych admissions, no psychiatrist, no active substance abuse, with PMHx significant for BPH, afib w/ PPM, bilateral hip arthroplasty (R 12/2022, L hemiarthroplasty 1/5/2023), DVT on Eliquis, subdural hematoma s/p craniotomy, admitted for urosepsis, and psychiatry consulted for medication recommendations for agitation.

## 2023-03-01 PROCEDURE — 99232 SBSQ HOSP IP/OBS MODERATE 35: CPT

## 2023-03-01 RX ORDER — QUETIAPINE FUMARATE 200 MG/1
75 TABLET, FILM COATED ORAL
Refills: 0 | Status: DISCONTINUED | OUTPATIENT
Start: 2023-03-01 | End: 2023-03-02

## 2023-03-01 RX ADMIN — Medication 3 MILLIGRAM(S): at 23:25

## 2023-03-01 RX ADMIN — TAMSULOSIN HYDROCHLORIDE 0.4 MILLIGRAM(S): 0.4 CAPSULE ORAL at 21:46

## 2023-03-01 RX ADMIN — Medication 300 MILLIGRAM(S): at 12:43

## 2023-03-01 RX ADMIN — CHLORHEXIDINE GLUCONATE 1 APPLICATION(S): 213 SOLUTION TOPICAL at 15:37

## 2023-03-01 RX ADMIN — QUETIAPINE FUMARATE 50 MILLIGRAM(S): 200 TABLET, FILM COATED ORAL at 07:59

## 2023-03-01 RX ADMIN — Medication 1 TABLET(S): at 12:43

## 2023-03-01 RX ADMIN — APIXABAN 5 MILLIGRAM(S): 2.5 TABLET, FILM COATED ORAL at 17:24

## 2023-03-01 RX ADMIN — FINASTERIDE 5 MILLIGRAM(S): 5 TABLET, FILM COATED ORAL at 12:44

## 2023-03-01 RX ADMIN — POLYETHYLENE GLYCOL 3350 17 GRAM(S): 17 POWDER, FOR SOLUTION ORAL at 12:43

## 2023-03-01 RX ADMIN — QUETIAPINE FUMARATE 75 MILLIGRAM(S): 200 TABLET, FILM COATED ORAL at 18:23

## 2023-03-01 RX ADMIN — QUETIAPINE FUMARATE 25 MILLIGRAM(S): 200 TABLET, FILM COATED ORAL at 23:25

## 2023-03-01 RX ADMIN — Medication 325 MILLIGRAM(S): at 12:43

## 2023-03-01 RX ADMIN — Medication 50 MILLIGRAM(S): at 21:45

## 2023-03-01 RX ADMIN — AMIODARONE HYDROCHLORIDE 200 MILLIGRAM(S): 400 TABLET ORAL at 05:38

## 2023-03-01 RX ADMIN — APIXABAN 5 MILLIGRAM(S): 2.5 TABLET, FILM COATED ORAL at 05:38

## 2023-03-01 RX ADMIN — QUETIAPINE FUMARATE 50 MILLIGRAM(S): 200 TABLET, FILM COATED ORAL at 15:41

## 2023-03-01 RX ADMIN — SENNA PLUS 2 TABLET(S): 8.6 TABLET ORAL at 21:45

## 2023-03-01 RX ADMIN — Medication 0.25 MILLIGRAM(S): at 17:24

## 2023-03-01 NOTE — PROGRESS NOTE ADULT - SUBJECTIVE AND OBJECTIVE BOX
[facility] Division of Hospital Medicine  Sweetie Webster DO  Available on Teams Michael    Patient is a 89y old  Male who presents with a chief complaint of Hypotension and sepsis (28 Feb 2023 13:17)      SUBJECTIVE / OVERNIGHT EVENTS: none. Did not require any prn seroquel overnight. This morning, awake and confused but not agitated.  ADDITIONAL REVIEW OF SYSTEMS: negative    MEDICATIONS  (STANDING):  allopurinol 300 milliGRAM(s) Oral daily  aMIOdarone    Tablet 200 milliGRAM(s) Oral daily  apixaban 5 milliGRAM(s) Oral every 12 hours  chlorhexidine 2% Cloths 1 Application(s) Topical daily  clonazePAM  Tablet 0.25 milliGRAM(s) Oral <User Schedule>  ferrous    sulfate 325 milliGRAM(s) Oral daily  finasteride 5 milliGRAM(s) Oral daily  lactobacillus acidophilus 1 Tablet(s) Oral daily  polyethylene glycol 3350 17 Gram(s) Oral daily  QUEtiapine 50 milliGRAM(s) Oral <User Schedule>  QUEtiapine 75 milliGRAM(s) Oral at bedtime  senna 2 Tablet(s) Oral at bedtime  tamsulosin 0.4 milliGRAM(s) Oral at bedtime  traZODone 50 milliGRAM(s) Oral <User Schedule>    MEDICATIONS  (PRN):  acetaminophen     Tablet .. 650 milliGRAM(s) Oral every 6 hours PRN Temp greater or equal to 38C (100.4F), Mild Pain (1 - 3)  melatonin 3 milliGRAM(s) Oral at bedtime PRN Insomnia  ondansetron Injectable 4 milliGRAM(s) IV Push every 8 hours PRN Nausea and/or Vomiting  QUEtiapine 25 milliGRAM(s) Oral every 6 hours PRN agitation      CAPILLARY BLOOD GLUCOSE        I&O's Summary    28 Feb 2023 07:01  -  01 Mar 2023 07:00  --------------------------------------------------------  IN: 120 mL / OUT: 600 mL / NET: -480 mL        PHYSICAL EXAM:  Vital Signs Last 24 Hrs  T(C): 35.9 (01 Mar 2023 09:57), Max: 36.6 (28 Feb 2023 22:12)  T(F): 96.7 (01 Mar 2023 09:57), Max: 97.9 (28 Feb 2023 23:04)  HR: 63 (01 Mar 2023 09:57) (60 - 69)  BP: 112/80 (01 Mar 2023 09:57) (112/80 - 146/83)  BP(mean): --  RR: 18 (01 Mar 2023 09:57) (18 - 18)  SpO2: 94% (01 Mar 2023 09:57) (94% - 94%)    Parameters below as of 01 Mar 2023 09:57  Patient On (Oxygen Delivery Method): room air    CONSTITUTIONAL: Well-groomed, in no apparent distress, elderly  EYES: No conjunctival or scleral injection, non-icteric  ENMT: No external nasal lesions; no pharyngeal injection or exudates, oral mucosa with moist membranes  NECK: Trachea midline without palpable neck mass; thyroid not enlarged and non-tender  RESPIRATORY: Breathing comfortably; lungs CTA without wheeze/rhonchi/rales  CARDIOVASCULAR: +S1S2, RRR, no M/G/R; pedal pulses full and symmetric; no lower extremity edema  GASTROINTESTINAL: No palpable masses or tenderness, +BS throughout, no rebound/guarding; no hepatosplenomegaly; no hernia palpated  MUSCULOSKELETAL: no digital clubbing or cyanosis; no paraspinal tenderness; normal strength and tone of extremities  SKIN: No rashes or ulcers noted; no subcutaneous nodules or induration palpable  PSYCHIATRIC: awake, calm, but fidgeting. aaox0    LABS:                        10.9   6.49  )-----------( 220      ( 28 Feb 2023 07:02 )             35.8     02-28    141  |  101  |  12  ----------------------------<  108<H>  3.8   |  30  |  1.06    Ca    9.3      28 Feb 2023 07:02  Phos  2.9     02-28  Mg     1.8     02-28

## 2023-03-01 NOTE — PROGRESS NOTE ADULT - NSPROGADDITIONALINFOA_GEN_ALL_CORE
Plan discussed with ACP Keisha.    Sweetie Webster, DO  Available on Teams deirdre    Last spoke with wife at bedside 2 days ago. Will try to reach out to her again today.

## 2023-03-02 LAB — SARS-COV-2 RNA SPEC QL NAA+PROBE: SIGNIFICANT CHANGE UP

## 2023-03-02 PROCEDURE — 99232 SBSQ HOSP IP/OBS MODERATE 35: CPT

## 2023-03-02 PROCEDURE — 93010 ELECTROCARDIOGRAM REPORT: CPT

## 2023-03-02 RX ORDER — HALOPERIDOL DECANOATE 100 MG/ML
1 INJECTION INTRAMUSCULAR ONCE
Refills: 0 | Status: COMPLETED | OUTPATIENT
Start: 2023-03-02 | End: 2023-03-02

## 2023-03-02 RX ORDER — HALOPERIDOL DECANOATE 100 MG/ML
0.5 INJECTION INTRAMUSCULAR ONCE
Refills: 0 | Status: COMPLETED | OUTPATIENT
Start: 2023-03-02 | End: 2023-03-02

## 2023-03-02 RX ORDER — QUETIAPINE FUMARATE 200 MG/1
150 TABLET, FILM COATED ORAL
Refills: 0 | Status: DISCONTINUED | OUTPATIENT
Start: 2023-03-02 | End: 2023-03-03

## 2023-03-02 RX ORDER — QUETIAPINE FUMARATE 200 MG/1
50 TABLET, FILM COATED ORAL DAILY
Refills: 0 | Status: DISCONTINUED | OUTPATIENT
Start: 2023-03-02 | End: 2023-03-03

## 2023-03-02 RX ADMIN — Medication 1 TABLET(S): at 14:07

## 2023-03-02 RX ADMIN — CHLORHEXIDINE GLUCONATE 1 APPLICATION(S): 213 SOLUTION TOPICAL at 14:07

## 2023-03-02 RX ADMIN — APIXABAN 5 MILLIGRAM(S): 2.5 TABLET, FILM COATED ORAL at 05:58

## 2023-03-02 RX ADMIN — POLYETHYLENE GLYCOL 3350 17 GRAM(S): 17 POWDER, FOR SOLUTION ORAL at 14:07

## 2023-03-02 RX ADMIN — TAMSULOSIN HYDROCHLORIDE 0.4 MILLIGRAM(S): 0.4 CAPSULE ORAL at 21:20

## 2023-03-02 RX ADMIN — Medication 300 MILLIGRAM(S): at 14:07

## 2023-03-02 RX ADMIN — HALOPERIDOL DECANOATE 1 MILLIGRAM(S): 100 INJECTION INTRAMUSCULAR at 03:20

## 2023-03-02 RX ADMIN — QUETIAPINE FUMARATE 50 MILLIGRAM(S): 200 TABLET, FILM COATED ORAL at 08:36

## 2023-03-02 RX ADMIN — HALOPERIDOL DECANOATE 0.5 MILLIGRAM(S): 100 INJECTION INTRAMUSCULAR at 00:58

## 2023-03-02 RX ADMIN — Medication 325 MILLIGRAM(S): at 14:07

## 2023-03-02 RX ADMIN — FINASTERIDE 5 MILLIGRAM(S): 5 TABLET, FILM COATED ORAL at 14:07

## 2023-03-02 RX ADMIN — AMIODARONE HYDROCHLORIDE 200 MILLIGRAM(S): 400 TABLET ORAL at 05:57

## 2023-03-02 NOTE — CHART NOTE - NSCHARTNOTEFT_GEN_A_CORE
MEDICINE PA EPISODIC NOTE    Notified by RN about patient acutely agitated. Patient is A&O x1 at baseline, and is unable to be re-oriented. Pt yelling, trying to get out of bed, pulling at ey. Received prn seroquel 25mg two hours prior without improvement in agitation. Patient poses a risk to both themselves and staff.    Plan:  1.) Acute Agitation  - EKG (2/25/23) shows QTc: 485  - Haldol 0.5mg IVP x 1 STAT given  - Enhanced supervision  - Fall precautions  - Bed alarms in place  - Will endorse to day team in AM      Mikaela Burgos PA-C  Department of Medicine  Spectra [phone] MEDICINE PA EPISODIC NOTE    Notified by RN about patient acutely agitated. Patient is A&O x1 at baseline, and is unable to be re-oriented. Pt yelling, trying to get out of bed, pulling at ye. Received prn seroquel 25mg two hours prior without improvement in agitation. Patient poses a risk to both themselves and staff.    Plan:  1.) Acute Agitation  - EKG (2/25/23) shows QTc: 485  - Haldol 0.5mg IVP x 1 STAT given  - Enhanced supervision  - Fall precautions  - Bed alarms in place  - Will endorse to day team in AM      Mikaela Burgos PA-C  Department of Medicine  Spectra [phone]        ADDENDUM 3:35am    - pt with no improvement in agitation with haldol 0.5mg IV x 1  - pt seen smearing feces on body and face, attempting to get out of bed and yelling  - haldol 1mg IV x 1 ordered stat  - bilateral unsecured mittens placed  - c/w fall precautions and bed alarm  - discussed with RN

## 2023-03-02 NOTE — PROGRESS NOTE ADULT - NSPROGADDITIONALINFOA_GEN_ALL_CORE
Plan discussed with ACP Keisha.    Sweetie Webster, DO  Available on Teams deirdre    Spoke with wife at bedside yesterday.

## 2023-03-02 NOTE — PROGRESS NOTE ADULT - SUBJECTIVE AND OBJECTIVE BOX
[facility] Division of Hospital Medicine  Sweetie Webster DO  Available on Teams Michael    Patient is a 89y old  Male who presents with a chief complaint of Hypotension and sepsis (01 Mar 2023 14:08)      SUBJECTIVE / OVERNIGHT EVENTS: Overnight, very agitated, smearing feces on himself. Needed haldol.  ADDITIONAL REVIEW OF SYSTEMS: negative    MEDICATIONS  (STANDING):  allopurinol 300 milliGRAM(s) Oral daily  aMIOdarone    Tablet 200 milliGRAM(s) Oral daily  apixaban 5 milliGRAM(s) Oral every 12 hours  chlorhexidine 2% Cloths 1 Application(s) Topical daily  clonazePAM  Tablet 0.25 milliGRAM(s) Oral <User Schedule>  ferrous    sulfate 325 milliGRAM(s) Oral daily  finasteride 5 milliGRAM(s) Oral daily  lactobacillus acidophilus 1 Tablet(s) Oral daily  polyethylene glycol 3350 17 Gram(s) Oral daily  QUEtiapine 150 milliGRAM(s) Oral <User Schedule>  QUEtiapine 50 milliGRAM(s) Oral daily  senna 2 Tablet(s) Oral at bedtime  tamsulosin 0.4 milliGRAM(s) Oral at bedtime    MEDICATIONS  (PRN):  acetaminophen     Tablet .. 650 milliGRAM(s) Oral every 6 hours PRN Temp greater or equal to 38C (100.4F), Mild Pain (1 - 3)  melatonin 3 milliGRAM(s) Oral at bedtime PRN Insomnia  ondansetron Injectable 4 milliGRAM(s) IV Push every 8 hours PRN Nausea and/or Vomiting  QUEtiapine 25 milliGRAM(s) Oral every 6 hours PRN agitation      CAPILLARY BLOOD GLUCOSE        I&O's Summary    01 Mar 2023 07:01  -  02 Mar 2023 07:00  --------------------------------------------------------  IN: 530 mL / OUT: 175 mL / NET: 355 mL        PHYSICAL EXAM:  Vital Signs Last 24 Hrs  T(C): 36.4 (02 Mar 2023 09:51), Max: 36.4 (01 Mar 2023 16:44)  T(F): 97.6 (02 Mar 2023 09:51), Max: 97.6 (02 Mar 2023 09:51)  HR: 61 (02 Mar 2023 09:51) (61 - 71)  BP: 144/81 (02 Mar 2023 09:51) (140/75 - 169/88)  BP(mean): --  RR: 18 (02 Mar 2023 09:51) (18 - 20)  SpO2: 99% (02 Mar 2023 09:51) (92% - 99%)    Parameters below as of 02 Mar 2023 09:51  Patient On (Oxygen Delivery Method): room air    CONSTITUTIONAL: Well-groomed, in no apparent distress, elderly  EYES: No conjunctival or scleral injection, non-icteric  ENMT: No external nasal lesions; no pharyngeal injection or exudates, oral mucosa with moist membranes  NECK: Trachea midline without palpable neck mass; thyroid not enlarged and non-tender  RESPIRATORY: Breathing comfortably; lungs CTA without wheeze/rhonchi/rales  CARDIOVASCULAR: +S1S2, RRR, no M/G/R; pedal pulses full and symmetric; no lower extremity edema  GASTROINTESTINAL: No palpable masses or tenderness, +BS throughout, no rebound/guarding; no hepatosplenomegaly; no hernia palpated  MUSCULOSKELETAL: no digital clubbing or cyanosis; no paraspinal tenderness; normal strength and tone of extremities  SKIN: No rashes or ulcers noted; no subcutaneous nodules or induration palpable  PSYCHIATRIC: awake, calm, but fidgeting. aaox0

## 2023-03-02 NOTE — BH CONSULTATION LIAISON PROGRESS NOTE - NSBHFUPINTERVALHXFT_PSY_A_CORE
Psychiatry f/u requested for management of agitation.  Pt sedated on interview, unable to maintain arousal despite vigorous tactile stim, did not respond to any questions/commands.  Per primary team NP pt has been severely agitated in the evenings and then alternatively sleeping for most of the day.  Required Seroquel PRN last night.

## 2023-03-02 NOTE — BH CONSULTATION LIAISON PROGRESS NOTE - NSBHASSESSMENTFT_PSY_ALL_CORE
Pt is a 89yr old man,  for 42yrs, domiciled with wife (Lelia [phone]) and part-time HHA 2x weekly from 12pm-4pm, retired manager of a parking garage, with PPHx dementia dx in 2019 on Donepezil, no previous SA or psych admissions, no psychiatrist, no active substance abuse, with PMHx significant for BPH, afib w/ PPM, bilateral hip arthroplasty (R 12/2022, L hemiarthroplasty 1/5/2023), DVT on Eliquis, subdural hematoma s/p craniotomy, admitted for urosepsis, and psychiatry consulted for medication recommendations for agitation.

## 2023-03-02 NOTE — PROGRESS NOTE ADULT - PROBLEM SELECTOR PLAN 5
-New O2 requirement of 2L NC, intermittently  -previously received lasix for pleural effusions seen on CXR (also noted to have b/l opacities)  -negative RVP, repeat CXR appears similar to before  -prob also a component of atelectasis, however patient is not able to follow directions to use incentive spirometry  -try to mobilize OOB - PT rec SAUL

## 2023-03-03 PROCEDURE — 93010 ELECTROCARDIOGRAM REPORT: CPT

## 2023-03-03 PROCEDURE — 99232 SBSQ HOSP IP/OBS MODERATE 35: CPT

## 2023-03-03 RX ORDER — CLONAZEPAM 1 MG
0.25 TABLET ORAL
Refills: 0 | Status: DISCONTINUED | OUTPATIENT
Start: 2023-03-03 | End: 2023-03-03

## 2023-03-03 RX ORDER — QUETIAPINE FUMARATE 200 MG/1
25 TABLET, FILM COATED ORAL DAILY
Refills: 0 | Status: DISCONTINUED | OUTPATIENT
Start: 2023-03-03 | End: 2023-03-03

## 2023-03-03 RX ORDER — QUETIAPINE FUMARATE 200 MG/1
200 TABLET, FILM COATED ORAL
Refills: 0 | Status: DISCONTINUED | OUTPATIENT
Start: 2023-03-03 | End: 2023-03-06

## 2023-03-03 RX ADMIN — QUETIAPINE FUMARATE 50 MILLIGRAM(S): 200 TABLET, FILM COATED ORAL at 14:32

## 2023-03-03 RX ADMIN — QUETIAPINE FUMARATE 25 MILLIGRAM(S): 200 TABLET, FILM COATED ORAL at 21:16

## 2023-03-03 RX ADMIN — AMIODARONE HYDROCHLORIDE 200 MILLIGRAM(S): 400 TABLET ORAL at 05:36

## 2023-03-03 RX ADMIN — POLYETHYLENE GLYCOL 3350 17 GRAM(S): 17 POWDER, FOR SOLUTION ORAL at 14:32

## 2023-03-03 RX ADMIN — FINASTERIDE 5 MILLIGRAM(S): 5 TABLET, FILM COATED ORAL at 14:31

## 2023-03-03 RX ADMIN — Medication 650 MILLIGRAM(S): at 21:17

## 2023-03-03 RX ADMIN — SENNA PLUS 2 TABLET(S): 8.6 TABLET ORAL at 21:16

## 2023-03-03 RX ADMIN — TAMSULOSIN HYDROCHLORIDE 0.4 MILLIGRAM(S): 0.4 CAPSULE ORAL at 21:16

## 2023-03-03 RX ADMIN — Medication 1 TABLET(S): at 14:30

## 2023-03-03 RX ADMIN — CHLORHEXIDINE GLUCONATE 1 APPLICATION(S): 213 SOLUTION TOPICAL at 14:33

## 2023-03-03 RX ADMIN — Medication 325 MILLIGRAM(S): at 14:32

## 2023-03-03 RX ADMIN — Medication 650 MILLIGRAM(S): at 22:17

## 2023-03-03 RX ADMIN — APIXABAN 5 MILLIGRAM(S): 2.5 TABLET, FILM COATED ORAL at 17:01

## 2023-03-03 RX ADMIN — QUETIAPINE FUMARATE 200 MILLIGRAM(S): 200 TABLET, FILM COATED ORAL at 21:15

## 2023-03-03 RX ADMIN — Medication 300 MILLIGRAM(S): at 14:32

## 2023-03-03 RX ADMIN — Medication 3 MILLIGRAM(S): at 21:17

## 2023-03-03 RX ADMIN — APIXABAN 5 MILLIGRAM(S): 2.5 TABLET, FILM COATED ORAL at 05:36

## 2023-03-03 NOTE — BH CONSULTATION LIAISON PROGRESS NOTE - NSBHASSESSMENTFT_PSY_ALL_CORE
Pt is a 89yr old man,  for 42yrs, domiciled with wife (Lelia [phone]) and part-time HHA 2x weekly from 12pm-4pm, retired manager of a parking garage, with PPHx dementia dx in 2019 on Donepezil, no previous SA or psych admissions, no psychiatrist, no active substance abuse, with PMHx significant for BPH, afib w/ PPM, bilateral hip arthroplasty (R 12/2022, L hemiarthroplasty 1/5/2023), DVT on Eliquis, subdural hematoma s/p craniotomy, admitted for urosepsis, and psychiatry consulted for medication recommendations for agitation.

## 2023-03-03 NOTE — PROGRESS NOTE ADULT - SUBJECTIVE AND OBJECTIVE BOX
[facility] Division of Hospital Medicine  Sweetie Webster DO  Available on Teams Michael    Patient is a 89y old  Male who presents with a chief complaint of Hypotension and sepsis (02 Mar 2023 13:09)      SUBJECTIVE / OVERNIGHT EVENTS: no events. Patient did not need any prn seroquel over the last 24h. Patient is awake this morning.   ADDITIONAL REVIEW OF SYSTEMS: negative    MEDICATIONS  (STANDING):  allopurinol 300 milliGRAM(s) Oral daily  aMIOdarone    Tablet 200 milliGRAM(s) Oral daily  apixaban 5 milliGRAM(s) Oral every 12 hours  chlorhexidine 2% Cloths 1 Application(s) Topical daily  clonazePAM  Tablet 0.25 milliGRAM(s) Oral <User Schedule>  ferrous    sulfate 325 milliGRAM(s) Oral daily  finasteride 5 milliGRAM(s) Oral daily  lactobacillus acidophilus 1 Tablet(s) Oral daily  polyethylene glycol 3350 17 Gram(s) Oral daily  QUEtiapine 150 milliGRAM(s) Oral <User Schedule>  QUEtiapine 50 milliGRAM(s) Oral daily  senna 2 Tablet(s) Oral at bedtime  tamsulosin 0.4 milliGRAM(s) Oral at bedtime    MEDICATIONS  (PRN):  acetaminophen     Tablet .. 650 milliGRAM(s) Oral every 6 hours PRN Temp greater or equal to 38C (100.4F), Mild Pain (1 - 3)  melatonin 3 milliGRAM(s) Oral at bedtime PRN Insomnia  ondansetron Injectable 4 milliGRAM(s) IV Push every 8 hours PRN Nausea and/or Vomiting  QUEtiapine 25 milliGRAM(s) Oral every 6 hours PRN agitation      CAPILLARY BLOOD GLUCOSE        I&O's Summary    02 Mar 2023 07:01  -  03 Mar 2023 07:00  --------------------------------------------------------  IN: 400 mL / OUT: 1000 mL / NET: -600 mL        PHYSICAL EXAM:  Vital Signs Last 24 Hrs  T(C): 36.9 (03 Mar 2023 05:48), Max: 36.9 (03 Mar 2023 05:48)  T(F): 98.4 (03 Mar 2023 05:48), Max: 98.4 (03 Mar 2023 05:48)  HR: 78 (03 Mar 2023 05:48) (65 - 80)  BP: 140/90 (03 Mar 2023 05:48) (140/90 - 165/79)  BP(mean): --  RR: 16 (03 Mar 2023 05:48) (16 - 20)  SpO2: 93% (03 Mar 2023 05:48) (88% - 99%)    Parameters below as of 03 Mar 2023 05:48  Patient On (Oxygen Delivery Method): room air      CONSTITUTIONAL: Well-groomed, in no apparent distress, elderly  EYES: No conjunctival or scleral injection, non-icteric  ENMT: No external nasal lesions; no pharyngeal injection or exudates, oral mucosa with moist membranes  NECK: Trachea midline without palpable neck mass; thyroid not enlarged and non-tender  RESPIRATORY: Breathing comfortably; lungs CTA without wheeze/rhonchi/rales  CARDIOVASCULAR: +S1S2, RRR, no M/G/R; pedal pulses full and symmetric; no lower extremity edema  GASTROINTESTINAL: No palpable masses or tenderness, +BS throughout, no rebound/guarding; no hepatosplenomegaly; no hernia palpated  MUSCULOSKELETAL: no digital clubbing or cyanosis; no paraspinal tenderness; normal strength and tone of extremities  SKIN: No rashes or ulcers noted; no subcutaneous nodules or induration palpable  PSYCHIATRIC: awake, calm, but fidgeting. aaox0

## 2023-03-03 NOTE — PROGRESS NOTE ADULT - NSPROGADDITIONALINFOA_GEN_ALL_CORE
Plan discussed with ACP Aruna Webster, DO  Available on Teams deirdre    Spoke with wife at bedside 2 days ago. Will speak with her again today.    Patient is medically stable for discharge back to [facility].

## 2023-03-03 NOTE — BH CONSULTATION LIAISON PROGRESS NOTE - NSBHFUPINTERVALHXFT_PSY_A_CORE
Psychiatry f/u requested for management of agitation.  Pt sedated on interview, briefly murmurs to name but otherwise not answering questions.  Wife at bedside, very upset pt is receiving medications during the day, does not want pt to be sedated but is okay with evening medications.

## 2023-03-04 PROCEDURE — 99232 SBSQ HOSP IP/OBS MODERATE 35: CPT

## 2023-03-04 RX ADMIN — Medication 1 TABLET(S): at 11:08

## 2023-03-04 RX ADMIN — TAMSULOSIN HYDROCHLORIDE 0.4 MILLIGRAM(S): 0.4 CAPSULE ORAL at 21:25

## 2023-03-04 RX ADMIN — CHLORHEXIDINE GLUCONATE 1 APPLICATION(S): 213 SOLUTION TOPICAL at 11:14

## 2023-03-04 RX ADMIN — FINASTERIDE 5 MILLIGRAM(S): 5 TABLET, FILM COATED ORAL at 11:08

## 2023-03-04 RX ADMIN — APIXABAN 5 MILLIGRAM(S): 2.5 TABLET, FILM COATED ORAL at 17:18

## 2023-03-04 RX ADMIN — QUETIAPINE FUMARATE 200 MILLIGRAM(S): 200 TABLET, FILM COATED ORAL at 19:44

## 2023-03-04 RX ADMIN — Medication 3 MILLIGRAM(S): at 21:37

## 2023-03-04 RX ADMIN — APIXABAN 5 MILLIGRAM(S): 2.5 TABLET, FILM COATED ORAL at 06:03

## 2023-03-04 RX ADMIN — SENNA PLUS 2 TABLET(S): 8.6 TABLET ORAL at 21:25

## 2023-03-04 RX ADMIN — AMIODARONE HYDROCHLORIDE 200 MILLIGRAM(S): 400 TABLET ORAL at 06:03

## 2023-03-04 RX ADMIN — Medication 300 MILLIGRAM(S): at 11:08

## 2023-03-04 RX ADMIN — POLYETHYLENE GLYCOL 3350 17 GRAM(S): 17 POWDER, FOR SOLUTION ORAL at 11:08

## 2023-03-04 RX ADMIN — Medication 325 MILLIGRAM(S): at 11:08

## 2023-03-04 RX ADMIN — Medication 650 MILLIGRAM(S): at 21:25

## 2023-03-04 RX ADMIN — Medication 650 MILLIGRAM(S): at 22:25

## 2023-03-04 NOTE — PROGRESS NOTE ADULT - SUBJECTIVE AND OBJECTIVE BOX
[facility] Division of Hospital Medicine  Malcolm Oseguera MD  Available via MS Teams    SUBJECTIVE / OVERNIGHT EVENTS: No acute events overnight. Pt seen and examined at bedside. ROS limited by patient's underlying health conditions.     ADDITIONAL REVIEW OF SYSTEMS:    MEDICATIONS  (STANDING):  allopurinol 300 milliGRAM(s) Oral daily  aMIOdarone    Tablet 200 milliGRAM(s) Oral daily  apixaban 5 milliGRAM(s) Oral every 12 hours  chlorhexidine 2% Cloths 1 Application(s) Topical daily  ferrous    sulfate 325 milliGRAM(s) Oral daily  finasteride 5 milliGRAM(s) Oral daily  lactobacillus acidophilus 1 Tablet(s) Oral daily  polyethylene glycol 3350 17 Gram(s) Oral daily  QUEtiapine 200 milliGRAM(s) Oral <User Schedule>  senna 2 Tablet(s) Oral at bedtime  tamsulosin 0.4 milliGRAM(s) Oral at bedtime    MEDICATIONS  (PRN):  acetaminophen     Tablet .. 650 milliGRAM(s) Oral every 6 hours PRN Temp greater or equal to 38C (100.4F), Mild Pain (1 - 3)  melatonin 3 milliGRAM(s) Oral at bedtime PRN Insomnia  ondansetron Injectable 4 milliGRAM(s) IV Push every 8 hours PRN Nausea and/or Vomiting  QUEtiapine 25 milliGRAM(s) Oral every 6 hours PRN agitation      I&O's Summary    03 Mar 2023 07:01  -  04 Mar 2023 07:00  --------------------------------------------------------  IN: 0 mL / OUT: 450 mL / NET: -450 mL        T(F): 97.1 (03-04-23 @ 09:52), Max: 97.4 (03-03-23 @ 15:37)  HR: 75 (03-04-23 @ 09:52) (74 - 75)  BP: 119/65 (03-04-23 @ 09:52) (113/66 - 119/65)  RR: 16 (03-04-23 @ 09:52) (16 - 17)  SpO2: 92% (03-04-23 @ 09:52) (92% - 92%)    CONSTITUTIONAL: no distress, elderly gentleman supine in bed resting comfortably  ENMT: No external nasal lesions; no pharyngeal injection or exudates, oral mucosa with moist membranes  NECK: Trachea midline without palpable neck mass; thyroid not enlarged and non-tender  RESPIRATORY: Breathing comfortably; lungs CTA without wheeze/rhonchi/rales  CARDIOVASCULAR: +S1S2, RRR, no M/G/R; pedal pulses full and symmetric; no lower extremity edema  GASTROINTESTINAL: No palpable masses or tenderness, +BS throughout, no rebound/guarding  SKIN: No rashes or ulcers noted; no subcutaneous nodules or induration palpable  PSYCHIATRIC: AOx0, not following commands or responding    LABS:                    COVID-19 PCR: NotDetec (01 Mar 2023 19:32)  SARS-CoV-2: NotDetec (26 Feb 2023 18:10)  SARS-CoV-2: NotDetec (09 Feb 2023 10:56)  COVID-19 PCR: NotDetec (19 Jan 2023 07:19)  COVID-19 PCR: NotDetec (16 Jan 2023 21:13)  COVID-19 PCR: NotDetec (08 Jan 2023 19:25)  COVID-19 PCR: NotDetec (04 Jan 2023 17:46)  COVID-19 PCR: NotDetec (30 Dec 2022 10:12)  COVID-19 PCR: NotDetec (26 Dec 2022 18:04)  COVID-19 PCR: NotDetec (23 Dec 2022 06:19)  COVID-19 PCR: NotDetec (19 Dec 2022 22:30)      RADIOLOGY & ADDITIONAL TESTS:  New Results Reviewed Today:   New Imaging Personally Reviewed Today:  New Electrocardiogram Personally Reviewed Today:  Prior or Outpatient Records Reviewed Today:    COMMUNICATION:  Care Discussed with Consultants/Other Providers and Details of Discussion: Discussed with ACP  Discussions with Patient/Family:  PCP Communication:

## 2023-03-05 LAB — SARS-COV-2 RNA SPEC QL NAA+PROBE: SIGNIFICANT CHANGE UP

## 2023-03-05 PROCEDURE — 99232 SBSQ HOSP IP/OBS MODERATE 35: CPT

## 2023-03-05 RX ADMIN — TAMSULOSIN HYDROCHLORIDE 0.4 MILLIGRAM(S): 0.4 CAPSULE ORAL at 21:18

## 2023-03-05 RX ADMIN — APIXABAN 5 MILLIGRAM(S): 2.5 TABLET, FILM COATED ORAL at 17:14

## 2023-03-05 RX ADMIN — Medication 300 MILLIGRAM(S): at 11:08

## 2023-03-05 RX ADMIN — Medication 650 MILLIGRAM(S): at 21:18

## 2023-03-05 RX ADMIN — FINASTERIDE 5 MILLIGRAM(S): 5 TABLET, FILM COATED ORAL at 11:09

## 2023-03-05 RX ADMIN — QUETIAPINE FUMARATE 200 MILLIGRAM(S): 200 TABLET, FILM COATED ORAL at 21:17

## 2023-03-05 RX ADMIN — Medication 325 MILLIGRAM(S): at 11:08

## 2023-03-05 RX ADMIN — Medication 1 TABLET(S): at 11:08

## 2023-03-05 RX ADMIN — SENNA PLUS 2 TABLET(S): 8.6 TABLET ORAL at 21:17

## 2023-03-05 RX ADMIN — AMIODARONE HYDROCHLORIDE 200 MILLIGRAM(S): 400 TABLET ORAL at 05:34

## 2023-03-05 RX ADMIN — CHLORHEXIDINE GLUCONATE 1 APPLICATION(S): 213 SOLUTION TOPICAL at 11:10

## 2023-03-05 RX ADMIN — Medication 3 MILLIGRAM(S): at 21:25

## 2023-03-05 RX ADMIN — POLYETHYLENE GLYCOL 3350 17 GRAM(S): 17 POWDER, FOR SOLUTION ORAL at 11:08

## 2023-03-05 RX ADMIN — Medication 650 MILLIGRAM(S): at 22:18

## 2023-03-05 RX ADMIN — APIXABAN 5 MILLIGRAM(S): 2.5 TABLET, FILM COATED ORAL at 05:34

## 2023-03-05 NOTE — PROGRESS NOTE ADULT - SUBJECTIVE AND OBJECTIVE BOX
[facility] Division of Hospital Medicine  Malcolm Oseguera MD  Available via MS Teams    SUBJECTIVE / OVERNIGHT EVENTS: No acute events overnight. Pt seen and examined at bedside. ROS limited by medical condition.     ADDITIONAL REVIEW OF SYSTEMS:    MEDICATIONS  (STANDING):  allopurinol 300 milliGRAM(s) Oral daily  aMIOdarone    Tablet 200 milliGRAM(s) Oral daily  apixaban 5 milliGRAM(s) Oral every 12 hours  chlorhexidine 2% Cloths 1 Application(s) Topical daily  ferrous    sulfate 325 milliGRAM(s) Oral daily  finasteride 5 milliGRAM(s) Oral daily  lactobacillus acidophilus 1 Tablet(s) Oral daily  polyethylene glycol 3350 17 Gram(s) Oral daily  QUEtiapine 200 milliGRAM(s) Oral <User Schedule>  senna 2 Tablet(s) Oral at bedtime  tamsulosin 0.4 milliGRAM(s) Oral at bedtime    MEDICATIONS  (PRN):  acetaminophen     Tablet .. 650 milliGRAM(s) Oral every 6 hours PRN Temp greater or equal to 38C (100.4F), Mild Pain (1 - 3)  melatonin 3 milliGRAM(s) Oral at bedtime PRN Insomnia  ondansetron Injectable 4 milliGRAM(s) IV Push every 8 hours PRN Nausea and/or Vomiting      I&O's Summary    04 Mar 2023 07:01  -  05 Mar 2023 07:00  --------------------------------------------------------  IN: 0 mL / OUT: 500 mL / NET: -500 mL        T(F): 97.3 (03-05-23 @ 12:49), Max: 97.4 (03-05-23 @ 04:18)  HR: 70 (03-05-23 @ 12:49) (64 - 81)  BP: 118/81 (03-05-23 @ 12:49) (116/73 - 148/81)  RR: 18 (03-05-23 @ 12:49) (18 - 18)  SpO2: 92% (03-05-23 @ 12:49) (92% - 93%)      CONSTITUTIONAL: no distress, elderly gentleman sleeping in bed  ENMT: No external nasal lesions; no pharyngeal injection or exudates, oral mucosa with moist membranes  NECK: Trachea midline without palpable neck mass; thyroid not enlarged and non-tender  RESPIRATORY: Breathing comfortably; lungs CTA without wheeze/rhonchi/rales  CARDIOVASCULAR: +S1S2, RRR, no M/G/R; pedal pulses full and symmetric; no lower extremity edema  GASTROINTESTINAL: No palpable masses or tenderness, +BS throughout, no rebound/guarding  SKIN: No rashes or ulcers noted; no subcutaneous nodules or induration palpable  PSYCHIATRIC: AOx0, not following commands or responding    LABS:                    COVID-19 PCR: NotDetec (01 Mar 2023 19:32)  SARS-CoV-2: NotDetec (26 Feb 2023 18:10)  SARS-CoV-2: NotDetec (09 Feb 2023 10:56)  COVID-19 PCR: NotDetec (19 Jan 2023 07:19)  COVID-19 PCR: NotDetec (16 Jan 2023 21:13)  COVID-19 PCR: NotDetec (08 Jan 2023 19:25)  COVID-19 PCR: NotDetec (04 Jan 2023 17:46)  COVID-19 PCR: NotDetec (30 Dec 2022 10:12)  COVID-19 PCR: NotDetec (26 Dec 2022 18:04)  COVID-19 PCR: NotDetec (23 Dec 2022 06:19)  COVID-19 PCR: NotDetec (19 Dec 2022 22:30)      RADIOLOGY & ADDITIONAL TESTS:  New Results Reviewed Today:   New Imaging Personally Reviewed Today:  New Electrocardiogram Personally Reviewed Today:  Prior or Outpatient Records Reviewed Today:    COMMUNICATION:  Care Discussed with Consultants/Other Providers and Details of Discussion: Discussed with ACP  Discussions with Patient/Family:  PCP Communication:

## 2023-03-06 PROCEDURE — 51703 INSERT BLADDER CATH COMPLEX: CPT

## 2023-03-06 PROCEDURE — 99232 SBSQ HOSP IP/OBS MODERATE 35: CPT

## 2023-03-06 RX ORDER — CLONAZEPAM 1 MG
0.25 TABLET ORAL AT BEDTIME
Refills: 0 | Status: DISCONTINUED | OUTPATIENT
Start: 2023-03-06 | End: 2023-03-08

## 2023-03-06 RX ORDER — CLONAZEPAM 1 MG
0.5 TABLET ORAL AT BEDTIME
Refills: 0 | Status: DISCONTINUED | OUTPATIENT
Start: 2023-03-06 | End: 2023-03-06

## 2023-03-06 RX ORDER — TRAZODONE HCL 50 MG
50 TABLET ORAL
Refills: 0 | Status: DISCONTINUED | OUTPATIENT
Start: 2023-03-06 | End: 2023-03-08

## 2023-03-06 RX ORDER — QUETIAPINE FUMARATE 200 MG/1
100 TABLET, FILM COATED ORAL
Refills: 0 | Status: DISCONTINUED | OUTPATIENT
Start: 2023-03-06 | End: 2023-03-08

## 2023-03-06 RX ADMIN — Medication 325 MILLIGRAM(S): at 12:28

## 2023-03-06 RX ADMIN — AMIODARONE HYDROCHLORIDE 200 MILLIGRAM(S): 400 TABLET ORAL at 05:33

## 2023-03-06 RX ADMIN — POLYETHYLENE GLYCOL 3350 17 GRAM(S): 17 POWDER, FOR SOLUTION ORAL at 12:29

## 2023-03-06 RX ADMIN — CHLORHEXIDINE GLUCONATE 1 APPLICATION(S): 213 SOLUTION TOPICAL at 12:29

## 2023-03-06 RX ADMIN — FINASTERIDE 5 MILLIGRAM(S): 5 TABLET, FILM COATED ORAL at 12:28

## 2023-03-06 RX ADMIN — Medication 50 MILLIGRAM(S): at 22:16

## 2023-03-06 RX ADMIN — Medication 0.25 MILLIGRAM(S): at 18:47

## 2023-03-06 RX ADMIN — Medication 300 MILLIGRAM(S): at 12:28

## 2023-03-06 RX ADMIN — APIXABAN 5 MILLIGRAM(S): 2.5 TABLET, FILM COATED ORAL at 18:14

## 2023-03-06 RX ADMIN — TAMSULOSIN HYDROCHLORIDE 0.4 MILLIGRAM(S): 0.4 CAPSULE ORAL at 22:15

## 2023-03-06 RX ADMIN — SENNA PLUS 2 TABLET(S): 8.6 TABLET ORAL at 22:16

## 2023-03-06 RX ADMIN — APIXABAN 5 MILLIGRAM(S): 2.5 TABLET, FILM COATED ORAL at 05:34

## 2023-03-06 RX ADMIN — Medication 1 TABLET(S): at 12:28

## 2023-03-06 RX ADMIN — QUETIAPINE FUMARATE 100 MILLIGRAM(S): 200 TABLET, FILM COATED ORAL at 22:16

## 2023-03-06 NOTE — BH CONSULTATION LIAISON PROGRESS NOTE - NSBHFUPINTERVALHXFT_PSY_A_CORE
Psychiatry f/u requested for management of agitation.  Pt is restless and trying to climb out of bed

## 2023-03-06 NOTE — PROGRESS NOTE ADULT - SUBJECTIVE AND OBJECTIVE BOX
[facility] Division of Hospital Medicine  Sweetie Webster DO  Available on Teams Michael    Patient is a 89y old  Male who presents with a chief complaint of Hypotension and sepsis (05 Mar 2023 14:41)      SUBJECTIVE / OVERNIGHT EVENTS: none. This morning was noted to be very active. THen was sleeping peacefully an hour later.  ADDITIONAL REVIEW OF SYSTEMS: negative    MEDICATIONS  (STANDING):  allopurinol 300 milliGRAM(s) Oral daily  aMIOdarone    Tablet 200 milliGRAM(s) Oral daily  apixaban 5 milliGRAM(s) Oral every 12 hours  chlorhexidine 2% Cloths 1 Application(s) Topical daily  ferrous    sulfate 325 milliGRAM(s) Oral daily  finasteride 5 milliGRAM(s) Oral daily  lactobacillus acidophilus 1 Tablet(s) Oral daily  polyethylene glycol 3350 17 Gram(s) Oral daily  QUEtiapine 200 milliGRAM(s) Oral <User Schedule>  senna 2 Tablet(s) Oral at bedtime  tamsulosin 0.4 milliGRAM(s) Oral at bedtime    MEDICATIONS  (PRN):  acetaminophen     Tablet .. 650 milliGRAM(s) Oral every 6 hours PRN Temp greater or equal to 38C (100.4F), Mild Pain (1 - 3)  melatonin 3 milliGRAM(s) Oral at bedtime PRN Insomnia  ondansetron Injectable 4 milliGRAM(s) IV Push every 8 hours PRN Nausea and/or Vomiting      CAPILLARY BLOOD GLUCOSE        I&O's Summary    05 Mar 2023 07:01  -  06 Mar 2023 07:00  --------------------------------------------------------  IN: 0 mL / OUT: 550 mL / NET: -550 mL    06 Mar 2023 07:01  -  06 Mar 2023 11:19  --------------------------------------------------------  IN: 240 mL / OUT: 0 mL / NET: 240 mL        PHYSICAL EXAM:  Vital Signs Last 24 Hrs  T(C): 36.6 (06 Mar 2023 10:17), Max: 36.6 (06 Mar 2023 10:17)  T(F): 97.8 (06 Mar 2023 10:17), Max: 97.8 (06 Mar 2023 10:17)  HR: 70 (06 Mar 2023 10:37) (63 - 83)  BP: 118/68 (06 Mar 2023 10:37) (118/68 - 150/81)  BP(mean): --  RR: 18 (06 Mar 2023 10:17) (18 - 18)  SpO2: 92% (06 Mar 2023 10:37) (92% - 93%)    Parameters below as of 06 Mar 2023 10:37  Patient On (Oxygen Delivery Method): room air        CONSTITUTIONAL: Well-groomed, in no apparent distress, elderly  EYES: No conjunctival or scleral injection, non-icteric  ENMT: No external nasal lesions; no pharyngeal injection or exudates, oral mucosa with moist membranes  NECK: Trachea midline without palpable neck mass; thyroid not enlarged and non-tender  RESPIRATORY: Breathing comfortably; lungs CTA without wheeze/rhonchi/rales  CARDIOVASCULAR: +S1S2, RRR, no M/G/R; pedal pulses full and symmetric; no lower extremity edema  GASTROINTESTINAL: No palpable masses or tenderness, +BS throughout, no rebound/guarding; no hepatosplenomegaly; no hernia palpated  MUSCULOSKELETAL: no digital clubbing or cyanosis; no paraspinal tenderness; normal strength and tone of extremities  SKIN: No rashes or ulcers noted; no subcutaneous nodules or induration palpable  PSYCHIATRIC: calm, asleep

## 2023-03-06 NOTE — PROVIDER CONTACT NOTE (OTHER) - ASSESSMENT
A&OX1. VSS at this time. After removing current ye catheter, penis started gushing out blood. Blood is bright red with some clots noted. Pt stated he was not in any pain.
Pt. restraints continued. Pt. A&Ox0. pt. VSS except for O2 saturation that was 87-89%.
no signs of bleeding.
A&Ox1. VSS. Pt ye appears out of place. Tubing wrapped around pts foot and he is pulling on the tubing with his foot. Stat lock pulled off of leg from pulling with his foot. No external trauma noted. PA at bedside to assess and said to bladder scan patient. Bladder scan showed 667 being retained.

## 2023-03-06 NOTE — PROVIDER CONTACT NOTE (OTHER) - SITUATION
Pt getting LR@100/hr. IV infiltrated, R arm swollen. no redness or pain.
pt pulled out ye
While removing ye catheter (see previous contact note), pt penis starts to gush out large amount of blood
Pt trying to climb OOB and has Jamil catheter line wrapped around his foot. Jamil looks to be pulled out of place. Stat lock off of skin. Jamil no longer draining urine.
Pt. O2 decreased to 87-89%. Pt. has a congested cough.

## 2023-03-06 NOTE — PROCEDURE NOTE - ADDITIONAL PROCEDURE DETAILS
Using aseptic technique, area prepped in traditional sterile fashion, lidocaine urojet instilled into urethra, and 20F coude catheter placed without resistance. Clear yellow urine drained. 10cc sterile water placed into balloon and ye catheter secured with stat lock. pt tolerated procedure well. plan for ye per primary team. please page with any acute  concerns or questions.  p: [phone]

## 2023-03-06 NOTE — PROVIDER CONTACT NOTE (OTHER) - REASON
Jamil not draining/pt possibly pulled out with his foot
Pt penis bleeding s/p Jamil catheter removal
pt Iv infiltrated
Patient pulled off one port on his Jamil catheter
Pt. O2 at 87-89%. Pt. cough congested.
pt pulled ye

## 2023-03-06 NOTE — BH CONSULTATION LIAISON PROGRESS NOTE - NSBHASSESSMENTFT_PSY_ALL_CORE
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  PT is on high dose Seroquel 200mg but appears more restless.   Would recommend decreasing Seroquel to 100mg po qhs and adding  Trazodone 50mg po qhs and Clonazepam 0.25mg po qhs prn restlessness.

## 2023-03-06 NOTE — PROVIDER CONTACT NOTE (OTHER) - ACTION/TREATMENT ORDERED:
ye re-ordered. Urology called to assist in replacing ye
Jamil to be replaced next shift catheter size not on unit
Provider said to continue 2L on NC, ordered chest x-ray, pt. continued to monitor.
Take out current ye and insert new ye.
provider made aware, and assessed pt. Hold off on new IV access for 1 hour. Elevate right arm and place cold packs on site.
Urology consulted to reinsert ye. Continue to monitor patient/vital signs

## 2023-03-06 NOTE — PROVIDER CONTACT NOTE (OTHER) - DATE AND TIME:
16-Feb-2023 06:20
12-Feb-2023 09:48
12-Feb-2023 13:15
06-Mar-2023 18:00
12-Feb-2023 03:55
12-Feb-2023 13:40

## 2023-03-07 LAB
ANION GAP SERPL CALC-SCNC: 9 MMOL/L — SIGNIFICANT CHANGE UP (ref 5–17)
BUN SERPL-MCNC: 14 MG/DL — SIGNIFICANT CHANGE UP (ref 7–23)
CALCIUM SERPL-MCNC: 8.8 MG/DL — SIGNIFICANT CHANGE UP (ref 8.4–10.5)
CHLORIDE SERPL-SCNC: 105 MMOL/L — SIGNIFICANT CHANGE UP (ref 96–108)
CO2 SERPL-SCNC: 26 MMOL/L — SIGNIFICANT CHANGE UP (ref 22–31)
CREAT SERPL-MCNC: 0.96 MG/DL — SIGNIFICANT CHANGE UP (ref 0.5–1.3)
EGFR: 74 ML/MIN/1.73M2 — SIGNIFICANT CHANGE UP
GLUCOSE SERPL-MCNC: 135 MG/DL — HIGH (ref 70–99)
HCT VFR BLD CALC: 36 % — LOW (ref 39–50)
HGB BLD-MCNC: 10.8 G/DL — LOW (ref 13–17)
MAGNESIUM SERPL-MCNC: 1.6 MG/DL — SIGNIFICANT CHANGE UP (ref 1.6–2.6)
MCHC RBC-ENTMCNC: 27.8 PG — SIGNIFICANT CHANGE UP (ref 27–34)
MCHC RBC-ENTMCNC: 30 GM/DL — LOW (ref 32–36)
MCV RBC AUTO: 92.8 FL — SIGNIFICANT CHANGE UP (ref 80–100)
NRBC # BLD: 0 /100 WBCS — SIGNIFICANT CHANGE UP (ref 0–0)
PHOSPHATE SERPL-MCNC: 3.1 MG/DL — SIGNIFICANT CHANGE UP (ref 2.5–4.5)
PLATELET # BLD AUTO: 173 K/UL — SIGNIFICANT CHANGE UP (ref 150–400)
POTASSIUM SERPL-MCNC: 3.8 MMOL/L — SIGNIFICANT CHANGE UP (ref 3.5–5.3)
POTASSIUM SERPL-SCNC: 3.8 MMOL/L — SIGNIFICANT CHANGE UP (ref 3.5–5.3)
RBC # BLD: 3.88 M/UL — LOW (ref 4.2–5.8)
RBC # FLD: 16.4 % — HIGH (ref 10.3–14.5)
SODIUM SERPL-SCNC: 140 MMOL/L — SIGNIFICANT CHANGE UP (ref 135–145)
WBC # BLD: 4.5 K/UL — SIGNIFICANT CHANGE UP (ref 3.8–10.5)
WBC # FLD AUTO: 4.5 K/UL — SIGNIFICANT CHANGE UP (ref 3.8–10.5)

## 2023-03-07 PROCEDURE — 99232 SBSQ HOSP IP/OBS MODERATE 35: CPT

## 2023-03-07 RX ADMIN — Medication 1 TABLET(S): at 11:19

## 2023-03-07 RX ADMIN — TAMSULOSIN HYDROCHLORIDE 0.4 MILLIGRAM(S): 0.4 CAPSULE ORAL at 21:32

## 2023-03-07 RX ADMIN — AMIODARONE HYDROCHLORIDE 200 MILLIGRAM(S): 400 TABLET ORAL at 05:07

## 2023-03-07 RX ADMIN — POLYETHYLENE GLYCOL 3350 17 GRAM(S): 17 POWDER, FOR SOLUTION ORAL at 11:19

## 2023-03-07 RX ADMIN — Medication 325 MILLIGRAM(S): at 11:20

## 2023-03-07 RX ADMIN — QUETIAPINE FUMARATE 100 MILLIGRAM(S): 200 TABLET, FILM COATED ORAL at 18:33

## 2023-03-07 RX ADMIN — FINASTERIDE 5 MILLIGRAM(S): 5 TABLET, FILM COATED ORAL at 11:20

## 2023-03-07 RX ADMIN — Medication 50 MILLIGRAM(S): at 21:32

## 2023-03-07 RX ADMIN — CHLORHEXIDINE GLUCONATE 1 APPLICATION(S): 213 SOLUTION TOPICAL at 11:20

## 2023-03-07 RX ADMIN — SENNA PLUS 2 TABLET(S): 8.6 TABLET ORAL at 21:32

## 2023-03-07 RX ADMIN — APIXABAN 5 MILLIGRAM(S): 2.5 TABLET, FILM COATED ORAL at 18:13

## 2023-03-07 RX ADMIN — Medication 300 MILLIGRAM(S): at 11:20

## 2023-03-07 RX ADMIN — APIXABAN 5 MILLIGRAM(S): 2.5 TABLET, FILM COATED ORAL at 05:07

## 2023-03-07 NOTE — PROGRESS NOTE ADULT - PROBLEM SELECTOR PLAN 11
- Eliquis  - Needs feeding for meals  - DNR/DNI.      Dispo: to [facility] pending improvement in agitation
- Eliquis  - Needs feeding for meals  - DNR/DNI.      Dispo: to [facility] pending improvement in agitation. No longer ordered for seroquel PRN.
-Pt on Eliquis  - Needs feeding for meals  - Reassess needs for restrains pending clinical improvement  - DNR/DNI
- Eliquis  - Needs feeding for meals  - DNR/DNI.      Dispo: to [facility] pending improvement in agitation. No longer ordered for seroquel PRN.
likely related to sepsis  as above  c/w antibiotics
likely related to sepsis  as above  c/w antibiotics.
- Eliquis  - Needs feeding for meals  - DNR/DNI.
- Eliquis  - Needs feeding for meals  - DNR/DNI.      Dispo: to [facility] pending improvement in agitation
- Eliquis  - Needs feeding for meals  - DNR/DNI.
- Eliquis  - Needs feeding for meals  - DNR/DNI.      Dispo: to [facility] pending improvement in agitation
likely related to sepsis  as above  c/w antibiotics.
- Eliquis  - Needs feeding for meals  - DNR/DNI.      Dispo: to [facility] pending improvement in agitation
-Pt on Eliquis  - Needs feeding for meals  - Reassess needs for restrains pending clinical improvement  - DNR/DNI    - Updated wife 2/11.

## 2023-03-07 NOTE — PROGRESS NOTE ADULT - PROBLEM SELECTOR PROBLEM 11
Prophylactic measure
Hypotension
Prophylactic measure
Hypotension
Prophylactic measure
Hypotension
Prophylactic measure
Prophylactic measure

## 2023-03-07 NOTE — PROGRESS NOTE ADULT - PROBLEM SELECTOR PROBLEM 4
Dementia
Acute hypoxemic respiratory failure
Sepsis
Dementia
Dementia
Sepsis
Acute hypoxemic respiratory failure
Dementia
Sepsis
Dementia
Sepsis
Sepsis
Acute hypoxemic respiratory failure
Sepsis
Sepsis
Acute hypoxemic respiratory failure
Sepsis
Sepsis
Dementia
Sepsis
Dementia

## 2023-03-07 NOTE — PROGRESS NOTE ADULT - PROBLEM SELECTOR PROBLEM 3
Sepsis
Sepsis
Urinary tract infection
Hematuria
Sepsis
Urinary tract infection
Sepsis
Urinary tract infection
Urinary tract infection
Sepsis
Urinary tract infection
Sepsis
Sepsis
Urinary tract infection
Sepsis
Urinary tract infection

## 2023-03-07 NOTE — PROGRESS NOTE ADULT - PROBLEM SELECTOR PROBLEM 1
Bacteremia due to Enterococcus
Dementia
Bacteremia due to Enterococcus
Bacteremia due to Enterococcus
Dementia
Bacteremia due to Enterococcus
Dementia
Bacteremia due to Enterococcus
Dementia
Bacteremia due to Enterococcus
Dementia
Dementia
Bacteremia due to Enterococcus
Dementia

## 2023-03-07 NOTE — PROGRESS NOTE ADULT - REASON FOR ADMISSION
Hypotension and sepsis

## 2023-03-07 NOTE — PROGRESS NOTE ADULT - PROBLEM SELECTOR PLAN 4
New O2 requirement yesterday, CXR with worsening effusions, proBNP better than earlier in admission. Most c/w fluid from abx  -s/p IV lasix *2 with improvement  -now on RA resolved
Sepsis with bacteremia and UTI  - Improved  - Pending repeat Bcx (sent)  - See management as above
Sepsis with bacteremia and UTI  - resolved
MOLST form in chart noted. S/p recent hip fractures and repair. Significant fall hx  -DNR/DNI  -Wife involved with care  -Falls and aspiration precautions  -Cont. Trazodone and Seroquel(currently 50qam, 50 qhs) with hold parameters, seroquel 25mg q6 prn, repeat dosing as needed  - psych following appreciate recs  - A + O x 1 at baseline.
MOLST form in chart noted. S/p recent hip fractures and repair. Significant fall hx  -DNR/DNI  -Wife involved with care  -Falls and aspiration precautions  -Cont. Trazodone and Seroquel(currently 50qam, 50 qhs) with hold parameters, seroquel 25mg q6 prn, repeat dosing as needed. May need to increase night time medications if continues to be agitated at night  - psych following appreciate recs  - A + O x 1 at baseline.
MOLST form in chart noted. S/p recent hip fractures and repair. Significant fall hx  -DNR/DNI  -Wife involved with care  -Falls and aspiration precautions  -Cont. Trazodone and Seroquel(currently 50qam, 50 qhs) with hold parameters, seroquel 25mg q6 prn, repeat dosing as needed. May need to increase night time medications if continues to be agitated at night  - psych following appreciate recs  - A + O x 1 at baseline.
Sepsis with bacteremia and UTI  - resolved
Sepsis with bacteremia and UTI  - resolved
Sepsis with bacteremia and UTI  - Improved  - Pending repeat Bcx  - See management as above
Sepsis with bacteremia and UTI  - resolved
New O2 requirement yesterday, CXR with worsening effusions, proBNP better than earlier in admission. Most c/w fluid from abx  -s/p IV lasix *1 with improvement  -wean O2 as tolerated  -Repeat 40 IV *1 today, further dosing as needed, trend Cr
New O2 requirement yesterday, CXR with worsening effusions, proBNP better than earlier in admission. Most c/w fluid from abx  -s/p IV lasix *2 with improvement  -chart documenting 2L but on RA, resolved
New O2 requirement yesterday, CXR with worsening effusions, proBNP better than earlier in admission. Most c/w fluid from abx  -s/p IV lasix *2 with improvement  -chart documenting 2L but on RA, resolved
Sepsis with bacteremia and UTI  - resolved
MOLST form in chart noted. S/p recent hip fractures and repair. Significant fall hx  -DNR/DNI  -Wife involved with care  -Falls and aspiration precautions  -Cont. Trazodone and Seroquel(currently 25qam, 50 qhs) with hold parameters  - psych following appreciate recs  - A + O x 1, at baseline.
MOLST form in chart noted. S/p recent hip fractures and repair. Significant fall hx  -DNR/DNI  -Wife involved with care  -Falls and aspiration precautions  -Cont. Trazodone and Seroquel QHS with hold parameters  -Will cont. prior dysphagia diet, cont. to monitor.  - A + O x 1, at baseline.
Sepsis with bacteremia and UTI  - resolved
MOLST form in chart noted. S/p recent hip fractures and repair. Significant fall hx  -DNR/DNI  -Wife involved with care  -Falls and aspiration precautions  -Cont. Trazodone and Seroquel with hold parameters, responds well to seroquel 25mg am 50mg qhs  - psych recs appreciated, monitor QTc  - A + O x 1, at baseline.
MOLST form in chart noted. S/p recent hip fractures and repair. Significant fall hx  -DNR/DNI  -Wife involved with care  -Falls and aspiration precautions  -Cont. Trazodone and Seroquel(currently 50qam, 50 qhs) with hold parameters, added seroquel 25mg q6 prn  - psych following appreciate recs  - A + O x 1, at baseline.
Sepsis with bacteremia and UTI  - resolved
Sepsis with bacteremia and UTI  - resolved
MOLST form in chart noted. S/p recent hip fractures and repair. Significant fall hx  -DNR/DNI  -Wife involved with care  -Falls and aspiration precautions  -Cont. Trazodone and Seroquel with hold parameters  - psych recs appreciated, monitor QTc  -Will cont. prior dysphagia diet, cont. to monitor.  - A + O x 1, at baseline.
MOLST form in chart noted. S/p recent hip fractures and repair. Significant fall hx  -DNR/DNI  -Wife involved with care  -Falls and aspiration precautions  -Cont. Trazodone and Seroquel with hold parameters  - psych recs appreciated, monitor QTc  -Will cont. prior dysphagia diet, cont. to monitor.  - A + O x 1, at baseline.

## 2023-03-07 NOTE — PROGRESS NOTE ADULT - PROBLEM SELECTOR PLAN 2
High grade enterococcus faecalis bacteremia (3/4 bottles) on 2/9  - Ucx also (+) for enterococcus faecalis; Jamil exchanged in ED  - TTE without signs of vegetation  - started on unasyn and repeat Bcxs 2/11 ngtd  - Given improvement and quick clearance, unlikely 2/2 to endocarditis, most c/w impacted stool as source  - transitioned to ampicillin to complete 14 day course from negative culture (2/11-2/25)
Enterococcus Faecalis in Ucx, s/p abx, resolved  - Jamil exchanged in ED; will continue for BPH and urinary retention for now.
High grade enterococcus faecalis bacteremia (3/4 bottles) on 2/9  - Ucx also (+) for enterococcus faecalis; Jamil exchanged in ED  - TTE without signs of vegetation  - started on unasyn and repeat Bcxs 2/11 ngtd  - Given improvement and quick clearance, unlikely 2/2 to endocarditis, most c/w impacted stool as source  - transitioned to ampicillin to complete 14 day course from negative culture (2/11-2/25)
Enterococcus Faecalis in Ucx, s/p abx, resolved  - Jamil exchanged in ED; will continue for BPH and urinary retention for now.
High grade enterococcus faecalis bacteremia (3/4 bottles) on 2/9  - Ucx also (+) for enterococcus faecalis; Jamil exchanged in ED  - TTE without signs of vegetation  - started on unasyn and repeat Bcxs 2/11 ngtd  - Given improvement and quick clearance, unlikely 2/2 to endocarditis, most c/w impacted stool as source  - transitioned to ampicillin to complete 14 day course from negative culture (2/11-2/25)
Enterococcus Faecalis in Ucx, s/p abx, resolved  - Jamil exchanged in ED; will continue for BPH and urinary retention for now.
High grade enterococcus faecalis bacteremia (3/4 bottles) on 2/9  - Ucx also (+) for enterococcus faecalis; Jamil exchanged in ED  - TTE without signs of vegetation  - started on unasyn and repeat Bcxs 2/11 ngtd  - Given improvement and quick clearance, unlikely 2/2 to endocarditis, most c/w impacted stool as source  - transitioned to ampicillin to complete 14 day course from negative culture (2/11-2/25)
High grade enterococcus faecalis bacteremia (3/4 bottles) on 2/9  - Ucx also (+) for enterococcus faecalis; Jamil exchanged in ED  - TTE without signs of vegetation  - started on unasyn and repeat Bcxs 2/11 ngtd  - Given improvement and quick clearance, unlikely 2/2 to endocarditis, most c/w impacted stool as source  - transitioned to ampicillin to complete 14 day course from negative culture (2/11-2/25)
Enterococcus Faecalis in Ucx, s/p abx, resolved  - Jamil exchanged in ED; will continue for BPH and urinary retention for now.
High grade enterococcus faecalis bacteremia (3/4 bottles) on 2/9  - Ucx also (+) for enterococcus faecalis; Jamil exchanged in ED  - TTE without signs of vegetation  - started on unasyn and repeat Bcxs 2/11 ngtd  - Given improvement and quick clearance, unlikely 2/2 to endocarditis, most c/w impacted stool as source  - transitioned to ampicillin to complete 14 day course from negative culture (2/11-2/25)
Enterococcus Faecalis in Ucx  - Was treated outpatient for Kleb UTI, no seen in Ucx  - Per ID, changed to Unasyn to cover both bacteria  - Jamil exchanged in ED; will continue for BPH and urinary retention for now
High grade enterococcus faecalis bacteremia (3/4 bottles) on 2/9  - Ucx also (+) for enterococcus faecalis; Jamil exchanged in ED  - TTE without signs of vegetation  - started on unasyn and repeat Bcxs 2/11 ngtd  - Given improvement and quick clearance, unlikely 2/2 to endocarditis, most c/w impacted stool as source  - transitioned to ampicillin to complete 14 day course from negative culture (2/11-2/25)
Enterococcus Faecalis in Ucx  - Was treated outpatient for Kleb UTI, not seen in Ucx  - Per ID, changed to Unasyn to cover both bacteria, resolved  - Jamil exchanged in ED; will continue for BPH and urinary retention for now.
Enterococcus Faecalis in Ucx, s/p abx, resolved  - Jamil exchanged in ED; will continue for BPH and urinary retention for now.
Enterococcus Faecalis in Ucx, s/p abx, resolved  - Jamil exchanged in ED; will continue for BPH and urinary retention for now.
High grade enterococcus faecalis bacteremia (3/4 bottles) on 2/9  - Ucx also (+) for enterococcus faecalis; Jamil exchanged in ED  - TTE without signs of vegetation  - started on unasyn and repeat Bcxs 2/11 ngtd  - Given improvement and quick clearance, unlikely 2/2 to endocarditis, most c/w impacted stool as source  - transitioned to ampicillin to complete 14 day course from negative culture (2/11-2/25)
Enterococcus Faecalis in Ucx  - Was treated outpatient for Kleb UTI, not seen in Ucx  - Per ID, changed to Unasyn to cover both bacteria  - Jamil exchanged in ED; will continue for BPH and urinary retention for now.
Enterococcus Faecalis in Ucx  - Was treated outpatient for Kleb UTI, not seen in Ucx  - Per ID, changed to Unasyn to cover both bacteria  - Jamil exchanged in ED; will continue for BPH and urinary retention for now.
Resolved  - Likely i/s/o sepsis  -DNR/DNI, MOLST in chart
Enterococcus Faecalis in Ucx, resolved  - s/p abx  - Jamil exchanged in ED; will continue for BPH and urinary retention for now.
High grade enterococcus faecalis bacteremia (3/4 bottles) on 2/9  - Ucx also (+) for enterococcus faecalis; Jamil exchanged in ED  - TTE without signs of vegetation  - started on unasyn and repeat Bcxs 2/11 ngtd  - Given improvement and quick clearance, unlikely 2/2 to endocarditis, most c/w impacted stool as source  - transitioned to ampicillin to complete 14 day course from negative culture (2/11-2/25)
Enterococcus Faecalis in Ucx  - Was treated outpatient for Kleb UTI, no seen in Ucx  - Per ID, changed to Unasyn to cover both bacteria  - Jamil exchanged in ED; will continue for BPH and urinary retention for now.
High grade enterococcus faecalis bacteremia (3/4 bottles) on 2/9  - Ucx also (+) for enterococcus faecalis; Jamil exchanged in ED  - TTE without signs of vegetation  - started on unasyn and repeat Bcxs 2/11 ngtd  - Given improvement and quick clearance, unlikely 2/2 to endocarditis, most c/w impacted stool as source  - transitioned to ampicillin to complete 14 day course from negative culture (2/11-2/25)

## 2023-03-07 NOTE — PROGRESS NOTE ADULT - PROBLEM SELECTOR PLAN 3
Sepsis with bacteremia and UTI  - resolved
Enterococcus Faecalis in Ucx, s/p abx, resolved  - Jamil exchanged in ED; will continue for BPH and urinary retention for now.
Sepsis with bacteremia and UTI  - resolved
Enterococcus Faecalis in Ucx, s/p abx, resolved  - Jamil exchanged in ED; will continue for BPH and urinary retention for now.
Sepsis with bacteremia and UTI  - improving as above
Sepsis with bacteremia and UTI  - improving as above
Sepsis with bacteremia and UTI  - resolved
Enterococcus Faecalis in Ucx, s/p abx, resolved  - Jamil exchanged in ED; will continue for BPH and urinary retention for now.
Sepsis with bacteremia and UTI  - improving as above
Enterococcus Faecalis in Ucx, s/p abx, resolved  - Jamil exchanged in ED; will continue for BPH and urinary retention for now.
Likely traumatic  - Patient kicked on Ye, and Ye was found to be malfunctioning in PM 2/12  - Gross hematuria with Ye removal  - Urology replaced the ye, and cleared for continuing Eliquis  - HD stable  - If continue to have gross hematuria, will check STAT CBC
Enterococcus Faecalis in Ucx  - Was treated outpatient for Kleb UTI, no seen in Ucx  - Per ID, changed to Unasyn to cover both bacteria  - Jamil exchanged in ED; will continue for BPH and urinary retention for now
Sepsis with bacteremia and UTI  - improving, see above
Sepsis with bacteremia and UTI  - improving as above
Enterococcus Faecalis in Ucx, s/p abx, resolved  - Jamil exchanged in ED; will continue for BPH and urinary retention for now.
Sepsis with bacteremia and UTI  - Improved  - Pending repeat Bcx   - See management as above.
Sepsis with bacteremia and UTI  - Improved  - Pending repeat Bcx   - See management as above.
Enterococcus Faecalis in Ucx, s/p abx, resolved  - Jamil exchanged in ED; will continue for BPH and urinary retention for now.
Sepsis with bacteremia and UTI  - resolved
Sepsis with bacteremia and UTI  - resolved
Sepsis with bacteremia and UTI  - Improved  - Pending repeat Bcx (sent)  - See management as above.
Enterococcus Faecalis in Ucx, s/p abx, resolved  - Jamil exchanged in ED; will continue for BPH and urinary retention for now.

## 2023-03-07 NOTE — PROGRESS NOTE ADULT - NSPROGADDITIONALINFOA_GEN_ALL_CORE
Plan discussed with NP Jad Webster, DO  Available on Teams deirdre    Plan for discharge to [facility] tomorrow, 3/8, as long as he's not getting prn klonopin.

## 2023-03-07 NOTE — PROGRESS NOTE ADULT - NUTRITIONAL ASSESSMENT
This patient has been assessed with a concern for Malnutrition and has been determined to have a diagnosis/diagnoses of Severe protein-calorie malnutrition.    This patient is being managed with:   Diet Pureed-  Mildly Thick Liquids (MILDTHICKLIQS)  Entered: Feb 10 2023  3:25AM    

## 2023-03-07 NOTE — PROGRESS NOTE ADULT - PROBLEM SELECTOR PROBLEM 2
Urinary tract infection
Bacteremia due to Enterococcus
Urinary tract infection
Bacteremia due to Enterococcus
Urinary tract infection
Bacteremia due to Enterococcus
Bacteremia due to Enterococcus
Urinary tract infection
Urinary tract infection
Bacteremia due to Enterococcus
Bacteremia due to Enterococcus
Hypotension
Bacteremia due to Enterococcus
Urinary tract infection
Bacteremia due to Enterococcus
Urinary tract infection
Urinary tract infection

## 2023-03-07 NOTE — BH CONSULTATION LIAISON PROGRESS NOTE - NSBHASSESSMENTFT_PSY_ALL_CORE
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  Pt appears more comfortable and calm.  He did receive the Seroquel 100mg and Trazodone 50mg and did Not require the prn Clonazepam 0.25mg which was written for PRN anxiety/restlessness.   Continue:  Seroquel to 100mg po qhs   Trazodone 50mg po qhs and  Clonazepam 0.25mg po qhs prn restlessness.

## 2023-03-07 NOTE — CHART NOTE - NSCHARTNOTESELECT_GEN_ALL_CORE
Event Note
Event Note
Nutrition Services
increased agitation and restlessness behavior/Event Note
Event Note
Event Note
shortness of breath noted by psych/Event Note

## 2023-03-07 NOTE — BH CONSULTATION LIAISON PROGRESS NOTE - NSBHFUPINTERVALHXFT_PSY_A_CORE
Pt is with his son Lance at bedside who reports that his father is much calmer and more alert since the medication changes.  Pt appears more comfortable and calm.  He did receive the Seroquel 100mg and Trazodone 50mg and did Not require the prn Clonazepam 0.25mg which was written for PRN anxiety/restlessness.

## 2023-03-07 NOTE — PROGRESS NOTE ADULT - PROVIDER SPECIALTY LIST ADULT
Infectious Disease
Internal Medicine
Infectious Disease
Internal Medicine
Internal Medicine
Infectious Disease
Internal Medicine

## 2023-03-07 NOTE — PROGRESS NOTE ADULT - SUBJECTIVE AND OBJECTIVE BOX
[facility] Division of Hospital Medicine  Sweetie Webster DO  Available on Teams Michael    Patient is a 89y old  Male who presents with a chief complaint of Hypotension and sepsis (06 Mar 2023 11:19)      SUBJECTIVE / OVERNIGHT EVENTS: Pulled out ye catheter last night, it was replaced. This morning, was calm, asleep, but easily awoken.  ADDITIONAL REVIEW OF SYSTEMS: negative    MEDICATIONS  (STANDING):  allopurinol 300 milliGRAM(s) Oral daily  aMIOdarone    Tablet 200 milliGRAM(s) Oral daily  apixaban 5 milliGRAM(s) Oral every 12 hours  chlorhexidine 2% Cloths 1 Application(s) Topical daily  ferrous    sulfate 325 milliGRAM(s) Oral daily  finasteride 5 milliGRAM(s) Oral daily  lactobacillus acidophilus 1 Tablet(s) Oral daily  polyethylene glycol 3350 17 Gram(s) Oral daily  QUEtiapine 100 milliGRAM(s) Oral <User Schedule>  senna 2 Tablet(s) Oral at bedtime  tamsulosin 0.4 milliGRAM(s) Oral at bedtime  traZODone 50 milliGRAM(s) Oral <User Schedule>    MEDICATIONS  (PRN):  acetaminophen     Tablet .. 650 milliGRAM(s) Oral every 6 hours PRN Temp greater or equal to 38C (100.4F), Mild Pain (1 - 3)  clonazePAM  Tablet 0.25 milliGRAM(s) Oral at bedtime PRN Restlessness  melatonin 3 milliGRAM(s) Oral at bedtime PRN Insomnia  ondansetron Injectable 4 milliGRAM(s) IV Push every 8 hours PRN Nausea and/or Vomiting      CAPILLARY BLOOD GLUCOSE        I&O's Summary    06 Mar 2023 07:01  -  07 Mar 2023 07:00  --------------------------------------------------------  IN: 480 mL / OUT: 600 mL / NET: -120 mL    07 Mar 2023 07:01  -  07 Mar 2023 13:47  --------------------------------------------------------  IN: 75 mL / OUT: 0 mL / NET: 75 mL        PHYSICAL EXAM:  Vital Signs Last 24 Hrs  T(C): 36 (07 Mar 2023 12:00), Max: 97.3 (06 Mar 2023 15:23)  T(F): 96.8 (07 Mar 2023 12:00), Max: 207.1 (06 Mar 2023 15:23)  HR: 60 (07 Mar 2023 12:00) (60 - 72)  BP: 119/75 (07 Mar 2023 12:00) (119/75 - 163/88)  BP(mean): --  RR: 18 (07 Mar 2023 12:00) (16 - 18)  SpO2: 93% (07 Mar 2023 12:00) (92% - 99%)    Parameters below as of 07 Mar 2023 12:00  Patient On (Oxygen Delivery Method): room air      CONSTITUTIONAL: Well-groomed, in no apparent distress, elderly  EYES: No conjunctival or scleral injection, non-icteric  ENMT: No external nasal lesions; no pharyngeal injection or exudates, oral mucosa with moist membranes  NECK: Trachea midline without palpable neck mass; thyroid not enlarged and non-tender  RESPIRATORY: Breathing comfortably; lungs CTA without wheeze/rhonchi/rales  CARDIOVASCULAR: +S1S2, RRR, no M/G/R; pedal pulses full and symmetric; no lower extremity edema  GASTROINTESTINAL: No palpable masses or tenderness, +BS throughout, no rebound/guarding; no hepatosplenomegaly; no hernia palpated  MUSCULOSKELETAL: no digital clubbing or cyanosis; no paraspinal tenderness; normal strength and tone of extremities  SKIN: No rashes or ulcers noted; no subcutaneous nodules or induration palpable  PSYCHIATRIC: calm, asleep    LABS:                        10.8   4.50  )-----------( 173      ( 07 Mar 2023 06:46 )             36.0     03-07    140  |  105  |  14  ----------------------------<  135<H>  3.8   |  26  |  0.96    Ca    8.8      07 Mar 2023 06:48  Phos  3.1     03-07  Mg     1.6     03-07

## 2023-03-07 NOTE — PROGRESS NOTE ADULT - PROBLEM SELECTOR PLAN 1
MOLST form in chart noted. S/p recent hip fractures and repair. Significant fall hx  -DNR/DNI  -Wife involved with care  -Falls and aspiration precautions  -new psych recs: cont seroquel 50mg QD and 150mg at bedtime, continue 25mg q6 prn (patient must be off PRNs to go to [facility])  -psych following appreciate recs  -A + O x 1 at baseline.  - monitor EKG for QTc on 2/25 QTc 480s
High grade enterococcus faecalis bacteremia (3/4 bottles) on 2/9  - Ucx also (+) for enterococcus faecalis; Jamil exchanged in ED  - C/w Unasyn per ID rec  - Pending TTE  - Repeat culture in lab 2/11  - PPM site without signs of infection  - B/L hip surgical site without overt signs of infection, and patient is afebrile. Will continue to monitor, and if high suspicion for hardware infection, will consult ortho.
MOLST form in chart noted. S/p recent hip fractures and repair. Significant fall hx  -DNR/DNI  -Wife involved with care  -Falls and aspiration precautions  -new psych recs: cont seroquel 100mg QHS, plus trazodone 50 qhs  -psych following appreciate recs  -A + O x 1 at baseline.  - monitor EKG for QTc on 3/3 QTc 468
MOLST form in chart noted. S/p recent hip fractures and repair. Significant fall hx  -DNR/DNI  -Wife involved with care  -Falls and aspiration precautions  -Cont. Trazodone at 8PM  -cont seroquel to 50 TID, continue 25mg q6 prn (patient must be off PRNs to go to [facility])  -psych following appreciate recs  -A + O x 1 at baseline.  - monitor EKG for QTc on 2/25 QTc 480s
MOLST form in chart noted. S/p recent hip fractures and repair. Significant fall hx  -DNR/DNI  -Wife involved with care  -Falls and aspiration precautions  -Cont. Trazodone at 8PM  -cont seroquel to 50 TID, continue 25mg q6 prn (patient must be off PRNs to go to [facility])  -psych following appreciate recs  -A + O x 1 at baseline.  - monitor EKG for QTc on 2/25 QTc 480s  - spoke with psychiatry for f/u today - patient needs to be on standing medications only for discharge
MOLST form in chart noted. S/p recent hip fractures and repair. Significant fall hx  -DNR/DNI  -Wife involved with care  -Falls and aspiration precautions  -new psych recs: cont seroquel 50mg QD and 150mg at bedtime, continue 25mg q6 prn (patient must be off PRNs to go to [facility])  -psych following appreciate recs  -A + O x 1 at baseline.  - monitor EKG for QTc on 2/25 QTc 480s
High grade enterococcus faecalis bacteremia (3/4 bottles) on 2/9  - Ucx also (+) for enterococcus faecalis; Jamil exchanged in ED  - TTE without signs of vegetation  - started on unasyn and repeat Bcxs 2/11 ngtd  - Given improvement and quick clearance, unlikely 2/2 to endocarditis, most c/w impacted stool as source  - transitioned to ampicillin to complete 14 day course from negative culture (2/11-2/25)
High grade enterococcus faecalis bacteremia (3/4 bottles) on 2/9  - Ucx also (+) for enterococcus faecalis; Jamil exchanged in ED  - C/w Unasyn per ID rec  - Pending TTE  - Repeat culture in lab 2/11  - PPM site without signs of infection  - B/L hip surgical site without overt signs of infection, and patient is afebrile. Will continue to monitor, and if high suspicion for hardware infection, will consult ortho.
High grade enterococcus faecalis bacteremia (3/4 bottles) on 2/9  - Ucx also (+) for enterococcus faecalis; Jamil exchanged in ED  - C/w Unasyn per ID rec  - Pending TTE  - Repeat culture in lab 2/11  - PPM site without signs of infection  - B/L hip surgical site without overt signs of infection, and patient is afebrile. Will continue to monitor, and if high suspicion for hardware infection, will consult ortho.
High grade enterococcus faecalis bacteremia (3/4 bottles) on 2/9  - Ucx also (+) for enterococcus faecalis; Jamil exchanged in ED  - TTE without signs of vegetation  - started on unasyn and repeat Bcxs 2/11 ngtd  - Given improvement and quick clearance, unlikely 2/2 to endocarditis, most c/w impacted stool as source  - transitioned to ampicillin today to complete 14 day course from negative culture (2/11-2/25)
High grade enterococcus faecalis bacteremia (3/4 bottles) on 2/9  - Ucx also (+) for enterococcus faecalis; Jamil exchanged in ED  - TTE without signs of vegetation  - started on unasyn and repeat Bcxs 2/11 ngtd  - Given improvement and quick clearance, unlikely 2/2 to endocarditis, most c/w impacted stool as source  - transitioned to ampicillin to complete 14 day course from negative culture (2/11-2/25)
High grade enterococcus faecalis bacteremia (3/4 bottles) on 2/9  - Ucx also (+) for enterococcus faecalis; Jamil exchanged in ED  - C/w Unasyn   - TTE Severe right atrial enlargement, LV fxn, wnl, RV fxn reduced, unable to r/o endocarditis, ID recs noted, c/w medical therapy  - Repeat blood cultures 2/13  - PPM site without signs of infection  - B/L hip surgical site without overt signs of infection, and patient is afebrile. Will continue to monitor, and if high suspicion for hardware infection, will consider MRI/ortho consult.
MOLST form in chart noted. S/p recent hip fractures and repair. Significant fall hx  -DNR/DNI  -Wife involved with care  -Falls and aspiration precautions  -new psych recs: cont seroquel 50mg QD and 150mg at bedtime, continue 25mg q6 prn (patient must be off PRNs to go to [facility])  -psych following appreciate recs  -A + O x 1 at baseline.  - monitor EKG for QTc on 2/25 QTc 480s
Plan: High grade enterococcus faecalis bacteremia (3/4 bottles) on 2/9  - Ucx also (+) for enterococcus faecalis; Jamil exchanged in ED  - C/w Unasyn   - TTE Severe right atrial enlargement, LV fxn, wnl, RV fxn reduced, unable to r/o endocarditis, ID recs noted, c/w medical therapy  - Repeat blood cultures 2/13  - PPM site without signs of infection  - B/L hip surgical site without overt signs of infection, and patient is afebrile. Will continue to monitor, and if high suspicion for hardware infection, will consider MRI/ortho consult
High grade enterococcus faecalis bacteremia (3/4 bottles) on 2/9  - Ucx also (+) for enterococcus faecalis; Jamil exchanged in ED  - TTE without signs of vegetation  - started on unasyn and repeat Bcxs 2/11 ngtd  - Given improvement and quick clearance, unlikely 2/2 to endocarditis, most c/w impacted stool as source  - transitioned to ampicillin to complete 14 day course from negative culture (2/11-2/25)  - pending midline
High grade enterococcus faecalis bacteremia (3/4 bottles) on 2/9  - Ucx also (+) for enterococcus faecalis; Jamil exchanged in ED  - TTE without signs of vegetation  - started on unasyn and repeat Bcxs 2/11 ngtd  - Given improvement and quick clearance, unlikely 2/2 to endocarditis, most c/w impacted stool as source  - transitioned to ampicillin to complete 14 day course from negative culture (2/11-2/25)
MOLST form in chart noted. S/p recent hip fractures and repair. Significant fall hx  -DNR/DNI  -Wife involved with care  -Falls and aspiration precautions  -Cont. Trazodone at 8PM  -cont seroquel to 50 TID, continue 25mg q6 prn  -psych following appreciate recs  -A + O x 1 at baseline.  - monitor EKG for QTc on 2/25 QTc 480s
MOLST form in chart noted. S/p recent hip fractures and repair. Significant fall hx  -DNR/DNI  -Wife involved with care  -Falls and aspiration precautions  -Cont. Trazodone at 8PM  -cont seroquel to 50 at 8am and 2pm, 75mg at 6pm, continue 25mg q6 prn (patient must be off PRNs to go to [facility])  -psych following appreciate recs  -A + O x 1 at baseline.  - monitor EKG for QTc on 2/25 QTc 480s
MOLST form in chart noted. S/p recent hip fractures and repair. Significant fall hx  -DNR/DNI  -Wife involved with care  -Falls and aspiration precautions  -new psych recs: cont seroquel 50mg QD and 150mg at bedtime, continue 25mg q6 prn (patient must be off PRNs to go to [facility])  -psych following appreciate recs  -A + O x 1 at baseline.  - monitor EKG for QTc on 2/25 QTc 480s
MOLST form in chart noted. S/p recent hip fractures and repair. Significant fall hx  -DNR/DNI  -Wife involved with care  -Falls and aspiration precautions  -new psych recs: cont seroquel 200mg QHS only  -psych following appreciate recs  -A + O x 1 at baseline.  - monitor EKG for QTc on 3/3 QTc 468
High grade enterococcus faecalis bacteremia (3/4 bottles) on 2/9  - Ucx also (+) for enterococcus faecalis; Jamil exchanged in ED  - TTE without signs of vegetation  - started on unasyn and repeat Bcxs 2/11 ngtd  - Given improvement and quick clearance, unlikely 2/2 to endocarditis, most c/w impacted stool as source  - transitioned to ampicillin to complete 14 day course from negative culture (2/11-2/25)

## 2023-03-08 ENCOUNTER — TRANSCRIPTION ENCOUNTER (OUTPATIENT)
Age: 88
End: 2023-03-08

## 2023-03-08 VITALS
OXYGEN SATURATION: 97 % | DIASTOLIC BLOOD PRESSURE: 67 MMHG | RESPIRATION RATE: 18 BRPM | TEMPERATURE: 97 F | HEART RATE: 60 BPM | SYSTOLIC BLOOD PRESSURE: 129 MMHG

## 2023-03-08 PROCEDURE — 97116 GAIT TRAINING THERAPY: CPT

## 2023-03-08 PROCEDURE — 93005 ELECTROCARDIOGRAM TRACING: CPT

## 2023-03-08 PROCEDURE — C1751: CPT

## 2023-03-08 PROCEDURE — 82803 BLOOD GASES ANY COMBINATION: CPT

## 2023-03-08 PROCEDURE — 86850 RBC ANTIBODY SCREEN: CPT

## 2023-03-08 PROCEDURE — C8929: CPT

## 2023-03-08 PROCEDURE — 85730 THROMBOPLASTIN TIME PARTIAL: CPT

## 2023-03-08 PROCEDURE — 87077 CULTURE AEROBIC IDENTIFY: CPT

## 2023-03-08 PROCEDURE — 99231 SBSQ HOSP IP/OBS SF/LOW 25: CPT

## 2023-03-08 PROCEDURE — 85610 PROTHROMBIN TIME: CPT

## 2023-03-08 PROCEDURE — 83735 ASSAY OF MAGNESIUM: CPT

## 2023-03-08 PROCEDURE — 74177 CT ABD & PELVIS W/CONTRAST: CPT | Mod: MA

## 2023-03-08 PROCEDURE — 99285 EMERGENCY DEPT VISIT HI MDM: CPT

## 2023-03-08 PROCEDURE — 70450 CT HEAD/BRAIN W/O DYE: CPT | Mod: MA

## 2023-03-08 PROCEDURE — 86901 BLOOD TYPING SEROLOGIC RH(D): CPT

## 2023-03-08 PROCEDURE — 82962 GLUCOSE BLOOD TEST: CPT

## 2023-03-08 PROCEDURE — 85027 COMPLETE CBC AUTOMATED: CPT

## 2023-03-08 PROCEDURE — 71275 CT ANGIOGRAPHY CHEST: CPT | Mod: MA

## 2023-03-08 PROCEDURE — 97110 THERAPEUTIC EXERCISES: CPT

## 2023-03-08 PROCEDURE — 82435 ASSAY OF BLOOD CHLORIDE: CPT

## 2023-03-08 PROCEDURE — 36415 COLL VENOUS BLD VENIPUNCTURE: CPT

## 2023-03-08 PROCEDURE — 82728 ASSAY OF FERRITIN: CPT

## 2023-03-08 PROCEDURE — 83880 ASSAY OF NATRIURETIC PEPTIDE: CPT

## 2023-03-08 PROCEDURE — 84100 ASSAY OF PHOSPHORUS: CPT

## 2023-03-08 PROCEDURE — 82947 ASSAY GLUCOSE BLOOD QUANT: CPT

## 2023-03-08 PROCEDURE — 84132 ASSAY OF SERUM POTASSIUM: CPT

## 2023-03-08 PROCEDURE — P9045: CPT

## 2023-03-08 PROCEDURE — 96375 TX/PRO/DX INJ NEW DRUG ADDON: CPT

## 2023-03-08 PROCEDURE — 87086 URINE CULTURE/COLONY COUNT: CPT

## 2023-03-08 PROCEDURE — 87040 BLOOD CULTURE FOR BACTERIA: CPT

## 2023-03-08 PROCEDURE — 84295 ASSAY OF SERUM SODIUM: CPT

## 2023-03-08 PROCEDURE — 0225U NFCT DS DNA&RNA 21 SARSCOV2: CPT

## 2023-03-08 PROCEDURE — 76604 US EXAM CHEST: CPT

## 2023-03-08 PROCEDURE — 84484 ASSAY OF TROPONIN QUANT: CPT

## 2023-03-08 PROCEDURE — 82330 ASSAY OF CALCIUM: CPT

## 2023-03-08 PROCEDURE — 85018 HEMOGLOBIN: CPT

## 2023-03-08 PROCEDURE — 99239 HOSP IP/OBS DSCHRG MGMT >30: CPT

## 2023-03-08 PROCEDURE — 80048 BASIC METABOLIC PNL TOTAL CA: CPT

## 2023-03-08 PROCEDURE — 83550 IRON BINDING TEST: CPT

## 2023-03-08 PROCEDURE — 83540 ASSAY OF IRON: CPT

## 2023-03-08 PROCEDURE — U0003: CPT

## 2023-03-08 PROCEDURE — 87186 SC STD MICRODIL/AGAR DIL: CPT

## 2023-03-08 PROCEDURE — 85025 COMPLETE CBC W/AUTO DIFF WBC: CPT

## 2023-03-08 PROCEDURE — 82553 CREATINE MB FRACTION: CPT

## 2023-03-08 PROCEDURE — 87150 DNA/RNA AMPLIFIED PROBE: CPT

## 2023-03-08 PROCEDURE — 36569 INSJ PICC 5 YR+ W/O IMAGING: CPT

## 2023-03-08 PROCEDURE — U0005: CPT

## 2023-03-08 PROCEDURE — 96374 THER/PROPH/DIAG INJ IV PUSH: CPT

## 2023-03-08 PROCEDURE — 93308 TTE F-UP OR LMTD: CPT

## 2023-03-08 PROCEDURE — 97162 PT EVAL MOD COMPLEX 30 MIN: CPT

## 2023-03-08 PROCEDURE — 71045 X-RAY EXAM CHEST 1 VIEW: CPT

## 2023-03-08 PROCEDURE — 87635 SARS-COV-2 COVID-19 AMP PRB: CPT

## 2023-03-08 PROCEDURE — 81001 URINALYSIS AUTO W/SCOPE: CPT

## 2023-03-08 PROCEDURE — 83605 ASSAY OF LACTIC ACID: CPT

## 2023-03-08 PROCEDURE — 85014 HEMATOCRIT: CPT

## 2023-03-08 PROCEDURE — 86900 BLOOD TYPING SEROLOGIC ABO: CPT

## 2023-03-08 PROCEDURE — 80053 COMPREHEN METABOLIC PANEL: CPT

## 2023-03-08 RX ORDER — FINASTERIDE 5 MG/1
1 TABLET, FILM COATED ORAL
Qty: 0 | Refills: 0 | DISCHARGE
Start: 2023-03-08

## 2023-03-08 RX ORDER — TAMSULOSIN HYDROCHLORIDE 0.4 MG/1
1 CAPSULE ORAL
Qty: 0 | Refills: 0 | DISCHARGE
Start: 2023-03-08

## 2023-03-08 RX ORDER — FERROUS SULFATE 325(65) MG
1 TABLET ORAL
Qty: 0 | Refills: 0 | DISCHARGE
Start: 2023-03-08

## 2023-03-08 RX ORDER — TRAZODONE HCL 50 MG
1 TABLET ORAL
Qty: 0 | Refills: 0 | DISCHARGE
Start: 2023-03-08

## 2023-03-08 RX ORDER — APIXABAN 2.5 MG/1
1 TABLET, FILM COATED ORAL
Qty: 0 | Refills: 0 | DISCHARGE
Start: 2023-03-08

## 2023-03-08 RX ORDER — CLONAZEPAM 1 MG
0.25 TABLET ORAL
Qty: 0 | Refills: 0 | DISCHARGE
Start: 2023-03-08

## 2023-03-08 RX ORDER — LANOLIN ALCOHOL/MO/W.PET/CERES
1 CREAM (GRAM) TOPICAL
Qty: 0 | Refills: 0 | DISCHARGE
Start: 2023-03-08

## 2023-03-08 RX ORDER — QUETIAPINE FUMARATE 200 MG/1
1 TABLET, FILM COATED ORAL
Qty: 0 | Refills: 0 | DISCHARGE
Start: 2023-03-08

## 2023-03-08 RX ADMIN — AMIODARONE HYDROCHLORIDE 200 MILLIGRAM(S): 400 TABLET ORAL at 05:08

## 2023-03-08 RX ADMIN — Medication 300 MILLIGRAM(S): at 11:45

## 2023-03-08 RX ADMIN — FINASTERIDE 5 MILLIGRAM(S): 5 TABLET, FILM COATED ORAL at 11:45

## 2023-03-08 RX ADMIN — POLYETHYLENE GLYCOL 3350 17 GRAM(S): 17 POWDER, FOR SOLUTION ORAL at 11:45

## 2023-03-08 RX ADMIN — CHLORHEXIDINE GLUCONATE 1 APPLICATION(S): 213 SOLUTION TOPICAL at 11:45

## 2023-03-08 RX ADMIN — Medication 325 MILLIGRAM(S): at 11:45

## 2023-03-08 RX ADMIN — APIXABAN 5 MILLIGRAM(S): 2.5 TABLET, FILM COATED ORAL at 05:09

## 2023-03-08 RX ADMIN — Medication 1 TABLET(S): at 11:45

## 2023-03-08 NOTE — BH CONSULTATION LIAISON PROGRESS NOTE - NSICDXBHSECONDARYDX_PSY_ALL_CORE
Suspected pulmonary embolism   [id number]  Suspected deep vein thrombosis (DVT)   [id number]  Urinary tract infection   N39.0  Dementia   F03.90  
Suspected pulmonary embolism   [id number]  Suspected deep vein thrombosis (DVT)   [id number]  Urinary tract infection   N39.0  Dementia   F03.90  
Suspected pulmonary embolism   [id number]  Suspected deep vein thrombosis (DVT)   [id number]  Urinary tract infection   N39.0  Dementia   F03.90  
Suspected pulmonary embolism   [id number]  Suspected deep vein thrombosis (DVT)   [id number]  Urinary tract infection   N39.0  Dementia   F03.90  
Suspected pulmonary embolism   [id number]  Suspected deep vein thrombosis (DVT)   [id number]  Urinary tract infection   N39.0  Dementia   F03.90  
Suspected pulmonary embolism   [id number]  Suspected deep vein thrombosis (DVT)   [id number]  Urinary tract infection   N39.0  Dementia   F03.90  
Suspected pulmonary embolism   [id number]  Suspected deep vein thrombosis (DVT)   999694714  Urinary tract infection   N39.0  Dementia   F03.90  
Suspected pulmonary embolism   [id number]  Suspected deep vein thrombosis (DVT)   285308462  Urinary tract infection   N39.0  Dementia   F03.90

## 2023-03-08 NOTE — BH CONSULTATION LIAISON PROGRESS NOTE - NSBHMSEBEHAV_PSY_A_CORE
Cooperative
Cooperative
Uncooperative
Uncooperative
Unable to assess
Uncooperative
Uncooperative
Cooperative
Unable to assess
Uncooperative

## 2023-03-08 NOTE — BH CONSULTATION LIAISON PROGRESS NOTE - NSBHMSELANG_PSY_A_CORE
No abnormalities noted/Unable to assess
Unable to assess
No abnormalities noted/Unable to assess
Unable to assess
No abnormalities noted/Unable to assess
Unable to assess
No abnormalities noted

## 2023-03-08 NOTE — DISCHARGE NOTE PROVIDER - HOSPITAL COURSE
89M w/ pmhx of dementia (A+O x 1 baseline), afib on eliquis, s/p PPM, recent b/l hip arthroplasty s/p fall, c/b LE DVTs, LUE DVT on eliquis, ye since last hospitalization, bph, gout, subdural hematoma s/p craniotomy p/w hypotension, sepsis and UTI. Currently improved. Course c/b agitation.  Patient was seen by infectious disease for high grade bacteremia due to enterococcus and completed 14 days of Intravenous antibiotics ( Ampicillin) Patient had a TTE and no vegetation shown. During hospitalization , patient was severely agitated and required a psychiatry evaluation, for behavioral disturbance. His mood and behavior has improved and no longer required prn doses of clonazepam[khadar or Seroquel. Patient needs assistance with feeds.

## 2023-03-08 NOTE — BH CONSULTATION LIAISON PROGRESS NOTE - NSBHMSETHTCONTENT_PSY_A_CORE
Preoccupations
Unremarkable
Unable to assess
Unremarkable
Unable to assess
Unremarkable

## 2023-03-08 NOTE — BH CONSULTATION LIAISON PROGRESS NOTE - NSBHFUPREASONCONS_PSY_A_CORE
agitation/med management
agitation
agitation/med management
agitation
agitation/med management
agitation/med management
agitation
agitation/med management
agitation/med management

## 2023-03-08 NOTE — BH CONSULTATION LIAISON PROGRESS NOTE - NSBHMSEAFFRANGE_PSY_A_CORE
Blunted
Constricted
Labile
Blunted
Unable to assess
Unable to assess
Constricted
Constricted

## 2023-03-08 NOTE — DISCHARGE NOTE PROVIDER - NSDCMRMEDTOKEN_GEN_ALL_CORE_FT
acetaminophen 325 mg oral tablet: 3 tabs orally every 8 hours As Needed for Mild Pain  Acidophilus oral capsule: 1 cap(s) orally once a day  allopurinol 300 mg oral tablet: 1 tab(s) orally once a day  amiodarone 200 mg oral tablet: 1 tab(s) orally once a day  apixaban 5 mg oral tablet: 1 tab(s) orally every 12 hours  clonazePAM: 0.25 milligram(s) orally once a day, As Needed for restless  ferrous sulfate 325 mg (65 mg elemental iron) oral tablet: 1 tab(s) orally once a day  finasteride 5 mg oral tablet: 1 tab(s) orally once a day  melatonin 3 mg oral tablet: 1 tab(s) orally once a day (at bedtime), As needed, Insomnia  polyethylene glycol 3350 oral powder for reconstitution: 17 gram(s) orally once a day  QUEtiapine 100 mg oral tablet: 1 tab(s) orally once a day at 7:30 pm  senna leaf extract oral tablet: 2 tab(s) orally once a day (at bedtime)  tamsulosin 0.4 mg oral capsule: 1 cap(s) orally once a day (at bedtime)  traZODone 50 mg oral tablet: 1 tab(s) orally once a day at 8:30pm

## 2023-03-08 NOTE — BH CONSULTATION LIAISON PROGRESS NOTE - NSBHPSYCHOLCOGORIENT_PSY_A_CORE
Not fully oriented...
Unable to assess
Not fully oriented...
Unable to assess
Not fully oriented...

## 2023-03-08 NOTE — BH CONSULTATION LIAISON PROGRESS NOTE - NSBHMSEKNOW_PSY_A_CORE
Unable to assess
Impaired
Impaired
Normal
Normal
Unable to assess
Normal
Normal
Impaired
Normal

## 2023-03-08 NOTE — BH CONSULTATION LIAISON PROGRESS NOTE - LEVEL OF CONSCIOUSNESS
Lethargic, arousable to verbal stimulus
Alert
Alert
Lethargic, arousable to verbal stimulus
Lethargic, arousable to painful stimulus
Lethargic, arousable to verbal stimulus
Alert
Lethargic, arousable to verbal stimulus
Alert
Lethargic, arousable to painful stimulus
Alert
Alert

## 2023-03-08 NOTE — BH CONSULTATION LIAISON PROGRESS NOTE - NSBHMSEIMPULSE_PSY_A_CORE
Impaired
Unable to assess
Unable to assess
Impaired
Normal
Impaired

## 2023-03-08 NOTE — BH CONSULTATION LIAISON PROGRESS NOTE - NSBHPSYCHOLCOGABN_PSY_A_CORE
disoriented to time/disoriented to place/disoriented to situation
disoriented to time/disoriented to place/disoriented to person/disoriented to situation
disoriented to time/disoriented to place/disoriented to situation
disoriented to time/disoriented to situation
disoriented to time/disoriented to place/disoriented to situation
disoriented to time/disoriented to place/disoriented to person/disoriented to situation
disoriented to time/disoriented to place/disoriented to person/disoriented to situation
disoriented to time/disoriented to place/disoriented to situation

## 2023-03-08 NOTE — BH CONSULTATION LIAISON PROGRESS NOTE - NSBHMSEMOVE_PSY_A_CORE
No abnormal movements
Unable to assess
No abnormal movements
Other/Unable to assess
Unable to assess
No abnormal movements

## 2023-03-08 NOTE — DISCHARGE NOTE PROVIDER - NSDCCPCAREPLAN_GEN_ALL_CORE_FT
PRINCIPAL DISCHARGE DIAGNOSIS  Diagnosis: Acute UTI  Assessment and Plan of Treatment: completed 14 days of ampicillin, c/w ye and BPH meds . Please follow up with urology after rehab with Dr Hoening   HOME CARE INSTRUCTIONS  f you were prescribed antibiotics, take them exactly as your caregiver instructs you. Finish the medication even if you feel better after you have only taken some of the medication.  Drink enough water and fluids to keep your urine clear or pale yellow.  Avoid caffeine, tea, and carbonated beverages. They tend to irritate your bladder.  Empty your bladder often. Avoid holding urine for long periods of time.  Empty your bladder before and after sexual intercourse.  After a bowel movement, women should cleanse from front to back. Use each tissue only once.  SEEK MEDICAL CARE IF:  You have back pain.  You develop a fever.  Your symptoms do not begin to resolve within 3 days.  SEEK IMMEDIATE MEDICAL CARE IF:  You have severe back pain or lower abdominal pain.  You develop chills.  You have nausea or vomiting.  You have continued burning or discomfort with urination.        SECONDARY DISCHARGE DIAGNOSES  Diagnosis: BPH with urinary obstruction  Assessment and Plan of Treatment: ye, finisteride and flomax, please follow up with urology outpatient    Diagnosis: Bacteremia due to Enterococcus  Assessment and Plan of Treatment: completed 14 days of ampicillin now wbc improved .    Diagnosis: Dementia  Assessment and Plan of Treatment: supportive care    Diagnosis: Deep vein thrombosis (DVT)  Assessment and Plan of Treatment: Take your "blood thinners" as prescribed.  Walking is encouraged, increase activity as tolerated.  If you develop new leg pain, swelling, and/or redness contact your healthcare provider.  If you develop new chest pain with difficulty breathing, a rapid heart rate and/or a feeling of passing out call emergency medical services 911.      Diagnosis: Chronic atrial fibrillation  Assessment and Plan of Treatment: Atrial fibrillation is the most common heart rhythm problem.  The condition puts you at risk for has stroke and heart attack  You were started on blood thinners to prevent possible clot and stroke complications.   Monitor for any signs of bleeding and avoid injury while on this medication  If any bleeding persistent and symptomatic stop the medication and notify your doctor immediately.  It helps if you control your blood pressure, not drink more than 1-2 alcohol drinks per day, cut down on caffeine, getting treatment for over active thyroid gland, and get regular exercise  Call your doctor if you feel your heart racing or beating unusually, chest tightness or pain, lightheaded, faint, shortness of breath especially with exercise  It is important to take your heart medication as prescribed  You may be on anticoagulation which is very important to take as directed - you may need blood work to monitor drug levels      Diagnosis: Dementia  Assessment and Plan of Treatment: patient on seroquel and trazadone with klonopin. Patient must follow up with psych for maintenance and monitoring    Diagnosis: Hypotension  Assessment and Plan of Treatment: resolved

## 2023-03-08 NOTE — BH CONSULTATION LIAISON PROGRESS NOTE - NSBHMSEBODY_PSY_A_CORE
Average build
Overweight
Overweight
Average build
Overweight
Average build
Overweight

## 2023-03-08 NOTE — BH CONSULTATION LIAISON PROGRESS NOTE - NSBHADMITIPOBS_PSY_A_CORE
Routine observation
Enhanced supervision
Routine observation
Enhanced supervision
Routine observation
Enhanced supervision
Routine observation
Routine observation
Enhanced supervision
Enhanced supervision

## 2023-03-08 NOTE — BH CONSULTATION LIAISON PROGRESS NOTE - NSBHMSERECMEM_PSY_A_CORE
Impaired
Unable to assess
Impaired
Unable to assess
Impaired

## 2023-03-08 NOTE — BH CONSULTATION LIAISON PROGRESS NOTE - NSBHMSEMOOD_PSY_A_CORE
Depressed
Irritable/Unable to assess
Depressed
Depressed
Irritable/Unable to assess
Depressed
Unable to assess
Unable to assess
Depressed
Depressed
Statement Selected
Irritable/Unable to assess
Normal

## 2023-03-08 NOTE — BH CONSULTATION LIAISON PROGRESS NOTE - NSBHMSEINTELL_PSY_A_CORE
Unable to assess
Average
Average
Unable to assess
Average

## 2023-03-08 NOTE — BH CONSULTATION LIAISON PROGRESS NOTE - NSBHCONSULTFOLLOWAFTERCARE_PSY_A_CORE FT
as per medical team.  Pt may have f/u at the rehab with the consulting psychiatrist there. 
OP psych f/u at [facility]- [phone]  [facility] clinic- [phone]
as per medical team.  Pt may have f/u at the rehab with the consulting psychiatrist there. 
OP psych f/u at [facility]- [phone]  [facility] clinic- [phone]
OP psych f/u at [facility]- [phone]  [facility] clinic- [phone]
OP psych f/u at [facility]- [phone]  [facility] clinic- [phone]
as per medical team.
OP psych f/u at [facility]- [phone]  [facility] clinic- [phone]

## 2023-03-08 NOTE — BH CONSULTATION LIAISON PROGRESS NOTE - NSBHMSEGAIT_PSY_A_CORE
Unable to assess
Abnormal gait / station
Unable to assess

## 2023-03-08 NOTE — DISCHARGE NOTE PROVIDER - CARE PROVIDER_API CALL
Psychaitry,   at rehab  Phone: (   )    -  Fax: (   )    -  Follow Up Time: 1-3 days   Psychaitry,   at rehab  Phone: (   )    -  Fax: (   )    -  Follow Up Time: 1-3 days    Hoenig, David M (MD)  Urology  [facility]- Dept. of Urology, [address] [place] [address]  Phone: [phone]  Fax: [phone]  Follow Up Time: 1 week

## 2023-03-08 NOTE — BH CONSULTATION LIAISON PROGRESS NOTE - NSBHATTESTBILLING_PSY_A_CORE
Billing in another system
06801-Wslycjjnbu OBS or IP - low complexity OR 25-34 mins
Billing in another system
21207-Ydsqvafspj OBS or IP - low complexity OR 25-34 mins
28944-Grbedkaohd OBS or IP - low complexity OR 25-34 mins
06569-Ykspyrleof OBS or IP - moderate complexity OR 35-49 mins
Billing in another system
54302-Lwrtnqdwue OBS or IP - low complexity OR 25-34 mins
Billing in another system
Billing in another system
67568-Vyzjlzyfds OBS or IP - low complexity OR 25-34 mins
40931-Gejnffzmes OBS or IP - low complexity OR 25-34 mins

## 2023-03-08 NOTE — BH CONSULTATION LIAISON PROGRESS NOTE - NSBHMSESPEECH_PSY_A_CORE
Non-verbal: unable to assess speech further
Abnormal as indicated, otherwise normal...
Normal volume, rate, productivity, spontaneity and articulation
Abnormal as indicated, otherwise normal...
Non-verbal: unable to assess speech further
Abnormal as indicated, otherwise normal...
Abnormal as indicated, otherwise normal...

## 2023-03-08 NOTE — DISCHARGE NOTE PROVIDER - ATTENDING DISCHARGE PHYSICAL EXAMINATION:
Patient seen and examined. He was fidgeting a bit, though was redirectable. Stable for discharge.     CONSTITUTIONAL: Well-groomed, in no apparent distress, elderly  EYES: No conjunctival or scleral injection, non-icteric  ENMT: No external nasal lesions; no pharyngeal injection or exudates, oral mucosa with moist membranes  NECK: Trachea midline without palpable neck mass; thyroid not enlarged and non-tender  RESPIRATORY: Breathing comfortably; lungs CTA without wheeze/rhonchi/rales  CARDIOVASCULAR: +S1S2, RRR, +systolic murmur; pedal pulses full and symmetric; no lower extremity edema  GASTROINTESTINAL: No palpable masses or tenderness, +BS throughout, no rebound/guarding; no hepatosplenomegaly; no hernia palpated  : +ye  MUSCULOSKELETAL: no digital clubbing or cyanosis; no paraspinal tenderness; normal strength and tone of extremities  SKIN: No rashes or ulcers noted; no subcutaneous nodules or induration palpable  PSYCHIATRIC: calm, aaox0

## 2023-03-08 NOTE — BH CONSULTATION LIAISON PROGRESS NOTE - NSICDXBHPRIMARYDX_PSY_ALL_CORE
Dementia   F03.90  
Dementia   F03.90  
Mixed origin delirium   F05  
Dementia   F03.90  
Mixed origin delirium   F05  
Delirium secondary to multiple medical problems   F05  
Delirium secondary to multiple medical problems   F05  
Dementia   F03.90

## 2023-03-08 NOTE — BH CONSULTATION LIAISON PROGRESS NOTE - NSBHMSEREMMEM_PSY_A_CORE
Impaired
Unable to assess
Impaired
Normal
Unable to assess
Impaired
Impaired

## 2023-03-08 NOTE — BH CONSULTATION LIAISON PROGRESS NOTE - NSBHMSETHTPROC_PSY_A_CORE
Disorganized
Unable to assess
Disorganized
Unable to assess

## 2023-03-08 NOTE — BH CONSULTATION LIAISON PROGRESS NOTE - NSBHMSEKNOWHOW_PSY_ALL_CORE
Vocabulary
Current Events/Vocabulary
Vocabulary
Vocabulary

## 2023-03-08 NOTE — BH CONSULTATION LIAISON PROGRESS NOTE - NSBHMSETHTASSOC_PSY_A_CORE
Unable to assess
Unable to assess
Loose
Unable to assess
Loose
Unable to assess
Unable to assess

## 2023-03-08 NOTE — BH CONSULTATION LIAISON PROGRESS NOTE - NSBHMSEATTEN_PSY_A_CORE
Impaired
Unable to assess
Unable to assess
Impaired

## 2023-03-08 NOTE — DISCHARGE NOTE NURSING/CASE MANAGEMENT/SOCIAL WORK - NSDCPEFALRISK_GEN_ALL_CORE
For information on Fall & Injury Prevention, visit: [email or web address] OR  [email or web address] OR  https://www.cdc.gov/steadi/patient.html

## 2023-03-08 NOTE — BH CONSULTATION LIAISON PROGRESS NOTE - NSBHCONSULTRECOMMENDOTHER_PSY_A_CORE FT
Consolidate medications as follows:    Seroquel 200mg PO qhS    monitor EKG for QTc<500    C-[facility] psychiatry will continue to follow
    monitor EKG for QTc<500    C-[facility] psychiatry will continue to follow
rec cont current meds, can inc seroquel to 75mg po qhs
Consolidate medications as follows:    Seroquel 50mg PO daily (hold if sedated) and 150mg PO qhS    monitor EKG for QTc<500    C-[facility] psychiatry will continue to follow

## 2023-03-08 NOTE — DISCHARGE NOTE PROVIDER - CARE PROVIDERS DIRECT ADDRESSES
,DirectAddress_Unknown ,DirectAddress_Unknown,[email or web address]

## 2023-03-08 NOTE — BH CONSULTATION LIAISON PROGRESS NOTE - NSICDXBHTERTIARYDX_PSY_ALL_CORE
R/O Dementia   F03.90  

## 2023-03-08 NOTE — BH CONSULTATION LIAISON PROGRESS NOTE - NSBHMSEMUSCLE_PSY_A_CORE
Unable to assess
Normal muscle tone/strength

## 2023-03-08 NOTE — BH CONSULTATION LIAISON PROGRESS NOTE - NSBHMSEPERCEPT_PSY_A_CORE
No abnormalities
Unable to assess
Unable to assess
No abnormalities
Unable to assess
Unable to assess
No abnormalities
Unable to assess
No abnormalities
No abnormalities

## 2023-03-08 NOTE — BH CONSULTATION LIAISON PROGRESS NOTE - NSBHPTASSESSDT_PSY_A_CORE
23-Feb-2023 12:32
02-Mar-2023 12:56
28-Feb-2023 16:21
26-Feb-2023 11:44
06-Mar-2023 18:18
03-Mar-2023 15:35
17-Feb-2023 16:13
08-Mar-2023 12:54
25-Feb-2023 17:54
07-Mar-2023 15:40
24-Feb-2023 17:39
21-Feb-2023 09:47

## 2023-03-08 NOTE — DISCHARGE NOTE NURSING/CASE MANAGEMENT/SOCIAL WORK - NSDCVIVACCINE_GEN_ALL_CORE_FT
Tdap; 19-Dec-2022 20:14; Kacie Rosado (RN); [employer]; [id number] (Exp. Date: 08-Dec-2024); IntraMuscular; Deltoid Left.; 0.5 milliLiter(s); VIS (VIS Published: 09-May-2013, VIS Presented: 19-Dec-2022);

## 2023-03-08 NOTE — BH CONSULTATION LIAISON PROGRESS NOTE - NSBHASSESSMENTFT_PSY_ALL_CORE
Pt is a 89yr old man,  for 42yrs, domiciled with wife (Lelia [phone]) and part-time [employer] 2x weekly from 12pm-4pm, retired manager of a parking garage, with PPHx dementia dx in 2019 on Donepezil, no previous SA or psych admissions, no psychiatrist, no active substance abuse, with PMHx significant for BPH, afib w/ PPM, bilateral hip arthroplasty (R 12/2022, L hemiarthroplasty 1/5/2023), DVT on Eliquis, subdural hematoma s/p craniotomy, admitted for urosepsis, and psychiatry consulted for medication recommendations for agitation.  Pt appears more comfortable and calm.  He did receive the Seroquel 100mg and Trazodone 50mg and did Not require the prn Clonazepam 0.25mg which was written for PRN anxiety/restlessness.   Continue:  Seroquel to 100mg po qhs   Trazodone 50mg po qhs and  Clonazepam 0.25mg po qhs prn restlessness.

## 2023-03-08 NOTE — BH CONSULTATION LIAISON PROGRESS NOTE - NSBHMSEAFFQUAL_PSY_A_CORE
Euthymic
Depressed
Unable to assess
Depressed
Anxious
Depressed
Depressed
Irritable
Unable to assess
Depressed
Depressed
Euthymic

## 2023-03-08 NOTE — BH CONSULTATION LIAISON PROGRESS NOTE - CURRENT MEDICATION
MEDICATIONS  (STANDING):  allopurinol 300 milliGRAM(s) Oral daily  aMIOdarone    Tablet 200 milliGRAM(s) Oral daily  apixaban 5 milliGRAM(s) Oral every 12 hours  chlorhexidine 2% Cloths 1 Application(s) Topical daily  ferrous    sulfate 325 milliGRAM(s) Oral daily  finasteride 5 milliGRAM(s) Oral daily  lactobacillus acidophilus 1 Tablet(s) Oral daily  polyethylene glycol 3350 17 Gram(s) Oral daily  QUEtiapine 200 milliGRAM(s) Oral <User Schedule>  senna 2 Tablet(s) Oral at bedtime  tamsulosin 0.4 milliGRAM(s) Oral at bedtime    MEDICATIONS  (PRN):  acetaminophen     Tablet .. 650 milliGRAM(s) Oral every 6 hours PRN Temp greater or equal to 38C (100.4F), Mild Pain (1 - 3)  melatonin 3 milliGRAM(s) Oral at bedtime PRN Insomnia  ondansetron Injectable 4 milliGRAM(s) IV Push every 8 hours PRN Nausea and/or Vomiting  
MEDICATIONS  (STANDING):  allopurinol 300 milliGRAM(s) Oral daily  aMIOdarone    Tablet 200 milliGRAM(s) Oral daily  ampicillin  IVPB 12 Gram(s) IV Intermittent every 24 hours  apixaban 5 milliGRAM(s) Oral every 12 hours  chlorhexidine 2% Cloths 1 Application(s) Topical daily  ferrous    sulfate 325 milliGRAM(s) Oral daily  finasteride 5 milliGRAM(s) Oral daily  lactobacillus acidophilus 1 Tablet(s) Oral daily  polyethylene glycol 3350 17 Gram(s) Oral daily  QUEtiapine 50 milliGRAM(s) Oral <User Schedule>  senna 2 Tablet(s) Oral at bedtime  tamsulosin 0.4 milliGRAM(s) Oral at bedtime  traZODone 50 milliGRAM(s) Oral at bedtime    MEDICATIONS  (PRN):  acetaminophen     Tablet .. 650 milliGRAM(s) Oral every 6 hours PRN Temp greater or equal to 38C (100.4F), Mild Pain (1 - 3)  melatonin 3 milliGRAM(s) Oral at bedtime PRN Insomnia  ondansetron Injectable 4 milliGRAM(s) IV Push every 8 hours PRN Nausea and/or Vomiting  QUEtiapine 25 milliGRAM(s) Oral every 6 hours PRN agitation  
MEDICATIONS  (STANDING):  allopurinol 300 milliGRAM(s) Oral daily  aMIOdarone    Tablet 200 milliGRAM(s) Oral daily  apixaban 5 milliGRAM(s) Oral every 12 hours  chlorhexidine 2% Cloths 1 Application(s) Topical daily  clonazePAM  Tablet 0.25 milliGRAM(s) Oral <User Schedule>  ferrous    sulfate 325 milliGRAM(s) Oral daily  finasteride 5 milliGRAM(s) Oral daily  lactobacillus acidophilus 1 Tablet(s) Oral daily  polyethylene glycol 3350 17 Gram(s) Oral daily  QUEtiapine 50 milliGRAM(s) Oral <User Schedule>  senna 2 Tablet(s) Oral at bedtime  tamsulosin 0.4 milliGRAM(s) Oral at bedtime  traZODone 50 milliGRAM(s) Oral <User Schedule>    MEDICATIONS  (PRN):  acetaminophen     Tablet .. 650 milliGRAM(s) Oral every 6 hours PRN Temp greater or equal to 38C (100.4F), Mild Pain (1 - 3)  melatonin 3 milliGRAM(s) Oral at bedtime PRN Insomnia  ondansetron Injectable 4 milliGRAM(s) IV Push every 8 hours PRN Nausea and/or Vomiting  QUEtiapine 25 milliGRAM(s) Oral every 6 hours PRN agitation  
MEDICATIONS  (STANDING):  allopurinol 300 milliGRAM(s) Oral daily  aMIOdarone    Tablet 200 milliGRAM(s) Oral daily  apixaban 5 milliGRAM(s) Oral every 12 hours  chlorhexidine 2% Cloths 1 Application(s) Topical daily  clonazePAM  Tablet 0.25 milliGRAM(s) Oral <User Schedule>  ferrous    sulfate 325 milliGRAM(s) Oral daily  finasteride 5 milliGRAM(s) Oral daily  lactobacillus acidophilus 1 Tablet(s) Oral daily  polyethylene glycol 3350 17 Gram(s) Oral daily  QUEtiapine 50 milliGRAM(s) Oral <User Schedule>  senna 2 Tablet(s) Oral at bedtime  tamsulosin 0.4 milliGRAM(s) Oral at bedtime  traZODone 50 milliGRAM(s) Oral <User Schedule>    MEDICATIONS  (PRN):  acetaminophen     Tablet .. 650 milliGRAM(s) Oral every 6 hours PRN Temp greater or equal to 38C (100.4F), Mild Pain (1 - 3)  melatonin 3 milliGRAM(s) Oral at bedtime PRN Insomnia  ondansetron Injectable 4 milliGRAM(s) IV Push every 8 hours PRN Nausea and/or Vomiting  QUEtiapine 25 milliGRAM(s) Oral every 6 hours PRN agitation  
MEDICATIONS  (STANDING):  allopurinol 300 milliGRAM(s) Oral daily  aMIOdarone    Tablet 200 milliGRAM(s) Oral daily  apixaban 5 milliGRAM(s) Oral every 12 hours  chlorhexidine 2% Cloths 1 Application(s) Topical daily  ferrous    sulfate 325 milliGRAM(s) Oral daily  finasteride 5 milliGRAM(s) Oral daily  lactobacillus acidophilus 1 Tablet(s) Oral daily  polyethylene glycol 3350 17 Gram(s) Oral daily  QUEtiapine 100 milliGRAM(s) Oral <User Schedule>  senna 2 Tablet(s) Oral at bedtime  tamsulosin 0.4 milliGRAM(s) Oral at bedtime  traZODone 50 milliGRAM(s) Oral <User Schedule>    MEDICATIONS  (PRN):  acetaminophen     Tablet .. 650 milliGRAM(s) Oral every 6 hours PRN Temp greater or equal to 38C (100.4F), Mild Pain (1 - 3)  clonazePAM  Tablet 0.25 milliGRAM(s) Oral at bedtime PRN Restlessness  melatonin 3 milliGRAM(s) Oral at bedtime PRN Insomnia  ondansetron Injectable 4 milliGRAM(s) IV Push every 8 hours PRN Nausea and/or Vomiting  
MEDICATIONS  (STANDING):  allopurinol 300 milliGRAM(s) Oral daily  aMIOdarone    Tablet 200 milliGRAM(s) Oral daily  apixaban 5 milliGRAM(s) Oral every 12 hours  chlorhexidine 2% Cloths 1 Application(s) Topical daily  clonazePAM  Tablet 0.25 milliGRAM(s) Oral <User Schedule>  ferrous    sulfate 325 milliGRAM(s) Oral daily  finasteride 5 milliGRAM(s) Oral daily  lactobacillus acidophilus 1 Tablet(s) Oral daily  polyethylene glycol 3350 17 Gram(s) Oral daily  QUEtiapine 50 milliGRAM(s) Oral <User Schedule>  QUEtiapine 75 milliGRAM(s) Oral <User Schedule>  senna 2 Tablet(s) Oral at bedtime  tamsulosin 0.4 milliGRAM(s) Oral at bedtime  traZODone 50 milliGRAM(s) Oral <User Schedule>    MEDICATIONS  (PRN):  acetaminophen     Tablet .. 650 milliGRAM(s) Oral every 6 hours PRN Temp greater or equal to 38C (100.4F), Mild Pain (1 - 3)  melatonin 3 milliGRAM(s) Oral at bedtime PRN Insomnia  ondansetron Injectable 4 milliGRAM(s) IV Push every 8 hours PRN Nausea and/or Vomiting  QUEtiapine 25 milliGRAM(s) Oral every 6 hours PRN agitation  
MEDICATIONS  (STANDING):  allopurinol 300 milliGRAM(s) Oral daily  aMIOdarone    Tablet 200 milliGRAM(s) Oral daily  ampicillin  IVPB 12 Gram(s) IV Intermittent every 24 hours  apixaban 5 milliGRAM(s) Oral every 12 hours  chlorhexidine 2% Cloths 1 Application(s) Topical daily  ferrous    sulfate 325 milliGRAM(s) Oral daily  finasteride 5 milliGRAM(s) Oral daily  lactobacillus acidophilus 1 Tablet(s) Oral daily  polyethylene glycol 3350 17 Gram(s) Oral daily  QUEtiapine 50 milliGRAM(s) Oral <User Schedule>  senna 2 Tablet(s) Oral at bedtime  tamsulosin 0.4 milliGRAM(s) Oral at bedtime  traZODone 50 milliGRAM(s) Oral at bedtime    MEDICATIONS  (PRN):  acetaminophen     Tablet .. 650 milliGRAM(s) Oral every 6 hours PRN Temp greater or equal to 38C (100.4F), Mild Pain (1 - 3)  melatonin 3 milliGRAM(s) Oral at bedtime PRN Insomnia  ondansetron Injectable 4 milliGRAM(s) IV Push every 8 hours PRN Nausea and/or Vomiting  QUEtiapine 25 milliGRAM(s) Oral every 6 hours PRN agitation  
MEDICATIONS  (STANDING):  allopurinol 300 milliGRAM(s) Oral daily  aMIOdarone    Tablet 200 milliGRAM(s) Oral daily  ampicillin  IVPB 12 Gram(s) IV Intermittent every 24 hours  chlorhexidine 2% Cloths 1 Application(s) Topical daily  ferrous    sulfate 325 milliGRAM(s) Oral daily  lactobacillus acidophilus 1 Tablet(s) Oral daily  polyethylene glycol 3350 17 Gram(s) Oral daily  QUEtiapine 25 milliGRAM(s) Oral daily  QUEtiapine 50 milliGRAM(s) Oral at bedtime  senna 2 Tablet(s) Oral at bedtime  tamsulosin 0.4 milliGRAM(s) Oral at bedtime  traZODone 50 milliGRAM(s) Oral at bedtime    MEDICATIONS  (PRN):  acetaminophen     Tablet .. 650 milliGRAM(s) Oral every 6 hours PRN Temp greater or equal to 38C (100.4F), Mild Pain (1 - 3)  melatonin 3 milliGRAM(s) Oral at bedtime PRN Insomnia  ondansetron Injectable 4 milliGRAM(s) IV Push every 8 hours PRN Nausea and/or Vomiting  
MEDICATIONS  (STANDING):  allopurinol 300 milliGRAM(s) Oral daily  aMIOdarone    Tablet 200 milliGRAM(s) Oral daily  apixaban 5 milliGRAM(s) Oral every 12 hours  chlorhexidine 2% Cloths 1 Application(s) Topical daily  clonazePAM  Tablet 0.25 milliGRAM(s) Oral <User Schedule>  ferrous    sulfate 325 milliGRAM(s) Oral daily  finasteride 5 milliGRAM(s) Oral daily  lactobacillus acidophilus 1 Tablet(s) Oral daily  polyethylene glycol 3350 17 Gram(s) Oral daily  QUEtiapine 50 milliGRAM(s) Oral <User Schedule>  senna 2 Tablet(s) Oral at bedtime  tamsulosin 0.4 milliGRAM(s) Oral at bedtime  traZODone 50 milliGRAM(s) Oral <User Schedule>    MEDICATIONS  (PRN):  acetaminophen     Tablet .. 650 milliGRAM(s) Oral every 6 hours PRN Temp greater or equal to 38C (100.4F), Mild Pain (1 - 3)  melatonin 3 milliGRAM(s) Oral at bedtime PRN Insomnia  ondansetron Injectable 4 milliGRAM(s) IV Push every 8 hours PRN Nausea and/or Vomiting  QUEtiapine 25 milliGRAM(s) Oral every 6 hours PRN agitation  
MEDICATIONS  (STANDING):  allopurinol 300 milliGRAM(s) Oral daily  aMIOdarone    Tablet 200 milliGRAM(s) Oral daily  apixaban 5 milliGRAM(s) Oral every 12 hours  chlorhexidine 2% Cloths 1 Application(s) Topical daily  clonazePAM  Tablet 0.25 milliGRAM(s) Oral <User Schedule>  ferrous    sulfate 325 milliGRAM(s) Oral daily  finasteride 5 milliGRAM(s) Oral daily  lactobacillus acidophilus 1 Tablet(s) Oral daily  polyethylene glycol 3350 17 Gram(s) Oral daily  QUEtiapine 150 milliGRAM(s) Oral <User Schedule>  senna 2 Tablet(s) Oral at bedtime  tamsulosin 0.4 milliGRAM(s) Oral at bedtime    MEDICATIONS  (PRN):  acetaminophen     Tablet .. 650 milliGRAM(s) Oral every 6 hours PRN Temp greater or equal to 38C (100.4F), Mild Pain (1 - 3)  melatonin 3 milliGRAM(s) Oral at bedtime PRN Insomnia  ondansetron Injectable 4 milliGRAM(s) IV Push every 8 hours PRN Nausea and/or Vomiting  QUEtiapine 25 milliGRAM(s) Oral every 6 hours PRN agitation  
MEDICATIONS  (STANDING):  allopurinol 300 milliGRAM(s) Oral daily  aMIOdarone    Tablet 200 milliGRAM(s) Oral daily  apixaban 5 milliGRAM(s) Oral every 12 hours  chlorhexidine 2% Cloths 1 Application(s) Topical daily  ferrous    sulfate 325 milliGRAM(s) Oral daily  finasteride 5 milliGRAM(s) Oral daily  lactobacillus acidophilus 1 Tablet(s) Oral daily  polyethylene glycol 3350 17 Gram(s) Oral daily  QUEtiapine 100 milliGRAM(s) Oral <User Schedule>  senna 2 Tablet(s) Oral at bedtime  tamsulosin 0.4 milliGRAM(s) Oral at bedtime  traZODone 50 milliGRAM(s) Oral <User Schedule>    MEDICATIONS  (PRN):  acetaminophen     Tablet .. 650 milliGRAM(s) Oral every 6 hours PRN Temp greater or equal to 38C (100.4F), Mild Pain (1 - 3)  clonazePAM  Tablet 0.25 milliGRAM(s) Oral at bedtime PRN Restlessness  melatonin 3 milliGRAM(s) Oral at bedtime PRN Insomnia  ondansetron Injectable 4 milliGRAM(s) IV Push every 8 hours PRN Nausea and/or Vomiting  
MEDICATIONS  (STANDING):  allopurinol 300 milliGRAM(s) Oral daily  aMIOdarone    Tablet 200 milliGRAM(s) Oral daily  ampicillin  IVPB 12 Gram(s) IV Intermittent every 24 hours  apixaban 5 milliGRAM(s) Oral every 12 hours  chlorhexidine 2% Cloths 1 Application(s) Topical daily  clonazePAM  Tablet 0.25 milliGRAM(s) Oral <User Schedule>  ferrous    sulfate 325 milliGRAM(s) Oral daily  finasteride 5 milliGRAM(s) Oral daily  lactobacillus acidophilus 1 Tablet(s) Oral daily  polyethylene glycol 3350 17 Gram(s) Oral daily  QUEtiapine 50 milliGRAM(s) Oral every 8 hours  senna 2 Tablet(s) Oral at bedtime  tamsulosin 0.4 milliGRAM(s) Oral at bedtime  traZODone 50 milliGRAM(s) Oral at bedtime    MEDICATIONS  (PRN):  acetaminophen     Tablet .. 650 milliGRAM(s) Oral every 6 hours PRN Temp greater or equal to 38C (100.4F), Mild Pain (1 - 3)  melatonin 3 milliGRAM(s) Oral at bedtime PRN Insomnia  ondansetron Injectable 4 milliGRAM(s) IV Push every 8 hours PRN Nausea and/or Vomiting  QUEtiapine 25 milliGRAM(s) Oral every 6 hours PRN agitation

## 2023-03-08 NOTE — BH CONSULTATION LIAISON PROGRESS NOTE - NSBHMSESPABN_PSY_A_CORE
Decreased productivity
Soft volume/Increased productivity
Soft volume/Slowed rate
Soft volume/Increased productivity
Decreased productivity
Soft volume/Slowed rate
Increased productivity
Soft volume/Slowed rate
Soft volume/Increased productivity

## 2023-03-08 NOTE — BH CONSULTATION LIAISON PROGRESS NOTE - NSBHMSEAFFCONG_PSY_A_CORE
Congruent
Unable to assess
Congruent
Unable to assess
Congruent

## 2023-03-08 NOTE — DISCHARGE NOTE PROVIDER - PROVIDER TOKENS
FREE:[LAST:[Psychaitry],PHONE:[(   )    -],FAX:[(   )    -],ADDRESS:[at rehab],FOLLOWUP:[1-3 days]] FREE:[LAST:[Psychaitry],PHONE:[(   )    -],FAX:[(   )    -],ADDRESS:[at rehab],FOLLOWUP:[1-3 days]],PROVIDER:[TOKEN:[[id number]],FOLLOWUP:[1 week]]

## 2023-03-08 NOTE — BH CONSULTATION LIAISON PROGRESS NOTE - NSBHFUPINTERVALCCFT_PSY_A_CORE
Pt is slightly restless but able to be distracted with his TV.  Pt has not required any additional PRN medications.

## 2023-03-08 NOTE — DISCHARGE NOTE NURSING/CASE MANAGEMENT/SOCIAL WORK - PATIENT PORTAL LINK FT
You can access the FollowMyHealth Patient Portal offered by [facility] by registering at the following website: [email or web address]. By joining [facility]’s FollowMyHealth portal, you will also be able to view your health information using other applications (apps) compatible with our system.

## 2023-03-08 NOTE — BH CONSULTATION LIAISON PROGRESS NOTE - NSBHCHARTREVIEWVS_PSY_A_CORE FT
Vital Signs Last 24 Hrs  T(C): 36.4 (28 Feb 2023 13:31), Max: 36.4 (28 Feb 2023 13:31)  T(F): 97.6 (28 Feb 2023 13:31), Max: 97.6 (28 Feb 2023 13:31)  HR: 66 (28 Feb 2023 13:31) (66 - 69)  BP: 152/80 (28 Feb 2023 13:31) (143/68 - 152/80)  BP(mean): --  RR: 18 (28 Feb 2023 13:31) (18 - 18)  SpO2: 94% (28 Feb 2023 13:31) (92% - 94%)    Parameters below as of 28 Feb 2023 13:31  Patient On (Oxygen Delivery Method): room air    
Vital Signs Last 24 Hrs  T(C): 36.6 (17 Feb 2023 10:14), Max: 36.9 (16 Feb 2023 22:00)  T(F): 97.8 (17 Feb 2023 10:14), Max: 98.5 (16 Feb 2023 22:00)  HR: 90 (17 Feb 2023 10:14) (60 - 90)  BP: 137/75 (17 Feb 2023 10:14) (133/80 - 155/88)  BP(mean): --  RR: 18 (17 Feb 2023 10:14) (18 - 18)  SpO2: 98% (17 Feb 2023 10:14) (90% - 98%)    Parameters below as of 17 Feb 2023 10:14  Patient On (Oxygen Delivery Method): room air    
Vital Signs Last 24 Hrs  T(C): 36.6 (07 Mar 2023 15:38), Max: 36.6 (07 Mar 2023 15:38)  T(F): 97.8 (07 Mar 2023 15:38), Max: 97.8 (07 Mar 2023 15:38)  HR: 62 (07 Mar 2023 15:38) (60 - 72)  BP: 123/73 (07 Mar 2023 15:38) (119/75 - 163/88)  BP(mean): --  RR: 18 (07 Mar 2023 15:38) (18 - 18)  SpO2: 93% (07 Mar 2023 15:38) (93% - 99%)    Parameters below as of 07 Mar 2023 15:38  Patient On (Oxygen Delivery Method): room air    
Vital Signs Last 24 Hrs  T(C): 36.5 (26 Feb 2023 10:02), Max: 36.8 (25 Feb 2023 23:01)  T(F): 97.7 (26 Feb 2023 10:02), Max: 98.2 (25 Feb 2023 23:01)  HR: 71 (26 Feb 2023 10:02) (71 - 91)  BP: 108/68 (26 Feb 2023 10:02) (108/68 - 148/91)  BP(mean): --  RR: 18 (26 Feb 2023 10:02) (18 - 18)  SpO2: 94% (26 Feb 2023 10:02) (90% - 95%)    Parameters below as of 26 Feb 2023 10:02  Patient On (Oxygen Delivery Method): nasal cannula    
Vital Signs Last 24 Hrs  T(C): 97.3 (06 Mar 2023 15:23), Max: 97.3 (06 Mar 2023 15:23)  T(F): 207.1 (06 Mar 2023 15:23), Max: 207.1 (06 Mar 2023 15:23)  HR: 72 (06 Mar 2023 15:23) (63 - 83)  BP: 130/75 (06 Mar 2023 15:23) (118/68 - 150/81)  BP(mean): --  RR: 16 (06 Mar 2023 15:23) (16 - 18)  SpO2: 92% (06 Mar 2023 15:23) (92% - 93%)    Parameters below as of 06 Mar 2023 15:23  Patient On (Oxygen Delivery Method): room air    
Vital Signs Last 24 Hrs  T(C): 36.4 (02 Mar 2023 09:51), Max: 36.4 (01 Mar 2023 16:44)  T(F): 97.6 (02 Mar 2023 09:51), Max: 97.6 (02 Mar 2023 09:51)  HR: 61 (02 Mar 2023 09:51) (61 - 71)  BP: 144/81 (02 Mar 2023 09:51) (140/75 - 169/88)  BP(mean): --  RR: 18 (02 Mar 2023 09:51) (18 - 20)  SpO2: 99% (02 Mar 2023 09:51) (92% - 99%)    Parameters below as of 02 Mar 2023 09:51  Patient On (Oxygen Delivery Method): room air    
Vital Signs Last 24 Hrs  T(C): 36.7 (24 Feb 2023 15:52), Max: 36.7 (23 Feb 2023 23:05)  T(F): 98.1 (24 Feb 2023 15:52), Max: 98.1 (23 Feb 2023 23:05)  HR: 73 (24 Feb 2023 15:52) (60 - 80)  BP: 140/86 (24 Feb 2023 15:52) (140/86 - 167/79)  BP(mean): --  RR: 18 (24 Feb 2023 15:52) (18 - 18)  SpO2: 95% (24 Feb 2023 15:52) (93% - 100%)    Parameters below as of 24 Feb 2023 15:52  Patient On (Oxygen Delivery Method): nasal cannula  O2 Flow (L/min): 2  
Vital Signs Last 24 Hrs  T(C): 36.2 (08 Mar 2023 10:02), Max: 36.7 (08 Mar 2023 05:16)  T(F): 97.1 (08 Mar 2023 10:02), Max: 98 (08 Mar 2023 05:16)  HR: 67 (08 Mar 2023 10:02) (62 - 76)  BP: 128/76 (08 Mar 2023 10:02) (123/73 - 139/81)  BP(mean): --  RR: 18 (08 Mar 2023 10:02) (18 - 18)  SpO2: 93% (08 Mar 2023 10:02) (93% - 98%)    Parameters below as of 08 Mar 2023 10:02  Patient On (Oxygen Delivery Method): room air    
Vital Signs Last 24 Hrs  T(C): 36.8 (23 Feb 2023 10:26), Max: 36.8 (23 Feb 2023 10:26)  T(F): 98.2 (23 Feb 2023 10:26), Max: 98.2 (23 Feb 2023 10:26)  HR: 75 (23 Feb 2023 10:26) (60 - 77)  BP: 122/70 (23 Feb 2023 10:26) (122/70 - 157/78)  BP(mean): --  RR: 19 (23 Feb 2023 10:26) (18 - 19)  SpO2: 98% (23 Feb 2023 10:26) (98% - 99%)    Parameters below as of 23 Feb 2023 10:26  Patient On (Oxygen Delivery Method): nasal cannula  O2 Flow (L/min): 2  
Vital Signs Last 24 Hrs  T(C): 36.6 (21 Feb 2023 08:04), Max: 36.6 (21 Feb 2023 08:04)  T(F): 97.9 (21 Feb 2023 08:04), Max: 97.9 (21 Feb 2023 08:04)  HR: 62 (21 Feb 2023 08:04) (60 - 69)  BP: 163/88 (21 Feb 2023 08:04) (126/79 - 167/84)  BP(mean): --  RR: 18 (21 Feb 2023 08:04) (18 - 18)  SpO2: 93% (21 Feb 2023 08:04) (90% - 95%)    Parameters below as of 21 Feb 2023 08:04  Patient On (Oxygen Delivery Method): room air    
Vital Signs Last 24 Hrs  T(C): 36.2 (25 Feb 2023 17:43), Max: 36.6 (24 Feb 2023 22:45)  T(F): 97.2 (25 Feb 2023 17:43), Max: 97.9 (24 Feb 2023 22:45)  HR: 77 (25 Feb 2023 17:43) (65 - 77)  BP: 143/70 (25 Feb 2023 17:43) (131/80 - 150/81)  BP(mean): --  RR: 18 (25 Feb 2023 17:43) (18 - 18)  SpO2: 95% (25 Feb 2023 17:43) (94% - 98%)    Parameters below as of 25 Feb 2023 17:43  Patient On (Oxygen Delivery Method): nasal cannula    
Vital Signs Last 24 Hrs  T(C): 36.9 (03 Mar 2023 05:48), Max: 36.9 (03 Mar 2023 05:48)  T(F): 98.4 (03 Mar 2023 05:48), Max: 98.4 (03 Mar 2023 05:48)  HR: 78 (03 Mar 2023 05:48) (78 - 80)  BP: 140/90 (03 Mar 2023 05:48) (140/90 - 165/79)  BP(mean): --  RR: 16 (03 Mar 2023 05:48) (16 - 20)  SpO2: 93% (03 Mar 2023 05:48) (88% - 95%)    Parameters below as of 03 Mar 2023 05:48  Patient On (Oxygen Delivery Method): room air

## 2023-03-08 NOTE — BH CONSULTATION LIAISON PROGRESS NOTE - NSBHCHARTREVIEWINVESTIGATE_PSY_A_CORE FT
< from: 12 Lead ECG (02.25.23 @ 10:38) >      Ventricular Rate 63 BPM    Atrial Rate 288 BPM    QRS Duration 166 ms    Q-T Interval 474 ms    QTC Calculation(Bazett) 485 ms    R Axis -6 degrees    T Axis 239 degrees    Diagnosis Line vent paced  WHEN COMPARED WITH ECG OF 23-FEB-2023 17:39,  no  Confirmed by KASEY PINEDA MD [id number]) on 2/26/2023 1:31:02 PM    < end of copied text >    
< from: 12 Lead ECG (02.25.23 @ 10:38) >      Ventricular Rate 63 BPM    Atrial Rate 288 BPM    QRS Duration 166 ms    Q-T Interval 474 ms    QTC Calculation(Bazett) 485 ms    R Axis -6 degrees    T Axis 239 degrees    Diagnosis Line vent paced  WHEN COMPARED WITH ECG OF 23-FEB-2023 17:39,  no  Confirmed by KASEY PINEDA MD [id number]) on 2/26/2023 1:31:02 PM    < end of copied text >    
< from: 12 Lead ECG (02.25.23 @ 10:38) >      Ventricular Rate 63 BPM    Atrial Rate 288 BPM    QRS Duration 166 ms    Q-T Interval 474 ms    QTC Calculation(Bazett) 485 ms    R Axis -6 degrees    T Axis 239 degrees    Diagnosis Line vent paced  WHEN COMPARED WITH ECG OF 23-FEB-2023 17:39,  no  Confirmed by KASEY PINEDA MD ([id number]) on 2/26/2023 1:31:02 PM    < end of copied text >    
< from: 12 Lead ECG (02.25.23 @ 10:38) >      Ventricular Rate 63 BPM    Atrial Rate 288 BPM    QRS Duration 166 ms    Q-T Interval 474 ms    QTC Calculation(Bazett) 485 ms    R Axis -6 degrees    T Axis 239 degrees    Diagnosis Line vent paced  WHEN COMPARED WITH ECG OF 23-FEB-2023 17:39,  no  Confirmed by KASEY PINEDA MD ([id number]) on 2/26/2023 1:31:02 PM    < end of copied text >    
< from: 12 Lead ECG (02.25.23 @ 10:38) >      Ventricular Rate 63 BPM    Atrial Rate 288 BPM    QRS Duration 166 ms    Q-T Interval 474 ms    QTC Calculation(Bazett) 485 ms    R Axis -6 degrees    T Axis 239 degrees    Diagnosis Line vent paced  WHEN COMPARED WITH ECG OF 23-FEB-2023 17:39,  no  Confirmed by KASEY PINEDA MD ([id number]) on 2/26/2023 1:31:02 PM    < end of copied text >

## 2023-03-24 ENCOUNTER — EMERGENCY (EMERGENCY)
Facility: HOSPITAL | Age: 88
LOS: 1 days | Discharge: HOME | End: 2023-03-24
Attending: STUDENT IN AN ORGANIZED HEALTH CARE EDUCATION/TRAINING PROGRAM
Payer: MEDICARE

## 2023-03-24 VITALS
OXYGEN SATURATION: 95 % | HEART RATE: 79 BPM | SYSTOLIC BLOOD PRESSURE: 144 MMHG | RESPIRATION RATE: 18 BRPM | DIASTOLIC BLOOD PRESSURE: 69 MMHG

## 2023-03-24 VITALS
TEMPERATURE: 98 F | HEART RATE: 81 BPM | SYSTOLIC BLOOD PRESSURE: 145 MMHG | RESPIRATION RATE: 18 BRPM | OXYGEN SATURATION: 96 % | HEIGHT: 68 IN | DIASTOLIC BLOOD PRESSURE: 90 MMHG

## 2023-03-24 LAB
APPEARANCE UR: ABNORMAL
BACTERIA # UR AUTO: NEGATIVE — SIGNIFICANT CHANGE UP
BILIRUB UR-MCNC: NEGATIVE — SIGNIFICANT CHANGE UP
COLOR SPEC: ABNORMAL
DIFF PNL FLD: ABNORMAL
EPI CELLS # UR: 0 /HPF — SIGNIFICANT CHANGE UP
GLUCOSE UR QL: NEGATIVE — SIGNIFICANT CHANGE UP
HYALINE CASTS # UR AUTO: 0 /LPF — SIGNIFICANT CHANGE UP (ref 0–2)
KETONES UR-MCNC: NEGATIVE — SIGNIFICANT CHANGE UP
LEUKOCYTE ESTERASE UR-ACNC: NEGATIVE — SIGNIFICANT CHANGE UP
NITRITE UR-MCNC: NEGATIVE — SIGNIFICANT CHANGE UP
PH UR: 8 — SIGNIFICANT CHANGE UP (ref 5–8)
PROT UR-MCNC: ABNORMAL
RBC CASTS # UR COMP ASSIST: >50 /HPF — HIGH (ref 0–4)
SP GR SPEC: 1.02 — SIGNIFICANT CHANGE UP (ref 1.01–1.02)
UROBILINOGEN FLD QL: NEGATIVE — SIGNIFICANT CHANGE UP
WBC UR QL: 5 /HPF — SIGNIFICANT CHANGE UP (ref 0–5)

## 2023-03-24 PROCEDURE — 87077 CULTURE AEROBIC IDENTIFY: CPT

## 2023-03-24 PROCEDURE — 99284 EMERGENCY DEPT VISIT MOD MDM: CPT

## 2023-03-24 PROCEDURE — 87186 SC STD MICRODIL/AGAR DIL: CPT

## 2023-03-24 PROCEDURE — 81001 URINALYSIS AUTO W/SCOPE: CPT

## 2023-03-24 PROCEDURE — 87086 URINE CULTURE/COLONY COUNT: CPT

## 2023-03-24 PROCEDURE — 99283 EMERGENCY DEPT VISIT LOW MDM: CPT

## 2023-03-24 NOTE — ED PROVIDER NOTE - OBJECTIVE STATEMENT
Pt is a 89yoM w/hx of dementia (A+O x 1 baseline), afib on eliquis, s/p PPM, recent b/l hip arthroplasty s/p fall, c/b LE DVTs, LUE DVT on eliquis, ye since last hospitalization, bph, gout, subdural hematoma s/p craniotomy, p/w ye catheter dislodgement. Per nursing home report, pt pulled ye catheter out, unable to replace in NH so sent here, no other concerns at this time. Unable to obtain ROS 2/2 patient mentation; no recent fevers/vomiting/diarrhea.

## 2023-03-24 NOTE — ED ADULT NURSE REASSESSMENT NOTE - NS ED NURSE REASSESS COMMENT FT1
Report given to EMS ( [facility]), pt at baseline, no distress noted, empty foly ( 750cc bloody urine)

## 2023-03-24 NOTE — ED ADULT NURSE NOTE - OBJECTIVE STATEMENT
89yo M aaox0 h/o dementia, BPH, chronic ye cath , presents to ED from Nursing Home via EMS, as per EMS pt pulled out his ye cath , on examination genital area with some blood , MD Crum aware, pt denies any pain. Indwelling urinary "ye" catheter inserted using sterile technique. Procedure, risks, and benefits of catheter explained to patient. Second RN present to confirm sterility. Pt tolerated well. Urinary catheter drained dark bloody  urine ( MD Crum made aware) , no clots visualized. Bedside drainage to gravity. Stat lock in place. Safety and comfort measures initiated- bed placed in lowest position and side rails raised.

## 2023-03-24 NOTE — ED PROVIDER NOTE - PATIENT PORTAL LINK FT
You can access the FollowMyHealth Patient Portal offered by [facility] by registering at the following website: [email or web address]. By joining [employer]’s FollowMyHealth portal, you will also be able to view your health information using other applications (apps) compatible with our system.

## 2023-03-24 NOTE — ED PROVIDER NOTE - PHYSICAL EXAMINATION
General: elderly male lying in bed in NAD  Head: Atraumatic  Eyes: EOM grossly in tact, no scleral icterus  ENT: moist mucous membranes  Neurology: A&Ox1 (self), nonfocal, BEATTY x 4  Respiratory: normal respiratory effort  CV: Extremities warm and well perfused  : ye in place draining bloody urine   Abdominal: Soft, non-distended, no suprapubic ttp   Extremities: No edema  Skin: No rashes

## 2023-03-24 NOTE — ED PROVIDER NOTE - CLINICAL SUMMARY MEDICAL DECISION MAKING FREE TEXT BOX
Pt is a 89yoM w/hx of dementia (A+O x 1 baseline), afib on eliquis, s/p PPM, recent b/l hip arthroplasty s/p fall, c/b LE DVTs, LUE DVT on eliquis, ye since last hospitalization, bph, gout, subdural hematoma s/p craniotomy, p/w ye catheter dislodgement, self-removed, no other signs or symptoms of infection at this time, will replace ye w/expectant hematuria given traumatic removal and pt on eliquis, UA+UCx, bladder scan to eval integrity of bladder, and DC back to [facility] w/urology f/u. - Lindsay Brand, PGY-2

## 2023-03-24 NOTE — ED PROCEDURE NOTE - PROCEDURE ADDITIONAL DETAILS
Emergency Department Focused Ultrasound performed at patient's bedside for educational purposes. Jamil catheter in place in bladder on bedside scan.

## 2023-03-24 NOTE — ED ADULT NURSE NOTE - NSIMPLEMENTINTERV_GEN_ALL_ED
Implemented All Fall with Harm Risk Interventions:  [place] to call system. Call bell, personal items and telephone within reach. Instruct patient to call for assistance. Room bathroom lighting operational. Non-slip footwear when patient is off stretcher. Physically safe environment: no spills, clutter or unnecessary equipment. Stretcher in lowest position, wheels locked, appropriate side rails in place. Provide visual cue, wrist band, yellow gown, etc. Monitor gait and stability. Monitor for mental status changes and reorient to person, place, and time. Review medications for side effects contributing to fall risk. Reinforce activity limits and safety measures with patient and family. Provide visual clues: red socks.

## 2023-03-24 NOTE — ED PROVIDER NOTE - ATTENDING CONTRIBUTION TO CARE
Attending (Saskia Jones M.D.):  I have personally seen and examined this patient. I have performed a substantive portion of the visit including all aspects of the medical decision making. Resident and/or ACP note reviewed. I agree on the plan of care except where noted.    89 yo M hx dementia, a fib, DVTs, indwelling ye presenting from NH after pulling out ye catheter. NH staff apparently unable to replace ye so sent to ED.   Vitals wnl. No tachycardia, hypotension or fever.   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 Per nursing home report, pt pulled ye catheter out, unable to replace in NH so sent here, no other concerns at this time. Unable to obtain ROS 2/2 patient mentation; no recent fevers/vomiting/diarrhea. Attending (Saskia Jones M.D.):  I have personally seen and examined this patient. I have performed a substantive portion of the visit including all aspects of the medical decision making. Resident and/or ACP note reviewed. I agree on the plan of care except where noted.    89 yo M hx dementia, a fib, DVTs, indwelling ye presenting from NH after pulling out ye catheter. NH staff apparently unable to replace ye so sent to ED.   Vitals wnl. No tachycardia, hypotension or fever. Per chart review pt with recent admission, also pulled out ye during that admission.   Ye replaced, draining dark urine. Likely traumatic 2/2 multiple attempts at NH and fact that pt pulled out ye. Draining okay. No suprapubic pain. Abd soft ntnd. US shows ye in bladder.     Resident called NH, who accepts pt back. States they were unable to place ye so sent him to ED. No other concerns noted. Called family member who is aware of ED presentation and plan for dc back to NH.

## 2023-03-24 NOTE — ED PROVIDER NOTE - PROGRESS NOTE DETAILS
bedside US showing intact bladder w/ye in place, OK for DC back to NH, called nursing home ([phone])  to report ye replaced successfully, NH ok to receive pt, wife Meggan updated regarding discharge, non-emergent form handed to UR. - Lindsay Brand, PGY-2

## 2023-03-27 NOTE — ED POST DISCHARGE NOTE - OTHER COMMUNICATION
3/28 at 1606: Verbally gave ucx result to pt's physician at facility Dr. Glez, he states he will start Augmentin for the patient, Faxed results to his fax per his request as well ([phone]) and he state he will manage going forward.- LEROY YousifC 3/28 at 1606: Verbally gave ucx result to pt's physician at facility Dr. Glez, he states he will start Augmentin for the patient, Faxed results to his fax per his request as well ([phone]) and he state he will manage going forward. Fax confirmation received by ED rep Edenilson Gallagher PA-C 3/28 at 1606: Verbally gave ucx result to pt's physician at facility Dr. Herring, he states he will start Augmentin for the patient, Faxed results to his fax per his request as well ([phone]) and he states he will manage going forward. Fax confirmation received by ED rep Edenilson Gallagher PA-C

## 2023-03-27 NOTE — ED POST DISCHARGE NOTE - RESULT SUMMARY
Prelim ucx >100K Enterococcus Faecalis. Chart reviewed, pt had same bacteria growing 02/09/23 which was sensitive to ampicillin/augmentin. pt had bacteriemia at that time, given this would treat with Augmentin pending final results. - Gordon Gallagher PA-C

## 2023-03-27 NOTE — ED POST DISCHARGE NOTE - ADDITIONAL DOCUMENTATION
3/28: Wife Lelia update on ucx result and discussion w/ facility above and that pt is to be started on abx. Appreciative of call. - Gordon Gallagher PA-C

## 2023-03-27 NOTE — ED POST DISCHARGE NOTE - DETAILS
3/27: spoke w/ Mehnaz nurse from nursing home, informed her of prelim ucx result, that final results are pending but would start Augmentin in meantime as this was sensitive on prior result. She requested rx be faxed to [phone]. Rx printed by ED CDU PA and faxed by ED rep to the number. Will call if need to change based on final results. - LEROY YousifC 3/27: lennie w/ Mehnaz nurse from nursing home, informed her of prelim ucx result, that final results are pending but would start Augmentin in meantime as this was sensitive on prior result. She requested rx be faxed to [phone]. Rx printed by ED FRANKU PA and faxed by ED rep [place] to the number. Will call if need to change based on final results. - LEROY YousifC 3/28: Final results show sensitivity to Augmentin. Spoke w/ Mehnaz again at facility, states she never got fax yesterday, now requesting just the ucx results be faxed so doctor can rx abx. Confirmed fax#[phone], ED rep pradeep to fax results. Will await fax confirmation. Asked Mehnaz to give me doctors # to relay results, she refuses to do this over the phone, demanding fax w/ the results. - Gordon Gallagher PA-C 3/28 at 1532: Per ED Rep Carole initial fax not going thru, receiving strange dial tone and not sending. Discussed w/ facility, given alternate fax number [phone]. ED rep Carole to attempt this line. - Gordon Gallagher PA-C

## 2023-04-08 ENCOUNTER — EMERGENCY (EMERGENCY)
Facility: HOSPITAL | Age: 88
LOS: 1 days | Discharge: HOME | End: 2023-04-08
Attending: EMERGENCY MEDICINE
Payer: MEDICARE

## 2023-04-08 VITALS
SYSTOLIC BLOOD PRESSURE: 110 MMHG | RESPIRATION RATE: 16 BRPM | HEART RATE: 96 BPM | HEIGHT: 68 IN | DIASTOLIC BLOOD PRESSURE: 71 MMHG | OXYGEN SATURATION: 97 %

## 2023-04-08 VITALS
OXYGEN SATURATION: 98 % | SYSTOLIC BLOOD PRESSURE: 117 MMHG | DIASTOLIC BLOOD PRESSURE: 68 MMHG | TEMPERATURE: 98 F | HEART RATE: 85 BPM | RESPIRATION RATE: 16 BRPM

## 2023-04-08 LAB
APPEARANCE UR: ABNORMAL
BACTERIA # UR AUTO: ABNORMAL
BILIRUB UR-MCNC: NEGATIVE — SIGNIFICANT CHANGE UP
COLOR SPEC: YELLOW — SIGNIFICANT CHANGE UP
DIFF PNL FLD: ABNORMAL
EPI CELLS # UR: 0 /HPF — SIGNIFICANT CHANGE UP
GLUCOSE UR QL: NEGATIVE — SIGNIFICANT CHANGE UP
HYALINE CASTS # UR AUTO: 1 /LPF — SIGNIFICANT CHANGE UP (ref 0–2)
KETONES UR-MCNC: NEGATIVE — SIGNIFICANT CHANGE UP
LEUKOCYTE ESTERASE UR-ACNC: ABNORMAL
NITRITE UR-MCNC: POSITIVE
PH UR: 6.5 — SIGNIFICANT CHANGE UP (ref 5–8)
PROT UR-MCNC: ABNORMAL
RBC CASTS # UR COMP ASSIST: 92 /HPF — HIGH (ref 0–4)
SP GR SPEC: 1.02 — SIGNIFICANT CHANGE UP (ref 1.01–1.02)
UROBILINOGEN FLD QL: NEGATIVE — SIGNIFICANT CHANGE UP
WBC UR QL: 126 /HPF — HIGH (ref 0–5)

## 2023-04-08 PROCEDURE — 99285 EMERGENCY DEPT VISIT HI MDM: CPT | Mod: 25

## 2023-04-08 PROCEDURE — 99284 EMERGENCY DEPT VISIT MOD MDM: CPT

## 2023-04-08 PROCEDURE — 87186 SC STD MICRODIL/AGAR DIL: CPT

## 2023-04-08 PROCEDURE — 51702 INSERT TEMP BLADDER CATH: CPT

## 2023-04-08 PROCEDURE — 87086 URINE CULTURE/COLONY COUNT: CPT

## 2023-04-08 PROCEDURE — 81001 URINALYSIS AUTO W/SCOPE: CPT

## 2023-04-08 PROCEDURE — 87077 CULTURE AEROBIC IDENTIFY: CPT

## 2023-04-08 NOTE — ED PROVIDER NOTE - PATIENT PORTAL LINK FT
You can access the FollowMyHealth Patient Portal offered by Health system by registering at the following website: [email or web address]. By joining [facility]’s FollowMyHealth portal, you will also be able to view your health information using other applications (apps) compatible with our system.

## 2023-04-08 NOTE — ED ADULT NURSE NOTE - OBJECTIVE STATEMENT
pt is a 89yo male PMH cardiac pacemaker, BPH, gout, BIBEMS from [facility] for ye catheter complications. per EMS, pt presenting to the ED after pulling out ye catheter at [facility], no other complaints. upon assessment, some blood noted to pt diaper due to ye removal, dried blood noted to meatus of penis, no active bleeding at site. pt A/O x1, disoriented to time/situation, oriented to person, name, birthday. pt well-appearing, dressed in pt gown, placed on cardiac monitor, paced NS to 60s, repositioned in stretcher to position of comfort. pt is a 89yo male PMH cardiac pacemaker, BPH, gout, BIBEMS from [facility] for ye catheter complications. per EMS, pt presenting to the ED after pulling out ye catheter at [facility], no other complaints. upon assessment, some blood noted to pt diaper due to ye removal, dried blood noted to meatus of penis, no active bleeding at site. pt A/O x1, disoriented to time/situation, oriented to person, name, birthday. pt well-appearing, dressed in pt gown, placed on cardiac monitor, paced NS to 80s, repositioned in stretcher to position of comfort. pt is a 89yo male PMH cardiac pacemaker, Afib, BPH, gout, BIBEMS from [facility] for ye catheter complications. per EMS, pt presenting to the ED after pulling out ye catheter at [facility], no other complaints. upon assessment, some blood noted to pt diaper due to ye removal, dried blood noted to meatus of penis, no active bleeding at site. pt A/O x1, disoriented to time/situation, oriented to person, name, birthday. pt well-appearing, dressed in pt gown, placed on cardiac monitor, paced NS to 80s, repositioned in stretcher to position of comfort.

## 2023-04-08 NOTE — ED PROVIDER NOTE - PROGRESS NOTE DETAILS
Urology consulted, will see pt. - Gordon Gallagher PA-C Urology placed Jamil catheter currently draining clear urine.  Will monitor drainage and obtain sample for UA/ucx. - Gordon Gallagher PA-C Discussed patient case with CHIVO Melton from sending facility was on them.  Confirm no other issues from their standpoint aside from Jamil being dislodged.  Informed them Jamil replaced and awaiting UA result prior to discharge back to facility. - Gordon Gallagher PA-C UA grossly positive.  Chart reviewed with multiple positive UAs in the past as well as cultures growing Enterococcus.  Previous culture predicted susceptibility to Augmentin.  Patient without fever here and appears to baseline mental status.  Discussed with attending Dr. Hercules, joanna discharge home with PO antibiotics and follow-up culture results.  Asked with facility and made them aware of this plan.  They are agreeable to take patient back at this time and will start antibiotics there.  As per them will write antibiotics on discharge paperwork and they will fill. Stable for discharge.  - Gordon Gallagher PA-C

## 2023-04-08 NOTE — ED PROVIDER NOTE - ATTENDING APP SHARED VISIT CONTRIBUTION OF CARE
89y male pmh Demnetia DVT, Gout, BPH, P-Afib on ac, PPM, Fe-Anemia. Osteoarthritis, depression, THR, PT comes to ed c/o self dc ye today (pulled out) at [facility]. Pt poor historian. States nothing is wrong, Can't explain why he is here. PE adult male awake alert normocephalic atraumatic neck supple chest clear anterior & posterior cv no rubs, gallops or murmurs abd soft +bs no mass guarding neruo gcs 15 oriented x1,  blood at meatus.   Brandon Hercules MD, Facep

## 2023-04-08 NOTE — ED PROVIDER NOTE - CLINICAL SUMMARY MEDICAL DECISION MAKING FREE TEXT BOX
Adult male, pmh dementia, ye cath comes to ed form [facility] after pulling out ye. Plan uro cs, for replacment, check labs ua a nd reassess  Brandon Hercules MD, Facep

## 2023-04-08 NOTE — ED ADULT NURSE REASSESSMENT NOTE - NS ED NURSE REASSESS COMMENT FT1
16French urinary catheter placed under sterile technique by Urology team, draining to gravity, output 400ccs clear, dark yellow urine
pt resting in stretcher, no acute distress, 1:1 at bedside, pt pending nonemergent ambulance back to [facility]

## 2023-04-08 NOTE — ED PROVIDER NOTE - NSFOLLOWUPINSTRUCTIONS_ED_ALL_ED_FT
Follow-up with your primary care doctor in the next 2 to 3 days for reevaluation.    It is recommended you take Augmentin 875 mg every 12 hours for 7 days.  A culture result is pending, if this antibiotic needs to be changed we will call the facility.    Return to the Emergency Department immediately if you develop any new/worsening symptoms including but not limited to change in mental status/behavior, confusion, fever, Jamil catheter clogging, blood in urine or any other concerning symptoms. Follow-up with your primary care doctor in the next 2 to 3 days for reevaluation.    It is recommended you take ANTIBIOTIC Augmentin 875 mg every 12 hours for 7 days.  A culture result is pending, if this antibiotic needs to be changed we will call the facility.    Return to the Emergency Department immediately if you develop any new/worsening symptoms including but not limited to change in mental status/behavior, confusion, fever, Jamil catheter clogging, blood in urine or any other concerning symptoms.

## 2023-04-08 NOTE — ED PROVIDER NOTE - PHYSICAL EXAMINATION
Gen: Elderly appearing male, lying in bed in NAD. AOx1 (self, baseline per)  HEENT: NC/AT, PERRLA, EOMI, MMM  Resp: unlabored CTAB  Cardiac: rrr s1s2, no murmurs, rubs or gallops  GI: ND, +BS, Soft, NT  : +scant blood in diaper. +scant dried blood at urethral meatus, no current oozing of blood.   Ext: no pedal edema, no obvious deformity.  Skin: No rashes

## 2023-04-08 NOTE — ED PROVIDER NOTE - OBJECTIVE STATEMENT
89 yo male PMHx dementia (A+ O x1 at baseline), A-fib on Eliquis s/p PPM, bilateral hip arthroplasty c/b LE DVTs, BPH with chronic indwelling Jamil catheter, gout, subdural hematoma s/p craniotomy, resides in skilled nursing facility BIBEMS from facility for dislodged Jamil.  Per report patient removed Jamil catheter on his own earlier this morning with subsequent blood coming from meatus.  Limited history obtained from patient given dementia, however he is without complaints at this time including abdominal pain, penile pain, groin pain, chills, chest pain, shortness of breath.

## 2023-04-10 NOTE — ED POST DISCHARGE NOTE - ADDITIONAL DOCUMENTATION
4/10/23 Bebe PA- pt on augmentin, will not change medication at this time, next admin to f/up sensitivities.

## 2023-04-10 NOTE — ED POST DISCHARGE NOTE - RESULT SUMMARY
prelim >100,000 Enterococcus faecalis prelim >100,000 Enterococcus faecalis   //  4/11/23: final UCx shows sensitivity to ampicillin and by association augmentin, appropriate care received in ED no need for further contact at this time. -Dino Zamorano PA-C

## 2023-07-24 NOTE — ED PROVIDER NOTE - TEMPLATE, MLM
Agree with oral meds  Area with drops but 4 drops twice a day for 7 days is fine.  If left ear starts to get worse, can start drop for that ear as well   Please book him follow up appt with me Wed 8/2 at 12:50    General

## 2023-11-15 NOTE — PROGRESS NOTE ADULT - PROBLEM SELECTOR PLAN 6
You will be notified of your lab results within 48 to 72 hours. If prescriptions are needed at that time, they will be sent to your pharmacy of choice. Cont. Eliquis BID  - Extremity precautions.  - monitor for bleeding

## 2023-11-23 NOTE — ED PROVIDER NOTE - WR ORDER ID 4
Cuffless trach placed 9/17, transitioned to cuffed on 10/3. Oxygen requirements not improving. For mental health patient is on buspirone, quetiapine, and sertraline. For pain, gabapentin, oxycodone scheduled and prn, prn oxycodone mainly utilized for increased pain during chemo and after a bronch. On Guaifenesin, Atrovent and Xopenex for airway maintenance. No improvement in bilateral consolidation or atelectasis and groundglass opacities on repeat CT and chest x-rays. Bronchial cx with 3 colonies CoNS. CTCAP 10/12:  indicates additional groundglass opacity with paraseptal emphysema, which may be contributing to inability to remain off vent. Bronchoscopy performed and unremarkable. Samples sent to lab for culture. Patient was placed back on vent s/p procedure. Now on high flow. Completed 7 day course of cefepime and Vancomycin. Bronchial culture and gram stain negative. Micro negative for CMV, AFB, Fungitell, Pneumocystis jiroveci (carinii), Histoplasma, Aspergillus. Trach secretions sent for sputum culture shows 2+ polys, 1+ gram-positive cocci in pairs, chains and clusters and 1+ gram-negative rods. 11/14 Discussion regarding possibilities of discharge done with family. Family shows understanding of patient's condition. 11/15 Trach is capped and patient is placed on 3 L of NC briefly for 2 days. Patient is back on trach collar at high flow and NC. Cuffless trach was placed 9/17. Replaced with a cuffed Shiley XLT #7 10/3  Pt was on BiPAP via vent for the past 2 nights, tolerating it well   Currently on HFNC on 50 L of oxygen     ECHO 11/21/23 - EF 65%, no patent foramen ovale      11/22 - As per plan, pt was on trach collar with FiO2 at 50%, however pt 2 episodes of hypoxia without any trigger. The episode was associated with tachypnea and dyspnea, SpO2 dropping as low as 50% for 20 mins.  Suctioning, positional changes, coughing, were done in attempt to increase SpO2 however had no effect on the pt's condition. FiO2 was increased from 50% to 70% and eventually to 100%, which increased the SpO2 to 85%. Pt is currently on non breather mask. 11/23 - Pt had one episode of hypoxia associated with tachypnea and dyspnea last night secondary to off target BiPAP settings. SpO2 were dropped as low as 69%. When BiPAP settings were corrected (EPAP 8, PEEP 8, PSV 8, FiO2 70) pt's SpO2 improved in the 90s. Pt is currently on 15 L mid flow via nasal canula. Plan:  Continue to titrate O2 to maintain SpO2 >85%  Aggressive pulmonary toilet   Incentive Spirometry   Suctioning via trach if patient is desatting and unable to expectorate phlegm.   Continue inhalation treatment with Pulmicort and formoterol q12 , duonebs QID, atrovent and xopenex   Regular diet  Daily PT/OT  Follow up with the CXR [id number]

## 2024-01-01 NOTE — BH CONSULTATION LIAISON ASSESSMENT NOTE - NSBHINFOSOURCE_PSY_ALL_CORE
PROVIDER:[TOKEN:[[id number]],FOLLOWUP:[1-3 days]] Patient/Collateral... PROVIDER:[TOKEN:[[id number]],FOLLOWUP:[1-3 days]],PROVIDER:[TOKEN:[[id number]],FOLLOWUP:[1 week]],PROVIDER:[TOKEN:[[id number]],FOLLOWUP:[2 weeks]] PROVIDER:[TOKEN:[[id number]],FOLLOWUP:[1-3 days]],PROVIDER:[TOKEN:[[id number]],SCHEDULEDAPPT:[2024],SCHEDULEDAPPTTIME:[11:00 AM]],PROVIDER:[TOKEN:[[id number]],FOLLOWUP:[2 weeks]]

## 2024-04-11 NOTE — ED PROVIDER NOTE - PRO INTERPRETER NEED 2
Denies known Latex allergy or symptoms of Latex sensitivity.  Medications reviewed and updated.  Tobacco use verified       English

## 2024-05-10 NOTE — PROVIDER CONTACT NOTE (OTHER) - BACKGROUND
Admitted for UTI/urosepsis. PMH: Alzheimers, gout, BPH, urinary retention with ye insertion 2/9, anemia, DVT on eliquis, a fib. Pt on bilateral wrist restraints for pulling out lines.
Pt. admited with UTI, PMHx of dementia, afib, recent b/l hip arthoplasty.
Pt has L extremity prec, unable to put IV in Left arm. Right arm currently swollen from infiltrated IV.
Admitted for UTI/urosepsis. PMH: Alzheimers, gout, BPH, urinary retention/ye insertion 2/9, a fib, anemia, DVT on eliquis. Pt on bilateral wrist restraints for pulling out lines
No

## 2024-08-31 NOTE — BH CONSULTATION LIAISON ASSESSMENT NOTE - NSBHREFERIPTEAMCONTACT_PSY_A_CORE_FT
Patient was admitted by [place] service for pain control and monitoring of priapism. Upon chart review and starting H&P, noticed that patient was in OR for drainage with Urology. Was planning to examine patient post-procedure.     Around 1400, noticed incomplete nursing note that patient was being discharged. Reached out to PACU nursing who stated that Urology had placed DC orders. Urology unaware that patient was admitted. Patient was called at home, still having symptoms and in pain, he was asked to return to the hospital.     Being that I had not physically seen the patient on this encounter, pended note and orders I placed were discountinued. Since he already had a bed on [address] [id number], he was re-admitted as a direct admit for pain control and priapism monitoring. See new encounter for same day for H&P, further hospital course and documentation.       Mary Moody, APRN-CNP   
[phone]

## 2024-09-23 NOTE — ED ADULT TRIAGE NOTE - INTERNATIONAL TRAVEL
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 Abdominal pain has resolved. States that abdominal pain was preceded by flu-like illness. History of MS with initial concern for MS flare. Currently tolerating regular diet and having normal bowel function.    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   -Possible etiologies may include but are not limited to connective tissue disorders, FMD, segmental arteriole mediolysis, smoking, atherosclerosis, ETOH abuse, heavy weight lifting/strenuous activity, obesity.  -Continue medical optimization. Currently on ASA, therapeutic xarelto and statin.   -Discussed signs and symptoms of worsening SMA dissection/rupture including postprandial pain, food fear, sudden onset abdominal pain with associated systemic symptoms and advised to call the office or go to the ED.  -Follow-up with repeat CTA a/p in 3 months with follow-up to re-evaluate symptoms. If CTA remains stable or improved, will continue surveillance with mesenteric duplex.      Orders:    CTA abdomen pelvis w wo contrast; Future    
No

## 2024-12-27 NOTE — H&P ADULT - PROBLEM SELECTOR PLAN 3
Quality 130: Documentation Of Current Medications In The Medical Record: Current Medications Documented Quality 431: Preventive Care And Screening: Unhealthy Alcohol Use - Screening: Patient not identified as an unhealthy alcohol user when screened for unhealthy alcohol use using a systematic screening method Detail Level: Detailed Quality 226: Preventive Care And Screening: Tobacco Use: Screening And Cessation Intervention: Patient screened for tobacco use and is an ex/non-smoker UA positive, outpt UCx positive for Klebsiella sensitive to Zosyn. Was getting cipro  -Cont. IV Zosyn  -F/u UCx  -F/u BCxs

## (undated) DEVICE — GLV 8.5 PROTEXIS (WHITE)

## (undated) DEVICE — SAW BLADE STRYKER RECIPROCATING 77.6X0.77X11.2MM

## (undated) DEVICE — SPECIMEN CONTAINER 100ML

## (undated) DEVICE — ELCTR BOVIE PENCIL SMOKE EVACUATION

## (undated) DEVICE — GLV 7.5 PROTEXIS (WHITE)

## (undated) DEVICE — DRSG TAPE MICROFOAM 3"

## (undated) DEVICE — PACK TOTAL HIP (2 PACKS)

## (undated) DEVICE — SOL IRR POUR NS 0.9% 500ML

## (undated) DEVICE — PRESSURIZER FEM CNL W/O HUB MED

## (undated) DEVICE — STRYKER FEMORAL CANAL BRUSH

## (undated) DEVICE — POSITIONER FOAM ABDUCTION PILLOW MED (PINK)

## (undated) DEVICE — SUCTION YANKAUER NO CONTROL VENT

## (undated) DEVICE — DRAPE 3/4 SHEET W REINFORCEMENT 56X77"

## (undated) DEVICE — POSITIONER FOAM EGG CRATE ULNAR 2PCS (PINK)

## (undated) DEVICE — SUT POLYSORB 0 30" GS-21 UNDYED

## (undated) DEVICE — SUT POLYSORB 2-0 36" GS-21 UNDYED

## (undated) DEVICE — SAW BLADE MICROAIRE RECIPROCATING 12.2MMX80MMX1.78MM

## (undated) DEVICE — GLV 6.5 PROTEXIS (WHITE)

## (undated) DEVICE — VENODYNE/SCD SLEEVE CALF LARGE

## (undated) DEVICE — STRYKER INTERPULSE HANDPIECE W IRR SUCTION TUBE

## (undated) DEVICE — WARMING BLANKET UPPER ADULT

## (undated) DEVICE — SOL IRR POUR H2O 1000ML

## (undated) DEVICE — Device

## (undated) DEVICE — HOOD FLYTE STRYKER HELMET SHIELD

## (undated) DEVICE — STAPLER SKIN VISI-STAT 35 WIDE

## (undated) DEVICE — SOL IRR BAG NS 0.9% 3000ML

## (undated) DEVICE — GLV 7 PROTEXIS (WHITE)

## (undated) DEVICE — DRAPE HIP W POUCHES 87X115X134"

## (undated) DEVICE — GLV 8 PROTEXIS (WHITE)

## (undated) DEVICE — SUT POLYSORB 1 36" GS-21 UNDYED